# Patient Record
Sex: MALE | Race: WHITE | NOT HISPANIC OR LATINO | Employment: OTHER | URBAN - METROPOLITAN AREA
[De-identification: names, ages, dates, MRNs, and addresses within clinical notes are randomized per-mention and may not be internally consistent; named-entity substitution may affect disease eponyms.]

---

## 2017-06-15 ENCOUNTER — HISTORICAL (OUTPATIENT)
Dept: LAB | Facility: HOSPITAL | Age: 77
End: 2017-06-15

## 2017-09-26 ENCOUNTER — HISTORICAL (OUTPATIENT)
Dept: LAB | Facility: HOSPITAL | Age: 77
End: 2017-09-26

## 2017-12-19 ENCOUNTER — HISTORICAL (OUTPATIENT)
Dept: LAB | Facility: HOSPITAL | Age: 77
End: 2017-12-19

## 2018-03-19 ENCOUNTER — HISTORICAL (OUTPATIENT)
Dept: LAB | Facility: HOSPITAL | Age: 78
End: 2018-03-19

## 2018-03-19 LAB
ALBUMIN SERPL-MCNC: 4 GM/DL (ref 3.4–5)
ALBUMIN/GLOB SERPL: 1.1 RATIO (ref 1.1–2)
ALP SERPL-CCNC: 46 UNIT/L (ref 46–116)
ALT SERPL-CCNC: 24 UNIT/L (ref 12–78)
AST SERPL-CCNC: 14 UNIT/L (ref 15–37)
BILIRUB SERPL-MCNC: 0.6 MG/DL (ref 0.2–1)
BILIRUB SERPL-MCNC: NEGATIVE MG/DL
BILIRUBIN DIRECT+TOT PNL SERPL-MCNC: 0.21 MG/DL (ref 0–0.2)
BILIRUBIN DIRECT+TOT PNL SERPL-MCNC: 0.39 MG/DL (ref 0–0.8)
BLOOD URINE, POC: NEGATIVE
BUN SERPL-MCNC: 15.6 MG/DL (ref 7–18)
CALCIUM SERPL-MCNC: 9.3 MG/DL (ref 8.5–10.1)
CHLORIDE SERPL-SCNC: 105 MMOL/L (ref 98–107)
CLARITY, POC UA: CLEAR
CO2 SERPL-SCNC: 25.5 MMOL/L (ref 21–32)
COLOR, POC UA: NORMAL
CREAT SERPL-MCNC: 0.93 MG/DL (ref 0.6–1.3)
EST. AVERAGE GLUCOSE BLD GHB EST-MCNC: 140 MG/DL
GLOBULIN SER-MCNC: 3.6 GM/DL (ref 2.4–3.5)
GLUCOSE SERPL-MCNC: 139 MG/DL (ref 74–106)
GLUCOSE UR QL STRIP: NEGATIVE
HBA1C MFR BLD: 6.5 % (ref 4.5–6.2)
KETONES UR QL STRIP: NEGATIVE
LEUKOCYTE EST, POC UA: NEGATIVE
NITRITE, POC UA: NEGATIVE
PH, POC UA: 5.5
POTASSIUM SERPL-SCNC: 4.4 MMOL/L (ref 3.5–5.1)
PROT SERPL-MCNC: 7.6 GM/DL (ref 6.4–8.2)
PROTEIN, POC: NEGATIVE
SODIUM SERPL-SCNC: 142 MMOL/L (ref 136–145)
SPECIFIC GRAVITY, POC UA: 1.02
UROBILINOGEN, POC UA: NORMAL

## 2018-08-15 ENCOUNTER — HISTORICAL (OUTPATIENT)
Dept: RADIOLOGY | Facility: HOSPITAL | Age: 78
End: 2018-08-15

## 2018-10-10 ENCOUNTER — HISTORICAL (OUTPATIENT)
Dept: LAB | Facility: HOSPITAL | Age: 78
End: 2018-10-10

## 2018-12-11 ENCOUNTER — HISTORICAL (OUTPATIENT)
Dept: LAB | Facility: HOSPITAL | Age: 78
End: 2018-12-11

## 2019-01-23 ENCOUNTER — HISTORICAL (OUTPATIENT)
Dept: LAB | Facility: HOSPITAL | Age: 79
End: 2019-01-23

## 2019-04-22 ENCOUNTER — HISTORICAL (OUTPATIENT)
Dept: LAB | Facility: HOSPITAL | Age: 79
End: 2019-04-22

## 2019-10-30 ENCOUNTER — HISTORICAL (OUTPATIENT)
Dept: LAB | Facility: HOSPITAL | Age: 79
End: 2019-10-30

## 2019-11-07 ENCOUNTER — HISTORICAL (OUTPATIENT)
Dept: RADIOLOGY | Facility: HOSPITAL | Age: 79
End: 2019-11-07

## 2020-01-08 ENCOUNTER — HISTORICAL (OUTPATIENT)
Dept: LAB | Facility: HOSPITAL | Age: 80
End: 2020-01-08

## 2020-01-10 LAB — FINAL CULTURE: NORMAL

## 2020-02-17 ENCOUNTER — HISTORICAL (OUTPATIENT)
Dept: LAB | Facility: HOSPITAL | Age: 80
End: 2020-02-17

## 2020-09-14 ENCOUNTER — HISTORICAL (OUTPATIENT)
Dept: LAB | Facility: HOSPITAL | Age: 80
End: 2020-09-14

## 2021-03-23 ENCOUNTER — HISTORICAL (OUTPATIENT)
Dept: LAB | Facility: HOSPITAL | Age: 81
End: 2021-03-23

## 2021-08-31 ENCOUNTER — HISTORICAL (OUTPATIENT)
Dept: ADMINISTRATIVE | Facility: HOSPITAL | Age: 81
End: 2021-08-31

## 2021-09-07 ENCOUNTER — HISTORICAL (OUTPATIENT)
Dept: ADMINISTRATIVE | Facility: HOSPITAL | Age: 81
End: 2021-09-07

## 2021-11-29 ENCOUNTER — HISTORICAL (OUTPATIENT)
Dept: LAB | Facility: HOSPITAL | Age: 81
End: 2021-11-29

## 2021-12-01 ENCOUNTER — HISTORICAL (OUTPATIENT)
Dept: RADIOLOGY | Facility: HOSPITAL | Age: 81
End: 2021-12-01

## 2022-04-07 ENCOUNTER — HISTORICAL (OUTPATIENT)
Dept: ADMINISTRATIVE | Facility: HOSPITAL | Age: 82
End: 2022-04-07
Payer: MEDICARE

## 2022-04-24 VITALS
OXYGEN SATURATION: 97 % | SYSTOLIC BLOOD PRESSURE: 138 MMHG | HEIGHT: 70 IN | WEIGHT: 231.5 LBS | BODY MASS INDEX: 33.14 KG/M2 | DIASTOLIC BLOOD PRESSURE: 82 MMHG

## 2022-04-30 NOTE — OP NOTE
Patient:   Juanjose Chawla            MRN: [id number]            FIN: [id number]               Age:   80 years     Sex:  Male     :  1940   Associated Diagnoses:   None   Author:   Luis Miguel Moran MD      Preoperative Diagnosis:  Cataract Right  eye    Postoperative Diagnosis:  Cataract Right  eye    Procedure:  Phacoemulsification with intraocular lens implantation Right  eye    Surgeon:  Luis Miguel Moran MD    Assistant:  NASH Cavazos     Anestheisa:  MAC    Complications:  None    The patient was brought to the [facility] laser.  A time out was performed.  The capsulotomy, lens fragmentation and limbal relaxing incisions were completed.  Then the patient was brought into the operating suite, where the patient was correctly identified as was the operative eye via timeout.  The patient was prepped and draped in a sterile ophthalmic fashion.  A lid speculum was placed in the operative eye and the microscope was brought into place.  A 1.0 mm paracentesis was then made at (_12_) o'clock.  The anterior chamber was filled with Endocoat.  A (temporal ) clear corneal incision was made with a 2.4 mm keratome blade.  A 6 mm corneal marking ring was used to nicole the cornea centered over the visual axis.  A 5.00 mm continuous curvilinear capsulorhexis was fashioned using a cystotome and microcapsular forceps.  Hydrodissection and hydrodelineation was performed with upreserved 1% Xylocaine.  The nucleus was then phacoemulsified with the Abbott phacoemulsification hand-piece with a total of (_21_) EFX.  The cortex was then removed with the I/A hand-piece.  The lens model (_ZCB00_) with a power of (_22.0_) was placed in the capsular bag.  The Helon was then removed from the eye with the I/A hand piece.  The anterior chamber was inflated and the wounds were hydrated with BSS.  The wounds were checked with Weck-Veena sponges and found to be watertight.  The lid speculum was removed and topical antibiotics were placed on  the operative eye.  The patient was brought to PACU in good condition.

## 2022-04-30 NOTE — OP NOTE
Patient:   Juanjose Chawla            MRN: [id number]            FIN: [id number]               Age:   80 years     Sex:  Male     :  1940   Associated Diagnoses:   None   Author:   Luis Miguel Moran MD      Preoperative Diagnosis:  Cataract  Left eye    Postoperative Diagnosis:  Cataract  Left eye    Procedure:  Phacoemulsification with intraocular lens implantation  Left eye    Surgeon:  Luis Miguel Moran MD    Assistant:  NASH Cavazos     Anestheisa:  MAC    Complications:  None    The patient was brought to the hospitals laser.  A time out was performed.  The capsulotomy, lens fragmentation and limbal relaxing incisions were completed.  Then the patient was brought into the operating suite, where the patient was correctly identified as was the operative eye via timeout.  The patient was prepped and draped in a sterile ophthalmic fashion.  A lid speculum was placed in the operative eye and the microscope was brought into place.  A 1.0 mm paracentesis was then made at (_6_) o'clock.  The anterior chamber was filled with Endocoat.  A (temporal ) clear corneal incision was made with a 2.4 mm keratome blade.  A 6 mm corneal marking ring was used to nicole the cornea centered over the visual axis.  A 5.00 mm continuous curvilinear capsulorhexis was fashioned using a cystotome and microcapsular forceps.  Hydrodissection and hydrodelineation was performed with upreserved 1% Xylocaine.  The nucleus was then phacoemulsified with the Abbott phacoemulsification hand-piece with a total of (_19_) EFX.  The cortex was then removed with the I/A hand-piece.  The lens model (_ZCB00_) with a power of (_24.5_) was placed in the capsular bag.  The Helon was then removed from the eye with the I/A hand piece.  The anterior chamber was inflated and the wounds were hydrated with BSS.  The wounds were checked with Weck-Veena sponges and found to be watertight.  The lid speculum was removed and topical antibiotics were placed on the  operative eye.  The patient was brought to PACU in good condition.

## 2022-06-13 ENCOUNTER — LAB VISIT (OUTPATIENT)
Dept: LAB | Facility: HOSPITAL | Age: 82
End: 2022-06-13
Attending: FAMILY MEDICINE
Payer: MEDICARE

## 2022-06-13 DIAGNOSIS — E11.59 TYPE 2 DIABETES MELLITUS WITH VASCULAR DISEASE: Primary | ICD-10-CM

## 2022-06-13 DIAGNOSIS — E66.9 OBESITY, UNSPECIFIED CLASSIFICATION, UNSPECIFIED OBESITY TYPE, UNSPECIFIED WHETHER SERIOUS COMORBIDITY PRESENT: ICD-10-CM

## 2022-06-13 DIAGNOSIS — E55.9 VITAMIN D DEFICIENCY: ICD-10-CM

## 2022-06-13 DIAGNOSIS — D64.9 CHRONIC ANEMIA: ICD-10-CM

## 2022-06-13 DIAGNOSIS — I10 ESSENTIAL HYPERTENSION, MALIGNANT: ICD-10-CM

## 2022-06-13 LAB
ALBUMIN SERPL-MCNC: 3.7 GM/DL (ref 3.4–4.8)
ALBUMIN/GLOB SERPL: 1.1 RATIO (ref 1.1–2)
ALP SERPL-CCNC: 51 UNIT/L (ref 40–150)
ALT SERPL-CCNC: 15 UNIT/L (ref 0–55)
AST SERPL-CCNC: 18 UNIT/L (ref 5–34)
BASOPHILS # BLD AUTO: 0.03 X10(3)/MCL (ref 0–0.2)
BASOPHILS NFR BLD AUTO: 0.4 %
BILIRUBIN DIRECT+TOT PNL SERPL-MCNC: 0.7 MG/DL
BUN SERPL-MCNC: 15.8 MG/DL (ref 8.4–25.7)
CALCIUM SERPL-MCNC: 9.6 MG/DL (ref 8.8–10)
CHLORIDE SERPL-SCNC: 106 MMOL/L (ref 98–107)
CHOLEST SERPL-MCNC: 104 MG/DL
CHOLEST/HDLC SERPL: 2 {RATIO} (ref 0–5)
CO2 SERPL-SCNC: 25 MMOL/L (ref 23–31)
CREAT SERPL-MCNC: 0.81 MG/DL (ref 0.73–1.18)
CREAT UR-MCNC: 196 MG/DL (ref 63–166)
DEPRECATED CALCIDIOL+CALCIFEROL SERPL-MC: 52.8 NG/ML (ref 30–80)
EOSINOPHIL # BLD AUTO: 0.11 X10(3)/MCL (ref 0–0.9)
EOSINOPHIL NFR BLD AUTO: 1.6 %
ERYTHROCYTE [DISTWIDTH] IN BLOOD BY AUTOMATED COUNT: 12.5 % (ref 11.5–17)
ERYTHROCYTE [SEDIMENTATION RATE] IN BLOOD: 21 MM/HR (ref 0–15)
EST. AVERAGE GLUCOSE BLD GHB EST-MCNC: 180 MG/DL
FERRITIN SERPL-MCNC: 100.3 NG/ML (ref 21.81–274.66)
GLOBULIN SER-MCNC: 3.5 GM/DL (ref 2.4–3.5)
GLUCOSE SERPL-MCNC: 164 MG/DL (ref 82–115)
HBA1C MFR BLD: 7.9 %
HCT VFR BLD AUTO: 37.5 % (ref 42–52)
HDLC SERPL-MCNC: 45 MG/DL (ref 35–60)
HGB BLD-MCNC: 11.8 GM/DL (ref 14–18)
IMM GRANULOCYTES # BLD AUTO: 0.02 X10(3)/MCL (ref 0–0.02)
IMM GRANULOCYTES NFR BLD AUTO: 0.3 % (ref 0–0.43)
IRON SATN MFR SERPL: 28 % (ref 20–50)
IRON SERPL-MCNC: 73 UG/DL (ref 65–175)
LDLC SERPL CALC-MCNC: 40 MG/DL (ref 50–140)
LYMPHOCYTES # BLD AUTO: 2.23 X10(3)/MCL (ref 0.6–4.6)
LYMPHOCYTES NFR BLD AUTO: 32.4 %
MCH RBC QN AUTO: 29.6 PG (ref 27–31)
MCHC RBC AUTO-ENTMCNC: 31.5 MG/DL (ref 33–36)
MCV RBC AUTO: 94 FL (ref 80–94)
MICROALBUMIN UR-MCNC: 12.9 UG/ML
MICROALBUMIN/CREAT RATIO PNL UR: 6.6 MG/GM CR (ref 0–30)
MONOCYTES # BLD AUTO: 0.68 X10(3)/MCL (ref 0.1–1.3)
MONOCYTES NFR BLD AUTO: 9.9 %
NEUTROPHILS # BLD AUTO: 3.8 X10(3)/MCL (ref 2.1–9.2)
NEUTROPHILS NFR BLD AUTO: 55.4 %
PLATELET # BLD AUTO: 157 X10(3)/MCL (ref 130–400)
PMV BLD AUTO: 9.8 FL (ref 9.4–12.4)
POTASSIUM SERPL-SCNC: 4.8 MMOL/L (ref 3.5–5.1)
PROT SERPL-MCNC: 7.2 GM/DL (ref 5.8–7.6)
RBC # BLD AUTO: 3.99 X10(6)/MCL (ref 4.7–6.1)
SODIUM SERPL-SCNC: 140 MMOL/L (ref 136–145)
TIBC SERPL-MCNC: 188 UG/DL (ref 69–240)
TIBC SERPL-MCNC: 261 UG/DL (ref 250–450)
TRANSFERRIN SERPL-MCNC: 230 MG/DL
TRIGL SERPL-MCNC: 97 MG/DL (ref 34–140)
TSH SERPL-ACNC: 1.4 UIU/ML (ref 0.35–4.94)
VLDLC SERPL CALC-MCNC: 19 MG/DL
WBC # SPEC AUTO: 6.9 X10(3)/MCL (ref 4.5–11.5)

## 2022-06-13 PROCEDURE — 80053 COMPREHEN METABOLIC PANEL: CPT

## 2022-06-13 PROCEDURE — 82043 UR ALBUMIN QUANTITATIVE: CPT

## 2022-06-13 PROCEDURE — 83036 HEMOGLOBIN GLYCOSYLATED A1C: CPT

## 2022-06-13 PROCEDURE — 82306 VITAMIN D 25 HYDROXY: CPT

## 2022-06-13 PROCEDURE — 85651 RBC SED RATE NONAUTOMATED: CPT

## 2022-06-13 PROCEDURE — 83540 ASSAY OF IRON: CPT

## 2022-06-13 PROCEDURE — 84443 ASSAY THYROID STIM HORMONE: CPT

## 2022-06-13 PROCEDURE — 82728 ASSAY OF FERRITIN: CPT

## 2022-06-13 PROCEDURE — 85025 COMPLETE CBC W/AUTO DIFF WBC: CPT

## 2022-06-13 PROCEDURE — 36415 COLL VENOUS BLD VENIPUNCTURE: CPT

## 2022-06-13 PROCEDURE — 80061 LIPID PANEL: CPT

## 2022-06-30 RX ORDER — ROSUVASTATIN CALCIUM 20 MG/1
20 TABLET, COATED ORAL DAILY
COMMUNITY
Start: 2022-05-24

## 2022-06-30 RX ORDER — METFORMIN HYDROCHLORIDE 1000 MG/1
1000 TABLET ORAL 2 TIMES DAILY
COMMUNITY
Start: 2022-05-24

## 2022-06-30 RX ORDER — DOXAZOSIN 8 MG/1
8 TABLET ORAL NIGHTLY
COMMUNITY
Start: 2022-05-24

## 2022-06-30 RX ORDER — AMLODIPINE BESYLATE 5 MG/1
5 TABLET ORAL DAILY
COMMUNITY
Start: 2022-05-24

## 2022-06-30 RX ORDER — SITAGLIPTIN 100 MG/1
100 TABLET, FILM COATED ORAL DAILY
COMMUNITY
Start: 2022-05-24

## 2022-06-30 RX ORDER — LISINOPRIL 2.5 MG/1
2.5 TABLET ORAL DAILY
COMMUNITY
Start: 2022-05-24

## 2022-06-30 RX ORDER — FAMOTIDINE 20 MG/1
20 TABLET, FILM COATED ORAL DAILY
COMMUNITY
Start: 2022-05-24

## 2022-06-30 RX ORDER — ACYCLOVIR 400 MG/1
400 TABLET ORAL
COMMUNITY
Start: 2022-05-10

## 2022-09-15 ENCOUNTER — HISTORICAL (OUTPATIENT)
Dept: ADMINISTRATIVE | Facility: HOSPITAL | Age: 82
End: 2022-09-15
Payer: MEDICARE

## 2022-11-02 ENCOUNTER — LAB VISIT (OUTPATIENT)
Dept: LAB | Facility: HOSPITAL | Age: 82
End: 2022-11-02
Attending: FAMILY MEDICINE
Payer: MEDICARE

## 2022-11-02 DIAGNOSIS — I10 HYPERTENSION, UNSPECIFIED TYPE: Primary | ICD-10-CM

## 2022-11-02 DIAGNOSIS — E78.5 HYPERLIPIDEMIA, UNSPECIFIED HYPERLIPIDEMIA TYPE: ICD-10-CM

## 2022-11-02 DIAGNOSIS — E66.9 OBESITY, UNSPECIFIED CLASSIFICATION, UNSPECIFIED OBESITY TYPE, UNSPECIFIED WHETHER SERIOUS COMORBIDITY PRESENT: ICD-10-CM

## 2022-11-02 DIAGNOSIS — E11.69 DIABETES MELLITUS ASSOCIATED WITH HORMONAL ETIOLOGY: ICD-10-CM

## 2022-11-02 LAB
ANION GAP SERPL CALC-SCNC: 11 MEQ/L
BASOPHILS # BLD AUTO: 0.02 X10(3)/MCL (ref 0–0.2)
BASOPHILS NFR BLD AUTO: 0.3 %
BUN SERPL-MCNC: 15.2 MG/DL (ref 8.4–25.7)
CALCIUM SERPL-MCNC: 9 MG/DL (ref 8.8–10)
CHLORIDE SERPL-SCNC: 106 MMOL/L (ref 98–107)
CO2 SERPL-SCNC: 25 MMOL/L (ref 23–31)
CREAT SERPL-MCNC: 1.02 MG/DL (ref 0.73–1.18)
CREAT/UREA NIT SERPL: 15
EOSINOPHIL # BLD AUTO: 0.35 X10(3)/MCL (ref 0–0.9)
EOSINOPHIL NFR BLD AUTO: 4.4 %
ERYTHROCYTE [DISTWIDTH] IN BLOOD BY AUTOMATED COUNT: 12.8 % (ref 11.5–17)
EST. AVERAGE GLUCOSE BLD GHB EST-MCNC: 154.2 MG/DL
GFR SERPLBLD CREATININE-BSD FMLA CKD-EPI: >60 MLS/MIN/1.73/M2
GLUCOSE SERPL-MCNC: 161 MG/DL (ref 82–115)
HBA1C MFR BLD: 7 %
HCT VFR BLD AUTO: 40.7 % (ref 42–52)
HGB BLD-MCNC: 12.5 GM/DL (ref 14–18)
IMM GRANULOCYTES # BLD AUTO: 0.02 X10(3)/MCL (ref 0–0.04)
IMM GRANULOCYTES NFR BLD AUTO: 0.3 %
LYMPHOCYTES # BLD AUTO: 2.75 X10(3)/MCL (ref 0.6–4.6)
LYMPHOCYTES NFR BLD AUTO: 34.7 %
MCH RBC QN AUTO: 29.1 PG (ref 27–31)
MCHC RBC AUTO-ENTMCNC: 30.7 MG/DL (ref 33–36)
MCV RBC AUTO: 94.9 FL (ref 80–94)
MONOCYTES # BLD AUTO: 0.72 X10(3)/MCL (ref 0.1–1.3)
MONOCYTES NFR BLD AUTO: 9.1 %
NEUTROPHILS # BLD AUTO: 4.1 X10(3)/MCL (ref 2.1–9.2)
NEUTROPHILS NFR BLD AUTO: 51.2 %
PLATELET # BLD AUTO: 166 X10(3)/MCL (ref 130–400)
PMV BLD AUTO: 9.5 FL (ref 7.4–10.4)
POTASSIUM SERPL-SCNC: 4.2 MMOL/L (ref 3.5–5.1)
RBC # BLD AUTO: 4.29 X10(6)/MCL (ref 4.7–6.1)
SODIUM SERPL-SCNC: 142 MMOL/L (ref 136–145)
WBC # SPEC AUTO: 7.9 X10(3)/MCL (ref 4.5–11.5)

## 2022-11-02 PROCEDURE — 80048 BASIC METABOLIC PNL TOTAL CA: CPT

## 2022-11-02 PROCEDURE — 85025 COMPLETE CBC W/AUTO DIFF WBC: CPT

## 2022-11-02 PROCEDURE — 83036 HEMOGLOBIN GLYCOSYLATED A1C: CPT

## 2022-11-02 PROCEDURE — 36415 COLL VENOUS BLD VENIPUNCTURE: CPT

## 2023-06-14 ENCOUNTER — LAB VISIT (OUTPATIENT)
Dept: LAB | Facility: HOSPITAL | Age: 83
End: 2023-06-14
Attending: FAMILY MEDICINE
Payer: MEDICARE

## 2023-06-14 DIAGNOSIS — I15.2 OBESITY, DIABETES, AND HYPERTENSION SYNDROME: Primary | ICD-10-CM

## 2023-06-14 DIAGNOSIS — I10 ESSENTIAL HYPERTENSION, MALIGNANT: ICD-10-CM

## 2023-06-14 DIAGNOSIS — D64.9 ANEMIA, UNSPECIFIED TYPE: ICD-10-CM

## 2023-06-14 DIAGNOSIS — E11.59 OBESITY, DIABETES, AND HYPERTENSION SYNDROME: Primary | ICD-10-CM

## 2023-06-14 DIAGNOSIS — E66.9 OBESITY, DIABETES, AND HYPERTENSION SYNDROME: Primary | ICD-10-CM

## 2023-06-14 DIAGNOSIS — E55.9 AVITAMINOSIS D: ICD-10-CM

## 2023-06-14 DIAGNOSIS — E11.69 OBESITY, DIABETES, AND HYPERTENSION SYNDROME: Primary | ICD-10-CM

## 2023-06-14 LAB
ALBUMIN SERPL-MCNC: 3.7 G/DL (ref 3.4–4.8)
ALBUMIN/GLOB SERPL: 1 RATIO (ref 1.1–2)
ALP SERPL-CCNC: 60 UNIT/L (ref 40–150)
ALT SERPL-CCNC: 9 UNIT/L (ref 0–55)
AST SERPL-CCNC: 14 UNIT/L (ref 5–34)
BILIRUBIN DIRECT+TOT PNL SERPL-MCNC: 0.7 MG/DL
BUN SERPL-MCNC: 15.5 MG/DL (ref 8.4–25.7)
CALCIUM SERPL-MCNC: 9.3 MG/DL (ref 8.8–10)
CHLORIDE SERPL-SCNC: 107 MMOL/L (ref 98–107)
CHOLEST SERPL-MCNC: 112 MG/DL
CHOLEST/HDLC SERPL: 2 {RATIO} (ref 0–5)
CO2 SERPL-SCNC: 26 MMOL/L (ref 23–31)
CREAT SERPL-MCNC: 0.87 MG/DL (ref 0.73–1.18)
DEPRECATED CALCIDIOL+CALCIFEROL SERPL-MC: 57.3 NG/ML (ref 30–80)
ERYTHROCYTE [DISTWIDTH] IN BLOOD BY AUTOMATED COUNT: 13.1 % (ref 11.5–17)
EST. AVERAGE GLUCOSE BLD GHB EST-MCNC: 171.4 MG/DL
GFR SERPLBLD CREATININE-BSD FMLA CKD-EPI: >60 MLS/MIN/1.73/M2
GLOBULIN SER-MCNC: 3.6 GM/DL (ref 2.4–3.5)
GLUCOSE SERPL-MCNC: 144 MG/DL (ref 82–115)
HBA1C MFR BLD: 7.6 %
HCT VFR BLD AUTO: 40.6 % (ref 42–52)
HDLC SERPL-MCNC: 52 MG/DL (ref 35–60)
HGB BLD-MCNC: 12.6 G/DL (ref 14–18)
LDLC SERPL CALC-MCNC: 45 MG/DL (ref 50–140)
MCH RBC QN AUTO: 29.6 PG (ref 27–31)
MCHC RBC AUTO-ENTMCNC: 31 G/DL (ref 33–36)
MCV RBC AUTO: 95.5 FL (ref 80–94)
PLATELET # BLD AUTO: 145 X10(3)/MCL (ref 130–400)
PMV BLD AUTO: 9.6 FL (ref 7.4–10.4)
POTASSIUM SERPL-SCNC: 4.4 MMOL/L (ref 3.5–5.1)
PROT SERPL-MCNC: 7.3 GM/DL (ref 5.8–7.6)
RBC # BLD AUTO: 4.25 X10(6)/MCL (ref 4.7–6.1)
SODIUM SERPL-SCNC: 141 MMOL/L (ref 136–145)
TRIGL SERPL-MCNC: 76 MG/DL (ref 34–140)
TSH SERPL-ACNC: 1.19 UIU/ML (ref 0.35–4.94)
VLDLC SERPL CALC-MCNC: 15 MG/DL
WBC # SPEC AUTO: 6.45 X10(3)/MCL (ref 4.5–11.5)

## 2023-06-14 PROCEDURE — 83036 HEMOGLOBIN GLYCOSYLATED A1C: CPT

## 2023-06-14 PROCEDURE — 82306 VITAMIN D 25 HYDROXY: CPT

## 2023-06-14 PROCEDURE — 85027 COMPLETE CBC AUTOMATED: CPT

## 2023-06-14 PROCEDURE — 80061 LIPID PANEL: CPT

## 2023-06-14 PROCEDURE — 84443 ASSAY THYROID STIM HORMONE: CPT

## 2023-06-14 PROCEDURE — 36415 COLL VENOUS BLD VENIPUNCTURE: CPT

## 2023-06-14 PROCEDURE — 80053 COMPREHEN METABOLIC PANEL: CPT

## 2024-01-29 ENCOUNTER — LAB VISIT (OUTPATIENT)
Dept: LAB | Facility: HOSPITAL | Age: 84
End: 2024-01-29
Attending: FAMILY MEDICINE
Payer: MEDICARE

## 2024-01-29 DIAGNOSIS — E11.69 OBESITY, DIABETES, AND HYPERTENSION SYNDROME: ICD-10-CM

## 2024-01-29 DIAGNOSIS — E66.9 OBESITY, DIABETES, AND HYPERTENSION SYNDROME: ICD-10-CM

## 2024-01-29 DIAGNOSIS — I25.10 CORONARY ATHEROSCLEROSIS OF NATIVE CORONARY ARTERY: ICD-10-CM

## 2024-01-29 DIAGNOSIS — E78.5 HYPERLIPIDEMIA, UNSPECIFIED HYPERLIPIDEMIA TYPE: ICD-10-CM

## 2024-01-29 DIAGNOSIS — I15.2 OBESITY, DIABETES, AND HYPERTENSION SYNDROME: ICD-10-CM

## 2024-01-29 DIAGNOSIS — I10 ESSENTIAL HYPERTENSION, MALIGNANT: Primary | ICD-10-CM

## 2024-01-29 DIAGNOSIS — E11.59 OBESITY, DIABETES, AND HYPERTENSION SYNDROME: ICD-10-CM

## 2024-01-29 LAB
ANION GAP SERPL CALC-SCNC: 8 MEQ/L
BUN SERPL-MCNC: 14 MG/DL (ref 8.4–25.7)
CALCIUM SERPL-MCNC: 9 MG/DL (ref 8.8–10)
CHLORIDE SERPL-SCNC: 105 MMOL/L (ref 98–107)
CO2 SERPL-SCNC: 28 MMOL/L (ref 23–31)
CREAT SERPL-MCNC: 0.9 MG/DL (ref 0.73–1.18)
CREAT/UREA NIT SERPL: 16
ERYTHROCYTE [DISTWIDTH] IN BLOOD BY AUTOMATED COUNT: 13 % (ref 11.5–17)
EST. AVERAGE GLUCOSE BLD GHB EST-MCNC: 165.7 MG/DL
GFR SERPLBLD CREATININE-BSD FMLA CKD-EPI: >60 MLS/MIN/1.73/M2
GLUCOSE SERPL-MCNC: 155 MG/DL (ref 82–115)
HBA1C MFR BLD: 7.4 %
HCT VFR BLD AUTO: 41.3 % (ref 42–52)
HGB BLD-MCNC: 12.9 G/DL (ref 14–18)
MCH RBC QN AUTO: 29.7 PG (ref 27–31)
MCHC RBC AUTO-ENTMCNC: 31.2 G/DL (ref 33–36)
MCV RBC AUTO: 95.2 FL (ref 80–94)
PLATELET # BLD AUTO: 104 X10(3)/MCL (ref 130–400)
PMV BLD AUTO: 10.3 FL (ref 7.4–10.4)
POTASSIUM SERPL-SCNC: 4.2 MMOL/L (ref 3.5–5.1)
RBC # BLD AUTO: 4.34 X10(6)/MCL (ref 4.7–6.1)
SODIUM SERPL-SCNC: 141 MMOL/L (ref 136–145)
WBC # SPEC AUTO: 5.67 X10(3)/MCL (ref 4.5–11.5)

## 2024-01-29 PROCEDURE — 83036 HEMOGLOBIN GLYCOSYLATED A1C: CPT

## 2024-01-29 PROCEDURE — 85027 COMPLETE CBC AUTOMATED: CPT

## 2024-01-29 PROCEDURE — 36415 COLL VENOUS BLD VENIPUNCTURE: CPT

## 2024-01-29 PROCEDURE — 80048 BASIC METABOLIC PNL TOTAL CA: CPT

## 2024-08-14 ENCOUNTER — LAB VISIT (OUTPATIENT)
Dept: LAB | Facility: HOSPITAL | Age: 84
End: 2024-08-14
Attending: FAMILY MEDICINE
Payer: MEDICARE

## 2024-08-14 DIAGNOSIS — I25.10 CORONARY ATHEROSCLEROSIS OF NATIVE CORONARY ARTERY: ICD-10-CM

## 2024-08-14 DIAGNOSIS — I50.42 CHRONIC COMBINED SYSTOLIC AND DIASTOLIC HEART FAILURE: ICD-10-CM

## 2024-08-14 DIAGNOSIS — I15.2 OBESITY, DIABETES, AND HYPERTENSION SYNDROME: Primary | ICD-10-CM

## 2024-08-14 DIAGNOSIS — Z79.899 ENCOUNTER FOR LONG-TERM (CURRENT) USE OF OTHER MEDICATIONS: ICD-10-CM

## 2024-08-14 DIAGNOSIS — E78.5 HYPERLIPIDEMIA, UNSPECIFIED HYPERLIPIDEMIA TYPE: ICD-10-CM

## 2024-08-14 DIAGNOSIS — D69.6 THROMBOCYTOPENIA, UNSPECIFIED: ICD-10-CM

## 2024-08-14 DIAGNOSIS — E66.9 OBESITY, DIABETES, AND HYPERTENSION SYNDROME: Primary | ICD-10-CM

## 2024-08-14 DIAGNOSIS — E11.69 OBESITY, DIABETES, AND HYPERTENSION SYNDROME: Primary | ICD-10-CM

## 2024-08-14 DIAGNOSIS — E11.59 OBESITY, DIABETES, AND HYPERTENSION SYNDROME: Primary | ICD-10-CM

## 2024-08-14 DIAGNOSIS — I10 ESSENTIAL HYPERTENSION, MALIGNANT: ICD-10-CM

## 2024-08-14 LAB
ALBUMIN SERPL-MCNC: 3.7 G/DL (ref 3.4–4.8)
ALBUMIN/GLOB SERPL: 1 RATIO (ref 1.1–2)
ALP SERPL-CCNC: 65 UNIT/L (ref 40–150)
ALT SERPL-CCNC: 10 UNIT/L (ref 0–55)
ANION GAP SERPL CALC-SCNC: 4 MEQ/L
AST SERPL-CCNC: 13 UNIT/L (ref 5–34)
BILIRUB SERPL-MCNC: 0.7 MG/DL
BUN SERPL-MCNC: 13.6 MG/DL (ref 8.4–25.7)
CALCIUM SERPL-MCNC: 9.3 MG/DL (ref 8.8–10)
CHLORIDE SERPL-SCNC: 109 MMOL/L (ref 98–107)
CHOLEST SERPL-MCNC: 119 MG/DL
CHOLEST/HDLC SERPL: 3 {RATIO} (ref 0–5)
CO2 SERPL-SCNC: 28 MMOL/L (ref 23–31)
CREAT SERPL-MCNC: 1 MG/DL (ref 0.73–1.18)
CREAT/UREA NIT SERPL: 14
ERYTHROCYTE [DISTWIDTH] IN BLOOD BY AUTOMATED COUNT: 13.3 % (ref 11.5–17)
EST. AVERAGE GLUCOSE BLD GHB EST-MCNC: 131.2 MG/DL
FT4I SERPL CALC-MCNC: 2.28 (ref 2.6–3.6)
GFR SERPLBLD CREATININE-BSD FMLA CKD-EPI: >60 ML/MIN/1.73/M2
GLOBULIN SER-MCNC: 3.6 GM/DL (ref 2.4–3.5)
GLUCOSE SERPL-MCNC: 129 MG/DL (ref 82–115)
HBA1C MFR BLD: 6.2 %
HCT VFR BLD AUTO: 41 % (ref 42–52)
HDLC SERPL-MCNC: 45 MG/DL (ref 35–60)
HGB BLD-MCNC: 13 G/DL (ref 14–18)
LDLC SERPL CALC-MCNC: 55 MG/DL (ref 50–140)
MCH RBC QN AUTO: 30.7 PG (ref 27–31)
MCHC RBC AUTO-ENTMCNC: 31.7 G/DL (ref 33–36)
MCV RBC AUTO: 96.9 FL (ref 80–94)
PLATELET # BLD AUTO: 115 X10(3)/MCL (ref 130–400)
PMV BLD AUTO: 9.9 FL (ref 7.4–10.4)
POTASSIUM SERPL-SCNC: 4.5 MMOL/L (ref 3.5–5.1)
PROT SERPL-MCNC: 7.3 GM/DL (ref 5.8–7.6)
RBC # BLD AUTO: 4.23 X10(6)/MCL (ref 4.7–6.1)
SODIUM SERPL-SCNC: 141 MMOL/L (ref 136–145)
T3RU NFR SERPL: 35.57 % (ref 31–39)
T4 SERPL-MCNC: 6.41 UG/DL (ref 4.87–11.72)
TRIGL SERPL-MCNC: 94 MG/DL (ref 34–140)
TSH SERPL-ACNC: 0.97 UIU/ML (ref 0.35–4.94)
VLDLC SERPL CALC-MCNC: 19 MG/DL
WBC # BLD AUTO: 6.63 X10(3)/MCL (ref 4.5–11.5)

## 2024-08-14 PROCEDURE — 80053 COMPREHEN METABOLIC PANEL: CPT

## 2024-08-14 PROCEDURE — 36415 COLL VENOUS BLD VENIPUNCTURE: CPT

## 2024-08-14 PROCEDURE — 85027 COMPLETE CBC AUTOMATED: CPT

## 2024-08-14 PROCEDURE — 84436 ASSAY OF TOTAL THYROXINE: CPT

## 2024-08-14 PROCEDURE — 84443 ASSAY THYROID STIM HORMONE: CPT

## 2024-08-14 PROCEDURE — 83036 HEMOGLOBIN GLYCOSYLATED A1C: CPT

## 2024-08-14 PROCEDURE — 80061 LIPID PANEL: CPT

## 2025-02-27 ENCOUNTER — HOSPITAL ENCOUNTER (OUTPATIENT)
Dept: RADIOLOGY | Facility: HOSPITAL | Age: 85
Discharge: HOME | End: 2025-02-27
Attending: FAMILY MEDICINE
Payer: MEDICARE

## 2025-02-27 ENCOUNTER — HOSPITAL ENCOUNTER (OUTPATIENT)
Facility: HOSPITAL | Age: 85
Discharge: HOME | End: 2025-02-28
Attending: STUDENT IN AN ORGANIZED HEALTH CARE EDUCATION/TRAINING PROGRAM | Admitting: INTERNAL MEDICINE
Payer: MEDICARE

## 2025-02-27 DIAGNOSIS — J18.9 PNEUMONIA OF RIGHT LOWER LOBE DUE TO INFECTIOUS ORGANISM: ICD-10-CM

## 2025-02-27 DIAGNOSIS — R06.02 SHORTNESS OF BREATH: ICD-10-CM

## 2025-02-27 DIAGNOSIS — R07.9 CHEST PAIN: ICD-10-CM

## 2025-02-27 DIAGNOSIS — J90 PLEURAL EFFUSION: ICD-10-CM

## 2025-02-27 DIAGNOSIS — R91.8 HILAR ENLARGEMENT: ICD-10-CM

## 2025-02-27 DIAGNOSIS — R06.00 DYSPNEA, UNSPECIFIED TYPE: ICD-10-CM

## 2025-02-27 DIAGNOSIS — J44.1 COPD EXACERBATION: ICD-10-CM

## 2025-02-27 DIAGNOSIS — R09.02 HYPOXIA: Primary | ICD-10-CM

## 2025-02-27 DIAGNOSIS — R93.89 ABNORMAL CHEST X-RAY: ICD-10-CM

## 2025-02-27 LAB
ALBUMIN SERPL-MCNC: 3.5 G/DL (ref 3.4–4.8)
ALBUMIN/GLOB SERPL: 0.7 RATIO (ref 1.1–2)
ALP SERPL-CCNC: 81 UNIT/L (ref 40–150)
ALT SERPL-CCNC: 7 UNIT/L (ref 0–55)
ANION GAP SERPL CALC-SCNC: 9 MEQ/L
AST SERPL-CCNC: 21 UNIT/L (ref 5–34)
B PERT.PT PRMT NPH QL NAA+NON-PROBE: NOT DETECTED
BASOPHILS # BLD AUTO: 0.04 X10(3)/MCL
BASOPHILS NFR BLD AUTO: 0.5 %
BILIRUB SERPL-MCNC: 0.6 MG/DL
BNP BLD-MCNC: 45.2 PG/ML
BUN SERPL-MCNC: 11.5 MG/DL (ref 8.4–25.7)
C PNEUM DNA NPH QL NAA+NON-PROBE: NOT DETECTED
CALCIUM SERPL-MCNC: 9.7 MG/DL (ref 8.8–10)
CHLORIDE SERPL-SCNC: 106 MMOL/L (ref 98–107)
CO2 SERPL-SCNC: 24 MMOL/L (ref 23–31)
CREAT SERPL-MCNC: 0.9 MG/DL (ref 0.5–1.4)
CREAT SERPL-MCNC: 0.93 MG/DL (ref 0.72–1.25)
CREAT/UREA NIT SERPL: 12
EOSINOPHIL # BLD AUTO: 0.13 X10(3)/MCL (ref 0–0.9)
EOSINOPHIL NFR BLD AUTO: 1.5 %
ERYTHROCYTE [DISTWIDTH] IN BLOOD BY AUTOMATED COUNT: 13.1 % (ref 11.5–17)
FLUAV AG UPPER RESP QL IA.RAPID: NOT DETECTED
FLUBV AG UPPER RESP QL IA.RAPID: NOT DETECTED
GFR SERPLBLD CREATININE-BSD FMLA CKD-EPI: >60 ML/MIN/1.73/M2
GLOBULIN SER-MCNC: 4.8 GM/DL (ref 2.4–3.5)
GLUCOSE SERPL-MCNC: 109 MG/DL (ref 82–115)
HADV DNA NPH QL NAA+NON-PROBE: NOT DETECTED
HCOV 229E RNA NPH QL NAA+NON-PROBE: NOT DETECTED
HCOV HKU1 RNA NPH QL NAA+NON-PROBE: NOT DETECTED
HCOV NL63 RNA NPH QL NAA+NON-PROBE: NOT DETECTED
HCOV OC43 RNA NPH QL NAA+NON-PROBE: NOT DETECTED
HCT VFR BLD AUTO: 40.6 % (ref 42–52)
HGB BLD-MCNC: 13.4 G/DL (ref 14–18)
HMPV RNA NPH QL NAA+NON-PROBE: NOT DETECTED
HPIV1 RNA NPH QL NAA+NON-PROBE: NOT DETECTED
HPIV2 RNA NPH QL NAA+NON-PROBE: NOT DETECTED
HPIV3 RNA NPH QL NAA+NON-PROBE: NOT DETECTED
HPIV4 RNA NPH QL NAA+NON-PROBE: NOT DETECTED
IMM GRANULOCYTES # BLD AUTO: 0.03 X10(3)/MCL (ref 0–0.04)
IMM GRANULOCYTES NFR BLD AUTO: 0.3 %
LACTATE SERPL-SCNC: 1 MMOL/L (ref 0.5–2.2)
LYMPHOCYTES # BLD AUTO: 1.99 X10(3)/MCL (ref 0.6–4.6)
LYMPHOCYTES NFR BLD AUTO: 22.6 %
M PNEUMO DNA NPH QL NAA+NON-PROBE: NOT DETECTED
MCH RBC QN AUTO: 30.5 PG (ref 27–31)
MCHC RBC AUTO-ENTMCNC: 33 G/DL (ref 33–36)
MCV RBC AUTO: 92.5 FL (ref 80–94)
MONOCYTES # BLD AUTO: 0.96 X10(3)/MCL (ref 0.1–1.3)
MONOCYTES NFR BLD AUTO: 10.9 %
NEUTROPHILS # BLD AUTO: 5.66 X10(3)/MCL (ref 2.1–9.2)
NEUTROPHILS NFR BLD AUTO: 64.2 %
NRBC BLD AUTO-RTO: 0 %
OHS QRS DURATION: 122 MS
OHS QTC CALCULATION: 486 MS
PLATELET # BLD AUTO: 148 X10(3)/MCL (ref 130–400)
PMV BLD AUTO: 11 FL (ref 7.4–10.4)
POTASSIUM SERPL-SCNC: 4.7 MMOL/L (ref 3.5–5.1)
PROT SERPL-MCNC: 8.3 GM/DL (ref 5.8–7.6)
RBC # BLD AUTO: 4.39 X10(6)/MCL (ref 4.7–6.1)
RSV RNA NPH QL NAA+NON-PROBE: NOT DETECTED
RV+EV RNA NPH QL NAA+NON-PROBE: NOT DETECTED
SAMPLE: NORMAL
SARS-COV-2 RNA RESP QL NAA+PROBE: NOT DETECTED
SODIUM SERPL-SCNC: 139 MMOL/L (ref 136–145)
TROPONIN I SERPL-MCNC: 0.01 NG/ML (ref 0–0.04)
WBC # BLD AUTO: 8.81 X10(3)/MCL (ref 4.5–11.5)

## 2025-02-27 PROCEDURE — 96375 TX/PRO/DX INJ NEW DRUG ADDON: CPT

## 2025-02-27 PROCEDURE — 99900035 HC TECH TIME PER 15 MIN (STAT)

## 2025-02-27 PROCEDURE — 25000242 PHARM REV CODE 250 ALT 637 W/ HCPCS: Performed by: STUDENT IN AN ORGANIZED HEALTH CARE EDUCATION/TRAINING PROGRAM

## 2025-02-27 PROCEDURE — 93010 ELECTROCARDIOGRAM REPORT: CPT | Mod: ,,, | Performed by: INTERNAL MEDICINE

## 2025-02-27 PROCEDURE — 87798 DETECT AGENT NOS DNA AMP: CPT | Performed by: STUDENT IN AN ORGANIZED HEALTH CARE EDUCATION/TRAINING PROGRAM

## 2025-02-27 PROCEDURE — 27000221 HC OXYGEN, UP TO 24 HOURS

## 2025-02-27 PROCEDURE — 83880 ASSAY OF NATRIURETIC PEPTIDE: CPT | Performed by: PHYSICIAN ASSISTANT

## 2025-02-27 PROCEDURE — 85025 COMPLETE CBC W/AUTO DIFF WBC: CPT | Performed by: PHYSICIAN ASSISTANT

## 2025-02-27 PROCEDURE — 0240U COVID/FLU A&B PCR: CPT | Performed by: STUDENT IN AN ORGANIZED HEALTH CARE EDUCATION/TRAINING PROGRAM

## 2025-02-27 PROCEDURE — 96365 THER/PROPH/DIAG IV INF INIT: CPT

## 2025-02-27 PROCEDURE — 99900031 HC PATIENT EDUCATION (STAT)

## 2025-02-27 PROCEDURE — 25000003 PHARM REV CODE 250: Performed by: STUDENT IN AN ORGANIZED HEALTH CARE EDUCATION/TRAINING PROGRAM

## 2025-02-27 PROCEDURE — G0378 HOSPITAL OBSERVATION PER HR: HCPCS

## 2025-02-27 PROCEDURE — 93005 ELECTROCARDIOGRAM TRACING: CPT

## 2025-02-27 PROCEDURE — 71260 CT THORAX DX C+: CPT | Mod: TC

## 2025-02-27 PROCEDURE — 94760 N-INVAS EAR/PLS OXIMETRY 1: CPT

## 2025-02-27 PROCEDURE — 63600175 PHARM REV CODE 636 W HCPCS: Performed by: STUDENT IN AN ORGANIZED HEALTH CARE EDUCATION/TRAINING PROGRAM

## 2025-02-27 PROCEDURE — 25500020 PHARM REV CODE 255: Performed by: FAMILY MEDICINE

## 2025-02-27 PROCEDURE — 94644 CONT INHLJ TX 1ST HOUR: CPT

## 2025-02-27 PROCEDURE — 87040 BLOOD CULTURE FOR BACTERIA: CPT | Performed by: STUDENT IN AN ORGANIZED HEALTH CARE EDUCATION/TRAINING PROGRAM

## 2025-02-27 PROCEDURE — 83605 ASSAY OF LACTIC ACID: CPT | Performed by: STUDENT IN AN ORGANIZED HEALTH CARE EDUCATION/TRAINING PROGRAM

## 2025-02-27 PROCEDURE — 99285 EMERGENCY DEPT VISIT HI MDM: CPT | Mod: 25

## 2025-02-27 PROCEDURE — 80053 COMPREHEN METABOLIC PANEL: CPT | Performed by: PHYSICIAN ASSISTANT

## 2025-02-27 PROCEDURE — 84484 ASSAY OF TROPONIN QUANT: CPT | Performed by: PHYSICIAN ASSISTANT

## 2025-02-27 RX ORDER — FUROSEMIDE 10 MG/ML
40 INJECTION INTRAMUSCULAR; INTRAVENOUS
Status: COMPLETED | OUTPATIENT
Start: 2025-02-27 | End: 2025-02-27

## 2025-02-27 RX ORDER — METHYLPREDNISOLONE SOD SUCC 125 MG
125 VIAL (EA) INJECTION
Status: COMPLETED | OUTPATIENT
Start: 2025-02-27 | End: 2025-02-27

## 2025-02-27 RX ORDER — CEFTRIAXONE 2 G/1
2 INJECTION, POWDER, FOR SOLUTION INTRAMUSCULAR; INTRAVENOUS
Status: COMPLETED | OUTPATIENT
Start: 2025-02-27 | End: 2025-02-27

## 2025-02-27 RX ORDER — IPRATROPIUM BROMIDE AND ALBUTEROL SULFATE 2.5; .5 MG/3ML; MG/3ML
9 SOLUTION RESPIRATORY (INHALATION)
Status: COMPLETED | OUTPATIENT
Start: 2025-02-27 | End: 2025-02-27

## 2025-02-27 RX ADMIN — AZITHROMYCIN DIHYDRATE 500 MG: 500 INJECTION, POWDER, LYOPHILIZED, FOR SOLUTION INTRAVENOUS at 11:02

## 2025-02-27 RX ADMIN — FUROSEMIDE 40 MG: 10 INJECTION, SOLUTION INTRAMUSCULAR; INTRAVENOUS at 07:02

## 2025-02-27 RX ADMIN — METHYLPREDNISOLONE SODIUM SUCCINATE 125 MG: 125 INJECTION, POWDER, FOR SOLUTION INTRAMUSCULAR; INTRAVENOUS at 11:02

## 2025-02-27 RX ADMIN — CEFTRIAXONE SODIUM 2 G: 2 INJECTION, POWDER, FOR SOLUTION INTRAMUSCULAR; INTRAVENOUS at 10:02

## 2025-02-27 RX ADMIN — IPRATROPIUM BROMIDE AND ALBUTEROL SULFATE 9 ML: .5; 3 SOLUTION RESPIRATORY (INHALATION) at 07:02

## 2025-02-27 RX ADMIN — IOHEXOL 100 ML: 350 INJECTION, SOLUTION INTRAVENOUS at 09:02

## 2025-02-27 NOTE — Clinical Note
Diagnosis: COPD exacerbation [020307]   Future Attending Provider: JOYCE NANCE [[id number]]   Admit to which facility:: [facility] [[id number]]

## 2025-02-27 NOTE — FIRST PROVIDER EVALUATION
Medical screening examination initiated.  I have conducted a focused provider triage encounter, findings are as follows:    Brief history of present illness:  84-year-old male presents to ED for evaluation of shortness of breath.  Patient reports he had x-ray and CT done outpatient.  Shows a moderate-to-large pleural effusion.  Sent to ED for possible admission.    There were no vitals filed for this visit.    Pertinent physical exam:  Patient is awake and alert and oriented.  Ambulatory to triage.  In no acute distress.      Brief workup plan:  labs, EKG    Preliminary workup initiated; this workup will be continued and followed by the physician or advanced practice provider that is assigned to the patient when roomed.

## 2025-02-28 VITALS
WEIGHT: 214.31 LBS | RESPIRATION RATE: 18 BRPM | BODY MASS INDEX: 30.68 KG/M2 | DIASTOLIC BLOOD PRESSURE: 83 MMHG | SYSTOLIC BLOOD PRESSURE: 146 MMHG | HEART RATE: 91 BPM | HEIGHT: 70 IN | OXYGEN SATURATION: 91 % | TEMPERATURE: 98 F

## 2025-02-28 PROBLEM — J90 PLEURAL EFFUSION: Status: RESOLVED | Noted: 2025-02-28 | Resolved: 2025-02-28

## 2025-02-28 PROBLEM — J90 PLEURAL EFFUSION: Status: ACTIVE | Noted: 2025-02-28

## 2025-02-28 LAB
ALBUMIN SERPL-MCNC: 3.7 G/DL (ref 3.4–4.8)
ALBUMIN/GLOB SERPL: 0.9 RATIO (ref 1.1–2)
ALP SERPL-CCNC: 76 UNIT/L (ref 40–150)
ALT SERPL-CCNC: 5 UNIT/L (ref 0–55)
ANION GAP SERPL CALC-SCNC: 11 MEQ/L
AST SERPL-CCNC: 11 UNIT/L (ref 5–34)
BASOPHILS # BLD AUTO: 0.03 X10(3)/MCL
BASOPHILS NFR BLD AUTO: 0.4 %
BASOPHILS NFR FLD MANUAL: 1 %
BILIRUB SERPL-MCNC: 0.5 MG/DL
BUN SERPL-MCNC: 13.2 MG/DL (ref 8.4–25.7)
CALCIUM SERPL-MCNC: 9.7 MG/DL (ref 8.8–10)
CHLORIDE SERPL-SCNC: 104 MMOL/L (ref 98–107)
CLARITY BODY FLUID (OLG): NORMAL
CO2 SERPL-SCNC: 25 MMOL/L (ref 23–31)
COLOR BODY FLUID (OLG): NORMAL
CREAT SERPL-MCNC: 1.03 MG/DL (ref 0.72–1.25)
CREAT/UREA NIT SERPL: 13
EOSINOPHIL # BLD AUTO: 0.02 X10(3)/MCL (ref 0–0.9)
EOSINOPHIL NFR BLD AUTO: 0.3 %
ERYTHROCYTE [DISTWIDTH] IN BLOOD BY AUTOMATED COUNT: 13.2 % (ref 11.5–17)
GFR SERPLBLD CREATININE-BSD FMLA CKD-EPI: >60 ML/MIN/1.73/M2
GLOBULIN SER-MCNC: 4 GM/DL (ref 2.4–3.5)
GLUCOSE FLD-MCNC: 89 MG/DL
GLUCOSE SERPL-MCNC: 160 MG/DL (ref 82–115)
HCT VFR BLD AUTO: 39.7 % (ref 42–52)
HGB BLD-MCNC: 13.1 G/DL (ref 14–18)
IMM GRANULOCYTES # BLD AUTO: 0.03 X10(3)/MCL (ref 0–0.04)
IMM GRANULOCYTES NFR BLD AUTO: 0.4 %
LDH FLD-CCNC: 1115 U/L
LDH SERPL-CCNC: 173 U/L (ref 125–220)
LYMPHOCYTES # BLD AUTO: 1.05 X10(3)/MCL (ref 0.6–4.6)
LYMPHOCYTES NFR BLD AUTO: 13.5 %
LYMPHOCYTES NFR FLD MANUAL: 13 %
MACROPHAGES MAN COUNT BF (OLG): 42
MAGNESIUM SERPL-MCNC: 2 MG/DL (ref 1.6–2.6)
MCH RBC QN AUTO: 30.5 PG (ref 27–31)
MCHC RBC AUTO-ENTMCNC: 33 G/DL (ref 33–36)
MCV RBC AUTO: 92.3 FL (ref 80–94)
MESOTHELIAL CELLS MAN COUNT BF (OLG): 10
MONOCYTE MAN % BF (OLG): 2 %
MONOCYTES # BLD AUTO: 0.17 X10(3)/MCL (ref 0.1–1.3)
MONOCYTES NFR BLD AUTO: 2.2 %
MRSA PCR SCRN (OHS): NOT DETECTED
NEUTROPHILS # BLD AUTO: 6.48 X10(3)/MCL (ref 2.1–9.2)
NEUTROPHILS MAN % BF (OLG): 11 %
NEUTROPHILS NFR BLD AUTO: 83.2 %
NRBC BLD AUTO-RTO: 0 %
OTHER CELLS FLD MANUAL: 73 %
PLATELET # BLD AUTO: 140 X10(3)/MCL (ref 130–400)
PLATELETS.RETICULATED NFR BLD AUTO: 2.5 % (ref 0.9–11.2)
PMV BLD AUTO: 9.9 FL (ref 7.4–10.4)
POTASSIUM SERPL-SCNC: 4 MMOL/L (ref 3.5–5.1)
PROT FLD-MCNC: 6.9 GM/DL
PROT SERPL-MCNC: 7.7 GM/DL (ref 5.8–7.6)
RBC # BLD AUTO: 4.3 X10(6)/MCL (ref 4.7–6.1)
SODIUM SERPL-SCNC: 140 MMOL/L (ref 136–145)
WBC # BLD AUTO: 7.78 X10(3)/MCL (ref 4.5–11.5)
WBC # FLD AUTO: 1803 /UL

## 2025-02-28 PROCEDURE — 83615 LACTATE (LD) (LDH) ENZYME: CPT | Performed by: INTERNAL MEDICINE

## 2025-02-28 PROCEDURE — 85025 COMPLETE CBC W/AUTO DIFF WBC: CPT

## 2025-02-28 PROCEDURE — 25000003 PHARM REV CODE 250: Performed by: INTERNAL MEDICINE

## 2025-02-28 PROCEDURE — 84311 SPECTROPHOTOMETRY: CPT | Performed by: INTERNAL MEDICINE

## 2025-02-28 PROCEDURE — 87070 CULTURE OTHR SPECIMN AEROBIC: CPT

## 2025-02-28 PROCEDURE — 36415 COLL VENOUS BLD VENIPUNCTURE: CPT | Mod: 59

## 2025-02-28 PROCEDURE — 87641 MR-STAPH DNA AMP PROBE: CPT

## 2025-02-28 PROCEDURE — 87116 MYCOBACTERIA CULTURE: CPT | Performed by: INTERNAL MEDICINE

## 2025-02-28 PROCEDURE — G0378 HOSPITAL OBSERVATION PER HR: HCPCS

## 2025-02-28 PROCEDURE — 80053 COMPREHEN METABOLIC PANEL: CPT

## 2025-02-28 PROCEDURE — 87102 FUNGUS ISOLATION CULTURE: CPT | Performed by: INTERNAL MEDICINE

## 2025-02-28 PROCEDURE — 82945 GLUCOSE OTHER FLUID: CPT | Performed by: INTERNAL MEDICINE

## 2025-02-28 PROCEDURE — 84157 ASSAY OF PROTEIN OTHER: CPT | Performed by: INTERNAL MEDICINE

## 2025-02-28 PROCEDURE — 36415 COLL VENOUS BLD VENIPUNCTURE: CPT

## 2025-02-28 PROCEDURE — 89051 BODY FLUID CELL COUNT: CPT | Performed by: INTERNAL MEDICINE

## 2025-02-28 PROCEDURE — 87070 CULTURE OTHR SPECIMN AEROBIC: CPT | Performed by: INTERNAL MEDICINE

## 2025-02-28 PROCEDURE — 63600175 PHARM REV CODE 636 W HCPCS: Performed by: INTERNAL MEDICINE

## 2025-02-28 PROCEDURE — 86480 TB TEST CELL IMMUN MEASURE: CPT

## 2025-02-28 PROCEDURE — 83735 ASSAY OF MAGNESIUM: CPT

## 2025-02-28 RX ORDER — CEFTRIAXONE 2 G/1
2 INJECTION, POWDER, FOR SOLUTION INTRAMUSCULAR; INTRAVENOUS
Status: DISCONTINUED | OUTPATIENT
Start: 2025-02-28 | End: 2025-02-28

## 2025-02-28 RX ORDER — AZITHROMYCIN 250 MG/1
TABLET, FILM COATED ORAL
Qty: 6 TABLET | Refills: 0 | Status: SHIPPED | OUTPATIENT
Start: 2025-02-28 | End: 2025-03-05

## 2025-02-28 RX ORDER — IBUPROFEN 200 MG
16 TABLET ORAL
Status: DISCONTINUED | OUTPATIENT
Start: 2025-02-28 | End: 2025-02-28 | Stop reason: HOSPADM

## 2025-02-28 RX ORDER — FLUTICASONE FUROATE AND VILANTEROL 200; 25 UG/1; UG/1
1 POWDER RESPIRATORY (INHALATION) DAILY
Status: DISCONTINUED | OUTPATIENT
Start: 2025-02-28 | End: 2025-02-28 | Stop reason: HOSPADM

## 2025-02-28 RX ORDER — GUAIFENESIN 100 MG/5ML
200 SOLUTION ORAL EVERY 4 HOURS PRN
Status: DISCONTINUED | OUTPATIENT
Start: 2025-02-28 | End: 2025-02-28 | Stop reason: HOSPADM

## 2025-02-28 RX ORDER — ATORVASTATIN CALCIUM 40 MG/1
40 TABLET, FILM COATED ORAL DAILY
Status: DISCONTINUED | OUTPATIENT
Start: 2025-02-28 | End: 2025-02-28 | Stop reason: HOSPADM

## 2025-02-28 RX ORDER — IPRATROPIUM BROMIDE AND ALBUTEROL SULFATE 2.5; .5 MG/3ML; MG/3ML
3 SOLUTION RESPIRATORY (INHALATION) EVERY 8 HOURS
Status: DISCONTINUED | OUTPATIENT
Start: 2025-02-28 | End: 2025-02-28 | Stop reason: HOSPADM

## 2025-02-28 RX ORDER — ALUMINUM HYDROXIDE, MAGNESIUM HYDROXIDE, AND SIMETHICONE 1200; 120; 1200 MG/30ML; MG/30ML; MG/30ML
30 SUSPENSION ORAL 4 TIMES DAILY PRN
Status: DISCONTINUED | OUTPATIENT
Start: 2025-02-28 | End: 2025-02-28 | Stop reason: HOSPADM

## 2025-02-28 RX ORDER — ALBUTEROL SULFATE 0.63 MG/3ML
0.63 SOLUTION RESPIRATORY (INHALATION) EVERY 6 HOURS PRN
COMMUNITY

## 2025-02-28 RX ORDER — BISACODYL 10 MG/1
10 SUPPOSITORY RECTAL DAILY PRN
Status: DISCONTINUED | OUTPATIENT
Start: 2025-02-28 | End: 2025-02-28 | Stop reason: HOSPADM

## 2025-02-28 RX ORDER — ASPIRIN 81 MG/1
81 TABLET ORAL DAILY
Status: DISCONTINUED | OUTPATIENT
Start: 2025-02-28 | End: 2025-02-28 | Stop reason: HOSPADM

## 2025-02-28 RX ORDER — IBUPROFEN 200 MG
24 TABLET ORAL
Status: DISCONTINUED | OUTPATIENT
Start: 2025-02-28 | End: 2025-02-28 | Stop reason: HOSPADM

## 2025-02-28 RX ORDER — GLUCAGON 1 MG
1 KIT INJECTION
Status: DISCONTINUED | OUTPATIENT
Start: 2025-02-28 | End: 2025-02-28 | Stop reason: HOSPADM

## 2025-02-28 RX ORDER — CEFUROXIME AXETIL 250 MG/1
250 TABLET ORAL 2 TIMES DAILY
Qty: 10 TABLET | Refills: 0 | Status: SHIPPED | OUTPATIENT
Start: 2025-02-28 | End: 2025-03-05

## 2025-02-28 RX ORDER — AMLODIPINE BESYLATE 5 MG/1
5 TABLET ORAL DAILY
Status: DISCONTINUED | OUTPATIENT
Start: 2025-03-01 | End: 2025-02-28 | Stop reason: HOSPADM

## 2025-02-28 RX ORDER — ERGOCALCIFEROL 1.25 MG/1
50000 CAPSULE ORAL
Status: DISCONTINUED | OUTPATIENT
Start: 2025-03-06 | End: 2025-02-28 | Stop reason: HOSPADM

## 2025-02-28 RX ORDER — TALC
6 POWDER (GRAM) TOPICAL NIGHTLY PRN
Status: DISCONTINUED | OUTPATIENT
Start: 2025-02-28 | End: 2025-02-28 | Stop reason: HOSPADM

## 2025-02-28 RX ORDER — BENZONATATE 100 MG/1
100 CAPSULE ORAL 3 TIMES DAILY PRN
COMMUNITY

## 2025-02-28 RX ORDER — TAMSULOSIN HYDROCHLORIDE 0.4 MG/1
0.4 CAPSULE ORAL DAILY
Status: DISCONTINUED | OUTPATIENT
Start: 2025-02-28 | End: 2025-02-28 | Stop reason: HOSPADM

## 2025-02-28 RX ORDER — BENZONATATE 100 MG/1
100 CAPSULE ORAL 3 TIMES DAILY PRN
Status: DISCONTINUED | OUTPATIENT
Start: 2025-02-28 | End: 2025-02-28 | Stop reason: HOSPADM

## 2025-02-28 RX ORDER — GUAIFENESIN 600 MG/1
600 TABLET, EXTENDED RELEASE ORAL 2 TIMES DAILY
Qty: 14 TABLET | Refills: 0 | Status: SHIPPED | OUTPATIENT
Start: 2025-02-28 | End: 2025-03-07

## 2025-02-28 RX ORDER — CEFTRIAXONE 1 G/1
1 INJECTION, POWDER, FOR SOLUTION INTRAMUSCULAR; INTRAVENOUS
Status: DISCONTINUED | OUTPATIENT
Start: 2025-02-28 | End: 2025-02-28 | Stop reason: HOSPADM

## 2025-02-28 RX ORDER — LISINOPRIL 2.5 MG/1
2.5 TABLET ORAL DAILY
Status: DISCONTINUED | OUTPATIENT
Start: 2025-02-28 | End: 2025-02-28 | Stop reason: HOSPADM

## 2025-02-28 RX ORDER — ALBUTEROL SULFATE 90 UG/1
2 INHALANT RESPIRATORY (INHALATION) EVERY 4 HOURS PRN
Status: DISCONTINUED | OUTPATIENT
Start: 2025-02-28 | End: 2025-02-28 | Stop reason: HOSPADM

## 2025-02-28 RX ORDER — GABAPENTIN 300 MG/1
300 CAPSULE ORAL 3 TIMES DAILY
Status: DISCONTINUED | OUTPATIENT
Start: 2025-02-28 | End: 2025-02-28 | Stop reason: HOSPADM

## 2025-02-28 RX ORDER — GUAIFENESIN 600 MG/1
600 TABLET, EXTENDED RELEASE ORAL 2 TIMES DAILY
Status: DISCONTINUED | OUTPATIENT
Start: 2025-02-28 | End: 2025-02-28 | Stop reason: HOSPADM

## 2025-02-28 RX ORDER — SODIUM CHLORIDE 0.9 % (FLUSH) 0.9 %
10 SYRINGE (ML) INJECTION
Status: DISCONTINUED | OUTPATIENT
Start: 2025-02-28 | End: 2025-02-28 | Stop reason: HOSPADM

## 2025-02-28 RX ORDER — FAMOTIDINE 20 MG/1
20 TABLET, FILM COATED ORAL DAILY
Status: DISCONTINUED | OUTPATIENT
Start: 2025-02-28 | End: 2025-02-28 | Stop reason: HOSPADM

## 2025-02-28 RX ORDER — GABAPENTIN 300 MG/1
300 CAPSULE ORAL 3 TIMES DAILY
COMMUNITY

## 2025-02-28 RX ORDER — LEVOFLOXACIN 750 MG/1
750 TABLET ORAL DAILY
Status: ON HOLD | COMMUNITY
End: 2025-02-28 | Stop reason: HOSPADM

## 2025-02-28 RX ORDER — INSULIN ASPART 100 [IU]/ML
0-10 INJECTION, SOLUTION INTRAVENOUS; SUBCUTANEOUS
Status: DISCONTINUED | OUTPATIENT
Start: 2025-02-28 | End: 2025-02-28 | Stop reason: HOSPADM

## 2025-02-28 RX ORDER — ERGOCALCIFEROL 1.25 MG/1
50000 CAPSULE ORAL
COMMUNITY

## 2025-02-28 RX ORDER — PREDNISONE 50 MG/1
50 TABLET ORAL DAILY
Status: DISCONTINUED | OUTPATIENT
Start: 2025-02-28 | End: 2025-02-28 | Stop reason: HOSPADM

## 2025-02-28 RX ORDER — ACETAMINOPHEN 500 MG
1000 TABLET ORAL EVERY 6 HOURS PRN
Status: DISCONTINUED | OUTPATIENT
Start: 2025-02-28 | End: 2025-02-28 | Stop reason: HOSPADM

## 2025-02-28 RX ORDER — PREDNISONE 50 MG/1
50 TABLET ORAL DAILY
Qty: 5 TABLET | Refills: 0 | Status: SHIPPED | OUTPATIENT
Start: 2025-03-01 | End: 2025-03-06

## 2025-02-28 RX ORDER — ASPIRIN 81 MG/1
81 TABLET ORAL DAILY
COMMUNITY

## 2025-02-28 RX ORDER — GLIMEPIRIDE 1 MG/1
1 TABLET ORAL
COMMUNITY

## 2025-02-28 RX ORDER — POLYETHYLENE GLYCOL 3350 17 G/17G
17 POWDER, FOR SOLUTION ORAL 2 TIMES DAILY PRN
Status: DISCONTINUED | OUTPATIENT
Start: 2025-02-28 | End: 2025-02-28 | Stop reason: HOSPADM

## 2025-02-28 RX ORDER — IPRATROPIUM BROMIDE AND ALBUTEROL SULFATE 2.5; .5 MG/3ML; MG/3ML
3 SOLUTION RESPIRATORY (INHALATION) EVERY 4 HOURS PRN
Status: DISCONTINUED | OUTPATIENT
Start: 2025-02-28 | End: 2025-02-28

## 2025-02-28 RX ORDER — TAMSULOSIN HYDROCHLORIDE 0.4 MG/1
0.4 CAPSULE ORAL DAILY
COMMUNITY

## 2025-02-28 RX ADMIN — GABAPENTIN 300 MG: 300 CAPSULE ORAL at 12:02

## 2025-02-28 RX ADMIN — ATORVASTATIN CALCIUM 40 MG: 40 TABLET, FILM COATED ORAL at 08:02

## 2025-02-28 RX ADMIN — GABAPENTIN 300 MG: 300 CAPSULE ORAL at 08:02

## 2025-02-28 RX ADMIN — FAMOTIDINE 20 MG: 20 TABLET, FILM COATED ORAL at 08:02

## 2025-02-28 RX ADMIN — ASPIRIN 81 MG: 81 TABLET, COATED ORAL at 08:02

## 2025-02-28 RX ADMIN — TAMSULOSIN HYDROCHLORIDE 0.4 MG: 0.4 CAPSULE ORAL at 08:02

## 2025-02-28 RX ADMIN — GUAIFENESIN 600 MG: 600 TABLET ORAL at 12:02

## 2025-02-28 RX ADMIN — LISINOPRIL 2.5 MG: 2.5 TABLET ORAL at 08:02

## 2025-02-28 RX ADMIN — PREDNISONE 50 MG: 50 TABLET ORAL at 12:02

## 2025-02-28 NOTE — H&P
"[facility] Medicine History & Physical Examination       Patient Name: Juanjose Chawla  MRN: [id number]  Patient Class: OP- Observation   Admission Date: 2/27/2025   Admitting Physician:  Service   Length of Stay: 0  Attending Physician: Stas Moore MD  Primary Care Provider: Sadiq Hartmann MD  Face-to-Face encounter date: 02/28/2025  Code Status: FULL   Chief Complaint: Shortness of Breath (PCP had patient do outpatient xray and CT done,  sent here due to "fluid around the lungs" x 3 weeks. C/o SOB. )        HISTORY OF PRESENT ILLNESS:     83 yo  male with h/o t2Dm, HTN, HLD,remote history of smoking was seen by his pcp due to c/o progressively worsening SOB a/w productive cough for past few weeks. Work up done as outpatient showed right lower lobe atelectasis, pleural effusion, pneumonia lymphadenopathy of the right hilum for which he was sent to ER for further work up and eval. At baseline he is independent with all ADLS and IADLs.     Upon presentation, he was tachypnic, has mild elevated BP and was mildly hypoxic requiring nc O2. CT chest showed findings consistent with COPD and emphysema with right lower lobe area of atelectasis versus developing consolidation ,moderate to large right-sided pleural effusion, reactive bilateral hilar lymphadenopathy. BNP was wnl. Cultures were obtained, empiric abx were added and he was admitted to  for further management.    When seen at bedside, he was alert, comfortably resting on room air, and reports feeling better.         PAST MEDICAL HISTORY:   No past medical history on file.    PAST SURGICAL HISTORY:   No past surgical history on file.    ALLERGIES:   Patient has no known allergies.    FAMILY HISTORY:   Reviewed and negative    SOCIAL HISTORY:     Social History     Tobacco Use    Smoking status: Not on file    Smokeless tobacco: Not on file   Substance Use Topics    Alcohol use: Not on file        HOME " MEDICATIONS:     Prior to Admission medications    Medication Sig Start Date End Date Taking? Authorizing Provider   albuterol (ACCUNEB) 0.63 mg/3 mL Nebu Take 0.63 mg by nebulization every 6 (six) hours as needed (wheezing and shortness of breath). Rescue   Yes Provider, Historical   amLODIPine (NORVASC) 5 MG tablet Take 5 mg by mouth once daily. 5/24/22  Yes Provider, Historical   aspirin (ECOTRIN) 81 MG EC tablet Take 81 mg by mouth once daily.   Yes Provider, Historical   benzonatate (TESSALON) 100 MG capsule Take 100 mg by mouth 3 (three) times daily as needed for Cough.   Yes Provider, Historical   ergocalciferol (VITAMIN D2) 50,000 unit Cap Take 50,000 Units by mouth every 7 days.   Yes Provider, Historical   famotidine (PEPCID) 20 MG tablet Take 20 mg by mouth once daily. 5/24/22  Yes Provider, Historical   gabapentin (NEURONTIN) 300 MG capsule Take 300 mg by mouth 3 (three) times daily.   Yes Provider, Historical   glimepiride (AMARYL) 1 MG tablet Take 1 mg by mouth before breakfast.   Yes Provider, Historical   levoFLOXacin (LEVAQUIN) 750 MG tablet Take 750 mg by mouth once daily. Take for 7 days starting 2/26/25   Yes Provider, Historical   lisinopriL (PRINIVIL,ZESTRIL) 2.5 MG tablet Take 2.5 mg by mouth once daily. 5/24/22  Yes Provider, Historical   rosuvastatin (CRESTOR) 20 MG tablet Take 20 mg by mouth once daily. 5/24/22  Yes Provider, Historical   tamsulosin (FLOMAX) 0.4 mg Cap Take 0.4 mg by mouth once daily.   Yes Provider, Historical   acyclovir (ZOVIRAX) 400 MG tablet Take 400 mg by mouth. 5/10/22   Provider, Historical   doxazosin (CARDURA) 8 MG Tab Take 8 mg by mouth nightly. 5/24/22   Provider, Historical   JANUVIA 100 mg Tab Take 100 mg by mouth once daily. 5/24/22   Provider, Historical   metFORMIN (GLUCOPHAGE) 1000 MG tablet Take 1,000 mg by mouth 2 (two) times daily. 5/24/22   Provider, Historical       REVIEW OF SYSTEMS:   Except as documented, all other systems reviewed and negative      PHYSICAL EXAM:     VITAL SIGNS: 24 HRS MIN & MAX LAST   Temp  Min: 97.5 °F (36.4 °C)  Max: 97.9 °F (36.6 °C) 97.5 °F (36.4 °C)   BP  Min: 103/67  Max: 167/106 (!) 151/83   Pulse  Min: 70  Max: 100  92   Resp  Min: 15  Max: 27 20   SpO2  Min: 91 %  Max: 98 % (!) 93 %     General appearance: Well-developed, well-nourished male in no apparent distress.  HENT: Atraumatic head. Moist mucous membranes of oral cavity.  Eyes: Normal extraocular movements.   Neck: Supple.   Lungs: Clear to auscultation bilaterally. No wheezing present.   Heart: Regular rate and rhythm. S1 and S2 present with no murmurs/gallop/rub. No pedal edema. No JVD present.   Abdomen: Soft, non-distended, non-tender. No rebound tenderness/guarding. Bowel sounds are normal.   Extremities: No cyanosis, clubbing, or edema.  Skin: No Rash.   Neuro: Motor and sensory exams grossly intact. Good tone. Muscle strength 5/5 in all 4 extremities  Psych/mental status: Appropriate mood and affect. Responds appropriately to questions.     LABS AND IMAGING:     Recent Labs   Lab 02/27/25  1624 02/28/25 0315   WBC 8.81 7.78   RBC 4.39* 4.30*   HGB 13.4* 13.1*   HCT 40.6* 39.7*   MCV 92.5 92.3   MCH 30.5 30.5   MCHC 33.0 33.0   RDW 13.1 13.2    140   MPV 11.0* 9.9       Recent Labs   Lab 02/27/25  1624 02/28/25  0315    140   K 4.7 4.0    104   CO2 24 25   BUN 11.5 13.2   CREATININE 0.93 1.03   CALCIUM 9.7 9.7   MG  --  2.00   ALBUMIN 3.5 3.7   ALKPHOS 81 76   ALT 7 5   AST 21 11   BILITOT 0.6 0.5       Microbiology Results (last 7 days)       Procedure Component Value Units Date/Time    Respiratory Culture [[id number]]     Order Status: Sent Specimen: Sputum     Blood culture #2 **CANNOT BE ORDERED STAT** [[id number]] Collected: 02/27/25 1922    Order Status: Resulted Specimen: Blood Updated: 02/27/25 1941    Blood culture #1 **CANNOT BE ORDERED STAT** [[id number]] Collected: 02/27/25 1929    Order Status: Resulted Specimen: Blood Updated:  02/27/25 1941             CT Chest With Contrast  Narrative: EXAMINATION:  CT CHEST WITH CONTRAST    CLINICAL HISTORY:  abnormal chest xray, Right hilar enlargement; Abnormal findings on diagnostic imaging of other specified body structures    TECHNIQUE:  Low dose axial images, sagittal and coronal reformations were obtained from the thoracic inlet to the lung bases. Contrast was  administered.  Automatic exposure control is utilized to reduce patient radiation exposure.    COMPARISON:  None.    FINDINGS:  There are changes seen consistent with emphysema and COPD in the lungs bilaterally.  There is some interstitial fibrosis seen in the upper and lower lobes with some blebs seen in the left lower lobe.  There is a moderate to large right-sided pleural effusion.  There is atelectasis versus developing infiltrate in the right lower lobe.  Some right hilar lymphadenopathy is seen which is likely reactive.  Representative lymph node is measured on image 56 series 3 in the right hilum.  It measures 2.1 x 2.5 cm.  Other smaller lymph nodes are seen in the right hilum.  Some lymph nodes also seen in the left hilum which are mostly subcentimeter in size.  There is adenopathy seen in the subcarinal region as well as the mediastinum.  Largest lymph node is in the precarinal region and it measures 2 cm x 2.2 cm.    Thoracic aorta is normal in caliber.  The heart is normal in size.  Upper abdomen appears unremarkable.  Impression: Findings seen consistent with COPD and emphysema with right lower lobe area of atelectasis versus developing consolidation.  There is a reactive lymphadenopathy right hilum as well as the mediastinum and smaller lymph nodes are seen in the left hilum.  Short-term follow-up is recommended.    Electronically signed by: Isaiah Burno  Date:    02/27/2025  Time:    12:30      ASSESSMENT & PLAN:       Possible right sided CAP  Moderate right pleural effusion  Chronic progressively worsening SOB- Underlying  un quantified COPD  B/l hilar LAD- ? Reactive  SOB due to above  Hypoxia at presentation - improving  Past history of smoking    Hx: T2DM, HTN, HLD, BPH    Plan:  - Will keep empiric cap antibiotics, rocephin and Azithromycin. Resp pcr, mrsa pcr were negative. Obtain sputum cultures  - keep neb treatments. Add few days of Po prednisone. Switch to IV if SOB worsens.   - Pulm consulted for possible thoracentesis and more input. Needs outpatient PFTs  -Encourage ambulation, IS use. Antitussives as needed  -Will review home meds. Check A1c for am. Keep correction. Hold po diab meds    lovenox          __________________________________________________________________________  INPATIENT LIST OF MEDICATIONS   Current Medications[1]      Scheduled Meds:   albuterol-ipratropium  3 mL Nebulization Q8H    [START ON 3/1/2025] amLODIPine  5 mg Oral Daily    aspirin  81 mg Oral Daily    atorvastatin  40 mg Oral Daily    azithromycin  500 mg Intravenous Q24H    cefTRIAXone (Rocephin) IV (PEDS and ADULTS)  2 g Intravenous Q24H    [START ON 3/6/2025] ergocalciferol  50,000 Units Oral Q7 Days    famotidine  20 mg Oral Daily    gabapentin  300 mg Oral TID    lisinopriL  2.5 mg Oral Daily    tamsulosin  0.4 mg Oral Daily     Continuous Infusions:  PRN Meds:.  Current Facility-Administered Medications:     acetaminophen, 1,000 mg, Oral, Q6H PRN    aluminum-magnesium hydroxide-simethicone, 30 mL, Oral, QID PRN    benzonatate, 100 mg, Oral, TID PRN    bisacodyL, 10 mg, Rectal, Daily PRN    dextrose 50%, 12.5 g, Intravenous, PRN    dextrose 50%, 25 g, Intravenous, PRN    glucagon (human recombinant), 1 mg, Intramuscular, PRN    glucose, 16 g, Oral, PRN    glucose, 24 g, Oral, PRN    melatonin, 6 mg, Oral, Nightly PRN    polyethylene glycol, 17 g, Oral, BID PRN    sodium chloride 0.9%, 10 mL, Intravenous, PRN      Stas Moore MD   02/28/2025     Screening for Social Drivers for health:  Patient screened for food insecurity, housing  instability, transportation needs, utility difficulties, and interpersonal safety (select all that apply as identified as concern)  []Housing or Food  []Transportation Needs  []Utility Difficulties  []Interpersonal safety  [x]None         [1]   Current Facility-Administered Medications:     acetaminophen tablet 1,000 mg, 1,000 mg, Oral, Q6H PRN, Mari Chavis FNP    albuterol-ipratropium 2.5 mg-0.5 mg/3 mL nebulizer solution 3 mL, 3 mL, Nebulization, Q8H, Stas Moore MD    aluminum-magnesium hydroxide-simethicone 200-200-20 mg/5 mL suspension 30 mL, 30 mL, Oral, QID PRN, Mari Chavis FNP    [START ON 3/1/2025] amLODIPine tablet 5 mg, 5 mg, Oral, Daily, Stas Moore MD    aspirin EC tablet 81 mg, 81 mg, Oral, Daily, Stas Moore MD    atorvastatin tablet 40 mg, 40 mg, Oral, Daily, Stas Moore MD    azithromycin (ZITHROMAX) 500 mg in 0.9% NaCl 250 mL IVPB (admixture device), 500 mg, Intravenous, Q24H, Mari Chavis FNP    benzonatate capsule 100 mg, 100 mg, Oral, TID PRN, Stas Moore MD    bisacodyL suppository 10 mg, 10 mg, Rectal, Daily PRN, Mari Chavis FNP    cefTRIAXone injection 2 g, 2 g, Intravenous, Q24H, Mari Chavis FNP    dextrose 50% injection 12.5 g, 12.5 g, Intravenous, PRN, Mari Chavis FNP    dextrose 50% injection 25 g, 25 g, Intravenous, PRN, Mari Chavis FNP    [START ON 3/6/2025] ergocalciferol capsule 50,000 Units, 50,000 Units, Oral, Q7 Days, Stas Moore MD    famotidine tablet 20 mg, 20 mg, Oral, Daily, Stas Moore MD    gabapentin capsule 300 mg, 300 mg, Oral, TID, Stas Moore MD    glucagon (human recombinant) injection 1 mg, 1 mg, Intramuscular, PRN, Mari Chavis, HEMA    glucose chewable tablet 16 g, 16 g, Oral, PRN, Mari Chavis FNP    glucose chewable tablet 24 g, 24 g, Oral, PRN, Mari Chavis, HEMA    lisinopriL tablet 2.5 mg, 2.5 mg, Oral, Daily, Stas Moore MD    melatonin tablet 6  mg, 6 mg, Oral, Nightly PRN, Mari Chavis FNP    polyethylene glycol packet 17 g, 17 g, Oral, BID PRN, Mari Chavis FNP    sodium chloride 0.9% flush 10 mL, 10 mL, Intravenous, PRN, Mari Chavis FNP    tamsulosin 24 hr capsule 0.4 mg, 0.4 mg, Oral, Daily, Stas Moore MD

## 2025-02-28 NOTE — PLAN OF CARE
02/28/25 1454   Discharge Assessment   Assessment Type Discharge Planning Assessment   Confirmed/corrected address, phone number and insurance Yes   Confirmed Demographics Correct on Facesheet   Source of Information patient;family   Communicated MITCH with patient/caregiver Yes   Reason For Admission Shortness of breath   People in Home spouse   Do you expect to return to your current living situation? Yes   Do you have help at home or someone to help you manage your care at home? Yes   Who are your caregiver(s) and their phone number(s)? Ashlie Denton   Prior to hospitilization cognitive status: Unable to Assess   Current cognitive status: Alert/Oriented   Dressing/Bathing Difficulty no   Home Accessibility wheelchair accessible   Home Layout Able to live on 1st floor   Patient currently being followed by outpatient case management? No   Do you currently have service(s) that help you manage your care at home? No   Do you have prescription coverage? Yes   Coverage Medicare   Who is going to help you get home at discharge? Daughter   How do you get to doctors appointments? car, drives self;family or friend will provide   Are you on dialysis? No   Do you take coumadin? No   Discharge Plan A Home with family   Discharge Plan B Home with family   DME Needed Upon Discharge  none   Discharge Plan discussed with: Patient   Transition of Care Barriers None   OTHER   Name(s) of People in Home Ashlie Chawla     Dc assessment completed with patient and daughter at bedside. Both denies need for home health upon dc. Patient is independent at home with ADLs. Daughter lives across the street and able to assit with care at home.     PCP Sadiq Hartmann MD  [facility] [place].

## 2025-02-28 NOTE — DISCHARGE SUMMARY
[facility] - [facility] Hospital Medicine  Discharge Summary      Patient Name: Juanjose Chawla  MRN: [id number]  [facility]: [id number]  Patient Class: OP- Observation  Admission Date: 2/27/2025  Hospital Length of Stay: 0 days  Discharge Date and Time:  02/28/2025 3:04 PM  Attending Physician: Luis Antonio Guaman MD   Discharging Provider: Stas Moore MD  Primary Care Provider: Sadiq Hartmann MD    Primary Care Team: Networked reference to record PCT         85 yo  male with h/o t2Dm, HTN, HLD,remote history of smoking was seen by his pcp due to c/o progressively worsening SOB a/w productive cough for past few weeks. Work up done as outpatient showed right lower lobe atelectasis, pleural effusion, pneumonia lymphadenopathy of the right hilum for which he was sent to ER for further work up and eval. At baseline he is independent with all ADLS and IADLs.      Upon presentation, he was tachypnic, has mild elevated BP and was mildly hypoxic requiring nc O2. CT chest showed findings consistent with COPD and emphysema with right lower lobe area of atelectasis versus developing consolidation ,moderate to large right-sided pleural effusion, reactive bilateral hilar lymphadenopathy. BNP was wnl. Cultures were obtained, empiric abx were added and he was admitted to  for further management.    He was weaned off to room air.  Empiric community-acquired pneumonia treatment, scheduled nebulizer treatments were continued while inpatient.  Evaluated him and he underwent diagnostic thoracentesis.  1300 cc kenny pleural fluid was drained and samples were sent for analysis.  CT findings suggesting COPD with chronic interstitial lung disease as per Pulmonary.  Malignancy could not be excluded and he may need EBUS as outpatient.  Pulmonary has cleared for discharge and recommended outpatient follow up.  He remained stable from hemodynamic standpoint and we will be discharged to home  today.  Recommended follow up with PCP on Pulmonary.  Necessary prescriptions will be provided.    Possible right sided CAP  Moderate right pleural effusion s/p diagnostic thoracentesis 02/28/2025  Chronic progressively worsening SOB- Underlying un quantified COPD, ILD  B/l hilar LAD- unclear etiology  Hypoxia at presentation - improving  Past history of smoking     Hx: T2DM, CAD, HTN, HLD, BPH      Goals of Care Treatment Preferences:  Code Status: Full Code      Vitals:    02/28/25 1130   BP: (!) 146/83   Pulse: 91   Resp: 18   Temp: 98 °F (36.7 °C)       General: Comfortable, not in distress  Respiratory: Clear to auscultation bilaterally, nonlabored breathing  Cardiovascular: RRR, S1, S2  Abdominal: Soft, nontender, nondistended  Neurological: AOx4, no focal deficits  Psychiatric: Cooperative             Consults:   Consults (From admission, onward)          Status Ordering Provider     Inpatient consult to Pulmonology  Once        Provider:  Serafin Jones MD    Completed COCO SCHILLING            No notes have been filed under this hospital service.  Service: Hospital Medicine    Final Active Diagnoses:      Problems Resolved During this Admission:    Diagnosis Date Noted Date Resolved POA    PRINCIPAL PROBLEM:  Pleural effusion [J90] 02/28/2025 02/28/2025 Yes       Discharged Condition: good    Disposition: Home or Self Care    Follow Up:   Follow-up Information       Sadiq Hartmann MD Follow up.    Specialty: Family Medicine  Why: Someone from [address] [place] [address] will contact the patient with an appointment date and time.  Contact information:  [address] [place] [address] [id number] [phone]                           Patient Instructions:   No discharge procedures on file.    Significant Diagnostic Studies: Labs: CMP   Recent Labs   Lab 02/27/25  1624 02/28/25  0315    140   K 4.7 4.0    104   CO2 24 25   BUN 11.5 13.2   CREATININE 0.93 1.03   CALCIUM 9.7 9.7   ALBUMIN 3.5 3.7   BILITOT 0.6  0.5   ALKPHOS 81 76   AST 21 11   ALT 7 5       Pending Diagnostic Studies:       Procedure Component Value Units Date/Time    Adenosine Deaminase, Pleural Fluid Pleural Fluid, Right [[id number]] Collected: 02/28/25 1328    Order Status: Sent Lab Status: In process Updated: 02/28/25 1335    Specimen: Body Fluid from Pleural Fluid, Right     Cytology, Fluid/Wash/Brush [[id number]] Collected: 02/28/25 1328    Order Status: Sent Lab Status: In process Updated: 02/28/25 1335    Specimen: Body Fluid from Pleural Fluid, Right     Differential, Body Fluid [[id number]] Collected: 02/28/25 1328    Order Status: Sent Lab Status: In process Updated: 02/28/25 1420    Specimen: Body Fluid from Pleural, Right     Glucose, Body Fluid [[id number]] Collected: 02/28/25 1328    Order Status: Sent Lab Status: In process Updated: 02/28/25 1336    Specimen: Body Fluid from Pleural Fluid     LD, Body Fluid [[id number]] Collected: 02/28/25 1328    Order Status: Sent Lab Status: In process Updated: 02/28/25 1336    Specimen: Body Fluid from Pleural Fluid     Protein, Body Fluid [[id number]] Collected: 02/28/25 1328    Order Status: Sent Lab Status: In process Updated: 02/28/25 1336    Specimen: Body Fluid from Pleural Fluid     Quantiferon Gold TB [[id number]] Collected: 02/28/25 1347    Order Status: Sent Lab Status: In process Updated: 02/28/25 1356    Specimen: Blood            Medications:  Reconciled Home Medications:      Medication List        START taking these medications      azithromycin 250 MG tablet  Commonly known as: Z-SHARLA  Take 2 tablets by mouth on day 1; Take 1 tablet by mouth on days 2-5     cefUROXime 250 MG tablet  Commonly known as: CEFTIN  Take 1 tablet (250 mg total) by mouth 2 (two) times daily. for 5 days     guaiFENesin 600 mg 12 hr tablet  Commonly known as: MUCINEX  Take 1 tablet (600 mg total) by mouth 2 (two) times daily. for 7 days     predniSONE 50 MG Tab  Commonly known as: DELTASONE  Take 1 tablet (50 mg  total) by mouth once daily. for 5 days  Start taking on: March 1, 2025     umeclidinium 62.5 mcg/actuation inhalation capsule  Commonly known as: INCRUSE ELLIPTA  Inhale 62.5 mcg into the lungs once daily. Controller            CONTINUE taking these medications      albuterol 0.63 mg/3 mL Nebu  Commonly known as: ACCUNEB  Take 0.63 mg by nebulization every 6 (six) hours as needed (wheezing and shortness of breath). Rescue     amLODIPine 5 MG tablet  Commonly known as: NORVASC  Take 5 mg by mouth once daily.     aspirin 81 MG EC tablet  Commonly known as: ECOTRIN  Take 81 mg by mouth once daily.     benzonatate 100 MG capsule  Commonly known as: TESSALON  Take 100 mg by mouth 3 (three) times daily as needed for Cough.     famotidine 20 MG tablet  Commonly known as: PEPCID  Take 20 mg by mouth once daily.     gabapentin 300 MG capsule  Commonly known as: NEURONTIN  Take 300 mg by mouth 3 (three) times daily.     glimepiride 1 MG tablet  Commonly known as: AMARYL  Take 1 mg by mouth before breakfast.     JANUVIA 100 mg Tab  Generic drug: SITagliptin phosphate  Take 100 mg by mouth once daily.     lisinopriL 2.5 MG tablet  Commonly known as: PRINIVIL,ZESTRIL  Take 2.5 mg by mouth once daily.     metFORMIN 1000 MG tablet  Commonly known as: GLUCOPHAGE  Take 1,000 mg by mouth 2 (two) times daily.     rosuvastatin 20 MG tablet  Commonly known as: CRESTOR  Take 20 mg by mouth once daily.     tamsulosin 0.4 mg Cap  Commonly known as: FLOMAX  Take 0.4 mg by mouth once daily.     VITAMIN D2 50,000 unit Cap  Generic drug: ergocalciferol  Take 50,000 Units by mouth every 7 days.            STOP taking these medications      acyclovir 400 MG tablet  Commonly known as: ZOVIRAX     doxazosin 8 MG Tab  Commonly known as: CARDURA     levoFLOXacin 750 MG tablet  Commonly known as: LEVAQUIN              Indwelling Lines/Drains at time of discharge:   Lines/Drains/Airways       None                   Time spent on the discharge of  patient: 35 minutes         Stas Moore MD  Department of Hospital Medicine  [facility] - [facility]

## 2025-02-28 NOTE — PROCEDURES
[facility] - [facility]    Right Ultrasound guided Thoracentesis Procedure Note         Date of Procedure: 2/28/2025    Procedure:  Right Ultrasound-guided diagnostic and therapeutic thoracentesis    Performed by: Shira Faustin MD  Supervised by: Jerrod Harman MD    Pre-Procedure Diagnosis:  Right pleural effusion    Post-Procedure Diagnosis:  Same    Anesthesia:  5 cc of 1% local lidocaine injected subcutaneously      Indication: The patient is a 84 y.o. male with right pleural effusion.    Consent: The risks, benefits, potential complications, treatment options and expected outcomes were discussed with the patient and/or family.  The possibilities of reaction to medication, pulmonary aspiration, perforation of an organ , bleeding, failure to diagnose a condition and creating a complication requiring transfusion or operation were discussed.  Signed/verbal consent was granted.      Description of the Findings of the Procedure:  A Time Out was held and the above information confirmed.     With patient in sitting position, ultrasound guidance was used to identify a pocket of fluid felt amenable to aspiration.  The right posterior mid axillary area was sterilized with chlorhexidine and draped in sterile fashion.  5 cc of local lidocaine was injected subcutaneously.  A small incision was made with scalpel.  A 20 gauge thoracentesis needle was advanced with aspiration via 10 cc syringe.  When pleural fluid was obtained, a 8 English drainage catheter was advanced over the needle into the pleural space and the needle was removed.  The catheter was then connected to suction for fluid removal via vacutainer bottle.    Total of 1300 cc of fluid was removed.     Appearance of fluid was kenny colored.    The patient recovered from the procedure and anesthesia in satisfactory condition.      Complications:  None    Estimated Blood Loss (EBL): Minimal    Specimens: sent to lab     Shira Faustin  MD  Pulmonary Critical Care Medicine  [facility] [address]

## 2025-02-28 NOTE — PROGRESS NOTES
Inpatient Nutrition Evaluation    Admit Date: 2/27/2025   Total duration of encounter: 1 day   Patient Age: 84 y.o.    Nutrition Recommendation/Prescription     Continue diet (diabetic diet) as ordered and tolerated.     Nutrition Assessment     Chart Review    Reason Seen: continuous nutrition monitoring    Malnutrition Screening Tool Results              Diagnosis:  Possible right sided CAP  Moderate right pleural effusion  Chronic progressively worsening SOB- Underlying un quantified COPD  B/l hilar LAD- ? Reactive  SOB due to above  Hypoxia at presentation - improving  Past history of smoking    Relevant Medical History: T2DM, HTN, HLD, BPH     Scheduled Medications:  albuterol-ipratropium, 3 mL, Q8H  [START ON 3/1/2025] amLODIPine, 5 mg, Daily  aspirin, 81 mg, Daily  atorvastatin, 40 mg, Daily  azithromycin, 500 mg, Q24H  cefTRIAXone (Rocephin) IV (PEDS and ADULTS), 1 g, Q24H  [START ON 3/6/2025] ergocalciferol, 50,000 Units, Q7 Days  famotidine, 20 mg, Daily  gabapentin, 300 mg, TID  guaiFENesin, 600 mg, BID  lisinopriL, 2.5 mg, Daily  predniSONE, 50 mg, Daily  tamsulosin, 0.4 mg, Daily    Continuous Infusions:   PRN Medications:   Current Facility-Administered Medications:     acetaminophen, 1,000 mg, Oral, Q6H PRN    aluminum-magnesium hydroxide-simethicone, 30 mL, Oral, QID PRN    benzonatate, 100 mg, Oral, TID PRN    bisacodyL, 10 mg, Rectal, Daily PRN    dextrose 50%, 12.5 g, Intravenous, PRN    dextrose 50%, 12.5 g, Intravenous, PRN    dextrose 50%, 25 g, Intravenous, PRN    dextrose 50%, 25 g, Intravenous, PRN    glucagon (human recombinant), 1 mg, Intramuscular, PRN    glucagon (human recombinant), 1 mg, Intramuscular, PRN    glucose, 16 g, Oral, PRN    glucose, 16 g, Oral, PRN    glucose, 24 g, Oral, PRN    glucose, 24 g, Oral, PRN    guaiFENesin 100 mg/5 ml, 200 mg, Oral, Q4H PRN    insulin aspart U-100, 0-10 Units, Subcutaneous, QID (AC + HS) PRN    melatonin, 6 mg, Oral, Nightly PRN    polyethylene  "glycol, 17 g, Oral, BID PRN    sodium chloride 0.9%, 10 mL, Intravenous, PRN    Recent Labs   Lab 25  1624 25  0315    140   K 4.7 4.0   CALCIUM 9.7 9.7   MG  --  2.00    104   CO2 24 25   BUN 11.5 13.2   CREATININE 0.93 1.03   EGFRNORACEVR >60 >60   GLUCOSE 109 160*   BILITOT 0.6 0.5   ALKPHOS 81 76   ALT 7 5   AST 21 11   ALBUMIN 3.5 3.7   WBC 8.81 7.78   HGB 13.4* 13.1*   HCT 40.6* 39.7*     Nutrition Orders:  Diet Adult Regular Diabetic; 2000 Calories (up to 75 gm per meal); Standard Tray      Appetite/Oral Intake: NPO/NPO  Factors Affecting Nutritional Intake: NPO  Food/Jehovah's witness/Cultural Preferences: none reported  Food Allergies: none reported  Last Bowel Movement: 25  Wound(s):      Comments    25 Reports 2 days since last meal. Typically follows a regular diet, eats small portions at home. Denies any current GI complaints or unintentional wt loss. Dry UBW ~210lbs. Politely declined oral supplement once diet advanced.     Anthropometrics    Height: 5' 10" (177.8 cm),    Last Weight: 97.2 kg (214 lb 4.6 oz) (25 235), Weight Method: Bed Scale  BMI (Calculated): 30.7  BMI Classification: obese grade I (BMI 30-34.9)        Ideal Body Weight (IBW), Male: 166 lb     % Ideal Body Weight, Male (lb): 129.09 %                 Usual Body Weight (UBW), k.45 kg  % Usual Body Weight: 102.05     Usual Weight Provided By: patient    Wt Readings from Last 5 Encounters:   25 97.2 kg (214 lb 4.6 oz)   21 105 kg (231 lb 7.7 oz)     Weight Change(s) Since Admission:   Wt Readings from Last 1 Encounters:   25 97.2 kg (214 lb 4.6 oz)   25 1503 95.3 kg (210 lb)   Admit Weight: 95.3 kg (210 lb) (25 1503), Weight Method: Bed Scale    Patient Education     Not applicable.    Nutrition Goals & Monitoring     Dietitian will monitor: energy intake    Nutrition Risk/Follow-Up: low (follow-up in 5-7 days)  Patients assigned 'low nutrition risk' status do not " qualify for a full nutritional assessment but will be monitored and re-evaluated in a 5-7 day time period. Please consult if re-evaluation needed sooner.

## 2025-02-28 NOTE — CONSULTS
[facility] - [facility] Medical Telemetry  Pulmonology  Consult Note    Patient Name: Juanjose Chawla  MRN: [id number]  Admission Date: 2/27/2025  Hospital Length of Stay: 0 days  Code Status: Full Code  Attending Physician: Luis Antonio Guaman MD  Primary Care Provider: Sadiq Hartmann MD   Principal Problem: <principal problem not specified>      Subjective:     Reason for Consult:    HPI    Juanjose Chawla is an 85 y/o  male with PMH of T2DM, HTN, HLD, CAD (s/p stenting in 2017) and remote history of smoking who presented tot he [facility] ED on 02/28/25 with c/o worsening SOB onset ~3-4x weeks. Pt reports his SOB is worse with exertion and has been intermittent since onset. Pt reports an associated productive cough with clear sputum. He reports having an outpatient X-ray and CT scan done today, for a concern of lung cancer. He was diagnosed with right lower lobe atelectasis, pleural effusion, pneumonia lymphadenopathy of the right hilum. He reports he was prescribed an inhaler and Levaquin, and told to come here for pulmonology evaluation. He denies being on O2 at home. He denies a history of asthma or COPD. He reports he was a former tobacco user in his 20s. At baseline, he is independent with ADLs, is involved in extensive physical activity while gardening and taking care of his many acres of land. 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 Also endorsed remote smoking history for 7-8 years, quit in 1971.    Upon presentation, he was tachypnic, has mild elevated BP and was mildly hypoxic requiring nc O2. CT chest showed findings consistent with COPD and emphysema with right lower lobe area of atelectasis versus developing consolidation, moderate to large right-sided pleural effusion, reactive bilateral hilar lymphadenopathy. BNP was wnl. Cultures were obtained, empiric abx were added and he was admitted to   for further management. Pulmonology was consulted for evaluation of possible thoracentesis.    Today (02/28/25)  Patient was seen this a.m - was sitting up in chair, comfortably breathing on room air. He reiterated details regarding his symptoms as in HPI above. He noticed a worsening in his SOB 4-5 weeks ago, along with productive cough with clear-white mucus and some chest congestion. His wife developed similar symptoms before him and recovered within a week. Prior to this, he would occasionally feel SOB which he would mention to his cardiologist, but he has not followed with pulmonology. He did confirm improvement in symptoms since starting the Levaquin and nebs 2 days ago. Further, he mentioned an associated decrease in appetite due to illness related stress and measured 10 lbs weight loss over 5 weeks. Denied any fevers, chills, palpitations, chest pain/pressure, night sweats, or any other notable symptoms. He did report brief, migrating pains in his chest, over the past week - R lower side first and today, in the L middle region. Denied needing home oxygen. Patient mentioned that he is bradycardic at baseline - has always found HR's in the 50's.    No past medical history on file.      No past surgical history on file.      No family history on file.      Social History[1]      Review of patient's allergies indicates:  No Known Allergies      Current Outpatient Medications   Medication Instructions    acyclovir (ZOVIRAX) 400 mg, Oral    albuterol (ACCUNEB) 0.63 mg, Every 6 hours PRN    amLODIPine (NORVASC) 5 mg, Daily    aspirin (ECOTRIN) 81 mg, Daily    benzonatate (TESSALON) 100 mg, 3 times daily PRN    doxazosin (CARDURA) 8 mg, Oral, Nightly    ergocalciferol (VITAMIN D2) 50,000 Units, Every 7 days    famotidine (PEPCID) 20 mg, Daily    gabapentin (NEURONTIN) 300 mg, 3 times daily    glimepiride (AMARYL) 1 mg, Before breakfast    JANUVIA 100 mg, Oral, Daily    levoFLOXacin (LEVAQUIN) 750 mg, Daily     lisinopriL (PRINIVIL,ZESTRIL) 2.5 mg, Daily    metFORMIN (GLUCOPHAGE) 1,000 mg, Oral, 2 times daily    rosuvastatin (CRESTOR) 20 mg, Daily    tamsulosin (FLOMAX) 0.4 mg, Daily    umeclidinium (INCRUSE ELLIPTA) 62.5 mcg, Inhalation, Daily, Controller         Scheduled Medications:    albuterol-ipratropium  3 mL Nebulization Q8H    [START ON 3/1/2025] amLODIPine  5 mg Oral Daily    aspirin  81 mg Oral Daily    atorvastatin  40 mg Oral Daily    azithromycin  500 mg Intravenous Q24H    cefTRIAXone (Rocephin) IV (PEDS and ADULTS)  1 g Intravenous Q24H    [START ON 3/6/2025] ergocalciferol  50,000 Units Oral Q7 Days    famotidine  20 mg Oral Daily    fluticasone furoate-vilanteroL  1 puff Inhalation Daily    gabapentin  300 mg Oral TID    guaiFENesin  600 mg Oral BID    lisinopriL  2.5 mg Oral Daily    predniSONE  50 mg Oral Daily    tamsulosin  0.4 mg Oral Daily         PRN Medications:     Current Facility-Administered Medications:     acetaminophen, 1,000 mg, Oral, Q6H PRN    albuterol, 2 puff, Inhalation, Q4H PRN **AND** MDI Q4H PRN, , , Q4H PRN    aluminum-magnesium hydroxide-simethicone, 30 mL, Oral, QID PRN    benzonatate, 100 mg, Oral, TID PRN    bisacodyL, 10 mg, Rectal, Daily PRN    dextrose 50%, 12.5 g, Intravenous, PRN    dextrose 50%, 12.5 g, Intravenous, PRN    dextrose 50%, 25 g, Intravenous, PRN    dextrose 50%, 25 g, Intravenous, PRN    glucagon (human recombinant), 1 mg, Intramuscular, PRN    glucagon (human recombinant), 1 mg, Intramuscular, PRN    glucose, 16 g, Oral, PRN    glucose, 16 g, Oral, PRN    glucose, 24 g, Oral, PRN    glucose, 24 g, Oral, PRN    guaiFENesin 100 mg/5 ml, 200 mg, Oral, Q4H PRN    insulin aspart U-100, 0-10 Units, Subcutaneous, QID (AC + HS) PRN    melatonin, 6 mg, Oral, Nightly PRN    polyethylene glycol, 17 g, Oral, BID PRN    sodium chloride 0.9%, 10 mL, Intravenous, PRN      Infusions:            ROS  Conducted and relevant items mentioned in HPI      Objective:     Vital  "Signs (Most Recent):  Temp: 98 °F (36.7 °C) (02/28/25 1130)  Pulse: 91 (02/28/25 1130)  Resp: 18 (02/28/25 1130)  BP: (!) 146/83 (02/28/25 1130)  SpO2: (!) 91 % (02/28/25 1130) Vital Signs (24h Range):  Temp:  [97.5 °F (36.4 °C)-98 °F (36.7 °C)] 98 °F (36.7 °C)  Pulse:  [] 91  Resp:  [15-27] 18  SpO2:  [91 %-98 %] 91 %  BP: (103-167)/() 146/83     Body mass index is 30.75 kg/m².      Fluid Balance:     Intake/Output Summary (Last 24 hours) at 2/28/2025 1403  Last data filed at 2/27/2025 2014  Gross per 24 hour   Intake --   Output 150 ml   Net -150 ml           Physical Examination:  General: Nontoxic-appearing, well nourished, in no acute distress. Breathing comfortably on room air.  Eye: PERRL, EOMI  HENT: Normocephalic, atraumatic, moist mucous membranes  Neck: Supple, nontender, no JVD  Respiratory: Clear to auscultation bilaterally, although relatively decreased breath sounds in R middle lobe.   No wheezes, rales, or rhonchi. Normal work of breathing  Cardiovascular: Regular rate and rhythm, no murmur, rubs, or gallops. No peripheral edema. No JVD  Gastrointestinal: Soft, nontender, nondistended  Musculoskeletal: Moves all extremities purposefully. No gross deformities. No calf tenderness  Integumentary: Warm, dry, intact. No rashes  Neurologic: Alert and oriented x3. Normal speech. No gross deficits.  Psychiatric: Appropriate mood and affect        Laboratory Studies:       No results for input(s): "PH", "PCO2", "PO2", "HCO3", "POCSATURATED", "BE" in the last 24 hours.      Recent Labs   Lab 02/28/25  0315   WBC 7.78   RBC 4.30*   HGB 13.1*   HCT 39.7*      MCV 92.3   MCH 30.5   MCHC 33.0         Recent Labs   Lab 02/28/25  0315   GLUCOSE 160*      K 4.0      CO2 25   BUN 13.2   CREATININE 1.03   CALCIUM 9.7   MG 2.00         Microbiology Data:   Microbiology Results (last 7 days)       Procedure Component Value Units Date/Time    Fungal Culture [[id number]] Collected: 02/28/25 " 1328    Order Status: Sent Specimen: Body Fluid from Pleural Fluid, Right Updated: 02/28/25 1336    Mycobacteria and Nocardia Culture [[id number]] Collected: 02/28/25 1328    Order Status: Sent Specimen: Body Fluid from Pleural Fluid, Right Updated: 02/28/25 1336    Body Fluid Culture [[id number]] Collected: 02/28/25 1328    Order Status: Sent Specimen: Body Fluid from Pleural Fluid, Right Updated: 02/28/25 1335    Respiratory Culture [[id number]] Collected: 02/28/25 0839    Order Status: Sent Specimen: Sputum, Expectorated Updated: 02/28/25 0845    Blood culture #2 **CANNOT BE ORDERED STAT** [[id number]] Collected: 02/27/25 1922    Order Status: Resulted Specimen: Blood Updated: 02/27/25 1941    Blood culture #1 **CANNOT BE ORDERED STAT** [[id number]] Collected: 02/27/25 1929    Order Status: Resulted Specimen: Blood Updated: 02/27/25 1941              Imaging reviewed:  CT Chest With Contrast  Narrative: EXAMINATION:  CT CHEST WITH CONTRAST    CLINICAL HISTORY:  abnormal chest xray, Right hilar enlargement; Abnormal findings on diagnostic imaging of other specified body structures    TECHNIQUE:  Low dose axial images, sagittal and coronal reformations were obtained from the thoracic inlet to the lung bases. Contrast was  administered.  Automatic exposure control is utilized to reduce patient radiation exposure.    COMPARISON:  None.    FINDINGS:  There are changes seen consistent with emphysema and COPD in the lungs bilaterally.  There is some interstitial fibrosis seen in the upper and lower lobes with some blebs seen in the left lower lobe.  There is a moderate to large right-sided pleural effusion.  There is atelectasis versus developing infiltrate in the right lower lobe.  Some right hilar lymphadenopathy is seen which is likely reactive.  Representative lymph node is measured on image 56 series 3 in the right hilum.  It measures 2.1 x 2.5 cm.  Other smaller lymph nodes are seen in the right hilum.  Some lymph  "nodes also seen in the left hilum which are mostly subcentimeter in size.  There is adenopathy seen in the subcarinal region as well as the mediastinum.  Largest lymph node is in the precarinal region and it measures 2 cm x 2.2 cm.    Thoracic aorta is normal in caliber.  The heart is normal in size.  Upper abdomen appears unremarkable.  Impression: Findings seen consistent with COPD and emphysema with right lower lobe area of atelectasis versus developing consolidation.  There is a reactive lymphadenopathy right hilum as well as the mediastinum and smaller lymph nodes are seen in the left hilum.  Short-term follow-up is recommended.    Electronically signed by: Isaiah Bruno  Date:    02/27/2025  Time:    12:30        All imaging from the past 24 hours personally reviewed.        2D ECHO Results    No results found in the last 24 hours.       Pulmonary Functions Testing Results:    No results found for: "FEV1", "FVC", "NBT7AMR", "TLC", "DLCO"        Assessment/Plan:     Assessment  Right sided CAP  Moderate R pleural effusion  Chronic, progressively worsening SOB - underlying COPD and ILD changes on CT  B/L lymphadenopathy   Hypoxia upon presentation - improving  Remote hx of smoking  Remote hx of sandblasting  Hx of CAD s/p stenting in 2017  Hx of T2DM, HTN, HLD      Plan  COVID, MRSA PCR, respiratory panel negative  Serum electrolytes, lactic acid, LDH normal  Blood and sputum cultures pending  Per patient, his current R sided pneumonia was treated by an antibiotic(?) outpatient, changed to Levaquin of which he had only taken 2 /5 doses prior to hospital presentation.  CT chest 02/28/25 - Findings consistent with COPD/emphysema with RLL atelectasis vs developing consolidation. Some interstitial fibrosis seen in the upper and lower lobes with some blebs seen in the left lower lobe. There is a moderate to large right-sided pleural effusion. Lymphadenopathy evident in R hilar region (largest 2.5 cm x 2.1 cm), " subcarinal as well as smaller lymph nodes seen in L hilum.  Thoracentesis performed - 1300 cc of kenny colored fluid obtained.  Pending labs - pleural fluid culture, cell count, cytology, LD, glucose, protein, fungal culture, mycobacteria and nocardia culture.  If these are unremarkable, plan for EBUS of enlarged lymph nodes.  At this time, our list of differentials is broad, including a parapneumonic effusion, TB, silicosis/ILD and also malignancy.  Agree with rocephin and azithromycin while inpatient.  Continue neb treatments and PO prednisolone taper.  Encouraged ambulation and IS use are appreciated.  Patient okay to be discharged from pulmonary standpoint. Consider switching home antibiotics to Ceftin 250 PO BID and Z-John.      Thank you for your consult. Pulmonology staff will follow with the patient regarding results of his pleural fluid analysis and the steps forward.    Case seen with Dr Harman. Physician attestation to follow.     Shira Faustin MD  Internal Medicine - PGY-1  02/28/25              [1]   Social History  Socioeconomic History    Marital status:

## 2025-02-28 NOTE — ED PROVIDER NOTES
"Encounter Date: 2/27/2025    SCRIBE #1 NOTE: I, Deep Geronimo, am scribing for, and in the presence of,  Chacorta Argueta MD. I have scribed the following portions of the note - Other sections scribed: HPI,ROS,PE.       History     Chief Complaint   Patient presents with    Shortness of Breath     PCP had patient do outpatient xray and CT done,  sent here due to "fluid around the lungs" x 3 weeks. C/o SOB.      Patient is a 85 y/o male with PMHx of DM, HTN, and HLD presents to ED c/o SOB onset ~3-4x weeks. Pt reports his SOB is worse with exertion and has been intermittent since onset. Pt reports an associated productive cough with clear sputum. He reports having an outpatient X-ray and CT scan done today, for a concern of lung cancer. He was diagnosed with right lower lobe atelectasis, pleural effusion, pneumonia lymphadenopathy of the right hilum. He reports he was prescribed an inhaler and Levaquin, and told to come here for pulmonology evaluation. He denies being on O2 at home. He denies a history of asthma or COPD. He reports he was a former tobacco user in his 20s.       Per chart review, outpatient CT chest w/ contrast impression:       Findings seen consistent with COPD and emphysema with right lower lobe area of atelectasis versus developing consolidation.  There is a reactive lymphadenopathy right hilum as well as the mediastinum and smaller lymph nodes are seen in the left hilum.       The history is provided by the patient.     Review of patient's allergies indicates:  No Known Allergies  No past medical history on file.  No past surgical history on file.  No family history on file.  Social History[1]  Review of Systems   Constitutional:  Negative for fever.   HENT:  Negative for sore throat.    Eyes:  Negative for visual disturbance.   Respiratory:  Positive for cough (productive with clear sputum) and shortness of breath.    Cardiovascular:  Negative for chest pain.   Gastrointestinal:  Negative for " abdominal pain.   Genitourinary:  Negative for dysuria.   Musculoskeletal:  Negative for joint swelling.   Skin:  Negative for rash.   Neurological:  Negative for weakness.   Psychiatric/Behavioral:  Negative for confusion.    All other systems reviewed and are negative.      Physical Exam     Initial Vitals [02/27/25 1503]   BP Pulse Resp Temp SpO2   (!) 146/91 100 (!) 21 97.9 °F (36.6 °C) (!) 91 %      MAP       --         Physical Exam    Nursing note and vitals reviewed.  Constitutional: He appears well-developed and well-nourished. He is not diaphoretic. No distress.   HENT:   Head: Normocephalic and atraumatic.   Eyes: Conjunctivae and EOM are normal. Pupils are equal, round, and reactive to light.   Neck:   Normal range of motion.  Cardiovascular:  Regular rhythm, normal heart sounds and intact distal pulses.           No murmur heard.  Tachycardic   Pulmonary/Chest: He is in respiratory distress. He has no wheezes. He has rales (crackles to the right base).   Abdominal: Abdomen is soft. He exhibits no distension. There is no abdominal tenderness.   Musculoskeletal:         General: No tenderness or edema. Normal range of motion.      Cervical back: Normal range of motion.     Neurological: He is alert and oriented to person, place, and time. No cranial nerve deficit.   Skin: Skin is warm and dry. Capillary refill takes less than 2 seconds. No rash noted. No erythema.   Psychiatric: He has a normal mood and affect.         ED Course   Procedures  Labs Reviewed   COMPREHENSIVE METABOLIC PANEL - Abnormal       Result Value    Sodium 139      Potassium 4.7      Chloride 106      CO2 24      Glucose 109      Blood Urea Nitrogen 11.5      Creatinine 0.93      Calcium 9.7      Protein Total 8.3 (*)     Albumin 3.5      Globulin 4.8 (*)     Albumin/Globulin Ratio 0.7 (*)     Bilirubin Total 0.6      ALP 81      ALT 7      AST 21      eGFR >60      Anion Gap 9.0      BUN/Creatinine Ratio 12     CBC WITH DIFFERENTIAL  - Abnormal    WBC 8.81      RBC 4.39 (*)     Hgb 13.4 (*)     Hct 40.6 (*)     MCV 92.5      MCH 30.5      MCHC 33.0      RDW 13.1      Platelet 148      MPV 11.0 (*)     Neut % 64.2      Lymph % 22.6      Mono % 10.9      Eos % 1.5      Basophil % 0.5      Imm Grans % 0.3      Neut # 5.66      Lymph # 1.99      Mono # 0.96      Eos # 0.13      Baso # 0.04      Imm Gran # 0.03      NRBC% 0.0     B-TYPE NATRIURETIC PEPTIDE - Normal    Natriuretic Peptide 45.2     TROPONIN I - Normal    Troponin-I 0.010     COVID/FLU A&B PCR - Normal    Influenza A PCR Not Detected      Influenza B PCR Not Detected      SARS-CoV-2 PCR Not Detected      Narrative:     The [employer] Xpress SARS-CoV-2/FLU/RSV plus is a rapid, multiplexed real-time PCR test intended for the simultaneous qualitative detection and differentiation of SARS-CoV-2, Influenza A, Influenza B, and respiratory syncytial virus (RSV) viral RNA in either nasopharyngeal swab or nasal swab specimens.         RESPIRATORY PANEL - Normal    Adenovirus Not Detected      Coronavirus 229E Not Detected      Coronavirus HKU1 Not Detected      Coronavirus NL63 Not Detected      Coronavirus OC43 PCR, Common Cold Virus Not Detected      Human Metapneumovirus Not Detected      Parainfluenza Virus 1 Not Detected      Parainfluenza Virus 2 Not Detected      Parainfluenza Virus 3 Not Detected      Parainfluenza Virus 4 Not Detected      Bordetella pertussis (ptxP) Not Detected      Chlamydia pneumoniae Not Detected      Mycoplasma pneumoniae Not Detected      Human Rhinovirus/Enterovirus Not Detected      Bordetella parapertussis (ZX7584) Not Detected      Narrative:     The [employer] Respiratory Panel 2.1 (RP2.1) is a PCR-based multiplexed nucleic acid test intended for use with the [employer]Fire® 2.0 for simultaneous qualitative detection and identification of multiple respiratory viral and bacterial nucleic acids in nasopharyngeal swabs (NPS) obtained from individuals suspected of respiratory  tract infections.   LACTIC ACID, PLASMA - Normal    Lactic Acid Level 1.0     BLOOD CULTURE OLG   BLOOD CULTURE OLG   CBC W/ AUTO DIFFERENTIAL    Narrative:     The following orders were created for panel order CBC auto differential.  Procedure                               Abnormality         Status                     ---------                               -----------         ------                     CBC with Differential[[id number]]       Abnormal            Final result                 Please view results for these tests on the individual orders.     EKG Readings: (Independently Interpreted)   Initial Reading: No STEMI. Rhythm: Normal Sinus Rhythm. Heart Rate: 90. Ectopy: PVCs. Conduction: Normal. ST Segments: Normal ST Segments. T Waves: Normal.   Taken at 15:04     ECG Results              EKG 12-lead (Final result)        Collection Time Result Time QRS Duration OHS QTC Calculation    02/27/25 15:04:13 02/27/25 20:00:45 122 486                     Final result by Interface, [facility] (02/27/25 20:00:50)                   Narrative:    Test Reason : R06.02,    Vent. Rate :  90 BPM     Atrial Rate :  90 BPM     P-R Int : 142 ms          QRS Dur : 122 ms      QT Int : 398 ms       P-R-T Axes :  35 -16   1 degrees    QTcB Int : 486 ms    Sinus rhythm with Premature supraventricular complexes  Nonspecific intraventricular conduction delay  Borderline Abnormal ECG    Confirmed by Mathew Yarbrough ([id number]) on 2/27/2025 8:00:42 PM    Referred By:            Confirmed By: Mathew Yarbrough                                  Imaging Results    None          Medications   azithromycin (ZITHROMAX) 500 mg in 0.9% NaCl 250 mL IVPB (admixture device) (500 mg Intravenous New Bag 2/27/25 2300)   methylPREDNISolone sodium succinate injection 125 mg (has no administration in time range)   albuterol-ipratropium 2.5 mg-0.5 mg/3 mL nebulizer solution 9 mL (9 mLs Nebulization Given 2/27/25 1927)   furosemide injection 40 mg  (40 mg Intravenous Given 2/27/25 1917)   cefTRIAXone injection 2 g (2 g Intravenous Given 2/27/25 7714)     Medical Decision Making  Judging by the patient's chief complaint and pertinent history, the patient has the following possible differential diagnoses, including but not limited to the following.  Some of these are deemed to be lower likelihood and some more likely based on my physical exam and history combined with possible lab work and/or imaging studies.   Please see the pertinent studies, and refer to the HPI.  Some of these diagnoses will take further evaluation to fully rule out, perhaps as an outpatient and the patient was encouraged to follow up when discharged for more comprehensive evaluation.    ACS, pneumonia, COVID/Flu, congestive heart failure, asthma, COPD, pleural effusion, pulmonary edema, acute bronchitis, PE, pneumothorax, hemothorax, aortic dissection, electrolyte abnormalities, anemia, anxiety       Patient is a 84-year-old male presents to emergency department complaining of shortness of breath.  See HPI.  See physical exam.  Had outpatient labs and imaging with concern for possible pleural effusion, pneumonia, recommended to come to the emergency department for pulmonology evaluation.  On arrival patient hypoxic.  Occasional wheeze.  Hypoxic, suspect likely history of undiagnosed COPD.  Given DuoNebs, steroids.  Blood cultures obtained currently on treatment with Levaquin.  Afebrile, no leukocytosis.  Covered with IV antibiotics however can likely placed back on Levaquin.  Will consult pulmonology.  Will observe overnight, likely wean oxygen.  Likely COPD exacerbation.  All results discussed with the patient and family.  Answered all questions at this time.  Verbalized understanding and agreed to plan.    Problems Addressed:  COPD exacerbation: acute illness or injury that poses a threat to life or bodily functions  Dyspnea, unspecified type: acute illness or injury that poses a threat  to life or bodily functions  Hypoxia: acute illness or injury that poses a threat to life or bodily functions  Pleural effusion: acute illness or injury that poses a threat to life or bodily functions  Pneumonia of right lower lobe due to infectious organism: acute illness or injury that poses a threat to life or bodily functions  Shortness of breath: acute illness or injury that poses a threat to life or bodily functions    Amount and/or Complexity of Data Reviewed  External Data Reviewed: radiology and notes.     Details: Per chart review, outpatient CT chest w/ contrast impression:       Findings seen consistent with COPD and emphysema with right lower lobe area of atelectasis versus developing consolidation.  There is a reactive lymphadenopathy right hilum as well as the mediastinum and smaller lymph nodes are seen in the left hilum.  Labs: ordered.  Radiology: ordered and independent interpretation performed.  ECG/medicine tests: ordered and independent interpretation performed.     Details: Initial Reading: No STEMI. Rhythm: Normal Sinus Rhythm. Heart Rate: 90. Ectopy: PVCs. Conduction: Normal. ST Segments: Normal ST Segments. T Waves: Normal.   Taken at 15:04     Risk  Prescription drug management.  Parenteral controlled substances.  Decision regarding hospitalization.            Scribe Attestation:   Scribe #1: I performed the above scribed service and the documentation accurately describes the services I performed. I attest to the accuracy of the note.    Attending Attestation:           Physician Attestation for Scribe:  Physician Attestation Statement for Scribe #1: I, Chacorta Argueta MD, reviewed documentation, as scribed by Deep Geronimo in my presence, and it is both accurate and complete.                                    Clinical Impression:  Final diagnoses:  [R06.02] Shortness of breath  [R09.02] Hypoxia (Primary)  [J44.1] COPD exacerbation  [R06.00] Dyspnea, unspecified type  [J90] Pleural  effusion  [J18.9] Pneumonia of right lower lobe due to infectious organism          ED Disposition Condition    Observation                     [1]         Chacorta Argueta MD  02/27/25 7940

## 2025-03-02 LAB
BACTERIA SPEC CULT: ABNORMAL
GRAM STN SPEC: ABNORMAL

## 2025-03-04 LAB
BACTERIA BLD CULT: NORMAL
BACTERIA BLD CULT: NORMAL
GAMMA INTERFERON BACKGROUND BLD IA-ACNC: 0 IU/ML
M TB IFN-G BLD-IMP: NEGATIVE
M TB IFN-G CD4+ BCKGRND COR BLD-ACNC: 0.01 IU/ML
M TB IFN-G CD4+CD8+ BCKGRND COR BLD-ACNC: 0 IU/ML
MITOGEN IGNF BCKGRD COR BLD-ACNC: 10 IU/ML

## 2025-03-05 ENCOUNTER — PATIENT MESSAGE (OUTPATIENT)
Dept: ADMINISTRATIVE | Facility: CLINIC | Age: 85
End: 2025-03-05
Payer: MEDICARE

## 2025-03-05 ENCOUNTER — PATIENT OUTREACH (OUTPATIENT)
Dept: ADMINISTRATIVE | Facility: CLINIC | Age: 85
End: 2025-03-05
Payer: MEDICARE

## 2025-03-05 LAB
ADENOSINE DEAMINASE PLR-CCNC: 47 U/L
BACTERIA FLD CULT: NORMAL

## 2025-03-05 NOTE — PROGRESS NOTES
C3 nurse attempted to contact Juanjose Chawla  for a TCC post hospital discharge follow up call. No answer. No voicemail available. Called contact number for patient's wife, Ashlie. No answer. Left voicemail with callback information. The patient does not have a scheduled HOSFU appointment noted. Unable to message PCP staff.    Message sent via patient's portal regarding follow up call.

## 2025-03-05 NOTE — PROGRESS NOTES
C3 nurse spoke with Juanjose Chawla  for a TCC post hospital discharge follow up call. The patient has a scheduled [facility] appointment with Sadiq Hartmann MD  today, 03/05/2025 @ 1 pm.     Patient taking;  Xigduo XR 5mg-1000 mg (2 tablets) daily

## 2025-03-13 ENCOUNTER — HOSPITAL ENCOUNTER (OUTPATIENT)
Dept: RADIOLOGY | Facility: HOSPITAL | Age: 85
Discharge: HOME | End: 2025-03-13
Attending: INTERNAL MEDICINE
Payer: MEDICARE

## 2025-03-13 DIAGNOSIS — J91.0 MALIGNANT PLEURAL EFFUSION: ICD-10-CM

## 2025-03-13 PROCEDURE — 74177 CT ABD & PELVIS W/CONTRAST: CPT | Mod: TC

## 2025-03-13 PROCEDURE — 25500020 PHARM REV CODE 255: Performed by: INTERNAL MEDICINE

## 2025-03-13 RX ORDER — DIATRIZOATE MEGLUMINE AND DIATRIZOATE SODIUM 660; 100 MG/ML; MG/ML
30 SOLUTION ORAL; RECTAL
Status: DISCONTINUED | OUTPATIENT
Start: 2025-03-13 | End: 2025-03-14 | Stop reason: HOSPADM

## 2025-03-13 RX ADMIN — IOHEXOL 100 ML: 350 INJECTION, SOLUTION INTRAVENOUS at 11:03

## 2025-03-19 ENCOUNTER — HOSPITAL ENCOUNTER (OUTPATIENT)
Dept: RADIOLOGY | Facility: HOSPITAL | Age: 85
Discharge: HOME | End: 2025-03-19
Attending: INTERNAL MEDICINE
Payer: MEDICARE

## 2025-03-19 DIAGNOSIS — J90 PLEURAL EFFUSION: ICD-10-CM

## 2025-03-19 PROCEDURE — 32555 ASPIRATE PLEURA W/ IMAGING: CPT

## 2025-03-19 PROCEDURE — 71045 X-RAY EXAM CHEST 1 VIEW: CPT | Mod: TC

## 2025-03-19 NOTE — PROCEDURES
[facility]   Thoracentesis  Procedure Note    SUMMARY     Date of Procedure:  3/19/2025    Procedure: right thoracentesis with US guidance    Performed by: Daysi Harman MD, [facility]        Pre-Operative Diagnosis: malignant right pleural effusion with recurrence    Post-Operative Diagnosis: malignant right pleural effusion with recurrence    Anesthesia: Local, 1% lidocaine without epinephrine      Description of the Findings of the Procedure:   Proper informed consent was obtained, including a detailed discussion of the potential risks and benefits of the procedure.  Patient was placed in a sitting position, and ultrasound used to localize a right pleural effusion.  Skin was then marked, and was then prepped using chlorhexidine which was allowed to dry.  Sterile drape was then applied.  Xylocaine 1% without epinephrine, 3ml, was used to anesthetize skin.  A 22 gauge needle was then inserted over rib, into pleural space with return of kenny fluid.  A thoracentesis needle/catheter was then inserted over rib, into pleural space, and catheter was fed without resistance. Vacutainer bottles were then connected to catheter, removing a total of 650 cc kenny, noncloudy fluid.  No complications.  Post procedure chest x-ray reveals complete resolution of fluid, no pneumothorax.      Complications: none    Estimated Blood Loss (EBL): 0ml           Condition: stable    Disposition: home      Daysi Harman MD, [facility]  Pulmonary/Critical Care

## 2025-03-20 ENCOUNTER — HOSPITAL ENCOUNTER (OUTPATIENT)
Dept: RADIOLOGY | Facility: HOSPITAL | Age: 85
Discharge: HOME | End: 2025-03-20
Attending: INTERNAL MEDICINE
Payer: MEDICARE

## 2025-03-20 DIAGNOSIS — J91.0 MALIGNANT PLEURAL EFFUSION: ICD-10-CM

## 2025-03-20 PROCEDURE — A9552 F18 FDG: HCPCS | Performed by: INTERNAL MEDICINE

## 2025-03-20 PROCEDURE — 78815 PET IMAGE W/CT SKULL-THIGH: CPT | Mod: TC

## 2025-03-20 RX ORDER — FLUDEOXYGLUCOSE F18 500 MCI/ML
10 INJECTION INTRAVENOUS
Status: COMPLETED | OUTPATIENT
Start: 2025-03-20 | End: 2025-03-20

## 2025-03-20 RX ADMIN — FLUDEOXYGLUCOSE F-18 10.3 MILLICURIE: 500 INJECTION INTRAVENOUS at 11:03

## 2025-03-21 LAB — POCT GLUCOSE: 122 MG/DL (ref 70–110)

## 2025-03-24 ENCOUNTER — TELEPHONE (OUTPATIENT)
Dept: PULMONOLOGY | Facility: CLINIC | Age: 85
End: 2025-03-24
Payer: MEDICARE

## 2025-03-24 ENCOUNTER — TELEPHONE (OUTPATIENT)
Dept: HEMATOLOGY/ONCOLOGY | Facility: CLINIC | Age: 85
End: 2025-03-24
Payer: MEDICARE

## 2025-03-24 PROBLEM — C77.1 SECONDARY MALIGNANCY OF MEDIASTINAL LYMPH NODES: Status: ACTIVE | Noted: 2025-03-24

## 2025-03-24 PROBLEM — C79.51 METASTASIS TO BONE: Status: ACTIVE | Noted: 2025-03-24

## 2025-03-24 PROBLEM — J91.0 MALIGNANT PLEURAL EFFUSION: Status: ACTIVE | Noted: 2025-03-24

## 2025-03-24 NOTE — TELEPHONE ENCOUNTER
PET/CT was performed 03/24/2025. There are extensive bony hypermetabolic uptake with associated lytic attenuations left ilium, L5 vertebral body, C2 vertebral body, as well as hypermetabolic mediastinal nodes and bilateral hilar hypermetabolic lymph nodes.  There is a small amount of right pleural fluid noted with hypermetabolic consolidation in the right posterior basilar lower lobe.    I discussed the above findings in detail with patient by phone today.  Will likely need to proceed to bronch with EBUS for tissue.  Further to follow.

## 2025-03-24 NOTE — PROGRESS NOTES
Subjective:        PATIENT: Juanjose Chawla  MRN: [id number]  DATE: 3/25/2025    PCP: Sadiq Hartmann MD   Referring Provider : Jerrod Harman Jr., MD, [address]  [address]  [place] [address]     Chief Complaint: Establish Care          03/25/2025  This is an 84-year-old male who was seen evaluated by Pulmonary.  He was admitted to the hospital on February 28, 2025 with progressive shortness of breath he eventually underwent a diagnostic thoracentesis  He underwent CT scan chest abdomen pelvis.  He eventually underwent a diagnostic thoracentesis with social consistent with adenocarcinoma questionable primary.  He underwent a PET scan it was referred to us for further evaluation.  I spoke to his pulmonologist.  They are planning an endoscopic bronchoscopy and ultrasound and biopsy after his PET scan        This patient denies any headache or visual change.  He was longstanding visual changes.    He has  had cataract surgery.    He hearing loss.    He ambulates with a rolling walker.    Up until several months ago he was cutting all of his grass and working out in the field.    He was lost some weight.  He had he has dry mouth,   dyspnea on exertion, mild lower extremity edema.  Mild PND no orthopnea.    No current chest pain, he does have some pleuritic chest pain from time to time.    He was occasional bone pain ,,no neuropathy.    He ambulates with a rolling walker.    He was had constipation   he has had urinary frequency is yes drink plenty of water because of dry mouth.      Denies any blood in his stool.    He denies any history of hepatitis yellow jaundice.  He does not recall an episode where he had some heated lung therapy as a child back in the 40s      Past Medical History:   Diagnosis Date    Anxiety     Diabetes mellitus     HLD (hyperlipidemia)     Hypertension     Major depressive disorder, single episode, unspecified     Pneumonia, unspecified organism       .  Past Surgical  History:   Procedure Laterality Date    Cardiac stent      HERNIA REPAIR N/A       .No family history on file.   Social History[1]   .Review of patient's allergies indicates:  No Known Allergies   Current Medications[2]   Review of Systems     Pertinent positives above in interim history  Objective:     Vitals:    03/25/25 1401   BP: (!) 154/91   Pulse: 96   Resp: 20   Temp: 98 °F (36.7 °C)         Physical Exam  Vitals reviewed.   Constitutional:       Appearance: Normal appearance.   HENT:      Head: Normocephalic and atraumatic.      Right Ear: Tympanic membrane normal.      Left Ear: Tympanic membrane normal.      Mouth/Throat:      Mouth: Mucous membranes are moist.      Pharynx: Oropharynx is clear.   Eyes:      Extraocular Movements: Extraocular movements intact.      Conjunctiva/sclera: Conjunctivae normal.      Pupils: Pupils are equal, round, and reactive to light.   Cardiovascular:      Rate and Rhythm: Normal rate and regular rhythm.      Pulses: Normal pulses.      Heart sounds: Normal heart sounds.   Pulmonary:      Effort: Tachypnea present.      Breath sounds: Decreased air movement present. Examination of the right-middle field reveals decreased breath sounds. Examination of the right-lower field reveals decreased breath sounds. Decreased breath sounds present.   Abdominal:      General: Abdomen is flat. Bowel sounds are normal. There is no distension.      Palpations: Abdomen is soft.   Musculoskeletal:         General: Normal range of motion.      Cervical back: Normal range of motion and neck supple.   Lymphadenopathy:      Cervical: No cervical adenopathy.      Lower Body: No right inguinal adenopathy. No left inguinal adenopathy.   Skin:     General: Skin is warm and dry.   Neurological:      General: No focal deficit present.      Mental Status: He is alert and oriented to person, place, and time. Mental status is at baseline.      GCS: GCS eye subscore is 4. GCS verbal subscore is 5. GCS  motor subscore is 6.   Psychiatric:         Attention and Perception: Attention normal.         Mood and Affect: Mood is anxious.         Speech: Speech is rapid and pressured.         Behavior: Behavior normal.         Thought Content: Thought content normal.         Judgment: Judgment normal.         ECOG SCORE            .Lab Review:  Previous labs and images reviewed in epic      Images    February 27, 2025:  CT of the chest, COPD and emphysema, right lower lobe atelectasis, lymphadenopathy in the right hilum as well as the mediastinum and smaller nodes in the left hilum.  The right hilar lymph node measures 2.5 cm other small nodes in the right hilum.  And there is a precarinal lymph node 2.2 x 2.2 cm.  And adenopathy seen in the subcarinal space as well as the mediastinum.    03/13/2025: CT abdomen pelvis, right-sided pleural effusion interstitial fibrosis lungs bilaterally prostatic hypertrophy    03/20/2025: PET-CT: Hypermetabolic consolidation right lung base with mediastinal and bilateral adenopathy and innumerable scattered hypermetabolic bone lesions    pathology  The right pleural effusion showed malignant cells consistent with a poorly differential adenocarcinoma:  Negative for TTF 1, WT1, GATA3.  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  This is more consistent with a lung primary in the clinical picture.      Goals: Palliation   Noncurable  Control pain, decrease shortness of breath, explained he was not a surgical candidate.  Explained  utility of radiation   Explained risk of bone metastases   Express progressive shortness of breath --need for drainage of his pleural effusion  and Risk of death from progressive disease  Options: Chemotherapy, immunotherapy, directed therapy based on NGS testing oral medications   Hospice and supportive care      Recommendations  NGS testing in the blood  Agree with secondary biopsy we will discuss with Pulmonary either EBUS or bone biopsy   MRI with ativan  Drain Fluid if able  Agree with -- EBUS and biopsy---discussed again with Dr. Harman today  Stop cholesterol /medication  Start b12/folate  F/u ---   2 week  Labs today   Call primary care to help with BuSpar and see if an antianxiety medications can be altered or change                Follow up in about 2 weeks (around 4/8/2025) for use new patient spot, With ME.   Orders Placed This Encounter    MRI Brain W WO Contrast    CBC Auto Differential    Comprehensive Metabolic Panel    TSH    [facility] - Solid Tumor - CtDNA    CBC with Differential    LORazepam (ATIVAN) 1 MG tablet          Niko Johnson MD    Patient called today on 03/26/2025.  He was noted to have hypercalcemia in his labs yesterday.    He was instructed to go back to the emergency room to have his calcium and PTH checked.  He was explained that if his hypercalcemia is real he would need admission and start therapy for the above.    He understood the above.  He reports that he was going to go the hospital           [1]   Social History  Socioeconomic History    Marital status:    Tobacco Use    Smoking status: Never    Smokeless tobacco: Never   Substance and Sexual Activity    Alcohol use: Not Currently    Drug use: Never   [2]   Current Outpatient Medications:     albuterol (ACCUNEB) 0.63 mg/3 mL Nebu, Take 0.63 mg by nebulization every 6 (six) hours as needed (wheezing and shortness of breath). Rescue, Disp: , Rfl:     amLODIPine (NORVASC) 5 MG tablet, Take 5 mg by mouth once daily.,  Disp: , Rfl:     aspirin (ECOTRIN) 81 MG EC tablet, Take 81 mg by mouth once daily., Disp: , Rfl:     benzonatate (TESSALON) 100 MG capsule, Take 100 mg by mouth 3 (three) times daily as needed for Cough., Disp: , Rfl:     ergocalciferol (VITAMIN D2) 50,000 unit Cap, Take 50,000 Units by mouth every 7 days., Disp: , Rfl:     famotidine (PEPCID) 20 MG tablet, Take 20 mg by mouth once daily., Disp: , Rfl:     gabapentin (NEURONTIN) 300 MG capsule, Take 300 mg by mouth 3 (three) times daily., Disp: , Rfl:     glimepiride (AMARYL) 1 MG tablet, Take 1 mg by mouth before breakfast., Disp: , Rfl:     JANUVIA 100 mg Tab, Take 100 mg by mouth once daily., Disp: , Rfl:     lisinopriL (PRINIVIL,ZESTRIL) 2.5 MG tablet, Take 2.5 mg by mouth once daily., Disp: , Rfl:     metFORMIN (GLUCOPHAGE) 1000 MG tablet, Take 1,000 mg by mouth 2 (two) times daily., Disp: , Rfl:     rosuvastatin (CRESTOR) 20 MG tablet, Take 20 mg by mouth once daily., Disp: , Rfl:     tamsulosin (FLOMAX) 0.4 mg Cap, Take 0.4 mg by mouth once daily., Disp: , Rfl:     umeclidinium (INCRUSE ELLIPTA) 62.5 mcg/actuation inhalation capsule, Inhale 62.5 mcg into the lungs once daily. Controller, Disp: 30 each, Rfl: 0    LORazepam (ATIVAN) 1 MG tablet, Take  60 min before MRI, May repeat x 1 if needed, Disp: 2 tablet, Rfl: 0

## 2025-03-24 NOTE — TELEPHONE ENCOUNTER
Spoke with patient regarding new patient appointment on 3/25/25 with Dr. Johnson. Patient denied concerns or questions at this time. Confirmed appointment date, time and location with patient.

## 2025-03-25 ENCOUNTER — CLINICAL SUPPORT (OUTPATIENT)
Dept: HEMATOLOGY/ONCOLOGY | Facility: CLINIC | Age: 85
End: 2025-03-25
Payer: MEDICARE

## 2025-03-25 ENCOUNTER — OFFICE VISIT (OUTPATIENT)
Dept: HEMATOLOGY/ONCOLOGY | Facility: CLINIC | Age: 85
End: 2025-03-25
Payer: MEDICARE

## 2025-03-25 VITALS
RESPIRATION RATE: 20 BRPM | BODY MASS INDEX: 28.1 KG/M2 | TEMPERATURE: 98 F | HEART RATE: 96 BPM | OXYGEN SATURATION: 95 % | WEIGHT: 196.31 LBS | DIASTOLIC BLOOD PRESSURE: 91 MMHG | HEIGHT: 70 IN | SYSTOLIC BLOOD PRESSURE: 154 MMHG

## 2025-03-25 DIAGNOSIS — C34.90 MALIGNANT NEOPLASM OF UNSPECIFIED PART OF UNSPECIFIED BRONCHUS OR LUNG: ICD-10-CM

## 2025-03-25 DIAGNOSIS — C79.51 METASTASIS TO BONE: ICD-10-CM

## 2025-03-25 DIAGNOSIS — J91.0 MALIGNANT PLEURAL EFFUSION: Primary | ICD-10-CM

## 2025-03-25 DIAGNOSIS — R53.83 OTHER FATIGUE: ICD-10-CM

## 2025-03-25 DIAGNOSIS — C77.1 SECONDARY MALIGNANCY OF MEDIASTINAL LYMPH NODES: ICD-10-CM

## 2025-03-25 DIAGNOSIS — F40.240 CLAUSTROPHOBIA: ICD-10-CM

## 2025-03-25 DIAGNOSIS — C34.90 MALIGNANT NEOPLASM OF UNSPECIFIED PART OF UNSPECIFIED BRONCHUS OR LUNG: Primary | ICD-10-CM

## 2025-03-25 LAB
ALBUMIN SERPL-MCNC: 3.5 G/DL (ref 3.4–4.8)
ALBUMIN/GLOB SERPL: 0.7 RATIO (ref 1.1–2)
ALP SERPL-CCNC: 88 UNIT/L (ref 40–150)
ALT SERPL-CCNC: 7 UNIT/L (ref 0–55)
ANION GAP SERPL CALC-SCNC: 12 MEQ/L
AST SERPL-CCNC: 14 UNIT/L (ref 11–45)
BASOPHILS # BLD AUTO: 0.03 X10(3)/MCL
BASOPHILS NFR BLD AUTO: 0.4 %
BILIRUB SERPL-MCNC: 0.5 MG/DL
BUN SERPL-MCNC: 15.3 MG/DL (ref 8.4–25.7)
CALCIUM SERPL-MCNC: 11.4 MG/DL (ref 8.8–10)
CHLORIDE SERPL-SCNC: 107 MMOL/L (ref 98–107)
CO2 SERPL-SCNC: 25 MMOL/L (ref 23–31)
CREAT SERPL-MCNC: 0.79 MG/DL (ref 0.72–1.25)
CREAT/UREA NIT SERPL: 19
EOSINOPHIL # BLD AUTO: 0.14 X10(3)/MCL (ref 0–0.9)
EOSINOPHIL NFR BLD AUTO: 1.8 %
ERYTHROCYTE [DISTWIDTH] IN BLOOD BY AUTOMATED COUNT: 13 % (ref 11.5–17)
GFR SERPLBLD CREATININE-BSD FMLA CKD-EPI: >60 ML/MIN/1.73/M2
GLOBULIN SER-MCNC: 4.7 GM/DL (ref 2.4–3.5)
GLUCOSE SERPL-MCNC: 91 MG/DL (ref 82–115)
HCT VFR BLD AUTO: 41.9 % (ref 42–52)
HGB BLD-MCNC: 13.7 G/DL (ref 14–18)
IMM GRANULOCYTES # BLD AUTO: 0.03 X10(3)/MCL (ref 0–0.04)
IMM GRANULOCYTES NFR BLD AUTO: 0.4 %
LYMPHOCYTES # BLD AUTO: 2.11 X10(3)/MCL (ref 0.6–4.6)
LYMPHOCYTES NFR BLD AUTO: 26.6 %
MCH RBC QN AUTO: 30.4 PG (ref 27–31)
MCHC RBC AUTO-ENTMCNC: 32.7 G/DL (ref 33–36)
MCV RBC AUTO: 92.9 FL (ref 80–94)
MONOCYTES # BLD AUTO: 0.8 X10(3)/MCL (ref 0.1–1.3)
MONOCYTES NFR BLD AUTO: 10.1 %
NEUTROPHILS # BLD AUTO: 4.82 X10(3)/MCL (ref 2.1–9.2)
NEUTROPHILS NFR BLD AUTO: 60.7 %
PLATELET # BLD AUTO: 177 X10(3)/MCL (ref 130–400)
PMV BLD AUTO: 9.6 FL (ref 7.4–10.4)
POTASSIUM SERPL-SCNC: 4.2 MMOL/L (ref 3.5–5.1)
PROT SERPL-MCNC: 8.2 GM/DL (ref 5.8–7.6)
RBC # BLD AUTO: 4.51 X10(6)/MCL (ref 4.7–6.1)
SODIUM SERPL-SCNC: 144 MMOL/L (ref 136–145)
TSH SERPL-ACNC: 0.46 UIU/ML (ref 0.35–4.94)
WBC # BLD AUTO: 7.93 X10(3)/MCL (ref 4.5–11.5)

## 2025-03-25 PROCEDURE — 36415 COLL VENOUS BLD VENIPUNCTURE: CPT | Performed by: INTERNAL MEDICINE

## 2025-03-25 PROCEDURE — 99215 OFFICE O/P EST HI 40 MIN: CPT | Mod: PBBFAC | Performed by: INTERNAL MEDICINE

## 2025-03-25 PROCEDURE — 99999 PR PBB SHADOW E&M-EST. PATIENT-LVL V: CPT | Mod: PBBFAC,,, | Performed by: INTERNAL MEDICINE

## 2025-03-25 PROCEDURE — 80053 COMPREHEN METABOLIC PANEL: CPT | Performed by: INTERNAL MEDICINE

## 2025-03-25 PROCEDURE — 84443 ASSAY THYROID STIM HORMONE: CPT | Performed by: INTERNAL MEDICINE

## 2025-03-25 PROCEDURE — 85025 COMPLETE CBC W/AUTO DIFF WBC: CPT | Performed by: INTERNAL MEDICINE

## 2025-03-25 RX ORDER — LORAZEPAM 1 MG/1
TABLET ORAL
Qty: 2 TABLET | Refills: 0 | Status: SHIPPED | OUTPATIENT
Start: 2025-03-25

## 2025-03-25 NOTE — NURSING
Oncology Navigation   Intake  Date of Diagnosis: 02/28/25  Cancer Type: Thoracic     Treatment  Current Status: Staging work-up    Surgical Oncologist: Dr. Jerrod Harman  Type of Surgery: Bronch with EBUS    Medical Oncologist: Dr. Niko Johnson  Consult Date: 03/25/25       Procedures: Biopsy; MRI (EBUS for tissue for NGS)             Support Systems: Spouse/significant other; Children  Barriers of Care: Comorbidities     Acuity      Follow Up  No follow-ups on file.     Met with patient today following new patient appointment with Dr. Johnson. He is accompanied by his wife and two daughters. Introduced self and role of navigation. Patient states that he understands and agrees with plan. Assessed for barriers to care. Patient denies anxiety and depression today. He denies transportation concerns. He uses a walker and denies need for any other medical equipment. He has no further questions or concerns today. He is aware of how to reach navigation should he need to.

## 2025-03-26 ENCOUNTER — RESULTS FOLLOW-UP (OUTPATIENT)
Dept: HEMATOLOGY/ONCOLOGY | Facility: CLINIC | Age: 85
End: 2025-03-26

## 2025-03-26 ENCOUNTER — HOSPITAL ENCOUNTER (INPATIENT)
Facility: HOSPITAL | Age: 85
LOS: 2 days | Discharge: HOME | DRG: 167 | End: 2025-03-29
Attending: EMERGENCY MEDICINE | Admitting: HOSPITALIST
Payer: MEDICARE

## 2025-03-26 ENCOUNTER — TELEPHONE (OUTPATIENT)
Dept: HEMATOLOGY/ONCOLOGY | Facility: CLINIC | Age: 85
End: 2025-03-26
Payer: MEDICARE

## 2025-03-26 DIAGNOSIS — C77.1 SECONDARY MALIGNANCY OF MEDIASTINAL LYMPH NODES: ICD-10-CM

## 2025-03-26 DIAGNOSIS — E83.52 SERUM CALCIUM ELEVATED: Primary | ICD-10-CM

## 2025-03-26 DIAGNOSIS — R07.9 CHEST PAIN: ICD-10-CM

## 2025-03-26 DIAGNOSIS — R45.89 ANXIETY ABOUT TREATMENT: ICD-10-CM

## 2025-03-26 DIAGNOSIS — E87.8 ELECTROLYTE ABNORMALITY: ICD-10-CM

## 2025-03-26 DIAGNOSIS — E83.52 HYPERCALCEMIA: ICD-10-CM

## 2025-03-26 DIAGNOSIS — C34.90 MALIGNANT NEOPLASM OF UNSPECIFIED PART OF UNSPECIFIED BRONCHUS OR LUNG: Primary | ICD-10-CM

## 2025-03-26 LAB
ALBUMIN SERPL-MCNC: 3.4 G/DL (ref 3.4–4.8)
ALBUMIN/GLOB SERPL: 0.8 RATIO (ref 1.1–2)
ALP SERPL-CCNC: 90 UNIT/L (ref 40–150)
ALT SERPL-CCNC: 5 UNIT/L (ref 0–55)
ANION GAP SERPL CALC-SCNC: 11 MEQ/L
AST SERPL-CCNC: 13 UNIT/L (ref 11–45)
BACTERIA #/AREA URNS AUTO: ABNORMAL /HPF
BASOPHILS # BLD AUTO: 0.03 X10(3)/MCL
BASOPHILS NFR BLD AUTO: 0.3 %
BILIRUB SERPL-MCNC: 0.5 MG/DL
BILIRUB UR QL STRIP.AUTO: NEGATIVE
BUN SERPL-MCNC: 18.7 MG/DL (ref 8.4–25.7)
CALCIUM SERPL-MCNC: 10.7 MG/DL (ref 8.8–10)
CALCIUM SERPL-MCNC: 11.2 MG/DL (ref 8.8–10)
CHLORIDE SERPL-SCNC: 109 MMOL/L (ref 98–107)
CLARITY UR: CLEAR
CO2 SERPL-SCNC: 24 MMOL/L (ref 23–31)
COLOR UR AUTO: ABNORMAL
CREAT SERPL-MCNC: 0.79 MG/DL (ref 0.72–1.25)
CREAT/UREA NIT SERPL: 24
EOSINOPHIL # BLD AUTO: 0.15 X10(3)/MCL (ref 0–0.9)
EOSINOPHIL NFR BLD AUTO: 1.7 %
ERYTHROCYTE [DISTWIDTH] IN BLOOD BY AUTOMATED COUNT: 13.2 % (ref 11.5–17)
GFR SERPLBLD CREATININE-BSD FMLA CKD-EPI: >60 ML/MIN/1.73/M2
GLOBULIN SER-MCNC: 4.1 GM/DL (ref 2.4–3.5)
GLUCOSE SERPL-MCNC: 83 MG/DL (ref 82–115)
GLUCOSE UR QL STRIP: ABNORMAL
HCT VFR BLD AUTO: 40.4 % (ref 42–52)
HGB BLD-MCNC: 12.8 G/DL (ref 14–18)
HGB UR QL STRIP: ABNORMAL
IMM GRANULOCYTES # BLD AUTO: 0.04 X10(3)/MCL (ref 0–0.04)
IMM GRANULOCYTES NFR BLD AUTO: 0.5 %
INR PPP: 1
KETONES UR QL STRIP: NEGATIVE
LEUKOCYTE ESTERASE UR QL STRIP: NEGATIVE
LYMPHOCYTES # BLD AUTO: 1.94 X10(3)/MCL (ref 0.6–4.6)
LYMPHOCYTES NFR BLD AUTO: 22.5 %
MAGNESIUM SERPL-MCNC: 2.3 MG/DL (ref 1.6–2.6)
MCH RBC QN AUTO: 29.8 PG (ref 27–31)
MCHC RBC AUTO-ENTMCNC: 31.7 G/DL (ref 33–36)
MCV RBC AUTO: 94 FL (ref 80–94)
MONOCYTES # BLD AUTO: 0.83 X10(3)/MCL (ref 0.1–1.3)
MONOCYTES NFR BLD AUTO: 9.6 %
NEUTROPHILS # BLD AUTO: 5.62 X10(3)/MCL (ref 2.1–9.2)
NEUTROPHILS NFR BLD AUTO: 65.4 %
NITRITE UR QL STRIP: NEGATIVE
NRBC BLD AUTO-RTO: 0 %
OHS QRS DURATION: 126 MS
OHS QTC CALCULATION: 474 MS
PH UR STRIP: 5 [PH]
PHOSPHATE SERPL-MCNC: 2.7 MG/DL (ref 2.3–4.7)
PLATELET # BLD AUTO: 165 X10(3)/MCL (ref 130–400)
PMV BLD AUTO: 9.6 FL (ref 7.4–10.4)
POCT GLUCOSE: 142 MG/DL (ref 70–110)
POTASSIUM SERPL-SCNC: 3.9 MMOL/L (ref 3.5–5.1)
PROT SERPL-MCNC: 7.5 GM/DL (ref 5.8–7.6)
PROT UR QL STRIP: NEGATIVE
PROTHROMBIN TIME: 13.1 SECONDS (ref 12.5–14.5)
RBC # BLD AUTO: 4.3 X10(6)/MCL (ref 4.7–6.1)
RBC #/AREA URNS AUTO: ABNORMAL /HPF
SODIUM SERPL-SCNC: 144 MMOL/L (ref 136–145)
SP GR UR STRIP.AUTO: 1.03 (ref 1–1.03)
SQUAMOUS #/AREA URNS LPF: ABNORMAL /HPF
TROPONIN I SERPL-MCNC: 0.02 NG/ML (ref 0–0.04)
TSH SERPL-ACNC: 0.48 UIU/ML (ref 0.35–4.94)
UROBILINOGEN UR STRIP-ACNC: NORMAL
WBC # BLD AUTO: 8.61 X10(3)/MCL (ref 4.5–11.5)
WBC #/AREA URNS AUTO: ABNORMAL /HPF

## 2025-03-26 PROCEDURE — G0378 HOSPITAL OBSERVATION PER HR: HCPCS

## 2025-03-26 PROCEDURE — 99214 OFFICE O/P EST MOD 30 MIN: CPT | Mod: ,,, | Performed by: INTERNAL MEDICINE

## 2025-03-26 PROCEDURE — 80053 COMPREHEN METABOLIC PANEL: CPT | Performed by: PHYSICIAN ASSISTANT

## 2025-03-26 PROCEDURE — 83735 ASSAY OF MAGNESIUM: CPT | Performed by: PHYSICIAN ASSISTANT

## 2025-03-26 PROCEDURE — 99285 EMERGENCY DEPT VISIT HI MDM: CPT | Mod: 25

## 2025-03-26 PROCEDURE — 85610 PROTHROMBIN TIME: CPT | Performed by: PHYSICIAN ASSISTANT

## 2025-03-26 PROCEDURE — 85025 COMPLETE CBC W/AUTO DIFF WBC: CPT | Performed by: PHYSICIAN ASSISTANT

## 2025-03-26 PROCEDURE — 25000003 PHARM REV CODE 250: Performed by: INTERNAL MEDICINE

## 2025-03-26 PROCEDURE — 96360 HYDRATION IV INFUSION INIT: CPT

## 2025-03-26 PROCEDURE — 27000221 HC OXYGEN, UP TO 24 HOURS

## 2025-03-26 PROCEDURE — 36415 COLL VENOUS BLD VENIPUNCTURE: CPT | Performed by: STUDENT IN AN ORGANIZED HEALTH CARE EDUCATION/TRAINING PROGRAM

## 2025-03-26 PROCEDURE — 93010 ELECTROCARDIOGRAM REPORT: CPT | Mod: ,,, | Performed by: INTERNAL MEDICINE

## 2025-03-26 PROCEDURE — 84484 ASSAY OF TROPONIN QUANT: CPT

## 2025-03-26 PROCEDURE — 25000003 PHARM REV CODE 250: Performed by: EMERGENCY MEDICINE

## 2025-03-26 PROCEDURE — 84484 ASSAY OF TROPONIN QUANT: CPT | Performed by: PHYSICIAN ASSISTANT

## 2025-03-26 PROCEDURE — 84443 ASSAY THYROID STIM HORMONE: CPT | Performed by: PHYSICIAN ASSISTANT

## 2025-03-26 PROCEDURE — 84100 ASSAY OF PHOSPHORUS: CPT | Performed by: STUDENT IN AN ORGANIZED HEALTH CARE EDUCATION/TRAINING PROGRAM

## 2025-03-26 PROCEDURE — 93005 ELECTROCARDIOGRAM TRACING: CPT

## 2025-03-26 PROCEDURE — 81001 URINALYSIS AUTO W/SCOPE: CPT | Performed by: PHYSICIAN ASSISTANT

## 2025-03-26 PROCEDURE — 82310 ASSAY OF CALCIUM: CPT | Performed by: STUDENT IN AN ORGANIZED HEALTH CARE EDUCATION/TRAINING PROGRAM

## 2025-03-26 PROCEDURE — 25000003 PHARM REV CODE 250: Performed by: STUDENT IN AN ORGANIZED HEALTH CARE EDUCATION/TRAINING PROGRAM

## 2025-03-26 RX ORDER — SODIUM CHLORIDE 0.9 % (FLUSH) 0.9 %
10 SYRINGE (ML) INJECTION
Status: DISCONTINUED | OUTPATIENT
Start: 2025-03-26 | End: 2025-03-29 | Stop reason: HOSPADM

## 2025-03-26 RX ORDER — GLUCAGON 1 MG
1 KIT INJECTION
Status: DISCONTINUED | OUTPATIENT
Start: 2025-03-26 | End: 2025-03-29 | Stop reason: HOSPADM

## 2025-03-26 RX ORDER — AMOXICILLIN 250 MG
1 CAPSULE ORAL 2 TIMES DAILY PRN
Status: DISCONTINUED | OUTPATIENT
Start: 2025-03-26 | End: 2025-03-29 | Stop reason: HOSPADM

## 2025-03-26 RX ORDER — IBUPROFEN 200 MG
16 TABLET ORAL
Status: DISCONTINUED | OUTPATIENT
Start: 2025-03-26 | End: 2025-03-29 | Stop reason: HOSPADM

## 2025-03-26 RX ORDER — INSULIN ASPART 100 [IU]/ML
0-10 INJECTION, SOLUTION INTRAVENOUS; SUBCUTANEOUS
Status: DISCONTINUED | OUTPATIENT
Start: 2025-03-26 | End: 2025-03-29 | Stop reason: HOSPADM

## 2025-03-26 RX ORDER — SODIUM CHLORIDE 9 MG/ML
1000 INJECTION, SOLUTION INTRAVENOUS
Status: COMPLETED | OUTPATIENT
Start: 2025-03-26 | End: 2025-03-26

## 2025-03-26 RX ORDER — ATORVASTATIN CALCIUM 40 MG/1
40 TABLET, FILM COATED ORAL DAILY
Status: DISCONTINUED | OUTPATIENT
Start: 2025-03-26 | End: 2025-03-28

## 2025-03-26 RX ORDER — TALC
6 POWDER (GRAM) TOPICAL NIGHTLY PRN
Status: DISCONTINUED | OUTPATIENT
Start: 2025-03-26 | End: 2025-03-29 | Stop reason: HOSPADM

## 2025-03-26 RX ORDER — LISINOPRIL 2.5 MG/1
2.5 TABLET ORAL DAILY
Status: DISCONTINUED | OUTPATIENT
Start: 2025-03-27 | End: 2025-03-29 | Stop reason: HOSPADM

## 2025-03-26 RX ORDER — FOLIC ACID 1 MG/1
1 TABLET ORAL DAILY
Status: DISCONTINUED | OUTPATIENT
Start: 2025-03-27 | End: 2025-03-29 | Stop reason: HOSPADM

## 2025-03-26 RX ORDER — ACETAMINOPHEN 500 MG
1000 TABLET ORAL EVERY 6 HOURS PRN
Status: DISCONTINUED | OUTPATIENT
Start: 2025-03-26 | End: 2025-03-29 | Stop reason: HOSPADM

## 2025-03-26 RX ORDER — ALUMINUM HYDROXIDE, MAGNESIUM HYDROXIDE, AND SIMETHICONE 1200; 120; 1200 MG/30ML; MG/30ML; MG/30ML
30 SUSPENSION ORAL 4 TIMES DAILY PRN
Status: DISCONTINUED | OUTPATIENT
Start: 2025-03-26 | End: 2025-03-29 | Stop reason: HOSPADM

## 2025-03-26 RX ORDER — TAMSULOSIN HYDROCHLORIDE 0.4 MG/1
0.4 CAPSULE ORAL DAILY
Status: DISCONTINUED | OUTPATIENT
Start: 2025-03-27 | End: 2025-03-28

## 2025-03-26 RX ORDER — GUAIFENESIN 100 MG/5ML
400 LIQUID ORAL EVERY 8 HOURS
Status: DISCONTINUED | OUTPATIENT
Start: 2025-03-26 | End: 2025-03-29 | Stop reason: HOSPADM

## 2025-03-26 RX ORDER — ENOXAPARIN SODIUM 100 MG/ML
40 INJECTION SUBCUTANEOUS EVERY 24 HOURS
Status: DISCONTINUED | OUTPATIENT
Start: 2025-03-27 | End: 2025-03-29 | Stop reason: HOSPADM

## 2025-03-26 RX ORDER — PROCHLORPERAZINE EDISYLATE 5 MG/ML
5 INJECTION INTRAMUSCULAR; INTRAVENOUS EVERY 6 HOURS PRN
Status: DISCONTINUED | OUTPATIENT
Start: 2025-03-26 | End: 2025-03-29 | Stop reason: HOSPADM

## 2025-03-26 RX ORDER — IBUPROFEN 200 MG
24 TABLET ORAL
Status: DISCONTINUED | OUTPATIENT
Start: 2025-03-26 | End: 2025-03-29 | Stop reason: HOSPADM

## 2025-03-26 RX ORDER — LORAZEPAM 1 MG/1
1 TABLET ORAL SEE ADMIN INSTRUCTIONS
Status: DISCONTINUED | OUTPATIENT
Start: 2025-03-26 | End: 2025-03-29 | Stop reason: HOSPADM

## 2025-03-26 RX ORDER — BISACODYL 10 MG/1
10 SUPPOSITORY RECTAL DAILY PRN
Status: DISCONTINUED | OUTPATIENT
Start: 2025-03-26 | End: 2025-03-29 | Stop reason: HOSPADM

## 2025-03-26 RX ORDER — BUSPIRONE HYDROCHLORIDE 5 MG/1
5 TABLET ORAL 2 TIMES DAILY
Status: DISCONTINUED | OUTPATIENT
Start: 2025-03-26 | End: 2025-03-29 | Stop reason: HOSPADM

## 2025-03-26 RX ORDER — BUSPIRONE HYDROCHLORIDE 5 MG/1
5 TABLET ORAL 2 TIMES DAILY
COMMUNITY

## 2025-03-26 RX ORDER — NALOXONE HCL 0.4 MG/ML
0.02 VIAL (ML) INJECTION
Status: DISCONTINUED | OUTPATIENT
Start: 2025-03-26 | End: 2025-03-29 | Stop reason: HOSPADM

## 2025-03-26 RX ORDER — ONDANSETRON HYDROCHLORIDE 2 MG/ML
4 INJECTION, SOLUTION INTRAVENOUS EVERY 4 HOURS PRN
Status: DISCONTINUED | OUTPATIENT
Start: 2025-03-26 | End: 2025-03-29 | Stop reason: HOSPADM

## 2025-03-26 RX ORDER — VIT C/E/ZN/COPPR/LUTEIN/ZEAXAN 250MG-90MG
1000 CAPSULE ORAL DAILY
Status: DISCONTINUED | OUTPATIENT
Start: 2025-03-27 | End: 2025-03-29 | Stop reason: HOSPADM

## 2025-03-26 RX ORDER — GABAPENTIN 300 MG/1
300 CAPSULE ORAL 3 TIMES DAILY
Status: DISCONTINUED | OUTPATIENT
Start: 2025-03-26 | End: 2025-03-29 | Stop reason: HOSPADM

## 2025-03-26 RX ADMIN — SODIUM CHLORIDE 1000 ML: 9 INJECTION, SOLUTION INTRAVENOUS at 01:03

## 2025-03-26 RX ADMIN — LORAZEPAM 1 MG: 1 TABLET ORAL at 09:03

## 2025-03-26 RX ADMIN — GABAPENTIN 300 MG: 300 CAPSULE ORAL at 09:03

## 2025-03-26 RX ADMIN — GUAIFENESIN 400 MG: 200 SOLUTION ORAL at 09:03

## 2025-03-26 RX ADMIN — SODIUM CHLORIDE 1000 ML: 9 INJECTION, SOLUTION INTRAVENOUS at 03:03

## 2025-03-26 RX ADMIN — SODIUM CHLORIDE 1000 ML: 9 INJECTION, SOLUTION INTRAVENOUS at 12:03

## 2025-03-26 RX ADMIN — BUSPIRONE HYDROCHLORIDE 5 MG: 5 TABLET ORAL at 09:03

## 2025-03-26 NOTE — TELEPHONE ENCOUNTER
----- Message from Niko Johnson MD sent at 3/26/2025  7:39 AM CDT -----  Patient called--this am   Need him to go to hospital in  and have calcium and PTH    Check in ER or here in ER--If repeat is high we may have to have him be admitted  He may go to [facility]    This can be lab error or he may need treatment  Stop Tums  ----- Message -----  From: Lab, Background User  Sent: 3/25/2025   3:22 PM CDT  To: Niko Johnson MD

## 2025-03-26 NOTE — ED PROVIDER NOTES
Encounter Date: 3/26/2025    SCRIBE #1 NOTE: I, Lindsay Mallory, am scribing for, and in the presence of,  Edu Yeh MD. I have scribed the entire note.     History     Chief Complaint   Patient presents with    Abnormal Lab     Patient sent by magdy GONZALEZ for high calcium, had blood work done yesterday. Hx stage 4 lung, spine, and pelvis cancer. Patient denies any symptoms at this time.      84 year old male with a hx of stage 4 lung cancer with mets, DM, and HTN presents to the ED for abnormal labs. Pt was sent here by Dr. Johnson for hypercalcemia. Pt had blood work drawn yesterday and was told to come to the [facility] to have his labs repeated. Pt was told if his calcium was still elevated, he might need to be admitted. Pt denies any symptoms at this time. Pt reports he currently feels great.     The history is provided by the patient. No  was used.     Review of patient's allergies indicates:  No Known Allergies  Past Medical History:   Diagnosis Date    Anxiety     Diabetes mellitus     HLD (hyperlipidemia)     Hypertension     Major depressive disorder, single episode, unspecified     Pneumonia, unspecified organism      Past Surgical History:   Procedure Laterality Date    Cardiac stent      HERNIA REPAIR N/A      No family history on file.  Social History[1]  Review of Systems   Constitutional:  Negative for chills and fever.   HENT:  Negative for congestion and ear pain.    Eyes:  Negative for discharge.   Respiratory:  Negative for cough, shortness of breath and wheezing.    Cardiovascular:  Negative for chest pain and leg swelling.   Gastrointestinal:  Negative for abdominal pain, constipation, diarrhea, nausea and vomiting.   Genitourinary:  Negative for dysuria, flank pain and frequency.   Musculoskeletal:  Negative for back pain and joint swelling.   Skin:  Negative for rash.   Neurological:  Negative for dizziness, weakness and headaches.   Psychiatric/Behavioral:   Negative for agitation, confusion and hallucinations.        Physical Exam     Initial Vitals [03/26/25 1014]   BP Pulse Resp Temp SpO2   121/63 101 20 98 °F (36.7 °C) (!) 92 %      MAP       --         Physical Exam    Constitutional: He appears well-developed and well-nourished. No distress.   HENT:   Head: Normocephalic and atraumatic.   Eyes: Conjunctivae and EOM are normal. Pupils are equal, round, and reactive to light. Right eye exhibits no discharge. Left eye exhibits no discharge. No scleral icterus.   Neck: No tracheal deviation present.   Cardiovascular:  Normal rate, regular rhythm, normal heart sounds and intact distal pulses.           No murmur heard.  Pulmonary/Chest: Breath sounds normal. No stridor. No respiratory distress. He has no wheezes. He has no rales.   Abdominal: Abdomen is soft. He exhibits no distension. There is no abdominal tenderness. There is no rebound and no guarding.   Musculoskeletal:         General: No tenderness or edema. Normal range of motion.     Neurological: He is alert and oriented to person, place, and time. He has normal strength and normal reflexes. No cranial nerve deficit.   Skin: Skin is warm and dry. No rash noted. No erythema. No pallor.   Psychiatric: He has a normal mood and affect. His behavior is normal. Judgment and thought content normal.       ED Course   Procedures  Labs Reviewed   CBC W/ AUTO DIFFERENTIAL    Narrative:     The following orders were created for panel order CBC auto differential.  Procedure                               Abnormality         Status                     ---------                               -----------         ------                     CBC with Differential[[id number]]                           In process                   Please view results for these tests on the individual orders.   COMPREHENSIVE METABOLIC PANEL   MAGNESIUM   TROPONIN I   TSH   PROTIME-INR   URINALYSIS, REFLEX TO URINE CULTURE   CBC WITH DIFFERENTIAL      EKG Readings: (Independently Interpreted)   Initial Reading: No STEMI. Rhythm: Normal Sinus Rhythm. Heart Rate: 97. Ectopy: PVCs. Conduction: Normal. ST Segments: Normal ST Segments. T Waves: Normal. Axis: Left Axis Deviation. Clinical Impression: Left Ventricular Hypertrophy (LVH)   Done on 3/26/25 at 1014.      ms       Imaging Results              X-Ray Chest AP Portable (In process)                      Medications - No data to display  Medical Decision Making          Scribe Attestation:   Scribe #1: I performed the above scribed service and the documentation accurately describes the services I performed. I attest to the accuracy of the note.    Attending Attestation:           Physician Attestation for Scribe:  Physician Attestation Statement for Scribe #1: I, Edu Yeh MD, reviewed documentation, as scribed by Lindsay Mallory in my presence, and it is both accurate and complete.                                  Clinical Impression:  Final diagnoses:  [E87.8] Electrolyte abnormality                     [1]  Social History  Tobacco Use    Smoking status: Never    Smokeless tobacco: Never   Substance Use Topics    Alcohol use: Not Currently    Drug use: Never

## 2025-03-26 NOTE — PLAN OF CARE
03/26/25 1619   Discharge Assessment   Assessment Type Discharge Planning Assessment   Confirmed/corrected address, phone number and insurance Yes   Confirmed Demographics Correct on Facesheet   Source of Information patient;family   Communicated [facility] with patient/caregiver Date not available/Unable to determine   Reason For Admission Hypercalcemia   People in Home spouse   Do you expect to return to your current living situation? Yes   Do you have help at home or someone to help you manage your care at home? Yes   Who are your caregiver(s) and their phone number(s)? Ashlie   Prior to hospitilization cognitive status: Unable to Assess   Current cognitive status: Alert/Oriented   Walking or Climbing Stairs Difficulty yes   Walking or Climbing Stairs ambulation difficulty, requires equipment   Mobility Management RW   Dressing/Bathing Difficulty yes   Dressing/Bathing bathing difficulty, requires equipment   Dressing/Bathing Management Built in shower chair   Home Accessibility wheelchair accessible   Home Layout Able to live on 1st floor   Equipment Currently Used at Home walker, rolling   Patient currently being followed by outpatient case management? No   Do you currently have service(s) that help you manage your care at home? No   Do you have prescription coverage? Yes   Coverage Medicare   Who is going to help you get home at discharge? Family   How do you get to doctors appointments? car, drives self;family or friend will provide   Are you on dialysis? No   Do you take coumadin? No   Discharge Plan A Home with family   Discharge Plan B Home with family   DME Needed Upon Discharge  none   Discharge Plan discussed with: Patient;Spouse/sig other   Transition of Care Barriers None   OTHER   Name(s) of People in Home Ashlie Chawla

## 2025-03-26 NOTE — FIRST PROVIDER EVALUATION
"Medical screening examination initiated.  I have conducted a focused provider triage encounter, findings are as follows:    Brief history of present illness:  84yoWM w/hx of hypercalcemia sent here for further evaluation. Saw Dr. Lovett and Dr. Ham did lab work that said he needs to be evaluated. No other information. Patient told he "has cancer" and it's "stage 4" "something in lung, spine, and pelvis".     There were no vitals filed for this visit.    Pertinent physical exam:  NAD. Ambulates with walker.     Brief workup plan:  labs and imaging.     Preliminary workup initiated; this workup will be continued and followed by the physician or advanced practice provider that is assigned to the patient when roomed.  "

## 2025-03-26 NOTE — CONSULTS
Subjective:        PATIENT: Juanjose Chawla  MRN: [id number]  DATE: 3/26/2025    PCP: Sadiq Hartmann MD   Referring Provider : Sadiq Hartmann MD  [address] [place] [address]  [place] [address]     Chief Complaint: Abnormal Lab (Patient sent by magdy GONZALEZ for high calcium, had blood work done yesterday. Hx stage 4 lung, spine, and pelvis cancer. Patient denies any symptoms at this time. )          03/26/2025  This is an 84-year-old male who was seen evaluated by Pulmonary.  He was admitted to the hospital on February 28, 2025 with progressive shortness of breath he eventually underwent a diagnostic thoracentesis  He underwent CT scan chest abdomen pelvis.  He eventually underwent a diagnostic thoracentesis with social consistent with adenocarcinoma questionable primary.  He underwent a PET scan it was referred to us for further evaluation.  I spoke to his pulmonologist.  They are planning an endoscopic bronchoscopy and ultrasound and biopsy after his PET scan        This patient denies any headache or visual change.  He was longstanding visual changes.    He has  had cataract surgery.    He hearing loss.    He ambulates with a rolling walker.    Up until several months ago he was cutting all of his grass and working out in the field.    He was lost some weight.  He had he has dry mouth,   dyspnea on exertion, mild lower extremity edema.  Mild PND no orthopnea.    No current chest pain, he does have some pleuritic chest pain from time to time.    He was occasional bone pain ,,no neuropathy.    He ambulates with a rolling walker.    He was had constipation   he has had urinary frequency is yes drink plenty of water because of dry mouth.      Denies any blood in his stool.    He denies any history of hepatitis yellow jaundice.  He does not recall an episode where he had some heated lung therapy as a child back in the 40s    Patient called today on 03/26/2025.  He was noted to have hypercalcemia in his labs  yesterday.    He was instructed to go back to the emergency room to have his calcium and PTH checked.  He was explained that if his hypercalcemia is real he would need admission and start therapy for the above.    He understood the above.  He reports that he was going to go the hospital    He was repeat calcium was elevated.  He was seen evaluated in the emergency room.  He was started on IV fluids and admitted to the hospital.    I called and spoke to his pulmonologist Dr. Lela Messer.    They are going to squeeze him in for his bronchoscopy and potential fluid drain on Friday morning.    He was should have daily renal function test.    He was states his pleuritic pain is little better today.  He was still cough and shortness of breath.  He was still has anxiety.        Past Medical History:   Diagnosis Date    Anxiety     Diabetes mellitus     HLD (hyperlipidemia)     Hypertension     Major depressive disorder, single episode, unspecified     Pneumonia, unspecified organism       .  Past Surgical History:   Procedure Laterality Date    Cardiac stent      HERNIA REPAIR N/A       .No family history on file.   Social History[1]   .Review of patient's allergies indicates:  No Known Allergies   Current Medications[2]   Review of Systems     Pertinent positives above in interim history  Objective:     Vitals:    03/26/25 1444   BP: (!) 140/72   Pulse: 66   Resp: 18   Temp: 98.1 °F (36.7 °C)         Physical Exam  Vitals reviewed.   Constitutional:       Appearance: Normal appearance.   HENT:      Head: Normocephalic and atraumatic.      Right Ear: Tympanic membrane normal.      Left Ear: Tympanic membrane normal.      Mouth/Throat:      Mouth: Mucous membranes are moist.      Pharynx: Oropharynx is clear.   Eyes:      Extraocular Movements: Extraocular movements intact.      Conjunctiva/sclera: Conjunctivae normal.      Pupils: Pupils are equal, round, and reactive to light.   Cardiovascular:      Rate and Rhythm: Normal  rate and regular rhythm.      Pulses: Normal pulses.      Heart sounds: Normal heart sounds.   Pulmonary:      Effort: Tachypnea present.      Breath sounds: Decreased air movement present. Examination of the right-middle field reveals decreased breath sounds. Examination of the right-lower field reveals decreased breath sounds. Decreased breath sounds present.   Abdominal:      General: Abdomen is flat. Bowel sounds are normal. There is no distension.      Palpations: Abdomen is soft.   Musculoskeletal:         General: Normal range of motion.      Cervical back: Normal range of motion and neck supple.   Lymphadenopathy:      Cervical: No cervical adenopathy.      Lower Body: No right inguinal adenopathy. No left inguinal adenopathy.   Skin:     General: Skin is warm and dry.   Neurological:      General: No focal deficit present.      Mental Status: He is alert and oriented to person, place, and time. Mental status is at baseline.      GCS: GCS eye subscore is 4. GCS verbal subscore is 5. GCS motor subscore is 6.   Psychiatric:         Attention and Perception: Attention normal.         Mood and Affect: Mood is anxious.         Speech: Speech is rapid and pressured.         Behavior: Behavior normal.         Thought Content: Thought content normal.         Judgment: Judgment normal.         ECOG SCORE            .Lab Review:  Previous labs and images reviewed in epic      Images    February 27, 2025:  CT of the chest, COPD and emphysema, right lower lobe atelectasis, lymphadenopathy in the right hilum as well as the mediastinum and smaller nodes in the left hilum.  The right hilar lymph node measures 2.5 cm other small nodes in the right hilum.  And there is a precarinal lymph node 2.2 x 2.2 cm.  And adenopathy seen in the subcarinal space as well as the mediastinum.    03/13/2025: CT abdomen pelvis, right-sided pleural effusion interstitial fibrosis lungs bilaterally prostatic hypertrophy    03/20/2025: PET-CT:  Hypermetabolic consolidation right lung base with mediastinal and bilateral adenopathy and innumerable scattered hypermetabolic bone lesions    pathology  The right pleural effusion showed malignant cells consistent with a poorly differential adenocarcinoma:  Negative for TTF 1, WT1, GATA3.  Suggest an origin of the GI tract clinical pathology is recommended  Assessment/Plan:   Malignant pleural effusion   Pathological mediastinal and hilar adenopathy   Most likely right sided adenocarcinoma of the lung stage IV  Abnormal PET consistent with bone metastases  Fatigue   Weight loss   Anxiety/claustrophobia  Hypercalcemia        This is an 84-year-old male with a clinical appearance of stage IV non-small-cell lung carcinoma adenocarcinoma with malignant pleural effusion, bone Mets hilar and mediastinal adenopathy.  This is more consistent with a lung primary in the clinical picture.      Goals: Palliation   Noncurable  Control pain, decrease shortness of breath, explained he was not a surgical candidate.  Explained utility of radiation   Explained risk of bone metastases   Express progressive shortness of breath --need for drainage of his pleural effusion  and Risk of death from progressive disease  Options: Chemotherapy, immunotherapy, directed therapy based on NGS testing oral medications   Hospice and supportive care      Recommendations  NGS testing in the blood sent yesteredya  Agree with secondary biopsy             D/w dr. Harman they will do Friday AM here--consult placed  MRI with ativan or Ct head if not tolerated--complete staging  Drain Fluid if able  Stop cholesterol /medication  Add guaifenesin liquid   Stop calcium supplements and Tums  Diabetic diet, defer diabetic meds to PCP  Continue  b12/folate  primary care to help with BuSpar and see if an antianxiety medications can be altered or change     IVF --if his calcium in not improving add Miacalcin   If calcium > 12 and still elevated despite IVF and  miacalcim              Niko Johnson MD             [1]   Social History  Socioeconomic History    Marital status:    Tobacco Use    Smoking status: Never    Smokeless tobacco: Never   Substance and Sexual Activity    Alcohol use: Not Currently    Drug use: Never   [2]   Current Facility-Administered Medications:     0.9% NaCl infusion, 1,000 mL, Intravenous, ED 1 Time, Maddie Davis MD    acetaminophen tablet 1,000 mg, 1,000 mg, Oral, Q6H PRN, Vira Mccurdy, FNP    aluminum-magnesium hydroxide-simethicone 200-200-20 mg/5 mL suspension 30 mL, 30 mL, Oral, QID PRN, Vira Mccurdy, FNP    atorvastatin tablet 40 mg, 40 mg, Oral, Daily, Maddie Davis MD    bisacodyL suppository 10 mg, 10 mg, Rectal, Daily PRN, Vira Mccurdy, FNP    dextrose 50% injection 12.5 g, 12.5 g, Intravenous, PRN, Vira Mccurdy L, FNP    dextrose 50% injection 25 g, 25 g, Intravenous, PRN, Vira Mccurdy L, FNP    [START ON 3/27/2025] enoxaparin injection 40 mg, 40 mg, Subcutaneous, Daily, Vira Mccurdy, FNP    gabapentin capsule 300 mg, 300 mg, Oral, TID, Maddie Davis MD    glucagon (human recombinant) injection 1 mg, 1 mg, Intramuscular, PRN, Vira Mccurdy L, FNP    glucose chewable tablet 16 g, 16 g, Oral, PRN, Vira Mccurdy, FNP    glucose chewable tablet 24 g, 24 g, Oral, PRN, Vira Mccurdy L, FNP    insulin aspart U-100 injection 0-10 Units, 0-10 Units, Subcutaneous, QID (AC + HS) PRN, Vira Mccurdy L, FNP    [START ON 3/27/2025] lisinopriL tablet 2.5 mg, 2.5 mg, Oral, Daily, Maddie Davis MD    melatonin tablet 6 mg, 6 mg, Oral, Nightly PRN, Vira Mccurdy L, FNP    naloxone 0.4 mg/mL injection 0.02 mg, 0.02 mg, Intravenous, PRN, Vira Mccurdy, FNP    ondansetron injection 4 mg, 4 mg, Intravenous, Q4H PRN, Vira Mccurdy FNP    prochlorperazine injection Soln 5 mg, 5 mg, Intravenous, Q6H PRN, Vira Mccurdy, HEMA    senna-docusate 8.6-50 mg per tablet 1 tablet, 1 tablet, Oral, BID PRN, Calli  HEMA Faustin    [START ON 3/27/2025] SITagliptin phosphate tablet 100 mg, 100 mg, Oral, Daily, Maddie Davis MD    sodium chloride 0.9% flush 10 mL, 10 mL, Intravenous, PRN, Vira Mccurdy FNP    [START ON 3/27/2025] tamsulosin 24 hr capsule 0.4 mg, 0.4 mg, Oral, Daily, Maddie Davis MD    [START ON 3/27/2025] tiotropium bromide 2.5 mcg/actuation inhaler 2 puff, 2 puff, Inhalation, Daily, Maddie Davis MD

## 2025-03-26 NOTE — H&P
[facility] Medicine History & Physical Examination       Patient Name: Juanjose Chawla  MRN: [id number]  Patient Class: OP- Observation   Admission Date: 3/26/2025   Admitting Physician: NORMA Service   Length of Stay: 0  Attending Physician: Maddie Davis MD  Primary Care Provider: Sadiq Hartmann MD  Face-to-Face encounter date: 03/26/2025  Code Status:  Full code  Chief Complaint: Abnormal Lab (Patient sent by magdy GONZALEZ for high calcium, had blood work done yesterday. Hx stage 4 lung, spine, and pelvis cancer. Patient denies any symptoms at this time. )      Screening for Social Drivers for health:  Patient screened for food insecurity, housing instability, transportation needs, utility difficulties, and interpersonal safety (select all that apply as identified as concern)  []Housing or Food  []Transportation Needs  []Utility Difficulties  []Interpersonal safety  [x]None      Patient information was obtained from patient, patient's family, past medical records and ER records.  ED records were reviewed in detail and documented below    HISTORY OF PRESENT ILLNESS:   Juanjose Chawla is a 84 y.o. male who  has a past medical history of Anxiety, Diabetes mellitus, HLD (hyperlipidemia), Hypertension, Major depressive disorder, single episode, unspecified, and Pneumonia, unspecified organism.. The patient presented to [facility] on 3/26/2025 with a primary complaint of abnormal labs was brought from Oncology Clinic to our hospital.  He is brought in for hypercalcemia. Patient has a history of malignant pleural effusion which was found on recent hospitalization for shortness of breath on 2/28.  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  Since the patient needs to be admitted for hypercalcemia treatment, oncology has spoken with pulmonology  to schedule biopsy to confirm diagnosis.    CBC WNL.  CMP WNL except for calcium of 11.2 on arrival.  Continue fluids and repeat H&H at 6:00 p.m. we will consult Oncology.  PAST MEDICAL HISTORY:     Past Medical History:   Diagnosis Date    Anxiety     Diabetes mellitus     HLD (hyperlipidemia)     Hypertension     Major depressive disorder, single episode, unspecified     Pneumonia, unspecified organism        PAST SURGICAL HISTORY:     Past Surgical History:   Procedure Laterality Date    Cardiac stent      HERNIA REPAIR N/A        ALLERGIES:   Patient has no known allergies.    FAMILY HISTORY:   Reviewed and negative    SOCIAL HISTORY:     Social History     Tobacco Use    Smoking status: Never    Smokeless tobacco: Never   Substance Use Topics    Alcohol use: Not Currently        HOME MEDICATIONS:     Prior to Admission medications    Medication Sig Start Date End Date Taking? Authorizing Provider   albuterol (ACCUNEB) 0.63 mg/3 mL Nebu Take 0.63 mg by nebulization every 6 (six) hours as needed (wheezing and shortness of breath). Rescue    Provider, Historical   amLODIPine (NORVASC) 5 MG tablet Take 5 mg by mouth once daily. 5/24/22   Provider, Historical   aspirin (ECOTRIN) 81 MG EC tablet Take 81 mg by mouth once daily.    Provider, Historical   benzonatate (TESSALON) 100 MG capsule Take 100 mg by mouth 3 (three) times daily as needed for Cough.    Provider, Historical   ergocalciferol (VITAMIN D2) 50,000 unit Cap Take 50,000 Units by mouth every 7 days.    Provider, Historical   famotidine (PEPCID) 20 MG tablet Take 20 mg by mouth once daily. 5/24/22   Provider, Historical   gabapentin (NEURONTIN) 300 MG capsule Take 300 mg by mouth 3 (three) times daily.    Provider, Historical   glimepiride (AMARYL) 1 MG tablet Take 1 mg by mouth before breakfast.    Provider, Historical   JANUVIA 100 mg Tab Take 100 mg by mouth once daily. 5/24/22   Provider, Historical   lisinopriL (PRINIVIL,ZESTRIL) 2.5 MG tablet Take  2.5 mg by mouth once daily. 5/24/22   Provider, Historical   LORazepam (ATIVAN) 1 MG tablet Take  60 min before MRI, May repeat x 1 if needed 3/25/25   Niko Johnson MD   metFORMIN (GLUCOPHAGE) 1000 MG tablet Take 1,000 mg by mouth 2 (two) times daily. 5/24/22   Provider, Historical   rosuvastatin (CRESTOR) 20 MG tablet Take 20 mg by mouth once daily. 5/24/22   Provider, Historical   tamsulosin (FLOMAX) 0.4 mg Cap Take 0.4 mg by mouth once daily.    Provider, Historical   umeclidinium (INCRUSE ELLIPTA) 62.5 mcg/actuation inhalation capsule Inhale 62.5 mcg into the lungs once daily. Controller 2/28/25   Stas Moore MD       REVIEW OF SYSTEMS:   Except as documented, all other systems reviewed and negative     PHYSICAL EXAM:     VITAL SIGNS: 24 HRS MIN & MAX LAST   Temp  Min: 98 °F (36.7 °C)  Max: 98.1 °F (36.7 °C) 98.1 °F (36.7 °C)   BP  Min: 117/87  Max: 140/72 (!) 140/72   Pulse  Min: 66  Max: 101  66   Resp  Min: 13  Max: 22 18   SpO2  Min: 91 %  Max: 98 % (!) 91 %     General appearance: Well-developed, well-nourished male in no apparent distress.  HENT: Atraumatic head. Moist mucous membranes of oral cavity.  Eyes: Normal extraocular movements.   Neck: Supple.   Lungs: Clear to auscultation bilaterally. No wheezing present.   Heart: Regular rate and rhythm. S1 and S2 present with no murmurs/gallop/rub. No pedal edema. No JVD present.   Abdomen: Soft, non-distended, non-tender. No rebound tenderness/guarding. Bowel sounds are normal.   Extremities: No cyanosis, clubbing, or edema.  Skin: No Rash.   Neuro: Motor and sensory exams grossly intact. Good tone. Muscle strength 5/5 in all 4 extremities  Psych/mental status: Appropriate mood and affect. Responds appropriately to questions.     LABS AND IMAGING:     Recent Labs   Lab 03/25/25  1513 03/26/25  1126   WBC 7.93 8.61   RBC 4.51* 4.30*   HGB 13.7* 12.8*   HCT 41.9* 40.4*   MCV 92.9 94.0   MCH 30.4 29.8   MCHC 32.7* 31.7*   RDW 13.0 13.2     165   MPV 9.6 9.6       Recent Labs   Lab 03/25/25  1513 03/26/25  1038    144   K 4.2 3.9    109*   CO2 25 24   BUN 15.3 18.7   CREATININE 0.79 0.79   CALCIUM 11.4* 11.2*   MG  --  2.30   ALBUMIN 3.5 3.4   ALKPHOS 88 90   ALT 7 5   AST 14 13   BILITOT 0.5 0.5       Microbiology Results (last 7 days)       ** No results found for the last 168 hours. **             X-Ray Chest AP Portable  Narrative: EXAMINATION:  XR CHEST AP PORTABLE    CLINICAL HISTORY:  Other disorders of electrolyte and fluid balance, not elsewhere classified    TECHNIQUE:  Single frontal view of the chest was performed.    COMPARISON:  March 19, 2025    FINDINGS:  Examination reveals cardiomediastinal silhouette to be unchanged as compared with the previous exam.    Persistent blunting of the right costophrenic angle indicating the presence of a right-sided pleural effusion.    Persistent increase interstitial markings throughout both lung fields more so at the right perihilar region and right base and left infrahilar region and left base these, however, are similar to previous exam of March 19, 2025.    No new focal consolidative changes  Impression: No significant interval change as compared with the previous exam    Electronically signed by: Hai Wan  Date:    03/26/2025  Time:    12:12      ASSESSMENT & PLAN:   Hypercalcemia, expected from cancer history  Malignant pleural effusion   Pathological mediastinal and hilar adenopathy   Most likely right sided adenocarcinoma of the lung stage IV  Abnormal PET consistent with bone metastases  Fatigue   Weight loss   Anxiety/claustrophobia    -continue fluids for hypercalcemia  -oncology consulted  -repeat labs at 1800   -continue home medications  -continue to monitor    VTE Prophylaxis:  SCDs    Patient condition:  Stable    __________________________________________________________________________  INPATIENT LIST OF MEDICATIONS     Scheduled Meds:   [START ON 3/27/2025]  enoxparin  40 mg Subcutaneous Daily     Continuous Infusions:  PRN Meds:.  Current Facility-Administered Medications:     acetaminophen, 1,000 mg, Oral, Q6H PRN    aluminum-magnesium hydroxide-simethicone, 30 mL, Oral, QID PRN    bisacodyL, 10 mg, Rectal, Daily PRN    dextrose 50%, 12.5 g, Intravenous, PRN    dextrose 50%, 25 g, Intravenous, PRN    glucagon (human recombinant), 1 mg, Intramuscular, PRN    glucose, 16 g, Oral, PRN    glucose, 24 g, Oral, PRN    insulin aspart U-100, 0-10 Units, Subcutaneous, QID (AC + HS) PRN    melatonin, 6 mg, Oral, Nightly PRN    naloxone, 0.02 mg, Intravenous, PRN    ondansetron, 4 mg, Intravenous, Q4H PRN    prochlorperazine, 5 mg, Intravenous, Q6H PRN    senna-docusate 8.6-50 mg, 1 tablet, Oral, BID PRN    sodium chloride 0.9%, 10 mL, Intravenous, PRN        Maddie Davis MD   03/26/2025

## 2025-03-27 ENCOUNTER — ANESTHESIA EVENT (OUTPATIENT)
Dept: ENDOSCOPY | Facility: HOSPITAL | Age: 85
End: 2025-03-27
Payer: MEDICARE

## 2025-03-27 PROBLEM — E44.0 MODERATE MALNUTRITION: Status: ACTIVE | Noted: 2025-03-27

## 2025-03-27 LAB
ALBUMIN SERPL-MCNC: 3.1 G/DL (ref 3.4–4.8)
ALBUMIN/GLOB SERPL: 0.8 RATIO (ref 1.1–2)
ALP SERPL-CCNC: 83 UNIT/L (ref 40–150)
ALT SERPL-CCNC: 5 UNIT/L (ref 0–55)
ANION GAP SERPL CALC-SCNC: 7 MEQ/L
AST SERPL-CCNC: 11 UNIT/L (ref 11–45)
BASOPHILS # BLD AUTO: 0.03 X10(3)/MCL
BASOPHILS NFR BLD AUTO: 0.5 %
BILIRUB SERPL-MCNC: 0.6 MG/DL
BUN SERPL-MCNC: 13.6 MG/DL (ref 8.4–25.7)
CALCIUM SERPL-MCNC: 10.1 MG/DL (ref 8.8–10)
CHLORIDE SERPL-SCNC: 107 MMOL/L (ref 98–107)
CO2 SERPL-SCNC: 30 MMOL/L (ref 23–31)
CREAT SERPL-MCNC: 0.73 MG/DL (ref 0.72–1.25)
CREAT/UREA NIT SERPL: 19
EOSINOPHIL # BLD AUTO: 0.15 X10(3)/MCL (ref 0–0.9)
EOSINOPHIL NFR BLD AUTO: 2.5 %
ERYTHROCYTE [DISTWIDTH] IN BLOOD BY AUTOMATED COUNT: 13.2 % (ref 11.5–17)
GFR SERPLBLD CREATININE-BSD FMLA CKD-EPI: >60 ML/MIN/1.73/M2
GLOBULIN SER-MCNC: 3.7 GM/DL (ref 2.4–3.5)
GLUCOSE SERPL-MCNC: 130 MG/DL (ref 82–115)
HCT VFR BLD AUTO: 37.6 % (ref 42–52)
HGB BLD-MCNC: 12.3 G/DL (ref 14–18)
IMM GRANULOCYTES # BLD AUTO: 0.04 X10(3)/MCL (ref 0–0.04)
IMM GRANULOCYTES NFR BLD AUTO: 0.7 %
LYMPHOCYTES # BLD AUTO: 1.51 X10(3)/MCL (ref 0.6–4.6)
LYMPHOCYTES NFR BLD AUTO: 24.7 %
MAGNESIUM SERPL-MCNC: 2.1 MG/DL (ref 1.6–2.6)
MCH RBC QN AUTO: 30.2 PG (ref 27–31)
MCHC RBC AUTO-ENTMCNC: 32.7 G/DL (ref 33–36)
MCV RBC AUTO: 92.4 FL (ref 80–94)
MONOCYTES # BLD AUTO: 0.6 X10(3)/MCL (ref 0.1–1.3)
MONOCYTES NFR BLD AUTO: 9.8 %
NEUTROPHILS # BLD AUTO: 3.78 X10(3)/MCL (ref 2.1–9.2)
NEUTROPHILS NFR BLD AUTO: 61.8 %
NRBC BLD AUTO-RTO: 0 %
PLATELET # BLD AUTO: 152 X10(3)/MCL (ref 130–400)
PMV BLD AUTO: 10 FL (ref 7.4–10.4)
POCT GLUCOSE: 107 MG/DL (ref 70–110)
POCT GLUCOSE: 120 MG/DL (ref 70–110)
POCT GLUCOSE: 144 MG/DL (ref 70–110)
POTASSIUM SERPL-SCNC: 3.6 MMOL/L (ref 3.5–5.1)
PROT SERPL-MCNC: 6.8 GM/DL (ref 5.8–7.6)
RBC # BLD AUTO: 4.07 X10(6)/MCL (ref 4.7–6.1)
SODIUM SERPL-SCNC: 144 MMOL/L (ref 136–145)
WBC # BLD AUTO: 6.11 X10(3)/MCL (ref 4.5–11.5)

## 2025-03-27 PROCEDURE — 25000242 PHARM REV CODE 250 ALT 637 W/ HCPCS: Performed by: STUDENT IN AN ORGANIZED HEALTH CARE EDUCATION/TRAINING PROGRAM

## 2025-03-27 PROCEDURE — 21400001 HC TELEMETRY ROOM

## 2025-03-27 PROCEDURE — 63600175 PHARM REV CODE 636 W HCPCS

## 2025-03-27 PROCEDURE — 25000003 PHARM REV CODE 250: Performed by: NURSE PRACTITIONER

## 2025-03-27 PROCEDURE — 83735 ASSAY OF MAGNESIUM: CPT | Performed by: STUDENT IN AN ORGANIZED HEALTH CARE EDUCATION/TRAINING PROGRAM

## 2025-03-27 PROCEDURE — 25500020 PHARM REV CODE 255: Performed by: INTERNAL MEDICINE

## 2025-03-27 PROCEDURE — 25000003 PHARM REV CODE 250: Performed by: INTERNAL MEDICINE

## 2025-03-27 PROCEDURE — A9577 INJ MULTIHANCE: HCPCS | Performed by: INTERNAL MEDICINE

## 2025-03-27 PROCEDURE — 25000003 PHARM REV CODE 250: Performed by: STUDENT IN AN ORGANIZED HEALTH CARE EDUCATION/TRAINING PROGRAM

## 2025-03-27 PROCEDURE — 99232 SBSQ HOSP IP/OBS MODERATE 35: CPT | Mod: ,,, | Performed by: INTERNAL MEDICINE

## 2025-03-27 PROCEDURE — 80053 COMPREHEN METABOLIC PANEL: CPT | Performed by: STUDENT IN AN ORGANIZED HEALTH CARE EDUCATION/TRAINING PROGRAM

## 2025-03-27 PROCEDURE — 85025 COMPLETE CBC W/AUTO DIFF WBC: CPT | Performed by: STUDENT IN AN ORGANIZED HEALTH CARE EDUCATION/TRAINING PROGRAM

## 2025-03-27 PROCEDURE — 63600175 PHARM REV CODE 636 W HCPCS: Performed by: NURSE PRACTITIONER

## 2025-03-27 PROCEDURE — 36415 COLL VENOUS BLD VENIPUNCTURE: CPT | Performed by: STUDENT IN AN ORGANIZED HEALTH CARE EDUCATION/TRAINING PROGRAM

## 2025-03-27 RX ORDER — SODIUM CHLORIDE 9 MG/ML
INJECTION, SOLUTION INTRAVENOUS ONCE
Status: COMPLETED | OUTPATIENT
Start: 2025-03-27 | End: 2025-03-27

## 2025-03-27 RX ORDER — LORAZEPAM 2 MG/ML
1 INJECTION INTRAMUSCULAR ONCE
Status: COMPLETED | OUTPATIENT
Start: 2025-03-27 | End: 2025-03-27

## 2025-03-27 RX ADMIN — GABAPENTIN 300 MG: 300 CAPSULE ORAL at 09:03

## 2025-03-27 RX ADMIN — SITAGLIPTIN 100 MG: 100 TABLET, FILM COATED ORAL at 09:03

## 2025-03-27 RX ADMIN — TIOTROPIUM BROMIDE INHALATION SPRAY 2 PUFF: 3.12 SPRAY, METERED RESPIRATORY (INHALATION) at 10:03

## 2025-03-27 RX ADMIN — GABAPENTIN 300 MG: 300 CAPSULE ORAL at 07:03

## 2025-03-27 RX ADMIN — BUSPIRONE HYDROCHLORIDE 5 MG: 5 TABLET ORAL at 07:03

## 2025-03-27 RX ADMIN — GUAIFENESIN 400 MG: 200 SOLUTION ORAL at 03:03

## 2025-03-27 RX ADMIN — FOLIC ACID 1 MG: 1 TABLET ORAL at 09:03

## 2025-03-27 RX ADMIN — ATORVASTATIN CALCIUM 40 MG: 40 TABLET, FILM COATED ORAL at 09:03

## 2025-03-27 RX ADMIN — GABAPENTIN 300 MG: 300 CAPSULE ORAL at 03:03

## 2025-03-27 RX ADMIN — LORAZEPAM 1 MG: 2 INJECTION INTRAMUSCULAR; INTRAVENOUS at 02:03

## 2025-03-27 RX ADMIN — SENNOSIDES AND DOCUSATE SODIUM 1 TABLET: 50; 8.6 TABLET ORAL at 10:03

## 2025-03-27 RX ADMIN — GUAIFENESIN 400 MG: 200 SOLUTION ORAL at 07:03

## 2025-03-27 RX ADMIN — GADOBENATE DIMEGLUMINE 18 ML: 529 INJECTION, SOLUTION INTRAVENOUS at 03:03

## 2025-03-27 RX ADMIN — BUSPIRONE HYDROCHLORIDE 5 MG: 5 TABLET ORAL at 09:03

## 2025-03-27 RX ADMIN — LISINOPRIL 2.5 MG: 2.5 TABLET ORAL at 09:03

## 2025-03-27 RX ADMIN — CYANOCOBALAMIN TAB 500 MCG 1000 MCG: 500 TAB at 09:03

## 2025-03-27 RX ADMIN — SODIUM CHLORIDE: 9 INJECTION, SOLUTION INTRAVENOUS at 11:03

## 2025-03-27 RX ADMIN — ENOXAPARIN SODIUM 40 MG: 40 INJECTION SUBCUTANEOUS at 05:03

## 2025-03-27 NOTE — NURSING
Obtained up to date medication including bottles from pt & his daughter. Updated medication reconciliation from these. Returned pt's medication to pt's daughter to return to pt's home.

## 2025-03-27 NOTE — PROGRESS NOTES
Subjective:        PATIENT: Juanjose Chawla  MRN: [id number]  DATE: 3/27/2025    PCP: Sadiq Hartmann MD   Referring Provider : Sadiq Hartmann MD  [address] [place], [place] [address]  [place] [id number]     Chief Complaint: Abnormal Lab (Patient sent by magdy GONZALEZ for high calcium, had blood work done yesterday. Hx stage 4 lung, spine, and pelvis cancer. Patient denies any symptoms at this time. )        This is an 84-year-old male who was seen evaluated by Pulmonary.  He was admitted to the hospital on February 28, 2025 with progressive shortness of breath he eventually underwent a diagnostic thoracentesis  He underwent CT scan chest abdomen pelvis.  He eventually underwent a diagnostic thoracentesis with social consistent with adenocarcinoma questionable primary.  He underwent a PET scan it was referred to us for further evaluation.  I spoke to his pulmonologist.  They are planning an endoscopic bronchoscopy and ultrasound and biopsy after his PET scan        This patient denies any headache or visual change.  He was longstanding visual changes.    He has  had cataract surgery.    He hearing loss.    He ambulates with a rolling walker.    Up until several months ago he was cutting all of his grass and working out in the field.    He was lost some weight.  He had he has dry mouth,   dyspnea on exertion, mild lower extremity edema.  Mild PND no orthopnea.    No current chest pain, he does have some pleuritic chest pain from time to time.    He was occasional bone pain ,,no neuropathy.    He ambulates with a rolling walker.    He was had constipation   he has had urinary frequency is yes drink plenty of water because of dry mouth.      Denies any blood in his stool.    He denies any history of hepatitis yellow jaundice.  He does not recall an episode where he had some heated lung therapy as a child back in the 40s    Patient called today on 03/26/2025.  He was noted to have hypercalcemia in his labs yesterday.    He was  instructed to go back to the emergency room to have his calcium and PTH checked.  He was explained that if his hypercalcemia is real he would need admission and start therapy for the above.    He understood the above.  He reports that he was going to go the hospital    He was repeat calcium was elevated.  He was seen evaluated in the emergency room.  He was started on IV fluids and admitted to the hospital.    I called and spoke to his pulmonologist Dr. Lela Messer.    They are going to squeeze him in for his bronchoscopy and potential fluid drain on Friday morning.    He was should have daily renal function test.    He was states his pleuritic pain is little better today.  He was still cough and shortness of breath.  He was still has anxiety.        Today 03/27/2025: Patient was calcium continues declined.    We will keep him NPO after midnight, we will restart IV fluids at midnight and run through his procedure.    Complains of constipation.  Took a stool softener hopefully that will help.  Still with cough mucoid sputum.  No pain.  Reviewed MRI of the brain and showed bone Mets.  No intracranial metastases        Past Medical History:   Diagnosis Date    Anxiety     Diabetes mellitus     HLD (hyperlipidemia)     Hypertension     Major depressive disorder, single episode, unspecified     Pneumonia, unspecified organism       .  Past Surgical History:   Procedure Laterality Date    Cardiac stent      HERNIA REPAIR N/A       .No family history on file.   Social History[1]   .Review of patient's allergies indicates:  No Known Allergies   Current Medications[2]   Review of Systems     Pertinent positives above in interim history  Objective:     Vitals:    03/27/25 1111   BP: 127/72   Pulse: 79   Resp:    Temp: 97.6 °F (36.4 °C)         Physical Exam  Vitals reviewed.   Constitutional:       Appearance: Normal appearance.   HENT:      Head: Normocephalic and atraumatic.      Right Ear: Tympanic membrane normal.       Left Ear: Tympanic membrane normal.      Mouth/Throat:      Mouth: Mucous membranes are moist.      Pharynx: Oropharynx is clear.   Eyes:      Extraocular Movements: Extraocular movements intact.      Conjunctiva/sclera: Conjunctivae normal.      Pupils: Pupils are equal, round, and reactive to light.   Cardiovascular:      Rate and Rhythm: Normal rate and regular rhythm.      Pulses: Normal pulses.      Heart sounds: Normal heart sounds.   Pulmonary:      Effort: Tachypnea present.      Breath sounds: Decreased air movement present. Examination of the right-middle field reveals decreased breath sounds. Examination of the right-lower field reveals decreased breath sounds. Decreased breath sounds present.   Abdominal:      General: Abdomen is flat. Bowel sounds are normal. There is no distension.      Palpations: Abdomen is soft.   Musculoskeletal:         General: Normal range of motion.      Cervical back: Normal range of motion and neck supple.   Lymphadenopathy:      Cervical: No cervical adenopathy.      Lower Body: No right inguinal adenopathy. No left inguinal adenopathy.   Skin:     General: Skin is warm and dry.   Neurological:      General: No focal deficit present.      Mental Status: He is alert and oriented to person, place, and time. Mental status is at baseline.      GCS: GCS eye subscore is 4. GCS verbal subscore is 5. GCS motor subscore is 6.   Psychiatric:         Attention and Perception: Attention normal.         Mood and Affect: Mood is anxious.         Speech: Speech is rapid and pressured.         Behavior: Behavior normal.         Thought Content: Thought content normal.         Judgment: Judgment normal.         ECOG SCORE            .Lab Review:  Previous labs and images reviewed in epic      Images    February 27, 2025:  CT of the chest, COPD and emphysema, right lower lobe atelectasis, lymphadenopathy in the right hilum as well as the mediastinum and smaller nodes in the left hilum.  The  right hilar lymph node measures 2.5 cm other small nodes in the right hilum.  And there is a precarinal lymph node 2.2 x 2.2 cm.  And adenopathy seen in the subcarinal space as well as the mediastinum.    03/13/2025: CT abdomen pelvis, right-sided pleural effusion interstitial fibrosis lungs bilaterally prostatic hypertrophy    03/20/2025: PET-CT: Hypermetabolic consolidation right lung base with mediastinal and bilateral adenopathy and innumerable scattered hypermetabolic bone lesions    03/26/2025: MRI of the brain no intracranial metastases, osseous lytic lesion        pathology  The right pleural effusion showed malignant cells consistent with a poorly differential adenocarcinoma:  Negative for TTF 1, WT1, GATA3.  Suggest an origin of the GI tract clinical pathology is recommended  Assessment/Plan:   Malignant pleural effusion   Pathological mediastinal and hilar adenopathy   Most likely right sided adenocarcinoma of the lung stage IV  Abnormal PET consistent with bone metastases  Fatigue   Weight loss   Anxiety/claustrophobia  Hypercalcemia        This is an 84-year-old male with a clinical appearance of stage IV non-small-cell lung carcinoma adenocarcinoma with malignant pleural effusion, bone Mets hilar and mediastinal adenopathy.  This is more consistent with a lung primary in the clinical picture.      Goals: Palliation   Noncurable  Control pain, decrease shortness of breath, explained he was not a surgical candidate.  Explained utility of radiation   Explained risk of bone metastases   Express progressive shortness of breath --need for drainage of his pleural effusion  and Risk of death from progressive disease  Options: Chemotherapy, immunotherapy, directed therapy based on NGS testing oral medications   Hospice and supportive care      Recommendations  NGS testing in the blood sent   Agree with secondary biopsy             D/w dr. Harman they will do Friday AM here--consult placed  MRI with ativan or Ct  head if not tolerated--complete staging--no intracranial metastases  Drain Fluid if able  Stop cholesterol /medication  Continue guaifenesin liquid   Stop calcium supplements and Tums  Diabetic diet, defer diabetic meds to PCP  Continue  b12/folate  Remains on BuSpar consider alternative or increasing    IVF --if his calcium in not improving add Miacalcin   If calcium > 12 and still elevated despite IVF and miacalcim add zometa        Resume IVF tonight x 1 bag with npo   Will d/w pulmonary after bronch and will discuss impatient vs outpatient chemo   And if biopsy successful    Biopsy for tempus only if viable cancer tissue      Niko Johnson MD             [1]   Social History  Socioeconomic History    Marital status:    Tobacco Use    Smoking status: Never    Smokeless tobacco: Never   Substance and Sexual Activity    Alcohol use: Not Currently    Drug use: Never     Social Drivers of Health     Financial Resource Strain: Low Risk  (3/27/2025)    Overall Financial Resource Strain (CARDIA)     Difficulty of Paying Living Expenses: Not hard at all   Food Insecurity: No Food Insecurity (3/27/2025)    Hunger Vital Sign     Worried About Running Out of Food in the Last Year: Never true     Ran Out of Food in the Last Year: Never true   Transportation Needs: No Transportation Needs (3/27/2025)    PRAPARE - Transportation     Lack of Transportation (Medical): No     Lack of Transportation (Non-Medical): No   Stress: No Stress Concern Present (3/27/2025)    [employer] - Occupational Stress Questionnaire     Feeling of Stress : Not at all   Housing Stability: Low Risk  (3/27/2025)    Housing Stability Vital Sign     Unable to Pay for Housing in the Last Year: No     Homeless in the Last Year: No   [2]   Current Facility-Administered Medications:     acetaminophen tablet 1,000 mg, 1,000 mg, Oral, Q6H PRN, Vira Mccurdy, FNP    aluminum-magnesium hydroxide-simethicone 200-200-20  mg/5 mL suspension 30 mL, 30 mL, Oral, QID PRN, Vira Mccurdy, FNP    atorvastatin tablet 40 mg, 40 mg, Oral, Daily, Maddie Davis MD, 40 mg at 03/27/25 0916    bisacodyL suppository 10 mg, 10 mg, Rectal, Daily PRN, Vira Mccurdy, ALICEP    busPIRone tablet 5 mg, 5 mg, Oral, BID, Maddie Davis MD, 5 mg at 03/27/25 0916    cyanocobalamin tablet 1,000 mcg, 1,000 mcg, Oral, Daily, Niko Johnson MD, 1,000 mcg at 03/27/25 0916    dextrose 50% injection 12.5 g, 12.5 g, Intravenous, PRN, Vira Mccurdy, FNP    dextrose 50% injection 25 g, 25 g, Intravenous, PRN, Vira Mccurdy, FNP    enoxaparin injection 40 mg, 40 mg, Subcutaneous, Daily, Vira Mccurdy, HEMA    folic acid tablet 1 mg, 1 mg, Oral, Daily, Niko Johnson MD, 1 mg at 03/27/25 0916    gabapentin capsule 300 mg, 300 mg, Oral, TID, Maddie Davis MD, 300 mg at 03/27/25 1545    glucagon (human recombinant) injection 1 mg, 1 mg, Intramuscular, PRN, Vira Mccurdy, HEMA    glucose chewable tablet 16 g, 16 g, Oral, PRN, Vira Mccurdy, HEMA    glucose chewable tablet 24 g, 24 g, Oral, PRN, Vira Mccurdy, ALICEP    guaiFENesin 100 mg/5 ml syrup 400 mg, 400 mg, Oral, Q8H, Niko Johnson MD, 400 mg at 03/27/25 1545    insulin aspart U-100 injection 0-10 Units, 0-10 Units, Subcutaneous, QID (AC + HS) PRN, Vira Mccurdy, FNP    lisinopriL tablet 2.5 mg, 2.5 mg, Oral, Daily, Maddie Davis MD, 2.5 mg at 03/27/25 0916    LORazepam tablet 1 mg, 1 mg, Oral, See admin instructions, Niko Johnson MD, 1 mg at 03/26/25 2115    melatonin tablet 6 mg, 6 mg, Oral, Nightly PRN, Vira Mccurdy, HEMA    naloxone 0.4 mg/mL injection 0.02 mg, 0.02 mg, Intravenous, PRN, Vira Mccurdy, HEMA    ondansetron injection 4 mg, 4 mg, Intravenous, Q4H PRN, Vira Mccurdy, HEMA    prochlorperazine injection Soln 5 mg, 5 mg, Intravenous, Q6H PRN, Vira Mccurdy, HEMA    senna-docusate 8.6-50 mg per tablet 1 tablet, 1 tablet, Oral, BID PRN, Vira Mccurdy,  ALICEP, 1 tablet at 03/27/25 1014    SITagliptin phosphate tablet 100 mg, 100 mg, Oral, Daily, Maddie Davis MD, 100 mg at 03/27/25 0916    sodium chloride 0.9% flush 10 mL, 10 mL, Intravenous, PRN, Vira Mccurdy, ALICEP    tamsulosin 24 hr capsule 0.4 mg, 0.4 mg, Oral, Daily, Maddie Davis MD    tiotropium bromide 2.5 mcg/actuation inhaler 2 puff, 2 puff, Inhalation, Daily, Maddie Davis MD, 2 puff at 03/27/25 1014

## 2025-03-27 NOTE — CONSULTS
[facility] - [facility]  Pulmonary Critical Care Note    Patient Name: Juanjose Chawla  MRN: [id number]  Admission Date: 3/26/2025  Hospital Length of Stay: 0 days  Code Status: Full Code  Attending Provider: Romaine Cervantes MD  Primary Care Provider: Sadiq Hartmann MD     Subjective:     HPI:   This is an 84-year-old male with a history of diabetes, hypertension, hyperlipidemia, and anxiety who was evaluated in February 2025 for pleural effusion. Dr Harman performed a right-sided thoracentesis on 03/19/2025, pathology revealed poorly differentiated adenocarcinoma, strong nuclear positivity for CDX2 is suggestive of an origin from the GI tract although can also be found in certain pulmonary adenocarcinomas.  He was sent to the emergency department from the oncology clinic on 03/26/2025 for hypercalcemia.  He has also been scheduled for EBUS tomorrow.     Hospital Course/Significant events:  As above    24 Hour Interval History:  Doing well on room air. Had MRI brain done, results pending. He is having some anxiety and mild dyspnea.     Past Medical History:   Diagnosis Date    Anxiety     Diabetes mellitus     HLD (hyperlipidemia)     Hypertension     Major depressive disorder, single episode, unspecified     Pneumonia, unspecified organism        Past Surgical History:   Procedure Laterality Date    Cardiac stent      HERNIA REPAIR N/A        Social History[1]        Current Outpatient Medications   Medication Instructions    albuterol (ACCUNEB) 0.63 mg, Every 6 hours PRN    amLODIPine (NORVASC) 5 mg, Daily    aspirin (ECOTRIN) 81 mg, Daily    benzonatate (TESSALON) 100 mg, 3 times daily PRN    busPIRone (BUSPAR) 5 mg, Oral, 2 times daily PRN    ergocalciferol (VITAMIN D2) 50,000 Units, Every 7 days    famotidine (PEPCID) 20 mg, Daily    gabapentin (NEURONTIN) 300 mg, 3 times daily    glimepiride (AMARYL) 1 mg, Before breakfast    JANUVIA 100 mg, Daily    lisinopriL (PRINIVIL,ZESTRIL) 2.5  mg, Daily    LORazepam (ATIVAN) 1 MG tablet Take  60 min before MRI, May repeat x 1 if needed    metFORMIN (GLUCOPHAGE) 1,000 mg, 2 times daily    rosuvastatin (CRESTOR) 20 mg, Daily    tamsulosin (FLOMAX) 0.4 mg, Daily    umeclidinium (INCRUSE ELLIPTA) 62.5 mcg, Inhalation, Daily, Controller       Review of patient's allergies indicates:  No Known Allergies     Current Inpatient Medications   atorvastatin  40 mg Oral Daily    busPIRone  5 mg Oral BID    cyanocobalamin  1,000 mcg Oral Daily    enoxparin  40 mg Subcutaneous Daily    folic acid  1 mg Oral Daily    gabapentin  300 mg Oral TID    guaiFENesin 100 mg/5 ml  400 mg Oral Q8H    lisinopriL  2.5 mg Oral Daily    SITagliptin phosphate  100 mg Oral Daily    tamsulosin  0.4 mg Oral Daily    tiotropium bromide  2 puff Inhalation Daily         Review of Systems   Respiratory:  Positive for shortness of breath.    Psychiatric/Behavioral:  The patient is nervous/anxious.           Objective:       Intake/Output Summary (Last 24 hours) at 3/27/2025 0753  Last data filed at 3/27/2025 0428  Gross per 24 hour   Intake 1000 ml   Output 550 ml   Net 450 ml         Vital Signs (Most Recent):  Temp: 98 °F (36.7 °C) (03/27/25 0729)  Pulse: (!) 57 (03/27/25 0729)  Resp: 18 (03/26/25 1444)  BP: 131/66 (03/27/25 0729)  SpO2: (!) 93 % (03/27/25 0729)  Body mass index is 27.98 kg/m².  Weight: 88.5 kg (195 lb) Vital Signs (24h Range):  Temp:  [97.7 °F (36.5 °C)-98.1 °F (36.7 °C)] 98 °F (36.7 °C)  Pulse:  [] 57  Resp:  [13-22] 18  SpO2:  [90 %-98 %] 93 %  BP: (117-179)/(63-87) 131/66     Physical Exam  Vitals reviewed.   Constitutional:       Appearance: Normal appearance.   HENT:      Head: Normocephalic and atraumatic.   Cardiovascular:      Rate and Rhythm: Normal rate.      Heart sounds: Normal heart sounds.   Pulmonary:      Effort: Pulmonary effort is normal.      Breath sounds: Normal breath sounds.   Abdominal:      Palpations: Abdomen is soft.   Musculoskeletal:       "Cervical back: Neck supple.   Neurological:      General: No focal deficit present.      Mental Status: He is alert and oriented to person, place, and time.           Lines/Drains/Airways       Peripheral Intravenous Line  Duration                  Peripheral IV - Single Lumen 03/26/25 1043 20 G Posterior;Right Forearm <1 day                    Significant Labs:    Lab Results   Component Value Date    WBC 8.61 03/26/2025    HGB 12.8 (L) 03/26/2025    HCT 40.4 (L) 03/26/2025    MCV 94.0 03/26/2025     03/26/2025           BMP  Lab Results   Component Value Date     03/26/2025    K 3.9 03/26/2025    CO2 24 03/26/2025    BUN 18.7 03/26/2025    CREATININE 0.79 03/26/2025    CALCIUM 10.7 (H) 03/26/2025    AGAP 11.0 03/26/2025    EGFRNONAA >60 06/13/2022         ABG  No results for input(s): "PH", "PO2", "PCO2", "HCO3", "POCBASEDEF" in the last 168 hours.      Significant Imaging:  I have reviewed the pertinent imaging within the past 24 hours.        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, path with adenocarcinoma  Lymphadenopathy, awaiting EBUS for addition path to determine primary  Hypercalcemia      Plan  Plan for EBUS tomorrow  MRI brain results pending  NPO after midnight       HEMA Samaniego  Pulmonary Critical Care Medicine  [facility] - [facility]  DOS: 03/27/2025          [1]   Social History  Socioeconomic History    Marital status:    Tobacco Use    Smoking status: Never    Smokeless tobacco: Never   Substance and Sexual Activity    Alcohol use: Not Currently    Drug use: Never     "

## 2025-03-27 NOTE — PROGRESS NOTES
Inpatient Nutrition Assessment    Admit Date: 3/26/2025   Total duration of encounter: 1 day   Patient Age: 84 y.o.    Nutrition Recommendation/Prescription     Continue diabetic 2000 kcal diet as tolerated  Boost Glucose Control BID (190 kcal and 16 gm protein per serving)  Bowel regimen PRN  Monitor PO intake, labs and weight    Communication of Recommendations: reviewed with patient and reviewed with family    Nutrition Assessment     Malnutrition Assessment/Nutrition-Focused Physical Exam    Malnutrition Context: chronic illness (03/27/25 1500)  Malnutrition Level: moderate (03/27/25 1500)  Energy Intake (Malnutrition): other (see comments) (Does not meet criteria) (03/27/25 1500)  Weight Loss (Malnutrition): greater than 5% in 1 month (03/27/25 1500)  Subcutaneous Fat (Malnutrition): mild depletion (03/27/25 1500)           Muscle Mass (Malnutrition): mild depletion (03/27/25 1500)  [place] (Muscle Loss): mild depletion                       Fluid Accumulation (Malnutrition): other (see comments) (Unable to assess) (03/27/25 1500)        A minimum of two characteristics is recommended for diagnosis of either severe or non-severe malnutrition.    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  Abnormal PET consistent with bone metastases  Fatigue   Weight loss   Anxiety/claustrophobia    Relevant Medical History: anxiety, DM, HLD, HTN, depression    Scheduled Medications:  atorvastatin, 40 mg, Daily  busPIRone, 5 mg, BID  cyanocobalamin, 1,000 mcg, Daily  enoxparin, 40 mg, Daily  folic acid, 1 mg, Daily  gabapentin, 300 mg, TID  guaiFENesin  100 mg/5 ml, 400 mg, Q8H  lisinopriL, 2.5 mg, Daily  SITagliptin phosphate, 100 mg, Daily  tamsulosin, 0.4 mg, Daily  tiotropium bromide, 2 puff, Daily    Continuous Infusions:   PRN Medications:  acetaminophen, 1,000 mg, Q6H PRN  aluminum-magnesium hydroxide-simethicone, 30 mL, QID PRN  bisacodyL, 10 mg, Daily PRN  dextrose 50%, 12.5 g, PRN  dextrose 50%, 25 g, PRN  glucagon (human recombinant), 1 mg, PRN  glucose, 16 g, PRN  glucose, 24 g, PRN  insulin aspart U-100, 0-10 Units, QID (AC + HS) PRN  LORazepam, 1 mg, See admin instructions  melatonin, 6 mg, Nightly PRN  naloxone, 0.02 mg, PRN  ondansetron, 4 mg, Q4H PRN  prochlorperazine, 5 mg, Q6H PRN  senna-docusate 8.6-50 mg, 1 tablet, BID PRN  sodium chloride 0.9%, 10 mL, PRN    Calorie Containing IV Medications: no significant kcals from medications at this time    Recent Labs   Lab 03/25/25  1513 03/26/25  1038 03/26/25  1126 03/26/25  1907 03/27/25  1034    144  --   --  144   K 4.2 3.9  --   --  3.6   CALCIUM 11.4* 11.2*  --  10.7* 10.1*   PHOS  --   --   --  2.7  --    MG  --  2.30  --   --  2.10    109*  --   --  107   CO2 25 24  --   --  30   BUN 15.3 18.7  --   --  13.6   CREATININE 0.79 0.79  --   --  0.73   EGFRNORACEVR >60 >60  --   --  >60   GLUCOSE 91 83  --   --  130*   BILITOT 0.5 0.5  --   --  0.6   ALKPHOS 88 90  --   --  83   ALT 7 5  --   --  5   AST 14 13  --   --  11   ALBUMIN 3.5 3.4  --   --  3.1*   WBC 7.93  --  8.61  --  6.11   HGB 13.7*  --  12.8*  --  12.3*   HCT 41.9*  --  40.4*  --  37.6*     Nutrition Orders:  Diet Consistent Carbohydrate 2000 Calories (up to 75 gm per meal); Standard Tray  Diet NPO  Dietary nutrition supplements BID; Boost Glucose Control - Any flavor    Appetite/Oral Intake: good/% of meals  Factors Affecting Nutritional Intake: constipation  Social Needs Impacting Access to Food: none identified  Food/Buddhism/Cultural Preferences: none reported  Food Allergies: no known food allergies  Last  "Bowel Movement: 25  Wound(s):  none documented    Comments    3/27/25: Pt and family reports good appetite/PO intake since admit, however decreased PO intake PTA for >1 month 2/2 decreased appetite and difficulty swallowing r/t sinuses and phlegm. Agreed to ONS BID to ensure adequate nutrition intake. Denies n/v; requesting stool softener. PRN meds noted. Reports recent unintentional weight loss. Per EMR weight history, 7% weight loss in 1 month (significant). Pt with mild muscle and fat depletion noted per NFPE.    Anthropometrics    Height: 5' 10" (177.8 cm),    Last Weight: 88.5 kg (195 lb) (25 1444), Weight Method: Standard Scale  BMI (Calculated): 28  BMI Classification: overweight (BMI 25-29.9)        Ideal Body Weight (IBW), Male: 166 lb     % Ideal Body Weight, Male (lb): 117.47 %                 Usual Body Weight (UBW), k.4 kg  % Usual Body Weight: 92.91     Usual Weight Provided By: EMR weight history    Wt Readings from Last 5 Encounters:   25 88.5 kg (195 lb)   25 89 kg (196 lb 4.8 oz)   25 97.2 kg (214 lb 4.6 oz)   21 105 kg (231 lb 7.7 oz)     Weight Change(s) Since Admission:   Wt Readings from Last 1 Encounters:   25 1444 88.5 kg (195 lb)   25 1014 88.5 kg (195 lb)   Admit Weight: 88.5 kg (195 lb) (25 1014), Weight Method: Standard Scale    Estimated Needs    Weight Used For Calorie Calculations: 88.5 kg (195 lb 1.7 oz)  Energy Calorie Requirements (kcal): 1885 kcal (1.2 SF)  Energy Need Method: Branford- Jeor  Weight Used For Protein Calculations: 88.5 kg (195 lb 1.7 oz)  Protein Requirements: 89-98 gm (1.0-1.1 gm/kg)  Fluid Requirements (mL): 2212 mL (25 mL/kg)  CHO Requirement: 212-235 gm/d (45-50% est kcal)     Enteral Nutrition     Patient not receiving enteral nutrition at this time.    Parenteral Nutrition     Patient not receiving parenteral nutrition support at this time.    Evaluation of Received Nutrient Intake    Calories: " meeting estimated needs  Protein: meeting estimated needs    Patient Education     Not applicable.    Nutrition Diagnosis     PES: Moderate chronic disease or condition related malnutrition Related to chronic illness As Evidenced by:  - weight loss: > 5% in 1 month - muscle mass depletion: 1 area of mild muscle loss (Temporalis) - loss of subcutaneous fat: 1 area of mild fat loss (Buccal) new    Nutrition Interventions     Intervention(s): general/healthful diet, commercial beverage, prescription medication, and collaboration with other providers    Goal: Consume % of oral supplements by follow-up. (new)  Goal: Consume % of meals/snacks by follow-up. (new)    Nutrition Goals & Monitoring     Dietitian will monitor: food and beverage intake, weight change, glucose/endocrine profile, and gastrointestinal profile  Discharge planning: continue diabetic diet with boost or similar oral supplements  Nutrition Risk/Follow-Up: moderate (follow-up in 3-5 days)   Please consult if re-assessment needed sooner.

## 2025-03-27 NOTE — PROGRESS NOTES
[facility] Hospital Medicine Progress Note        Chief Complaint: Inpatient Follow-up     HPI:    84 y.o. male who  has a past medical history of Anxiety, Diabetes mellitus, HLD (hyperlipidemia), Hypertension, Major depressive disorder, single episode, unspecified, and Pneumonia, unspecified organism.. The patient presented to [facility] on 3/26/2025 with a primary complaint of abnormal labs was brought from Oncology Clinic to our hospital.  He is brought in for hypercalcemia. Patient has a history of malignant pleural effusion which was found on recent hospitalization for shortness of breath on 2/28.  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  Since the patient needs to be admitted for hypercalcemia treatment, oncology has spoken with pulmonology to schedule biopsy to confirm diagnosis.     CBC WNL.  CMP WNL except for calcium of 11.2 on arrival.  Continue fluids and repeat H&H at 6:00 p.m. we will consult Oncology.    Interval Hx:  No significant changes overnight.  On room air.  No family with him this morning.  No new complaints.  Afebrile    Objective/physical exam:  General appearance: Well-developed, well-nourished male in no apparent distress.  HENT: Atraumatic head. Moist mucous membranes of oral cavity.  Eyes: Normal extraocular movements.   Neck: Supple.   Lungs: Clear to auscultation bilaterally. No wheezing present.   Heart: Regular rate and rhythm. S1 and S2 present with no murmurs/gallop/rub. No pedal edema. No JVD present.   Abdomen: Soft, non-distended, non-tender. No rebound tenderness/guarding. Bowel sounds are normal.   Extremities: No cyanosis, clubbing, or edema.  Skin: No Rash.   Neuro: Motor and sensory exams grossly intact. Good tone. Muscle strength 5/5 in all 4 extremities  Psych/mental status: Appropriate mood and  affect. Responds appropriately to questions.     VITAL SIGNS: 24 HRS MIN & MAX LAST   Temp  Min: 97.7 °F (36.5 °C)  Max: 98.1 °F (36.7 °C) 98 °F (36.7 °C)   BP  Min: 117/87  Max: 179/71 131/66   Pulse  Min: 57  Max: 101  (!) 57   Resp  Min: 13  Max: 22 18   SpO2  Min: 90 %  Max: 98 % (!) 93 %       Recent Labs   Lab 03/25/25  1513 03/26/25  1126   WBC 7.93 8.61   RBC 4.51* 4.30*   HGB 13.7* 12.8*   HCT 41.9* 40.4*   MCV 92.9 94.0   MCH 30.4 29.8   MCHC 32.7* 31.7*   RDW 13.0 13.2    165   MPV 9.6 9.6       Recent Labs   Lab 03/25/25  1513 03/26/25  1038 03/26/25  1907    144  --    K 4.2 3.9  --     109*  --    CO2 25 24  --    BUN 15.3 18.7  --    CREATININE 0.79 0.79  --    CALCIUM 11.4* 11.2* 10.7*   MG  --  2.30  --    ALBUMIN 3.5 3.4  --    ALKPHOS 88 90  --    ALT 7 5  --    AST 14 13  --    BILITOT 0.5 0.5  --           Microbiology Results (last 7 days)       ** No results found for the last 168 hours. **             Radiology:  X-Ray Chest AP Portable  Narrative: EXAMINATION:  XR CHEST AP PORTABLE    CLINICAL HISTORY:  Other disorders of electrolyte and fluid balance, not elsewhere classified    TECHNIQUE:  Single frontal view of the chest was performed.    COMPARISON:  March 19, 2025    FINDINGS:  Examination reveals cardiomediastinal silhouette to be unchanged as compared with the previous exam.    Persistent blunting of the right costophrenic angle indicating the presence of a right-sided pleural effusion.    Persistent increase interstitial markings throughout both lung fields more so at the right perihilar region and right base and left infrahilar region and left base these, however, are similar to previous exam of March 19, 2025.    No new focal consolidative changes  Impression: No significant interval change as compared with the previous exam    Electronically signed by: Hai Wan  Date:    03/26/2025  Time:    12:12        Medications:  Scheduled Meds:   atorvastatin  40  mg Oral Daily    busPIRone  5 mg Oral BID    cyanocobalamin  1,000 mcg Oral Daily    enoxparin  40 mg Subcutaneous Daily    folic acid  1 mg Oral Daily    gabapentin  300 mg Oral TID    guaiFENesin 100 mg/5 ml  400 mg Oral Q8H    lisinopriL  2.5 mg Oral Daily    SITagliptin phosphate  100 mg Oral Daily    tamsulosin  0.4 mg Oral Daily    tiotropium bromide  2 puff Inhalation Daily     Continuous Infusions:  PRN Meds:.  Current Facility-Administered Medications:     acetaminophen, 1,000 mg, Oral, Q6H PRN    aluminum-magnesium hydroxide-simethicone, 30 mL, Oral, QID PRN    bisacodyL, 10 mg, Rectal, Daily PRN    dextrose 50%, 12.5 g, Intravenous, PRN    dextrose 50%, 25 g, Intravenous, PRN    glucagon (human recombinant), 1 mg, Intramuscular, PRN    glucose, 16 g, Oral, PRN    glucose, 24 g, Oral, PRN    insulin aspart U-100, 0-10 Units, Subcutaneous, QID (AC + HS) PRN    LORazepam, 1 mg, Oral, See admin instructions    melatonin, 6 mg, Oral, Nightly PRN    naloxone, 0.02 mg, Intravenous, PRN    ondansetron, 4 mg, Intravenous, Q4H PRN    prochlorperazine, 5 mg, Intravenous, Q6H PRN    senna-docusate 8.6-50 mg, 1 tablet, Oral, BID PRN    sodium chloride 0.9%, 10 mL, Intravenous, PRN    Nutrition:  Nutrition consulted. Most recent weight and BMI monitored-     Measurements:  Wt Readings from Last 1 Encounters:   03/26/25 88.5 kg (195 lb)   Body mass index is 27.98 kg/m².    Patient has been screened and assessed by RD.    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     From respiratory standpoint he appears to be stable.    Overall the patient has a very poor long-term prognosis.    Oncology working on further staging of his malignancy.  MRI of the brain is pending this morning but completed.  Follow up BMP.  Calcium was trending down.      Romaine Cervantes MD   03/27/2025     All diagnosis and differential diagnosis have been reviewed; assessment and plan has been documented; I have personally reviewed the labs and test results that are presently available; I have reviewed the patients medication list; I have reviewed the consulting providers response and recommendations. I have reviewed or attempted to review medical records based upon their availability    All of the patient's questions have been  addressed and answered. Patient's is agreeable to the above stated plan. I will continue to monitor closely and make adjustments to medical management as needed.  _____________________________________________________________________

## 2025-03-28 ENCOUNTER — ANESTHESIA (OUTPATIENT)
Dept: ENDOSCOPY | Facility: HOSPITAL | Age: 85
End: 2025-03-28
Payer: MEDICARE

## 2025-03-28 LAB
ANION GAP SERPL CALC-SCNC: 7 MEQ/L
BASOPHILS # BLD AUTO: 0.04 X10(3)/MCL
BASOPHILS NFR BLD AUTO: 0.7 %
BUN SERPL-MCNC: 14.6 MG/DL (ref 8.4–25.7)
CALCIUM SERPL-MCNC: 10 MG/DL (ref 8.8–10)
CHLORIDE SERPL-SCNC: 111 MMOL/L (ref 98–107)
CO2 SERPL-SCNC: 24 MMOL/L (ref 23–31)
CREAT SERPL-MCNC: 0.7 MG/DL (ref 0.72–1.25)
CREAT/UREA NIT SERPL: 21
EOSINOPHIL # BLD AUTO: 0.17 X10(3)/MCL (ref 0–0.9)
EOSINOPHIL NFR BLD AUTO: 3 %
ERYTHROCYTE [DISTWIDTH] IN BLOOD BY AUTOMATED COUNT: 13.2 % (ref 11.5–17)
GFR SERPLBLD CREATININE-BSD FMLA CKD-EPI: >60 ML/MIN/1.73/M2
GLUCOSE SERPL-MCNC: 104 MG/DL (ref 82–115)
HCT VFR BLD AUTO: 36 % (ref 42–52)
HGB BLD-MCNC: 11.2 G/DL (ref 14–18)
IMM GRANULOCYTES # BLD AUTO: 0.05 X10(3)/MCL (ref 0–0.04)
IMM GRANULOCYTES NFR BLD AUTO: 0.9 %
LYMPHOCYTES # BLD AUTO: 1.58 X10(3)/MCL (ref 0.6–4.6)
LYMPHOCYTES NFR BLD AUTO: 27.6 %
MCH RBC QN AUTO: 29.6 PG (ref 27–31)
MCHC RBC AUTO-ENTMCNC: 31.1 G/DL (ref 33–36)
MCV RBC AUTO: 95 FL (ref 80–94)
MONOCYTES # BLD AUTO: 0.76 X10(3)/MCL (ref 0.1–1.3)
MONOCYTES NFR BLD AUTO: 13.3 %
NEUTROPHILS # BLD AUTO: 3.12 X10(3)/MCL (ref 2.1–9.2)
NEUTROPHILS NFR BLD AUTO: 54.5 %
NRBC BLD AUTO-RTO: 0 %
PLATELET # BLD AUTO: 141 X10(3)/MCL (ref 130–400)
PMV BLD AUTO: 10.5 FL (ref 7.4–10.4)
POCT GLUCOSE: 113 MG/DL (ref 70–110)
POCT GLUCOSE: 117 MG/DL (ref 70–110)
POCT GLUCOSE: 140 MG/DL (ref 70–110)
POCT GLUCOSE: 224 MG/DL (ref 70–110)
POCT GLUCOSE: 238 MG/DL (ref 70–110)
POTASSIUM SERPL-SCNC: 3.5 MMOL/L (ref 3.5–5.1)
RBC # BLD AUTO: 3.79 X10(6)/MCL (ref 4.7–6.1)
SODIUM SERPL-SCNC: 142 MMOL/L (ref 136–145)
WBC # BLD AUTO: 5.72 X10(3)/MCL (ref 4.5–11.5)

## 2025-03-28 PROCEDURE — 31652 BRONCH EBUS SAMPLNG 1/2 NODE: CPT | Performed by: INTERNAL MEDICINE

## 2025-03-28 PROCEDURE — 27201423 OPTIME MED/SURG SUP & DEVICES STERILE SUPPLY: Performed by: INTERNAL MEDICINE

## 2025-03-28 PROCEDURE — 21400001 HC TELEMETRY ROOM

## 2025-03-28 PROCEDURE — 31652 BRONCH EBUS SAMPLNG 1/2 NODE: CPT

## 2025-03-28 PROCEDURE — 07B74ZX EXCISION OF THORAX LYMPHATIC, PERCUTANEOUS ENDOSCOPIC APPROACH, DIAGNOSTIC: ICD-10-PCS | Performed by: INTERNAL MEDICINE

## 2025-03-28 PROCEDURE — C1726 CATH, BAL DIL, NON-VASCULAR: HCPCS | Performed by: INTERNAL MEDICINE

## 2025-03-28 PROCEDURE — 88341 IMHCHEM/IMCYTCHM EA ADD ANTB: CPT

## 2025-03-28 PROCEDURE — 88342 IMHCHEM/IMCYTCHM 1ST ANTB: CPT

## 2025-03-28 PROCEDURE — 88172 CYTP DX EVAL FNA 1ST EA SITE: CPT

## 2025-03-28 PROCEDURE — 25000003 PHARM REV CODE 250: Performed by: ANESTHESIOLOGY

## 2025-03-28 PROCEDURE — 85025 COMPLETE CBC W/AUTO DIFF WBC: CPT | Performed by: HOSPITALIST

## 2025-03-28 PROCEDURE — 37000009 HC ANESTHESIA EA ADD 15 MINS: Performed by: INTERNAL MEDICINE

## 2025-03-28 PROCEDURE — 63600175 PHARM REV CODE 636 W HCPCS: Performed by: NURSE PRACTITIONER

## 2025-03-28 PROCEDURE — 36415 COLL VENOUS BLD VENIPUNCTURE: CPT | Performed by: HOSPITALIST

## 2025-03-28 PROCEDURE — 63600175 PHARM REV CODE 636 W HCPCS: Mod: JZ,TB | Performed by: ANESTHESIOLOGY

## 2025-03-28 PROCEDURE — 25000003 PHARM REV CODE 250: Performed by: INTERNAL MEDICINE

## 2025-03-28 PROCEDURE — 88173 CYTOPATH EVAL FNA REPORT: CPT

## 2025-03-28 PROCEDURE — 88305 TISSUE EXAM BY PATHOLOGIST: CPT | Performed by: INTERNAL MEDICINE

## 2025-03-28 PROCEDURE — 37000008 HC ANESTHESIA 1ST 15 MINUTES: Performed by: INTERNAL MEDICINE

## 2025-03-28 PROCEDURE — 99232 SBSQ HOSP IP/OBS MODERATE 35: CPT | Mod: ,,, | Performed by: INTERNAL MEDICINE

## 2025-03-28 PROCEDURE — 25000003 PHARM REV CODE 250: Performed by: NURSE PRACTITIONER

## 2025-03-28 PROCEDURE — 25000003 PHARM REV CODE 250: Performed by: STUDENT IN AN ORGANIZED HEALTH CARE EDUCATION/TRAINING PROGRAM

## 2025-03-28 PROCEDURE — 80048 BASIC METABOLIC PNL TOTAL CA: CPT | Performed by: HOSPITALIST

## 2025-03-28 PROCEDURE — 99900035 HC TECH TIME PER 15 MIN (STAT)

## 2025-03-28 RX ORDER — FENTANYL CITRATE 50 UG/ML
25 INJECTION, SOLUTION INTRAMUSCULAR; INTRAVENOUS EVERY 5 MIN PRN
Refills: 0 | Status: CANCELLED | OUTPATIENT
Start: 2025-03-28

## 2025-03-28 RX ORDER — GLUCAGON 1 MG
1 KIT INJECTION
Status: CANCELLED | OUTPATIENT
Start: 2025-03-28

## 2025-03-28 RX ORDER — GLYCOPYRROLATE 0.2 MG/ML
INJECTION INTRAMUSCULAR; INTRAVENOUS
Status: DISCONTINUED | OUTPATIENT
Start: 2025-03-28 | End: 2025-03-28

## 2025-03-28 RX ORDER — LIDOCAINE HYDROCHLORIDE 20 MG/ML
INJECTION INTRAVENOUS
Status: DISCONTINUED | OUTPATIENT
Start: 2025-03-28 | End: 2025-03-28

## 2025-03-28 RX ORDER — PROPOFOL 10 MG/ML
VIAL (ML) INTRAVENOUS
Status: DISCONTINUED | OUTPATIENT
Start: 2025-03-28 | End: 2025-03-28

## 2025-03-28 RX ORDER — ONDANSETRON HYDROCHLORIDE 2 MG/ML
INJECTION, SOLUTION INTRAVENOUS
Status: DISCONTINUED | OUTPATIENT
Start: 2025-03-28 | End: 2025-03-28

## 2025-03-28 RX ORDER — FENTANYL CITRATE 50 UG/ML
INJECTION, SOLUTION INTRAMUSCULAR; INTRAVENOUS
Status: DISCONTINUED | OUTPATIENT
Start: 2025-03-28 | End: 2025-03-28

## 2025-03-28 RX ORDER — LIDOCAINE HYDROCHLORIDE 40 MG/ML
4 SOLUTION TOPICAL
Status: DISCONTINUED | OUTPATIENT
Start: 2025-03-28 | End: 2025-03-29 | Stop reason: HOSPADM

## 2025-03-28 RX ORDER — DEXAMETHASONE SODIUM PHOSPHATE 4 MG/ML
INJECTION, SOLUTION INTRA-ARTICULAR; INTRALESIONAL; INTRAMUSCULAR; INTRAVENOUS; SOFT TISSUE
Status: DISCONTINUED | OUTPATIENT
Start: 2025-03-28 | End: 2025-03-28

## 2025-03-28 RX ADMIN — SITAGLIPTIN 100 MG: 100 TABLET, FILM COATED ORAL at 10:03

## 2025-03-28 RX ADMIN — DEXAMETHASONE SODIUM PHOSPHATE 4 MG: 4 INJECTION, SOLUTION INTRA-ARTICULAR; INTRALESIONAL; INTRAMUSCULAR; INTRAVENOUS; SOFT TISSUE at 08:03

## 2025-03-28 RX ADMIN — ONDANSETRON 4 MG: 2 INJECTION INTRAMUSCULAR; INTRAVENOUS at 08:03

## 2025-03-28 RX ADMIN — FOLIC ACID 1 MG: 1 TABLET ORAL at 10:03

## 2025-03-28 RX ADMIN — GABAPENTIN 300 MG: 300 CAPSULE ORAL at 09:03

## 2025-03-28 RX ADMIN — Medication 6 MG: at 09:03

## 2025-03-28 RX ADMIN — LISINOPRIL 2.5 MG: 2.5 TABLET ORAL at 10:03

## 2025-03-28 RX ADMIN — GUAIFENESIN 400 MG: 200 SOLUTION ORAL at 09:03

## 2025-03-28 RX ADMIN — GUAIFENESIN 400 MG: 200 SOLUTION ORAL at 03:03

## 2025-03-28 RX ADMIN — SODIUM CHLORIDE: 9 INJECTION, SOLUTION INTRAVENOUS at 08:03

## 2025-03-28 RX ADMIN — LIDOCAINE HYDROCHLORIDE 4 ML: 40 SOLUTION TOPICAL at 08:03

## 2025-03-28 RX ADMIN — FENTANYL CITRATE 50 MCG: 50 INJECTION, SOLUTION INTRAMUSCULAR; INTRAVENOUS at 08:03

## 2025-03-28 RX ADMIN — CYANOCOBALAMIN TAB 500 MCG 1000 MCG: 500 TAB at 10:03

## 2025-03-28 RX ADMIN — BUSPIRONE HYDROCHLORIDE 5 MG: 5 TABLET ORAL at 09:03

## 2025-03-28 RX ADMIN — LIDOCAINE HYDROCHLORIDE 80 MG: 20 INJECTION INTRAVENOUS at 08:03

## 2025-03-28 RX ADMIN — GLYCOPYRROLATE 0.2 MG: 0.2 INJECTION INTRAMUSCULAR; INTRAVENOUS at 08:03

## 2025-03-28 RX ADMIN — GABAPENTIN 300 MG: 300 CAPSULE ORAL at 03:03

## 2025-03-28 RX ADMIN — INSULIN ASPART 4 UNITS: 100 INJECTION, SOLUTION INTRAVENOUS; SUBCUTANEOUS at 04:03

## 2025-03-28 RX ADMIN — PROPOFOL 125 MCG/KG/MIN: 10 INJECTION, EMULSION INTRAVENOUS at 08:03

## 2025-03-28 RX ADMIN — ENOXAPARIN SODIUM 40 MG: 40 INJECTION SUBCUTANEOUS at 04:03

## 2025-03-28 RX ADMIN — PROPOFOL 100 MG: 10 INJECTION, EMULSION INTRAVENOUS at 08:03

## 2025-03-28 RX ADMIN — GUAIFENESIN 400 MG: 200 SOLUTION ORAL at 06:03

## 2025-03-28 RX ADMIN — BUSPIRONE HYDROCHLORIDE 5 MG: 5 TABLET ORAL at 10:03

## 2025-03-28 RX ADMIN — TIOTROPIUM BROMIDE INHALATION SPRAY 2 PUFF: 3.12 SPRAY, METERED RESPIRATORY (INHALATION) at 06:03

## 2025-03-28 RX ADMIN — GABAPENTIN 300 MG: 300 CAPSULE ORAL at 10:03

## 2025-03-28 NOTE — ANESTHESIA PREPROCEDURE EVALUATION
03/28/2025  Juanjose Chawla is a 84 y.o., male who  has a past medical history of Anxiety, Diabetes mellitus, HLD (hyperlipidemia), Hypertension, Major depressive disorder, single episode, unspecified, and Pneumonia, unspecified organism.. The patient presented to [facility] on 3/26/2025 with a primary complaint of abnormal labs was brought from Oncology Clinic to our hospital.  He is brought in for hypercalcemia. Patient has a history of malignant pleural effusion which was found on recent hospitalization for shortness of breath on 2/28.  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  Since the patient needs to be admitted for hypercalcemia treatment, oncology has spoken with pulmonology to schedule biopsy to confirm diagnosis.     Recent Labs   Lab 03/25/25  1513 03/26/25  1126   WBC 7.93 8.61   RBC 4.51* 4.30*   HGB 13.7* 12.8*   HCT 41.9* 40.4*   MCV 92.9 94.0   MCH 30.4 29.8   MCHC 32.7* 31.7*   RDW 13.0 13.2    165   MPV 9.6 9.6              Recent Labs   Lab 03/25/25  1513 03/26/25  1038    144   K 4.2 3.9    109*   CO2 25 24   BUN 15.3 18.7   CREATININE 0.79 0.79   CALCIUM 11.4* 11.2*   MG  --  2.30   ALBUMIN 3.5 3.4   ALKPHOS 88 90   ALT 7 5   AST 14 13   BILITOT 0.5 0.5     CXR  FINDINGS:  Examination reveals cardiomediastinal silhouette to be unchanged as compared with the previous exam.     Persistent blunting of the right costophrenic angle indicating the presence of a right-sided pleural effusion.     Persistent increase interstitial markings throughout both lung fields more so at the right perihilar region and right base and left infrahilar region and left base these, however, are similar to previous exam of March 19, 2025.     No new  focal consolidative changes    Pre-op Assessment    I have reviewed the Patient Summary Reports.    I have reviewed the NPO Status.   I have reviewed the Medications.     Review of Systems  Anesthesia Hx:  No problems with previous Anesthesia                Social:  Non-Smoker       Hematology/Oncology:                      Current/Recent Cancer. (Lung)                Cardiovascular:     Hypertension, well controlled   Denies MI.    CABG/stent (Stent x 1 - well since)           ECG has been reviewed.  Patient not on beta blockers                          Pulmonary:  Pneumonia       Denies Sleep Apnea.                Renal/:  Renal/ Normal                 Hepatic/GI:     GERD (OTC meds prn), well controlled                Neurological:  Neurology Normal                                      Endocrine:  Diabetes, well controlled, type 2           Psych:  Psychiatric History  depression                   Anesthesia Plan  Type of Anesthesia, risks & benefits discussed:    Anesthesia Type: Gen ETT  Intra-op Monitoring Plan: Standard ASA Monitors  Post Op Pain Control Plan: multimodal analgesia and IV/PO Opioids PRN  Induction:  IV  Airway Plan: Direct, Post-Induction  ASA Score: 3  Day of Surgery Review of History & Physical: H&P Update referred to the surgeon/provider.    Ready For Surgery From Anesthesia Perspective.     .

## 2025-03-28 NOTE — TRANSFER OF CARE
"Anesthesia Transfer of Care Note    Patient: Juanjose Chawla    Procedure(s) Performed: Procedure(s) (LRB):  ENDOBRONCHIAL ULTRASOUND (EBUS) (N/A)    Patient location: Telemetry/Step Down Unit    Anesthesia Type: general    Transport from OR: Transported from OR on 2-3 L/min O2 by NC with adequate spontaneous ventilation    Post pain: adequate analgesia    Post assessment: no apparent anesthetic complications and tolerated procedure well    Post vital signs: stable    Level of consciousness: awake, alert and oriented    Nausea/Vomiting: no nausea/vomiting    Complications: none    Transfer of care protocol was followed      Last vitals: Visit Vitals  /80   Pulse 78   Temp 36 °C (96.8 °F)   Resp 16   Ht 5' 10" (1.778 m)   Wt 88.5 kg (195 lb)   SpO2 (!) 93%   BMI 27.98 kg/m²     "

## 2025-03-28 NOTE — ANESTHESIA POSTPROCEDURE EVALUATION
Anesthesia Post Evaluation    Patient: Juanjose Chawla    Procedure(s) Performed: Procedure(s) (LRB):  ENDOBRONCHIAL ULTRASOUND (EBUS) (N/A)    Final Anesthesia Type: general      Patient location during evaluation: PACU  Patient participation: Yes- Able to Participate  Level of consciousness: awake and alert  Post-procedure vital signs: reviewed and stable  Pain management: adequate  Airway patency: patent    PONV status at discharge: No PONV  Anesthetic complications: no      Cardiovascular status: hemodynamically stable  Respiratory status: spontaneous ventilation and room air  Hydration status: euvolemic  Follow-up not needed.              Vitals Value Taken Time   /80 03/28/25 09:29   Temp 36 °C (96.8 °F) 03/28/25 09:29   Pulse 78 03/28/25 09:29   Resp 16 03/28/25 09:29   SpO2 93 % 03/28/25 09:29         No case tracking events are documented in the log.      Pain/Vashti Score: No data recorded

## 2025-03-28 NOTE — PROGRESS NOTES
Subjective:        PATIENT: Juanjose Chawla  MRN: [id number]  DATE: 3/28/2025    PCP: Sadiq Hartmann MD   Referring Provider : Sadiq Hartmann MD  [address] [place], [place] [address]  [place] [address]     Chief Complaint: Abnormal Lab (Patient sent by magdy GONZALEZ for high calcium, had blood work done yesterday. Hx stage 4 lung, spine, and pelvis cancer. Patient denies any symptoms at this time. )    This is an 84-year-old male who was seen evaluated by Pulmonary.  He was admitted to the hospital on February 28, 2025 with progressive shortness of breath he eventually underwent a diagnostic thoracentesis  He underwent CT scan chest abdomen pelvis.  He eventually underwent a diagnostic thoracentesis with social consistent with adenocarcinoma questionable primary.  He underwent a PET scan it was referred to us for further evaluation.  I spoke to his pulmonologist.  They are planning an endoscopic bronchoscopy and ultrasound and biopsy after his PET scan        This patient denies any headache or visual change.  He was longstanding visual changes.    He has  had cataract surgery.    He hearing loss.    He ambulates with a rolling walker.    Up until several months ago he was cutting all of his grass and working out in the field.    He was lost some weight.  He had he has dry mouth,   dyspnea on exertion, mild lower extremity edema.  Mild PND no orthopnea.    No current chest pain, he does have some pleuritic chest pain from time to time.    He was occasional bone pain ,,no neuropathy.    He ambulates with a rolling walker.    He was had constipation   he has had urinary frequency is yes drink plenty of water because of dry mouth.      Denies any blood in his stool.    He denies any history of hepatitis yellow jaundice.  He does not recall an episode where he had some heated lung therapy as a child back in the 40s    Patient called today on 03/26/2025.  He was noted to have hypercalcemia in his labs yesterday.    He was  instructed to go back to the emergency room to have his calcium and PTH checked.  He was explained that if his hypercalcemia is real he would need admission and start therapy for the above.    He understood the above.  He reports that he was going to go the hospital    He was repeat calcium was elevated.  He was seen evaluated in the emergency room.  He was started on IV fluids and admitted to the hospital.    I called and spoke to his pulmonologist Dr. Lela Messer.    They are going to squeeze him in for his bronchoscopy and potential fluid drain on Friday morning.    He was should have daily renal function test.    He was states his pleuritic pain is little better today.  He was still cough and shortness of breath.  He was still has anxiety.        03/27/2025: Patient was calcium continues declined.    We will keep him NPO after midnight, we will restart IV fluids at midnight and run through his procedure.    Complains of constipation.  Took a stool softener hopefully that will help.  Still with cough mucoid sputum.  No pain.  Reviewed MRI of the brain and showed bone Mets.  No intracranial metastases    3/28/ 2025  Patient was status post repeat bronchoscopy  He was bronchoscopy procedure went well.  They were able to get a good sample of the Station 7 R lymph node.    I do not think he needs a 2nd or 3rd biopsy at this point.    He was clinically doing well.  His calcium is well-controlled with IV hydration.    He was starting to eat.    I discussed with the hospital service that he be looks stable in the morning they can plan discharge.  However he was worsening shortness of breath and we should keep him make sure he does not have a recurrent pleural effusion.  And plan inpatient chemotherapy on Monday.      Past Medical History:   Diagnosis Date    Anxiety     Diabetes mellitus     HLD (hyperlipidemia)     Hypertension     Major depressive disorder, single episode, unspecified     Pneumonia, unspecified  organism       .  Past Surgical History:   Procedure Laterality Date    Cardiac stent      HERNIA REPAIR N/A       .No family history on file.   Social History[1]   .Review of patient's allergies indicates:  No Known Allergies   Current Medications[2]   Review of Systems     Pertinent positives above in interim history  Objective:     Vitals:    03/28/25 1230   BP: 139/64   Pulse: (!) 55   Resp:    Temp:          Physical Exam  Vitals reviewed.   Constitutional:       Appearance: Normal appearance.   HENT:      Head: Normocephalic and atraumatic.      Right Ear: Tympanic membrane normal.      Left Ear: Tympanic membrane normal.      Mouth/Throat:      Mouth: Mucous membranes are moist.      Pharynx: Oropharynx is clear.   Eyes:      Extraocular Movements: Extraocular movements intact.      Conjunctiva/sclera: Conjunctivae normal.      Pupils: Pupils are equal, round, and reactive to light.   Cardiovascular:      Rate and Rhythm: Normal rate and regular rhythm.      Pulses: Normal pulses.      Heart sounds: Normal heart sounds.   Pulmonary:      Breath sounds: Decreased air movement present. Examination of the right-middle field reveals decreased breath sounds. Examination of the right-lower field reveals decreased breath sounds. Decreased breath sounds present.   Abdominal:      General: Abdomen is flat. Bowel sounds are normal. There is no distension.      Palpations: Abdomen is soft.   Musculoskeletal:         General: Normal range of motion.      Cervical back: Normal range of motion and neck supple.   Lymphadenopathy:      Cervical: No cervical adenopathy.      Lower Body: No right inguinal adenopathy. No left inguinal adenopathy.   Skin:     General: Skin is warm and dry.   Neurological:      General: No focal deficit present.      Mental Status: He is alert and oriented to person, place, and time. Mental status is at baseline.      GCS: GCS eye subscore is 4. GCS verbal subscore is 5. GCS motor subscore is 6.    Psychiatric:         Attention and Perception: Attention normal.         Behavior: Behavior normal.         Thought Content: Thought content normal.         Judgment: Judgment normal.         ECOG SCORE            .Lab Review:  Previous labs and images reviewed in epic      Images    February 27, 2025:  CT of the chest, COPD and emphysema, right lower lobe atelectasis, lymphadenopathy in the right hilum as well as the mediastinum and smaller nodes in the left hilum.  The right hilar lymph node measures 2.5 cm other small nodes in the right hilum.  And there is a precarinal lymph node 2.2 x 2.2 cm.  And adenopathy seen in the subcarinal space as well as the mediastinum.    03/13/2025: CT abdomen pelvis, right-sided pleural effusion interstitial fibrosis lungs bilaterally prostatic hypertrophy    03/20/2025: PET-CT: Hypermetabolic consolidation right lung base with mediastinal and bilateral adenopathy and innumerable scattered hypermetabolic bone lesions    03/26/2025: MRI of the brain no intracranial metastases, osseous lytic lesion        pathology  The right pleural effusion showed malignant cells consistent with a poorly differential adenocarcinoma:  Negative for TTF 1, WT1, GATA3.  Suggest an origin of the GI tract clinical pathology is recommended  Assessment/Plan:   Malignant pleural effusion   Pathological mediastinal and hilar adenopathy   Most likely right sided adenocarcinoma of the lung stage IV  Abnormal PET consistent with bone metastases  Fatigue   Weight loss   Anxiety/claustrophobia  Hypercalcemia        This is an 84-year-old male with a clinical appearance of stage IV non-small-cell lung carcinoma adenocarcinoma with malignant pleural effusion, bone Mets hilar and mediastinal adenopathy.  This is more consistent with a lung primary in the clinical picture.      Goals: Palliation   Noncurable  Control pain, decrease shortness of breath, explained he was not a surgical candidate.  Explained utility  of radiation   Explained risk of bone metastases   Express progressive shortness of breath --need for drainage of his pleural effusion  and Risk of death from progressive disease  Options: Chemotherapy, immunotherapy, directed therapy based on NGS testing oral medications   Hospice and supportive care      Recommendations  NGS testing in the blood sent   Agree with secondary biopsy             Status post  MRI with ativan or Ct head if not tolerated--complete staging--no intracranial metastases  Drain Fluid if able  Stop cholesterol /medication  Continue guaifenesin liquid   Stop calcium supplements and Tums  Diabetic diet, defer diabetic meds to PCP  Continue  b12/folate  Remains on BuSpar consider alternative or increasing      If stable in a.m. discharged home with plan follow-up   If progressively short of breath repeat x-ray to make sure he does not need a drain of his pleural effusion, and then plan on keeping till Tuesday to plan chemotherapy on the oncology floor  Monday or tuesday  Carbo/alimta or carb/taxol    Call Oncology on call if questions or if worse  Dr. Macdonald on Call this weekend            Niko Johnson MD             [1]   Social History  Socioeconomic History    Marital status:    Tobacco Use    Smoking status: Never    Smokeless tobacco: Never   Substance and Sexual Activity    Alcohol use: Not Currently    Drug use: Never     Social Drivers of Health     Financial Resource Strain: Low Risk  (3/28/2025)    Overall Financial Resource Strain (CARDIA)     Difficulty of Paying Living Expenses: Not hard at all   Food Insecurity: No Food Insecurity (3/28/2025)    Hunger Vital Sign     Worried About Running Out of Food in the Last Year: Never true     Ran Out of Food in the Last Year: Never true   Transportation Needs: No Transportation Needs (3/27/2025)    PRAPARE - Transportation     Lack of Transportation (Medical): No     Lack of Transportation (Non-Medical): No   Stress: No Stress  Concern Present (3/28/2025)    [employer] [place] [employer] - Occupational Stress Questionnaire     Feeling of Stress : Not at all   Housing Stability: Low Risk  (3/28/2025)    Housing Stability Vital Sign     Unable to Pay for Housing in the Last Year: No     Homeless in the Last Year: No   [2]   Current Facility-Administered Medications:     acetaminophen tablet 1,000 mg, 1,000 mg, Oral, Q6H PRN, Vira Mccurdy, FNP    aluminum-magnesium hydroxide-simethicone 200-200-20 mg/5 mL suspension 30 mL, 30 mL, Oral, QID PRN, Vira Mccurdy, FNP    bisacodyL suppository 10 mg, 10 mg, Rectal, Daily PRN, Vira Mccurdy FNP    busPIRone tablet 5 mg, 5 mg, Oral, BID, Maddie Davis MD, 5 mg at 03/28/25 1035    cyanocobalamin tablet 1,000 mcg, 1,000 mcg, Oral, Daily, Niko Johnson MD, 1,000 mcg at 03/28/25 1035    dextrose 50% injection 12.5 g, 12.5 g, Intravenous, PRN, Vira Mccurdy, FNP    dextrose 50% injection 25 g, 25 g, Intravenous, PRN, Vira Mccurdy, FNP    enoxaparin injection 40 mg, 40 mg, Subcutaneous, Daily, Vira Mccurdy FNP, 40 mg at 03/27/25 1727    folic acid tablet 1 mg, 1 mg, Oral, Daily, Niko Johnson MD, 1 mg at 03/28/25 1034    gabapentin capsule 300 mg, 300 mg, Oral, TID, Maddie Davis MD, 300 mg at 03/28/25 1035    glucagon (human recombinant) injection 1 mg, 1 mg, Intramuscular, PRN, Vira Mccurdy, ALICEP    glucose chewable tablet 16 g, 16 g, Oral, PRN, Vira Mccurdy, ALICEP    glucose chewable tablet 24 g, 24 g, Oral, PRN, Vira Mccurdy, FNP    guaiFENesin 100 mg/5 ml syrup 400 mg, 400 mg, Oral, Q8H, Niko Johnson MD, 400 mg at 03/28/25 0612    insulin aspart U-100 injection 0-10 Units, 0-10 Units, Subcutaneous, QID (AC + HS) PRN, Vira Mccurdy, FNP    LIDOcaine HCL 4% external solution 4 mL, 4 mL, Topical (Top), PRN, Jerrod Harman Jr., MD, FCCP, 4 mL at 03/28/25 0815    lisinopriL tablet 2.5 mg, 2.5 mg, Oral, Daily, Maddie Davis MD, 2.5 mg at  03/28/25 1035    LORazepam tablet 1 mg, 1 mg, Oral, See admin instructions, Niko Johnson MD, 1 mg at 03/26/25 2115    melatonin tablet 6 mg, 6 mg, Oral, Nightly PRN, Vira Mccurdy FNP    naloxone 0.4 mg/mL injection 0.02 mg, 0.02 mg, Intravenous, PRN, Vira Mccurdy FNP    ondansetron injection 4 mg, 4 mg, Intravenous, Q4H PRN, Vira Mccurdy FNP    prochlorperazine injection Soln 5 mg, 5 mg, Intravenous, Q6H PRN, Vira Mccurdy FNP    senna-docusate 8.6-50 mg per tablet 1 tablet, 1 tablet, Oral, BID PRN, Vira Mccurdy FNP, 1 tablet at 03/27/25 1014    SITagliptin phosphate tablet 100 mg, 100 mg, Oral, Daily, Maddie Davis MD, 100 mg at 03/28/25 1035    sodium chloride 0.9% flush 10 mL, 10 mL, Intravenous, PRN, Vira Mccurdy FNP    tiotropium bromide 2.5 mcg/actuation inhaler 2 puff, 2 puff, Inhalation, Daily, Maddie Davis MD, 2 puff at 03/28/25 0612

## 2025-03-28 NOTE — PROGRESS NOTES
[facility] Hospital Medicine Progress Note        Chief Complaint: Inpatient Follow-up     HPI:   84 y.o. male who  has a past medical history of Anxiety, Diabetes mellitus, HLD (hyperlipidemia), Hypertension, Major depressive disorder, single episode, unspecified, and Pneumonia, unspecified organism.. The patient presented to [facility] on 3/26/2025 with a primary complaint of abnormal labs was brought from Oncology Clinic to our hospital.  He is brought in for hypercalcemia. Patient has a history of malignant pleural effusion which was found on recent hospitalization for shortness of breath on 2/28.  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  Since the patient needs to be admitted for hypercalcemia treatment, oncology has spoken with pulmonology to schedule biopsy to confirm diagnosis.     CBC WNL.  CMP WNL except for calcium of 11.2 on arrival.  Continue fluids and repeat H&H at 6:00 p.m. we will consult Oncology.     Interval Hx:  Doing okay this morning.  Resting on the couch.  No current complaints.  NPO for bronchoscopy this morning.    Objective/physical exam:  General appearance: Well-developed, well-nourished male in no apparent distress.  HENT: Atraumatic head. Moist mucous membranes of oral cavity.  Eyes: Normal extraocular movements.   Neck: Supple.   Lungs: Clear to auscultation bilaterally. No wheezing present.   Heart: Regular rate and rhythm. S1 and S2 present with no murmurs/gallop/rub. No pedal edema. No JVD present.   Abdomen: Soft, non-distended, non-tender. No rebound tenderness/guarding. Bowel sounds are normal.   Extremities: No cyanosis, clubbing, or edema.  Skin: No Rash.   Neuro: Motor and sensory exams grossly intact. Good tone. Muscle strength 5/5 in all 4 extremities  Psych/mental status: Appropriate mood and affect.  Responds appropriately to questions.     VITAL SIGNS: 24 HRS MIN & MAX LAST   Temp  Min: 97.6 °F (36.4 °C)  Max: 98.3 °F (36.8 °C) 98.2 °F (36.8 °C)   BP  Min: 123/67  Max: 142/78 (!) 142/78   Pulse  Min: 57  Max: 79  71   Resp  Min: 20  Max: 20 20   SpO2  Min: 91 %  Max: 94 % (!) 93 %       Recent Labs   Lab 03/26/25  1126 03/27/25  1034 03/28/25  0607   WBC 8.61 6.11 5.72   RBC 4.30* 4.07* 3.79*   HGB 12.8* 12.3* 11.2*   HCT 40.4* 37.6* 36.0*   MCV 94.0 92.4 95.0*   MCH 29.8 30.2 29.6   MCHC 31.7* 32.7* 31.1*   RDW 13.2 13.2 13.2    152 141   MPV 9.6 10.0 10.5*       Recent Labs   Lab 03/25/25  1513 03/26/25  1038 03/26/25  1907 03/27/25  1034 03/28/25  0607    144  --  144 142   K 4.2 3.9  --  3.6 3.5    109*  --  107 111*   CO2 25 24  --  30 24   BUN 15.3 18.7  --  13.6 14.6   CREATININE 0.79 0.79  --  0.73 0.70*   CALCIUM 11.4* 11.2* 10.7* 10.1* 10.0   MG  --  2.30  --  2.10  --    ALBUMIN 3.5 3.4  --  3.1*  --    ALKPHOS 88 90  --  83  --    ALT 7 5  --  5  --    AST 14 13  --  11  --    BILITOT 0.5 0.5  --  0.6  --           Microbiology Results (last 7 days)       ** No results found for the last 168 hours. **             Radiology:  MRI Brain W WO Contrast  Narrative: EXAMINATION:  MRI BRAIN W WO CONTRAST    CLINICAL HISTORY:  Malignant neoplasm of unspecified part of unspecified bronchus or lung Non-small cell lung cancer (NSCLC), staging;    TECHNIQUE:  Multiplanar, multisequence MR images of the brain were obtained with and without administration of intravenous contrast.    COMPARISON:  None    FINDINGS:  There is no restricted hemorrhage or edema.  Mild scattered T2/FLAIR hyperintense in subcortical white matter likely represent chronic microvascular ischemic changes.  There is no abnormal parenchymal or leptomeningeal enhancement.    There is no mass effect or midline shift.  The basal cisterns are patent.  There is mild diffuse parenchymal loss.  There is no hydrocephalus or  abnormal extra-axial fluid collection.  The major intracranial voids are patent.  Few scattered enhancing lesions in the calvarium likely represent metastases.  Impression: 1. No evidence of intracranial metastatic disease.  2. Few scattered osseous metastases in the calvarium.    Electronically signed by: Herlinda Snow  Date:    03/27/2025  Time:    09:02        Medications:  Scheduled Meds:   atorvastatin  40 mg Oral Daily    busPIRone  5 mg Oral BID    cyanocobalamin  1,000 mcg Oral Daily    enoxparin  40 mg Subcutaneous Daily    folic acid  1 mg Oral Daily    gabapentin  300 mg Oral TID    guaiFENesin 100 mg/5 ml  400 mg Oral Q8H    lisinopriL  2.5 mg Oral Daily    SITagliptin phosphate  100 mg Oral Daily    tamsulosin  0.4 mg Oral Daily    tiotropium bromide  2 puff Inhalation Daily     Continuous Infusions:  PRN Meds:.  Current Facility-Administered Medications:     acetaminophen, 1,000 mg, Oral, Q6H PRN    aluminum-magnesium hydroxide-simethicone, 30 mL, Oral, QID PRN    bisacodyL, 10 mg, Rectal, Daily PRN    dextrose 50%, 12.5 g, Intravenous, PRN    dextrose 50%, 25 g, Intravenous, PRN    glucagon (human recombinant), 1 mg, Intramuscular, PRN    glucose, 16 g, Oral, PRN    glucose, 24 g, Oral, PRN    insulin aspart U-100, 0-10 Units, Subcutaneous, QID (AC + HS) PRN    LORazepam, 1 mg, Oral, See admin instructions    melatonin, 6 mg, Oral, Nightly PRN    naloxone, 0.02 mg, Intravenous, PRN    ondansetron, 4 mg, Intravenous, Q4H PRN    prochlorperazine, 5 mg, Intravenous, Q6H PRN    senna-docusate 8.6-50 mg, 1 tablet, Oral, BID PRN    sodium chloride 0.9%, 10 mL, Intravenous, PRN    Nutrition:  Nutrition consulted. Most recent weight and BMI monitored-     Measurements:  Wt Readings from Last 1 Encounters:   03/26/25 88.5 kg (195 lb)   Body mass index is 27.98 kg/m².    Patient has been screened and assessed by RD.    Malnutrition Type:  Context: chronic illness  Level: moderate    Malnutrition  Characteristic Summary:  Weight Loss (Malnutrition): greater than 5% in 1 month  Energy Intake (Malnutrition): other (see comments) (Does not meet criteria)  Subcutaneous Fat (Malnutrition): mild depletion  Muscle Mass (Malnutrition): mild depletion  Fluid Accumulation (Malnutrition): other (see comments) (Unable to assess)    Interventions/Recommendations (treatment strategy):           Assessment/Plan:   Hypercalcemia of malignancy  Malignant pleural effusion   Right sided adenocarcinoma of the lung stage IV with bone mets  Anxiety     NPO for bronchoscopy and thoracentesis today.  Calcium has stabilized.  10.0 today.  Appreciate Oncology recommendations.  MRI of the brain performed yesterday was negative for any signs of intracranial disease but did have scattered osseous metastasis in his skull.  Oncology considering possible initiation of chemotherapy either outpatient versus inpatient pending pathology results.     Afternoon update:  Patient went for bronch this morning.  I discussed the case with Oncology.  Will monitor overnight and if stable can potentially go home tomorrow and will follow up in the clinic next week once pathology is back for directed chemotherapy.  If he is not stable for discharge, oncology will consider initiating chemo Monday or Tuesday.     Romaine Cervantes MD   03/28/2025     All diagnosis and differential diagnosis have been reviewed; assessment and plan has been documented; I have personally reviewed the labs and test results that are presently available; I have reviewed the patients medication list; I have reviewed the consulting providers response and recommendations. I have reviewed or attempted to review medical records based upon their availability    All of the patient's questions have been  addressed and answered. Patient's is agreeable to the above stated plan. I will continue to monitor closely and make adjustments to medical management as  needed.  _____________________________________________________________________

## 2025-03-28 NOTE — PROGRESS NOTES
[facility]  Fiberoptic Bronchoscopy with EBUS  Operative Note    SUMMARY     Date of Procedure: 3/28/2025    Procedure:  Fiberoptic bronchoscopy with EBUS guided biopsies 4R and 7R hypermetabolic adenopathy    Performed by: Daysi Harman MD, [facility]    Pre-Procedure Diagnosis:  Malignant right pleural effusion with hypermetabolic lytic bone lesions and mediastinal adenopathy    Post-Procedure Diagnosis: Malignant right pleural effusion with hypermetabolic lytic bone lesions and mediastinal adenopathy    Anesthesia:  LMA and anesthesia as per anesthesiology service        Description of the Findings of the Procedure:   Proper informed consent was obtained and signed by patient.  Anesthesiology service placed LMA and performed anesthesia during procedure.  EBUS bronchoscope was passed per LMA.  Vocal cords appeared normal and freely movable both pre and post bronchoscopy.  Trachea normal ferdinand sharp.  All lung airways appear patent without extrinsic compression or abnormal mucosa.  No endobronchial lesions encountered.  EBUS imaging used to inspect mediastinal structures.  4R/paratracheal node imaged and multiple transtracheal needle biopsies were taken with cores obtained.  Rapid read consistent with epithelial cells and no evidence of atypia or malignancy.  Scope was then advanced to the subcarinal position,, imaging an enlarged right subcarinal 7R node.  Multiple transbronchial biopsies using ultrasound guidance were obtained of this lymph node, rapid read returning consistent with malignancy.  Tissue was sent for final pathology.  Will obtain NGS panel and PDL1.  He tolerated procedure well without hypotension, desaturation, or arrhythmias.        Complications:  None    Estimated Blood Loss (EBL):  Less than 5 cc    OUTCOME: Patient tolerated treatment/procedure well without complication and is now ready for discharge.         Condition:  Good        Disposition: hospital floor bed        Daysi Harman  MD, FCCP  Pulmonary/critical care

## 2025-03-29 VITALS
RESPIRATION RATE: 16 BRPM | HEART RATE: 69 BPM | TEMPERATURE: 98 F | OXYGEN SATURATION: 93 % | HEIGHT: 70 IN | BODY MASS INDEX: 27.92 KG/M2 | WEIGHT: 195 LBS | DIASTOLIC BLOOD PRESSURE: 65 MMHG | SYSTOLIC BLOOD PRESSURE: 125 MMHG

## 2025-03-29 LAB
POCT GLUCOSE: 142 MG/DL (ref 70–110)
POCT GLUCOSE: 146 MG/DL (ref 70–110)

## 2025-03-29 PROCEDURE — 25000003 PHARM REV CODE 250: Performed by: INTERNAL MEDICINE

## 2025-03-29 PROCEDURE — 25000003 PHARM REV CODE 250: Performed by: NURSE PRACTITIONER

## 2025-03-29 PROCEDURE — 27000221 HC OXYGEN, UP TO 24 HOURS

## 2025-03-29 PROCEDURE — 25000003 PHARM REV CODE 250: Performed by: HOSPITALIST

## 2025-03-29 PROCEDURE — 94760 N-INVAS EAR/PLS OXIMETRY 1: CPT

## 2025-03-29 PROCEDURE — 25000003 PHARM REV CODE 250: Performed by: STUDENT IN AN ORGANIZED HEALTH CARE EDUCATION/TRAINING PROGRAM

## 2025-03-29 RX ORDER — CODEINE PHOSPHATE AND GUAIFENESIN 10; 100 MG/5ML; MG/5ML
5 SOLUTION ORAL EVERY 4 HOURS PRN
Qty: 100 ML | Refills: 0 | Status: SHIPPED | OUTPATIENT
Start: 2025-03-29

## 2025-03-29 RX ORDER — CODEINE PHOSPHATE AND GUAIFENESIN 10; 100 MG/5ML; MG/5ML
5 SOLUTION ORAL EVERY 4 HOURS PRN
Status: DISCONTINUED | OUTPATIENT
Start: 2025-03-29 | End: 2025-03-29 | Stop reason: HOSPADM

## 2025-03-29 RX ADMIN — GABAPENTIN 300 MG: 300 CAPSULE ORAL at 09:03

## 2025-03-29 RX ADMIN — GUAIFENESIN 400 MG: 200 SOLUTION ORAL at 05:03

## 2025-03-29 RX ADMIN — LISINOPRIL 2.5 MG: 2.5 TABLET ORAL at 09:03

## 2025-03-29 RX ADMIN — GUAIFENESIN AND CODEINE PHOSPHATE 5 ML: 100; 10 SOLUTION ORAL at 10:03

## 2025-03-29 RX ADMIN — TIOTROPIUM BROMIDE INHALATION SPRAY 2 PUFF: 3.12 SPRAY, METERED RESPIRATORY (INHALATION) at 09:03

## 2025-03-29 RX ADMIN — BUSPIRONE HYDROCHLORIDE 5 MG: 5 TABLET ORAL at 09:03

## 2025-03-29 RX ADMIN — FOLIC ACID 1 MG: 1 TABLET ORAL at 09:03

## 2025-03-29 RX ADMIN — GABAPENTIN 300 MG: 300 CAPSULE ORAL at 02:03

## 2025-03-29 RX ADMIN — SITAGLIPTIN 100 MG: 100 TABLET, FILM COATED ORAL at 09:03

## 2025-03-29 RX ADMIN — GUAIFENESIN 400 MG: 200 SOLUTION ORAL at 02:03

## 2025-03-29 RX ADMIN — SENNOSIDES AND DOCUSATE SODIUM 1 TABLET: 50; 8.6 TABLET ORAL at 02:03

## 2025-03-29 RX ADMIN — CYANOCOBALAMIN TAB 500 MCG 1000 MCG: 500 TAB at 09:03

## 2025-03-29 NOTE — NURSING
IV was D/C. Education and handouts were provided. Pt questions were answered. No concerns noted. Going home via personal vehicle.

## 2025-03-29 NOTE — CONSULTS
[facility] - [address] [facility]  Pulmonary Critical Care Note    Patient Name: Juanjose Chawla  MRN: [id number]  Admission Date: 3/26/2025  Hospital Length of Stay: 2 days  Code Status: Full Code  Attending Provider: Romaine Cervantes MD  Primary Care Provider: Sadiq Hartmann MD     Subjective:     HPI:   This is an 84-year-old male with a history of diabetes, hypertension, hyperlipidemia, and anxiety who was evaluated in February 2025 for pleural effusion. Dr Harman performed a right-sided thoracentesis on 03/19/2025, pathology revealed poorly differentiated adenocarcinoma, strong nuclear positivity for CDX2 is suggestive of an origin from the GI tract although can also be found in certain pulmonary adenocarcinomas.  He was sent to the emergency department from the oncology clinic on 03/26/2025 for hypercalcemia.  He has also been scheduled for EBUS.     Hospital Course/Significant events:  3/28/2025: fiberoptic bronchoscopy with EBUS guided biopsies 4R and 7R hypermetabolic adenopathy    24 Hour Interval History:  S/P EBUS 3/28 and tolerated without event. Sitting up on bathroom toilet. Rested well overnight. No acute events overnight. No c/o other than mild throat hoarseness/scratchiness. No cough or sputum production reported. Vitals stable with no recorded fever. Placed on 2L/NC overnight, currently on RA with Spo1 >92%. No new labs today. 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     Past Medical History:   Diagnosis Date    Anxiety     Diabetes mellitus     HLD (hyperlipidemia)     Hypertension     Major depressive disorder, single episode, unspecified     Pneumonia, unspecified organism      Past Surgical History:   Procedure Laterality Date    Cardiac stent      HERNIA REPAIR N/A      Social History[1]    Current Outpatient Medications   Medication Instructions    albuterol (ACCUNEB) 0.63 mg,  Every 6 hours PRN    amLODIPine (NORVASC) 5 mg, Daily    aspirin (ECOTRIN) 81 mg, Daily    benzonatate (TESSALON) 100 mg, 3 times daily PRN    busPIRone (BUSPAR) 5 mg, 2 times daily    ergocalciferol (VITAMIN D2) 50,000 Units, Every 7 days    famotidine (PEPCID) 20 mg, Daily    gabapentin (NEURONTIN) 300 mg, 3 times daily    glimepiride (AMARYL) 1 mg, Before breakfast    lisinopriL (PRINIVIL,ZESTRIL) 2.5 mg, Daily    LORazepam (ATIVAN) 1 MG tablet Take  60 min before MRI, May repeat x 1 if needed    metFORMIN (GLUCOPHAGE) 2,000 mg, Daily    tamsulosin (FLOMAX) 0.4 mg, Daily    umeclidinium (INCRUSE ELLIPTA) 62.5 mcg, Inhalation, Daily, Controller     Review of patient's allergies indicates:  No Known Allergies     Current Inpatient Medications   busPIRone  5 mg Oral BID    cyanocobalamin  1,000 mcg Oral Daily    enoxparin  40 mg Subcutaneous Daily    folic acid  1 mg Oral Daily    gabapentin  300 mg Oral TID    guaiFENesin 100 mg/5 ml  400 mg Oral Q8H    lisinopriL  2.5 mg Oral Daily    SITagliptin phosphate  100 mg Oral Daily    tiotropium bromide  2 puff Inhalation Daily     Review of Systems   Respiratory:  Positive for shortness of breath.    Psychiatric/Behavioral:  The patient is nervous/anxious.       Objective:       Intake/Output Summary (Last 24 hours) at 3/29/2025 0812  Last data filed at 3/28/2025 0904  Gross per 24 hour   Intake 400 ml   Output --   Net 400 ml     Vital Signs (Most Recent):  Temp: 98.1 °F (36.7 °C) (03/29/25 0549)  Pulse: (!) 55 (03/29/25 0549)  Resp: 16 (03/29/25 0020)  BP: (!) 125/57 (03/29/25 0549)  SpO2: (!) 94 % (03/29/25 0549)  Body mass index is 27.98 kg/m².  Weight: 88.5 kg (195 lb) Vital Signs (24h Range):  Temp:  [96.8 °F (36 °C)-98.3 °F (36.8 °C)] 98.1 °F (36.7 °C)  Pulse:  [] 55  Resp:  [16-18] 16  SpO2:  [89 %-96 %] 94 %  BP: (106-171)/(53-82) 125/57     Physical Exam  Vitals reviewed.   Constitutional:       Appearance: Normal appearance.   HENT:      Head:  Normocephalic and atraumatic.   Cardiovascular:      Rate and Rhythm: Normal rate.      Heart sounds: Normal heart sounds.   Pulmonary:      Effort: Pulmonary effort is normal.      Breath sounds: Normal breath sounds.   Abdominal:      Palpations: Abdomen is soft.   Musculoskeletal:      Cervical back: Neck supple.   Neurological:      General: No focal deficit present.      Mental Status: He is alert and oriented to person, place, and time.       Lines/Drains/Airways       Peripheral Intravenous Line  Duration                  Peripheral IV - Single Lumen 03/26/25 1043 20 G Posterior;Right Forearm 2 days                  Significant Labs:    Lab Results   Component Value Date    WBC 5.72 03/28/2025    HGB 11.2 (L) 03/28/2025    HCT 36.0 (L) 03/28/2025    MCV 95.0 (H) 03/28/2025     03/28/2025     BMP  Lab Results   Component Value Date     03/28/2025    K 3.5 03/28/2025    CO2 24 03/28/2025    BUN 14.6 03/28/2025    CREATININE 0.70 (L) 03/28/2025    CALCIUM 10.0 03/28/2025    AGAP 7.0 03/28/2025    EGFRNONAA >60 06/13/2022     Significant Imaging:  I have reviewed the pertinent imaging within the past 24 hours.    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, path with adenocarcinoma  Lymphadenopathy, s/p EBUS 3/28 for addition path to determine primary  Hypercalcemia    Plan  S/P EBUS; follow up pathology results  Stable from pulmonary standpoint to discharge home  Was placed on NC O2 overnight, no on room air with Spo2 >92%  Follow up with oncology outpatient     MURPHY Pollock  Pulmonary Critical Care Medicine  [facility] [address] [facility]  DOS: 03/29/2025          [1]   Social History  Socioeconomic History    Marital status:    Tobacco Use    Smoking status: Never    Smokeless tobacco: Never   Substance and Sexual Activity    Alcohol use: Not Currently    Drug use: Never     Social Drivers of Health     Financial Resource  Strain: Low Risk  (3/28/2025)    Overall Financial Resource Strain (CARDIA)     Difficulty of Paying Living Expenses: Not hard at all   Food Insecurity: No Food Insecurity (3/28/2025)    Hunger Vital Sign     Worried About Running Out of Food in the Last Year: Never true     Ran Out of Food in the Last Year: Never true   Transportation Needs: No Transportation Needs (3/27/2025)    PRAPARE - Transportation     Lack of Transportation (Medical): No     Lack of Transportation (Non-Medical): No   Stress: No Stress Concern Present (3/28/2025)    [employer] [place] [employer] - Occupational Stress Questionnaire     Feeling of Stress : Not at all   Housing Stability: Low Risk  (3/28/2025)    Housing Stability Vital Sign     Unable to Pay for Housing in the Last Year: No     Homeless in the Last Year: No

## 2025-03-29 NOTE — PLAN OF CARE
Problem: Adult Inpatient Plan of Care  Goal: Plan of Care Review  3/29/2025 1412 by Eli Floyd RN  Outcome: Met  3/29/2025 1147 by Eli Floyd RN  Outcome: Progressing  Goal: Patient-Specific Goal (Individualized)  3/29/2025 1412 by Eli Floyd RN  Outcome: Met  3/29/2025 1147 by Eli Floyd RN  Outcome: Progressing  Goal: Absence of Hospital-Acquired Illness or Injury  3/29/2025 1412 by Eli Floyd RN  Outcome: Met  3/29/2025 1147 by Eli Floyd RN  Outcome: Progressing  Goal: Optimal Comfort and Wellbeing  3/29/2025 1412 by Eli Floyd RN  Outcome: Met  3/29/2025 1147 by Eli Floyd RN  Outcome: Progressing  Goal: Readiness for Transition of Care  3/29/2025 1412 by Eli Floyd RN  Outcome: Met  3/29/2025 1147 by Eli Floyd RN  Outcome: Progressing     Problem: Fall Injury Risk  Goal: Absence of Fall and Fall-Related Injury  3/29/2025 1412 by Eli Floyd RN  Outcome: Met  3/29/2025 1147 by Eli Floyd RN  Outcome: Progressing

## 2025-03-29 NOTE — DISCHARGE SUMMARY
[facility]  Hospital Medicine Discharge Summary    Admit Date: 3/26/2025  Discharge Date and Time: 3/29/68190:53 PM  Admitting Physician: Hospitalist team   Discharging Physician: Romaine Cervantes MD.  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 male who  has a past medical history of Anxiety, Diabetes mellitus, HLD (hyperlipidemia), Hypertension, Major depressive disorder, single episode, unspecified, and Pneumonia, unspecified organism.. The patient presented to [facility] on 3/26/2025 with a primary complaint of abnormal labs was brought from Oncology Clinic to our hospital.  He is brought in for hypercalcemia. Patient has a history of malignant pleural effusion which was found on recent hospitalization for shortness of breath on 2/28.  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  Since the patient needs to be admitted for hypercalcemia treatment, oncology has spoken with pulmonology to schedule biopsy to confirm diagnosis.     CBC WNL.  CMP WNL except for calcium of 11.2 on arrival.  Continue fluids and repeat H&H at 6:00 p.m. we will consult Oncology.  He was taken for bronchoscopy with biopsy on 3/28 and returned to the floor in stable condition.       Patient doing well this morning.  He is on room air sitting on a couch.  He is significantly hoarse after his procedure yesterday and has a mildly productive cough.  Discussed that get him some cough medicine.  I discussed with him my conversation with Oncology yesterday.  If he is doing okay and stable could potentially go home later today or tomorrow and follow up in Oncology Clinic for pathology  "results and initiation of chemotherapy.  If he has started to decline chemotherapy can be started in the hospital setting but this would not be ideal. Patient reports that he is feeling well.  He is ambulating on room air.  OK to DC home.  Will send a prescription for some cough syrup.  Oncology will call the patient once biopsy results are back to plan for chemo.        Vitals:  Blood pressure 125/65, pulse 69, temperature 97.5 °F (36.4 °C), temperature source Oral, resp. rate 16, height 5' 10" (1.778 m), weight 88.5 kg (195 lb), SpO2 (!) 93%..    Physical Exam:  Awake, Alert, Oriented x 3, No new focal Neurologic deficit, Normal Affect  NC/AT, PERRLA, EOMI  Supple neck, no JVD, No cervical lymphadenopathy  Symmetrical chest, Good air entry bilaterally. No rhonchi, wheezes, crackles appreciated  RRR, No gallop, rub or murmur  +ve Bowel sounds x4, Abd soft and non tender, no rebound, guarding or rigidity  No Cyanosis, cludding or edema, No new rash or bruises    Procedures Performed: No admission procedures for hospital encounter.     Significant Diagnostic Studies: See Full reports for all details  Admission on 03/26/2025   Component Date Value    Sodium 03/26/2025 144     Potassium 03/26/2025 3.9     Chloride 03/26/2025 109 (H)     CO2 03/26/2025 24     Glucose 03/26/2025 83     Blood Urea Nitrogen 03/26/2025 18.7     Creatinine 03/26/2025 0.79     Calcium 03/26/2025 11.2 (H)     Protein Total 03/26/2025 7.5     Albumin 03/26/2025 3.4     Globulin 03/26/2025 4.1 (H)     Albumin/Globulin Ratio 03/26/2025 0.8 (L)     Bilirubin Total 03/26/2025 0.5     ALP 03/26/2025 90     ALT 03/26/2025 5     AST 03/26/2025 13     eGFR 03/26/2025 >60     Anion Gap 03/26/2025 11.0     BUN/Creatinine Ratio 03/26/2025 24     Magnesium Level 03/26/2025 2.30     Troponin-I 03/26/2025 0.016     TSH 03/26/2025 0.482     PT 03/26/2025 13.1     INR 03/26/2025 1.0     QRS Duration 03/26/2025 126     [facility] QTC Calculation 03/26/2025 474     " Color, UA 03/26/2025 Light-Yellow     Appearance, UA 03/26/2025 Clear     Specific Gravity, UA 03/26/2025 1.026     pH, UA 03/26/2025 5.0     Protein, UA 03/26/2025 Negative     Glucose, UA 03/26/2025 4+ (A)     Ketones, UA 03/26/2025 Negative     Blood, UA 03/26/2025 1+ (A)     Bilirubin, UA 03/26/2025 Negative     Urobilinogen, UA 03/26/2025 Normal     Nitrites, UA 03/26/2025 Negative     Leukocyte Esterase, UA 03/26/2025 Negative     RBC, UA 03/26/2025 0-5     WBC, UA 03/26/2025 6-10 (A)     Bacteria, UA 03/26/2025 None Seen     Squamous Epithelial Cell* 03/26/2025 Trace     WBC 03/26/2025 8.61     RBC 03/26/2025 4.30 (L)     Hgb 03/26/2025 12.8 (L)     Hct 03/26/2025 40.4 (L)     MCV 03/26/2025 94.0     MCH 03/26/2025 29.8     MCHC 03/26/2025 31.7 (L)     RDW 03/26/2025 13.2     Platelet 03/26/2025 165     MPV 03/26/2025 9.6     Neut % 03/26/2025 65.4     Lymph % 03/26/2025 22.5     Mono % 03/26/2025 9.6     Eos % 03/26/2025 1.7     Basophil % 03/26/2025 0.3     Imm Grans % 03/26/2025 0.5     Neut # 03/26/2025 5.62     Lymph # 03/26/2025 1.94     Mono # 03/26/2025 0.83     Eos # 03/26/2025 0.15     Baso # 03/26/2025 0.03     Imm Gran # 03/26/2025 0.04     NRBC% 03/26/2025 0.0     Phosphorus Level 03/26/2025 2.7     Calcium 03/26/2025 10.7 (H)     POCT Glucose 03/26/2025 142 (H)     Sodium 03/27/2025 144     Potassium 03/27/2025 3.6     Chloride 03/27/2025 107     CO2 03/27/2025 30     Glucose 03/27/2025 130 (H)     Blood Urea Nitrogen 03/27/2025 13.6     Creatinine 03/27/2025 0.73     Calcium 03/27/2025 10.1 (H)     Protein Total 03/27/2025 6.8     Albumin 03/27/2025 3.1 (L)     Globulin 03/27/2025 3.7 (H)     Albumin/Globulin Ratio 03/27/2025 0.8 (L)     Bilirubin Total 03/27/2025 0.6     ALP 03/27/2025 83     ALT 03/27/2025 5     AST 03/27/2025 11     eGFR 03/27/2025 >60     Anion Gap 03/27/2025 7.0     BUN/Creatinine Ratio 03/27/2025 19     Magnesium Level 03/27/2025 2.10     WBC 03/27/2025 6.11     RBC  03/27/2025 4.07 (L)     Hgb 03/27/2025 12.3 (L)     Hct 03/27/2025 37.6 (L)     MCV 03/27/2025 92.4     MCH 03/27/2025 30.2     MCHC 03/27/2025 32.7 (L)     RDW 03/27/2025 13.2     Platelet 03/27/2025 152     MPV 03/27/2025 10.0     Neut % 03/27/2025 61.8     Lymph % 03/27/2025 24.7     Mono % 03/27/2025 9.8     Eos % 03/27/2025 2.5     Basophil % 03/27/2025 0.5     Imm Grans % 03/27/2025 0.7     Neut # 03/27/2025 3.78     Lymph # 03/27/2025 1.51     Mono # 03/27/2025 0.60     Eos # 03/27/2025 0.15     Baso # 03/27/2025 0.03     Imm Gran # 03/27/2025 0.04     NRBC% 03/27/2025 0.0     POCT Glucose 03/27/2025 144 (H)     POCT Glucose 03/27/2025 120 (H)     POCT Glucose 03/27/2025 107     Sodium 03/28/2025 142     Potassium 03/28/2025 3.5     Chloride 03/28/2025 111 (H)     CO2 03/28/2025 24     Glucose 03/28/2025 104     Blood Urea Nitrogen 03/28/2025 14.6     Creatinine 03/28/2025 0.70 (L)     BUN/Creatinine Ratio 03/28/2025 21     Calcium 03/28/2025 10.0     Anion Gap 03/28/2025 7.0     eGFR 03/28/2025 >60     WBC 03/28/2025 5.72     RBC 03/28/2025 3.79 (L)     Hgb 03/28/2025 11.2 (L)     Hct 03/28/2025 36.0 (L)     MCV 03/28/2025 95.0 (H)     MCH 03/28/2025 29.6     MCHC 03/28/2025 31.1 (L)     RDW 03/28/2025 13.2     Platelet 03/28/2025 141     MPV 03/28/2025 10.5 (H)     Neut % 03/28/2025 54.5     Lymph % 03/28/2025 27.6     Mono % 03/28/2025 13.3     Eos % 03/28/2025 3.0     Basophil % 03/28/2025 0.7     Imm Grans % 03/28/2025 0.9     Neut # 03/28/2025 3.12     Lymph # 03/28/2025 1.58     Mono # 03/28/2025 0.76     Eos # 03/28/2025 0.17     Baso # 03/28/2025 0.04     Imm Gran # 03/28/2025 0.05 (H)     NRBC% 03/28/2025 0.0     POCT Glucose 03/28/2025 113 (H)     POCT Glucose 03/28/2025 117 (H)     POCT Glucose 03/28/2025 238 (H)     POCT Glucose 03/28/2025 224 (H)     POCT Glucose 03/28/2025 140 (H)     POCT Glucose 03/29/2025 146 (H)     POCT Glucose 03/29/2025 142 (H)         Microbiology Results (last 7 days)        ** No results found for the last 168 hours. **             MRI Brain W WO Contrast  Result Date: 3/27/2025  EXAMINATION: MRI BRAIN W WO CONTRAST CLINICAL HISTORY: Malignant neoplasm of unspecified part of unspecified bronchus or lung Non-small cell lung cancer (NSCLC), staging; TECHNIQUE: Multiplanar, multisequence MR images of the brain were obtained with and without administration of intravenous contrast. COMPARISON: None FINDINGS: There is no restricted hemorrhage or edema.  Mild scattered T2/FLAIR hyperintense in subcortical white matter likely represent chronic microvascular ischemic changes.  There is no abnormal parenchymal or leptomeningeal enhancement. There is no mass effect or midline shift.  The basal cisterns are patent.  There is mild diffuse parenchymal loss.  There is no hydrocephalus or abnormal extra-axial fluid collection.  The major intracranial voids are patent.  Few scattered enhancing lesions in the calvarium likely represent metastases.     1. No evidence of intracranial metastatic disease. 2. Few scattered osseous metastases in the calvarium. Electronically signed by: Herlinda Snow Date:    03/27/2025 Time:    09:02    X-Ray Chest AP Portable  Result Date: 3/26/2025  EXAMINATION: XR CHEST AP PORTABLE CLINICAL HISTORY: Other disorders of electrolyte and fluid balance, not elsewhere classified TECHNIQUE: Single frontal view of the chest was performed. COMPARISON: March 19, 2025 FINDINGS: Examination reveals cardiomediastinal silhouette to be unchanged as compared with the previous exam. Persistent blunting of the right costophrenic angle indicating the presence of a right-sided pleural effusion. Persistent increase interstitial markings throughout both lung fields more so at the right perihilar region and right base and left infrahilar region and left base these, however, are similar to previous exam of March 19, 2025. No new focal consolidative changes     No significant interval  change as compared with the previous exam Electronically signed by: Hai Wan Date:    03/26/2025 Time:    12:12    NM PET CT FDG Skull Base to Mid Thigh  Result Date: 3/24/2025  EXAMINATION: NM PET CT FDG SKULL BASE TO MID THIGH CLINICAL HISTORY: Malignant pleural effusion; Malignant pleural effusion TECHNIQUE: Fasting blood glucose level was 122 mg/dl. 10.3 mCi of F18-FDG was given intravenously with subsequent PET imaging performed from the skull base through the upper thighs. Corresponding helical CT images were acquired for anatomic correlation and attenuation correction. Dose length product is 716 mGycm. COMPARISON: CT chest dated 02/27/2025, CT abdomen pelvis dated 03/13/2025 FINDINGS: There is no hypermetabolic cervical lymphadenopathy. There is hypermetabolic mediastinal and bilateral hilar lymphadenopathy.  A right precarinal lymph node measures 1.6 cm in short axis with a max SUV of 4.9 (series 3, image 76).  Right hilar lymph node measures 1.5 cm with a max SUV of 5.1 (series 3, image 77).  There is a small right pleural effusion, decreased in size.  Consolidative opacities in the right lung base have a max SUV of 8.9 (series 3, image 93). There is no hypermetabolic lymphadenopathy in the abdomen or pelvis.  There is physiologic uptake in the solid organs, urinary system and bowel. There are innumerable scattered hypermetabolic bone lesions.  A lesion in the left ilium has a max SUV of 12.8 (fused axial image 211).  A lesion in the L5 vertebral body has a max SUV of 12.8 (fused axial image 191).  A lesion in the C2 vertebral body has a max SUV of 9 (fused axial image 13).     1. Hypermetabolic consolidation in the right lung base with mediastinal and bilateral hilar lymphadenopathy. 2. Innumerable scattered hypermetabolic bone lesions. Electronically signed by: Herlinda Snow Date:    03/24/2025 Time:    14:44    X-Ray Chest AP Portable  Result Date: 3/20/2025  EXAMINATION: XR CHEST AP PORTABLE  CLINICAL HISTORY: post right thoracentesis; FINDINGS: Comparison is made to the frontal chest dated 02/28/2025.  There is no pneumothorax.  Blunting of the costophrenic angles is consistent with trace pleural effusions.  There are centrally predominant mixed airspace and interstitial opacities which probably reflect pulmonary edema.  The cardiac silhouette is enlarged but unchanged.     1. No pneumothorax following right thoracentesis. 2. Trace pleural effusions. 3. Central predominant parenchymal opacities probably reflect edema, less likely pneumonia. Electronically signed by: Clay Benitez MD Date:    03/20/2025 Time:    21:41    US Non Rad Thoracentesis with Imaging, Aspiration Only  Result Date: 3/19/2025  See Provider Notes for results. IMPRESSION: Please see provider notes for interpretation This procedure was auto-finalized by: Virtual Radiologist    CT Abdomen Pelvis With IV Contrast Routine Oral Contrast  Result Date: 3/17/2025  EXAMINATION: CT ABDOMEN PELVIS WITH IV CONTRAST CLINICAL HISTORY: Malignant pleural effusion; Malignant pleural effusion TECHNIQUE: Low dose axial images, sagittal and coronal reformations were obtained from the lung bases to the pubic symphysis following the IV administration of contrast. Automatic exposure control (AEC) is utilized to reduce patient radiation exposure. COMPARISON: None. FINDINGS: There is a right-sided pleural effusion.  There is interstitial fibrosis seen in the lungs bilaterally and developing consolidation in the right lobe.  Similar changes were seen on 02/27/2025 exam.  A. The liver appears normal.  No liver mass or lesion is seen.  Portal and hepatic veins appear normal. The gallbladder appears normal.  No obvious gallstones are seen.  No biliary dilatation is seen.  No pericholecystic fluid is seen. The pancreas appears normal.  No pancreatic mass or lesion is seen. The spleen shows no acute abnormality. The adrenal glands appear normal.  No adrenal  nodule is seen. The kidneys appear normal.  No hydronephrosis is seen.  No hydroureter is seen.  No nephrolithiasis is seen.  No obvious ureteral stones are seen. Urinary bladder appears grossly unremarkable.  The prostate is enlarged in size. Calcified plaque is seen in the abdominal aorta and mesenteric vessels. No colitis is seen.  No diverticulitis is seen.  No obvious colonic mass or lesion is seen. No free air is seen.  No free fluid is seen.     Right-sided pleural effusion and developing consolidation in the right lower lobe Interstitial fibrosis in the lungs bilaterally Prostatic hypertrophy Electronically signed by: Isaiah Bruno Date:    03/17/2025 Time:    10:37    X-Ray Chest AP Portable  Result Date: 2/28/2025  EXAMINATION: XR CHEST AP PORTABLE CLINICAL HISTORY: Pleural effusion.Post right thoracentesis; COMPARISON: CT yesterday FINDINGS: Frontal view of the chest was obtained. Heart is not significantly enlarged.  There is aortic atherosclerosis.  There are mild right basilar opacities.  No sizable pleural effusion.  No convincing pneumothorax.     Mild right basilar opacities.  No convincing pneumothorax. Electronically signed by: Edwin Villa Date:    02/28/2025 Time:    14:24  - pulls last radiology orders        Medication List        START taking these medications      guaiFENesin-codeine 100-10 mg/5 ml  mg/5 mL syrup  Commonly known as: TUSSI-ORGANIDIN NR  Take 5 mLs by mouth every 4 (four) hours as needed for Cough or Congestion.            CONTINUE taking these medications      albuterol 0.63 mg/3 mL Nebu  Commonly known as: ACCUNEB     amLODIPine 5 MG tablet  Commonly known as: NORVASC     aspirin 81 MG EC tablet  Commonly known as: ECOTRIN     busPIRone 5 MG Tab  Commonly known as: BUSPAR     famotidine 20 MG tablet  Commonly known as: PEPCID     gabapentin 300 MG capsule  Commonly known as: NEURONTIN     glimepiride 1 MG tablet  Commonly known as: AMARYL     lisinopriL 2.5 MG  tablet  Commonly known as: PRINIVIL,ZESTRIL     LORazepam 1 MG tablet  Commonly known as: ATIVAN  Take  60 min before MRI, May repeat x 1 if needed     metFORMIN 1000 MG tablet  Commonly known as: GLUCOPHAGE     tamsulosin 0.4 mg Cap  Commonly known as: FLOMAX     umeclidinium 62.5 mcg/actuation inhalation capsule  Commonly known as: INCRUSE ELLIPTA  Inhale 62.5 mcg into the lungs once daily. Controller            STOP taking these medications      benzonatate 100 MG capsule  Commonly known as: TESSALON     VITAMIN D2 50,000 unit Cap  Generic drug: ergocalciferol               Where to Get Your Medications        These medications were sent to [facility] [address] [address]      Phone: [phone]   guaiFENesin-codeine 100-10 mg/5 ml  mg/5 mL syrup          Explained in detail to the patient about the discharge plan, medications, and follow-up visits. Pt understands and agrees with the treatment plan  Discharged Condition: stable  Diet: regular  Disposition: home    Medications Per [place] med rec  Activities as tolerated  Follow up with your PCP in 2 wks   For further questions contact hospitalist office    Discharge time 33 minutes    For worsening symptoms, chest pain, shortness of breath, increased abdominal pain, high grade fever, stroke or stroke like symptoms, immediately go to the nearest Emergency Room or call 911 as soon as possible.        Romaine Barrios M.D, on 3/29/2025. at 1:53 PM.

## 2025-03-29 NOTE — PROGRESS NOTES
[facility]  Hospital Medicine Progress Note        Chief Complaint: Inpatient Follow-up     HPI:   84 y.o. male who  has a past medical history of Anxiety, Diabetes mellitus, HLD (hyperlipidemia), Hypertension, Major depressive disorder, single episode, unspecified, and Pneumonia, unspecified organism.. The patient presented to [facility] on 3/26/2025 with a primary complaint of abnormal labs was brought from Oncology Clinic to our hospital.  He is brought in for hypercalcemia. Patient has a history of malignant pleural effusion which was found on recent hospitalization for shortness of breath on 2/28.  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  Since the patient needs to be admitted for hypercalcemia treatment, oncology has spoken with pulmonology to schedule biopsy to confirm diagnosis.     CBC WNL.  CMP WNL except for calcium of 11.2 on arrival.  Continue fluids and repeat H&H at 6:00 p.m. we will consult Oncology.     Interval Hx:  Patient doing well this morning.  He is on room air sitting on a couch.  He is significantly hoarse after his procedure yesterday and has a mildly productive cough.  Discussed that get him some cough medicine.  I discussed with him my conversation with Oncology yesterday.  If he is doing okay and stable could potentially go home later today or tomorrow and follow up in Oncology Clinic for pathology results and initiation of chemotherapy.  If he has started to decline chemotherapy can be started in the hospital setting but this would not be ideal.     Objective/physical exam:  General appearance: Well-developed, well-nourished male in no apparent distress.  HENT: Atraumatic head. Moist mucous membranes of oral cavity.  Eyes: Normal extraocular movements.   Neck: Supple.   Lungs: Clear to auscultation bilaterally. No  wheezing present.   Heart: Regular rate and rhythm. S1 and S2 present with no murmurs/gallop/rub. No pedal edema. No JVD present.   Abdomen: Soft, non-distended, non-tender. No rebound tenderness/guarding. Bowel sounds are normal.   Extremities: No cyanosis, clubbing, or edema.  Skin: No Rash.   Neuro: Motor and sensory exams grossly intact. Good tone. Muscle strength 5/5 in all 4 extremities  Psych/mental status: Appropriate mood and affect. Responds appropriately to questions.    VITAL SIGNS: 24 HRS MIN & MAX LAST   Temp  Min: 96.8 °F (36 °C)  Max: 98.3 °F (36.8 °C) 98.1 °F (36.7 °C)   BP  Min: 106/57  Max: 171/80 (!) 125/57   Pulse  Min: 55  Max: 108  (!) 55   Resp  Min: 16  Max: 18 16   SpO2  Min: 89 %  Max: 96 % (!) 94 %       Recent Labs   Lab 03/26/25  1126 03/27/25  1034 03/28/25  0607   WBC 8.61 6.11 5.72   RBC 4.30* 4.07* 3.79*   HGB 12.8* 12.3* 11.2*   HCT 40.4* 37.6* 36.0*   MCV 94.0 92.4 95.0*   MCH 29.8 30.2 29.6   MCHC 31.7* 32.7* 31.1*   RDW 13.2 13.2 13.2    152 141   MPV 9.6 10.0 10.5*       Recent Labs   Lab 03/25/25  1513 03/26/25  1038 03/26/25  1907 03/27/25  1034 03/28/25  0607    144  --  144 142   K 4.2 3.9  --  3.6 3.5    109*  --  107 111*   CO2 25 24  --  30 24   BUN 15.3 18.7  --  13.6 14.6   CREATININE 0.79 0.79  --  0.73 0.70*   CALCIUM 11.4* 11.2* 10.7* 10.1* 10.0   MG  --  2.30  --  2.10  --    ALBUMIN 3.5 3.4  --  3.1*  --    ALKPHOS 88 90  --  83  --    ALT 7 5  --  5  --    AST 14 13  --  11  --    BILITOT 0.5 0.5  --  0.6  --           Microbiology Results (last 7 days)       ** No results found for the last 168 hours. **             Radiology:  MRI Brain W WO Contrast  Narrative: EXAMINATION:  MRI BRAIN W WO CONTRAST    CLINICAL HISTORY:  Malignant neoplasm of unspecified part of unspecified bronchus or lung Non-small cell lung cancer (NSCLC), staging;    TECHNIQUE:  Multiplanar, multisequence MR images of the brain were obtained with and without  administration of intravenous contrast.    COMPARISON:  None    FINDINGS:  There is no restricted hemorrhage or edema.  Mild scattered T2/FLAIR hyperintense in subcortical white matter likely represent chronic microvascular ischemic changes.  There is no abnormal parenchymal or leptomeningeal enhancement.    There is no mass effect or midline shift.  The basal cisterns are patent.  There is mild diffuse parenchymal loss.  There is no hydrocephalus or abnormal extra-axial fluid collection.  The major intracranial voids are patent.  Few scattered enhancing lesions in the calvarium likely represent metastases.  Impression: 1. No evidence of intracranial metastatic disease.  2. Few scattered osseous metastases in the calvarium.    Electronically signed by: Herlinda Snow  Date:    03/27/2025  Time:    09:02        Medications:  Scheduled Meds:   busPIRone  5 mg Oral BID    cyanocobalamin  1,000 mcg Oral Daily    enoxparin  40 mg Subcutaneous Daily    folic acid  1 mg Oral Daily    gabapentin  300 mg Oral TID    guaiFENesin 100 mg/5 ml  400 mg Oral Q8H    lisinopriL  2.5 mg Oral Daily    SITagliptin phosphate  100 mg Oral Daily    tiotropium bromide  2 puff Inhalation Daily     Continuous Infusions:  PRN Meds:.  Current Facility-Administered Medications:     acetaminophen, 1,000 mg, Oral, Q6H PRN    aluminum-magnesium hydroxide-simethicone, 30 mL, Oral, QID PRN    bisacodyL, 10 mg, Rectal, Daily PRN    dextrose 50%, 12.5 g, Intravenous, PRN    dextrose 50%, 25 g, Intravenous, PRN    glucagon (human recombinant), 1 mg, Intramuscular, PRN    glucose, 16 g, Oral, PRN    glucose, 24 g, Oral, PRN    insulin aspart U-100, 0-10 Units, Subcutaneous, QID (AC + HS) PRN    LIDOcaine HCL 4%, 4 mL, Topical (Top), PRN    LORazepam, 1 mg, Oral, See admin instructions    melatonin, 6 mg, Oral, Nightly PRN    naloxone, 0.02 mg, Intravenous, PRN    ondansetron, 4 mg, Intravenous, Q4H PRN    prochlorperazine, 5 mg, Intravenous, Q6H PRN     senna-docusate 8.6-50 mg, 1 tablet, Oral, BID PRN    sodium chloride 0.9%, 10 mL, Intravenous, PRN    Nutrition:  Nutrition consulted. Most recent weight and BMI monitored-     Measurements:  Wt Readings from Last 1 Encounters:   03/26/25 88.5 kg (195 lb)   Body mass index is 27.98 kg/m².    Patient has been screened and assessed by RD.    Malnutrition Type:  Context: chronic illness  Level: moderate    Malnutrition Characteristic Summary:  Weight Loss (Malnutrition): greater than 5% in 1 month  Energy Intake (Malnutrition): other (see comments) (Does not meet criteria)  Subcutaneous Fat (Malnutrition): mild depletion  Muscle Mass (Malnutrition): mild depletion  Fluid Accumulation (Malnutrition): other (see comments) (Unable to assess)    Interventions/Recommendations (treatment strategy):           Assessment/Plan:   Hypercalcemia of malignancy, resolved  Malignant pleural effusion   Right sided adenocarcinoma of the lung stage IV with bone mets  Anxiety     Hypercalcemia stable/resolved.    Bronchoscopy yesterday.  Pathology sent off.  This will take several days to returned.    Per my conversation with Oncology yesterday okay to discharge the patient once he is feeling well.  They will call him with the pathology results and initiate chemotherapy in a directed approach.    If the patient decompensates over the weekend could consider emergent broad chemotherapy but this would not be ideal.  This morning patient is doing well but is a bit nervous about going home just yet.  Told him that we take the day today and can see how he is feeling.  Medically he appears to be stable        Romaine Cervantes MD   03/29/2025     All diagnosis and differential diagnosis have been reviewed; assessment and plan has been documented; I have personally reviewed the labs and test results that are presently available; I have reviewed the patients medication list; I have reviewed the consulting providers response and recommendations. I  have reviewed or attempted to review medical records based upon their availability    All of the patient's questions have been  addressed and answered. Patient's is agreeable to the above stated plan. I will continue to monitor closely and make adjustments to medical management as needed.  _____________________________________________________________________

## 2025-03-31 ENCOUNTER — TELEPHONE (OUTPATIENT)
Dept: HEMATOLOGY/ONCOLOGY | Facility: CLINIC | Age: 85
End: 2025-03-31
Payer: MEDICARE

## 2025-03-31 NOTE — TELEPHONE ENCOUNTER
Received call from patient. He states that he is constipated. He is having bowel movements but they are small and painful per patient. He feels that he is hydrating well. He has not taken any stool softeners/laxatives. He is asking if he can take magnesium citrate. Ok to advise him to try miralax or po stool softeners first?

## 2025-04-01 ENCOUNTER — RESULTS FOLLOW-UP (OUTPATIENT)
Dept: HEMATOLOGY/ONCOLOGY | Facility: CLINIC | Age: 85
End: 2025-04-01

## 2025-04-02 DIAGNOSIS — C77.1 SECONDARY MALIGNANCY OF MEDIASTINAL LYMPH NODES: Primary | ICD-10-CM

## 2025-04-03 ENCOUNTER — HOSPITAL ENCOUNTER (INPATIENT)
Facility: HOSPITAL | Age: 85
LOS: 6 days | Discharge: HOME HEALTH | DRG: 180 | End: 2025-04-09
Attending: STUDENT IN AN ORGANIZED HEALTH CARE EDUCATION/TRAINING PROGRAM | Admitting: STUDENT IN AN ORGANIZED HEALTH CARE EDUCATION/TRAINING PROGRAM
Payer: MEDICARE

## 2025-04-03 ENCOUNTER — TELEPHONE (OUTPATIENT)
Dept: HEMATOLOGY/ONCOLOGY | Facility: CLINIC | Age: 85
End: 2025-04-03
Payer: MEDICARE

## 2025-04-03 DIAGNOSIS — C79.51 METASTASIS TO BONE: ICD-10-CM

## 2025-04-03 DIAGNOSIS — C77.1 SECONDARY MALIGNANCY OF MEDIASTINAL LYMPH NODES: ICD-10-CM

## 2025-04-03 DIAGNOSIS — I25.10 CAD (CORONARY ARTERY DISEASE): ICD-10-CM

## 2025-04-03 DIAGNOSIS — R07.9 CHEST PAIN: ICD-10-CM

## 2025-04-03 DIAGNOSIS — J90 RECURRENT PLEURAL EFFUSION ON RIGHT: Primary | ICD-10-CM

## 2025-04-03 DIAGNOSIS — J18.9 PNEUMONIA OF RIGHT LOWER LOBE DUE TO INFECTIOUS ORGANISM: ICD-10-CM

## 2025-04-03 DIAGNOSIS — C80.1 ADENOCARCINOMA OF UNKNOWN PRIMARY: ICD-10-CM

## 2025-04-03 DIAGNOSIS — R06.02 SOB (SHORTNESS OF BREATH): ICD-10-CM

## 2025-04-03 DIAGNOSIS — J90 PLEURAL EFFUSION: ICD-10-CM

## 2025-04-03 DIAGNOSIS — E83.52 HYPERCALCEMIA: ICD-10-CM

## 2025-04-03 LAB
ALBUMIN SERPL-MCNC: 3 G/DL (ref 3.4–4.8)
ALBUMIN/GLOB SERPL: 0.7 RATIO (ref 1.1–2)
ALP SERPL-CCNC: 80 UNIT/L (ref 40–150)
ALT SERPL-CCNC: 7 UNIT/L (ref 0–55)
ANION GAP SERPL CALC-SCNC: 10 MEQ/L
AST SERPL-CCNC: 18 UNIT/L (ref 11–45)
BASOPHILS # BLD AUTO: 0.05 X10(3)/MCL
BASOPHILS NFR BLD AUTO: 0.5 %
BILIRUB SERPL-MCNC: 0.4 MG/DL
BNP BLD-MCNC: 39.6 PG/ML
BUN SERPL-MCNC: 12.6 MG/DL (ref 8.4–25.7)
CALCIUM SERPL-MCNC: 11.1 MG/DL (ref 8.8–10)
CHLORIDE SERPL-SCNC: 108 MMOL/L (ref 98–107)
CO2 SERPL-SCNC: 22 MMOL/L (ref 23–31)
CREAT SERPL-MCNC: 0.74 MG/DL (ref 0.72–1.25)
CREAT/UREA NIT SERPL: 17
EOSINOPHIL # BLD AUTO: 0.18 X10(3)/MCL (ref 0–0.9)
EOSINOPHIL NFR BLD AUTO: 2 %
ERYTHROCYTE [DISTWIDTH] IN BLOOD BY AUTOMATED COUNT: 13.5 % (ref 11.5–17)
FLUAV AG UPPER RESP QL IA.RAPID: NOT DETECTED
FLUBV AG UPPER RESP QL IA.RAPID: NOT DETECTED
GFR SERPLBLD CREATININE-BSD FMLA CKD-EPI: >60 ML/MIN/1.73/M2
GLOBULIN SER-MCNC: 4.2 GM/DL (ref 2.4–3.5)
GLUCOSE SERPL-MCNC: 142 MG/DL (ref 82–115)
HCT VFR BLD AUTO: 38.3 % (ref 42–52)
HGB BLD-MCNC: 12.6 G/DL (ref 14–18)
IMM GRANULOCYTES # BLD AUTO: 0.07 X10(3)/MCL (ref 0–0.04)
IMM GRANULOCYTES NFR BLD AUTO: 0.8 %
LYMPHOCYTES # BLD AUTO: 1.6 X10(3)/MCL (ref 0.6–4.6)
LYMPHOCYTES NFR BLD AUTO: 17.5 %
MCH RBC QN AUTO: 30.3 PG (ref 27–31)
MCHC RBC AUTO-ENTMCNC: 32.9 G/DL (ref 33–36)
MCV RBC AUTO: 92.1 FL (ref 80–94)
MONOCYTES # BLD AUTO: 0.95 X10(3)/MCL (ref 0.1–1.3)
MONOCYTES NFR BLD AUTO: 10.4 %
NEUTROPHILS # BLD AUTO: 6.27 X10(3)/MCL (ref 2.1–9.2)
NEUTROPHILS NFR BLD AUTO: 68.8 %
NRBC BLD AUTO-RTO: 0 %
PLATELET # BLD AUTO: 143 X10(3)/MCL (ref 130–400)
PMV BLD AUTO: 10.2 FL (ref 7.4–10.4)
POTASSIUM SERPL-SCNC: 4.4 MMOL/L (ref 3.5–5.1)
PROT SERPL-MCNC: 7.2 GM/DL (ref 5.8–7.6)
RBC # BLD AUTO: 4.16 X10(6)/MCL (ref 4.7–6.1)
RSV A 5' UTR RNA NPH QL NAA+PROBE: NOT DETECTED
SARS-COV-2 RNA RESP QL NAA+PROBE: NOT DETECTED
SODIUM SERPL-SCNC: 140 MMOL/L (ref 136–145)
TROPONIN I SERPL-MCNC: 0.04 NG/ML (ref 0–0.04)
WBC # BLD AUTO: 9.12 X10(3)/MCL (ref 4.5–11.5)

## 2025-04-03 PROCEDURE — 94640 AIRWAY INHALATION TREATMENT: CPT

## 2025-04-03 PROCEDURE — 25500020 PHARM REV CODE 255: Performed by: STUDENT IN AN ORGANIZED HEALTH CARE EDUCATION/TRAINING PROGRAM

## 2025-04-03 PROCEDURE — 87641 MR-STAPH DNA AMP PROBE: CPT | Performed by: NURSE PRACTITIONER

## 2025-04-03 PROCEDURE — 96374 THER/PROPH/DIAG INJ IV PUSH: CPT

## 2025-04-03 PROCEDURE — 94760 N-INVAS EAR/PLS OXIMETRY 1: CPT

## 2025-04-03 PROCEDURE — 87040 BLOOD CULTURE FOR BACTERIA: CPT | Performed by: NURSE PRACTITIONER

## 2025-04-03 PROCEDURE — 80053 COMPREHEN METABOLIC PANEL: CPT | Performed by: PHYSICIAN ASSISTANT

## 2025-04-03 PROCEDURE — 11000001 HC ACUTE MED/SURG PRIVATE ROOM

## 2025-04-03 PROCEDURE — 99900031 HC PATIENT EDUCATION (STAT)

## 2025-04-03 PROCEDURE — 96375 TX/PRO/DX INJ NEW DRUG ADDON: CPT

## 2025-04-03 PROCEDURE — 93010 ELECTROCARDIOGRAM REPORT: CPT | Mod: ,,, | Performed by: INTERNAL MEDICINE

## 2025-04-03 PROCEDURE — 87798 DETECT AGENT NOS DNA AMP: CPT | Performed by: NURSE PRACTITIONER

## 2025-04-03 PROCEDURE — 85025 COMPLETE CBC W/AUTO DIFF WBC: CPT | Performed by: PHYSICIAN ASSISTANT

## 2025-04-03 PROCEDURE — 83880 ASSAY OF NATRIURETIC PEPTIDE: CPT | Performed by: PHYSICIAN ASSISTANT

## 2025-04-03 PROCEDURE — 99285 EMERGENCY DEPT VISIT HI MDM: CPT | Mod: 25

## 2025-04-03 PROCEDURE — 63600175 PHARM REV CODE 636 W HCPCS: Performed by: STUDENT IN AN ORGANIZED HEALTH CARE EDUCATION/TRAINING PROGRAM

## 2025-04-03 PROCEDURE — 93005 ELECTROCARDIOGRAM TRACING: CPT

## 2025-04-03 PROCEDURE — 84484 ASSAY OF TROPONIN QUANT: CPT | Performed by: PHYSICIAN ASSISTANT

## 2025-04-03 PROCEDURE — 0241U COVID/RSV/FLU A&B PCR: CPT | Performed by: NURSE PRACTITIONER

## 2025-04-03 PROCEDURE — 25000242 PHARM REV CODE 250 ALT 637 W/ HCPCS: Performed by: STUDENT IN AN ORGANIZED HEALTH CARE EDUCATION/TRAINING PROGRAM

## 2025-04-03 RX ORDER — IPRATROPIUM BROMIDE AND ALBUTEROL SULFATE 2.5; .5 MG/3ML; MG/3ML
3 SOLUTION RESPIRATORY (INHALATION)
Status: COMPLETED | OUTPATIENT
Start: 2025-04-03 | End: 2025-04-03

## 2025-04-03 RX ORDER — NALOXONE HCL 0.4 MG/ML
0.02 VIAL (ML) INJECTION
Status: DISCONTINUED | OUTPATIENT
Start: 2025-04-04 | End: 2025-04-09 | Stop reason: HOSPADM

## 2025-04-03 RX ORDER — PROCHLORPERAZINE EDISYLATE 5 MG/ML
5 INJECTION INTRAMUSCULAR; INTRAVENOUS EVERY 6 HOURS PRN
Status: DISCONTINUED | OUTPATIENT
Start: 2025-04-04 | End: 2025-04-09 | Stop reason: HOSPADM

## 2025-04-03 RX ORDER — IBUPROFEN 200 MG
24 TABLET ORAL
Status: DISCONTINUED | OUTPATIENT
Start: 2025-04-04 | End: 2025-04-09 | Stop reason: HOSPADM

## 2025-04-03 RX ORDER — GLUCAGON 1 MG
1 KIT INJECTION
Status: DISCONTINUED | OUTPATIENT
Start: 2025-04-04 | End: 2025-04-09 | Stop reason: HOSPADM

## 2025-04-03 RX ORDER — ONDANSETRON HYDROCHLORIDE 2 MG/ML
4 INJECTION, SOLUTION INTRAVENOUS
Status: COMPLETED | OUTPATIENT
Start: 2025-04-03 | End: 2025-04-03

## 2025-04-03 RX ORDER — SODIUM CHLORIDE 9 MG/ML
INJECTION, SOLUTION INTRAVENOUS CONTINUOUS
Status: ACTIVE | OUTPATIENT
Start: 2025-04-04 | End: 2025-04-04

## 2025-04-03 RX ORDER — AMOXICILLIN 250 MG
1 CAPSULE ORAL 2 TIMES DAILY PRN
Status: DISCONTINUED | OUTPATIENT
Start: 2025-04-04 | End: 2025-04-09 | Stop reason: HOSPADM

## 2025-04-03 RX ORDER — ACETAMINOPHEN 500 MG
1000 TABLET ORAL EVERY 6 HOURS PRN
Status: DISCONTINUED | OUTPATIENT
Start: 2025-04-04 | End: 2025-04-04

## 2025-04-03 RX ORDER — SODIUM CHLORIDE 0.9 % (FLUSH) 0.9 %
10 SYRINGE (ML) INJECTION
Status: DISCONTINUED | OUTPATIENT
Start: 2025-04-04 | End: 2025-04-09 | Stop reason: HOSPADM

## 2025-04-03 RX ORDER — IPRATROPIUM BROMIDE AND ALBUTEROL SULFATE 2.5; .5 MG/3ML; MG/3ML
3 SOLUTION RESPIRATORY (INHALATION) EVERY 4 HOURS PRN
Status: DISCONTINUED | OUTPATIENT
Start: 2025-04-04 | End: 2025-04-04

## 2025-04-03 RX ORDER — ONDANSETRON HYDROCHLORIDE 2 MG/ML
4 INJECTION, SOLUTION INTRAVENOUS EVERY 4 HOURS PRN
Status: DISCONTINUED | OUTPATIENT
Start: 2025-04-04 | End: 2025-04-09 | Stop reason: HOSPADM

## 2025-04-03 RX ORDER — ALUMINUM HYDROXIDE, MAGNESIUM HYDROXIDE, AND SIMETHICONE 1200; 120; 1200 MG/30ML; MG/30ML; MG/30ML
30 SUSPENSION ORAL 4 TIMES DAILY PRN
Status: DISCONTINUED | OUTPATIENT
Start: 2025-04-04 | End: 2025-04-09 | Stop reason: HOSPADM

## 2025-04-03 RX ORDER — IBUPROFEN 200 MG
16 TABLET ORAL
Status: DISCONTINUED | OUTPATIENT
Start: 2025-04-04 | End: 2025-04-09 | Stop reason: HOSPADM

## 2025-04-03 RX ORDER — BISACODYL 10 MG/1
10 SUPPOSITORY RECTAL DAILY PRN
Status: DISCONTINUED | OUTPATIENT
Start: 2025-04-04 | End: 2025-04-09 | Stop reason: HOSPADM

## 2025-04-03 RX ORDER — MORPHINE SULFATE 4 MG/ML
2 INJECTION, SOLUTION INTRAMUSCULAR; INTRAVENOUS
Status: COMPLETED | OUTPATIENT
Start: 2025-04-03 | End: 2025-04-03

## 2025-04-03 RX ORDER — INSULIN ASPART 100 [IU]/ML
0-10 INJECTION, SOLUTION INTRAVENOUS; SUBCUTANEOUS
Status: DISCONTINUED | OUTPATIENT
Start: 2025-04-04 | End: 2025-04-09 | Stop reason: HOSPADM

## 2025-04-03 RX ORDER — ENOXAPARIN SODIUM 100 MG/ML
40 INJECTION SUBCUTANEOUS EVERY 24 HOURS
Status: DISCONTINUED | OUTPATIENT
Start: 2025-04-04 | End: 2025-04-09 | Stop reason: HOSPADM

## 2025-04-03 RX ORDER — TALC
6 POWDER (GRAM) TOPICAL NIGHTLY PRN
Status: DISCONTINUED | OUTPATIENT
Start: 2025-04-04 | End: 2025-04-09 | Stop reason: HOSPADM

## 2025-04-03 RX ADMIN — IOHEXOL 70 ML: 350 INJECTION, SOLUTION INTRAVENOUS at 08:04

## 2025-04-03 RX ADMIN — ONDANSETRON 4 MG: 2 INJECTION INTRAMUSCULAR; INTRAVENOUS at 08:04

## 2025-04-03 RX ADMIN — MORPHINE SULFATE 2 MG: 4 INJECTION INTRAVENOUS at 08:04

## 2025-04-03 RX ADMIN — PIPERACILLIN SODIUM AND TAZOBACTAM SODIUM 4.5 G: 4; .5 INJECTION, POWDER, LYOPHILIZED, FOR SOLUTION INTRAVENOUS at 11:04

## 2025-04-03 RX ADMIN — IPRATROPIUM BROMIDE AND ALBUTEROL SULFATE 3 ML: .5; 3 SOLUTION RESPIRATORY (INHALATION) at 08:04

## 2025-04-03 NOTE — TELEPHONE ENCOUNTER
Received call from patient's daughter. She states that patient is very SOB even at rest. She believes that patient needs thoracentesis again. Advised that she bring patient to ED. She verbalized understanding. Dr. Johnson made aware.

## 2025-04-03 NOTE — FIRST PROVIDER EVALUATION
"Medical screening examination initiated.  I have conducted a focused provider triage encounter, findings are as follows:    Brief history of present illness:  84yoWM w/SOB for a few days and now worsening w/orthopnea. No chest pain. No cough. No fever. No Hx of .     Lymph node biopsy 3/28/25- results in the chart.     Dr. Johnson/Ak[facility] - actively being worked up for lung cancer.    Vitals:    04/03/25 1519   BP: 138/69   BP Location: Left arm   Pulse: 106   Resp: (!) 21   Temp: 98 °F (36.7 °C)   TempSrc: Oral   SpO2: (!) 93%   Weight: 88.5 kg (195 lb)   Height: 5' 10" (1.778 m)       Pertinent physical exam:  wheelchair bound.     Brief workup plan:  labs and imaging    Preliminary workup initiated; this workup will be continued and followed by the physician or advanced practice provider that is assigned to the patient when roomed.  "

## 2025-04-03 NOTE — Clinical Note
Diagnosis: SOB (shortness of breath) [057456]   Future Attending Provider: REYES, THAIRY G [[id number]]   Admit to which facility:: [facility] [[id number]]   Reason for IP Medical Treatment  (Clinical interventions that can only be accomplished in the IP setting? ) :: IV Abx   Plans for Post-Acute care--if anticipated (pick the single best option):: A. No post acute care anticipated at this time

## 2025-04-04 PROBLEM — C80.1 ADENOCARCINOMA OF UNKNOWN PRIMARY: Status: ACTIVE | Noted: 2025-03-26

## 2025-04-04 LAB
ALBUMIN SERPL-MCNC: 2.8 G/DL (ref 3.4–4.8)
ALBUMIN/GLOB SERPL: 0.7 RATIO (ref 1.1–2)
ALP SERPL-CCNC: 79 UNIT/L (ref 40–150)
ALT SERPL-CCNC: 7 UNIT/L (ref 0–55)
ANION GAP SERPL CALC-SCNC: 6 MEQ/L
APICAL FOUR CHAMBER EJECTION FRACTION: 43 %
APICAL TWO CHAMBER EJECTION FRACTION: 49 %
AST SERPL-CCNC: 17 UNIT/L (ref 11–45)
AV INDEX (PROSTH): 0.54
AV MEAN GRADIENT: 5 MMHG
AV PEAK GRADIENT: 10 MMHG
AV VALVE AREA BY VELOCITY RATIO: 1.9 CM²
AV VALVE AREA: 2.1 CM²
AV VELOCITY RATIO: 0.5
B PERT.PT PRMT NPH QL NAA+NON-PROBE: NOT DETECTED
BASOPHILS # BLD AUTO: 0.06 X10(3)/MCL
BASOPHILS NFR BLD AUTO: 0.7 %
BILIRUB SERPL-MCNC: 0.6 MG/DL
BSA FOR ECHO PROCEDURE: 2.08 M2
BUN SERPL-MCNC: 10 MG/DL (ref 8.4–25.7)
C PNEUM DNA NPH QL NAA+NON-PROBE: NOT DETECTED
CALCIUM SERPL-MCNC: 11.1 MG/DL (ref 8.8–10)
CHLORIDE SERPL-SCNC: 107 MMOL/L (ref 98–107)
CLARITY BODY FLUID (OLG): CLEAR
CO2 SERPL-SCNC: 27 MMOL/L (ref 23–31)
COLOR BODY FLUID (OLG): YELLOW
CREAT SERPL-MCNC: 0.7 MG/DL (ref 0.72–1.25)
CREAT/UREA NIT SERPL: 14
CV ECHO LV RWT: 0.55 CM
DOP CALC AO PEAK VEL: 1.6 M/S
DOP CALC AO VTI: 24.3 CM
DOP CALC LVOT AREA: 3.8 CM2
DOP CALC LVOT DIAMETER: 2.2 CM
DOP CALC LVOT PEAK VEL: 0.8 M/S
DOP CALC LVOT STROKE VOLUME: 50.2 CM3
DOP CALC MV VTI: 12.9 CM
DOP CALCLVOT PEAK VEL VTI: 13.2 CM
ECHO LV POSTERIOR WALL: 1.2 CM (ref 0.6–1.1)
EOSINOPHIL # BLD AUTO: 0.12 X10(3)/MCL (ref 0–0.9)
EOSINOPHIL NFR BLD AUTO: 1.4 %
ERYTHROCYTE [DISTWIDTH] IN BLOOD BY AUTOMATED COUNT: 13.5 % (ref 11.5–17)
FRACTIONAL SHORTENING: 13.6 % (ref 28–44)
GFR SERPLBLD CREATININE-BSD FMLA CKD-EPI: >60 ML/MIN/1.73/M2
GLOBULIN SER-MCNC: 3.9 GM/DL (ref 2.4–3.5)
GLUCOSE FLD-MCNC: 48 MG/DL
GLUCOSE SERPL-MCNC: 129 MG/DL (ref 82–115)
HADV DNA NPH QL NAA+NON-PROBE: NOT DETECTED
HCOV 229E RNA NPH QL NAA+NON-PROBE: NOT DETECTED
HCOV HKU1 RNA NPH QL NAA+NON-PROBE: NOT DETECTED
HCOV NL63 RNA NPH QL NAA+NON-PROBE: NOT DETECTED
HCOV OC43 RNA NPH QL NAA+NON-PROBE: NOT DETECTED
HCT VFR BLD AUTO: 35.7 % (ref 42–52)
HGB BLD-MCNC: 11.6 G/DL (ref 14–18)
HMPV RNA NPH QL NAA+NON-PROBE: NOT DETECTED
HPIV1 RNA NPH QL NAA+NON-PROBE: NOT DETECTED
HPIV2 RNA NPH QL NAA+NON-PROBE: NOT DETECTED
HPIV3 RNA NPH QL NAA+NON-PROBE: NOT DETECTED
HPIV4 RNA NPH QL NAA+NON-PROBE: NOT DETECTED
IMM GRANULOCYTES # BLD AUTO: 0.06 X10(3)/MCL (ref 0–0.04)
IMM GRANULOCYTES NFR BLD AUTO: 0.7 %
INTERVENTRICULAR SEPTUM: 1.6 CM (ref 0.6–1.1)
LEFT ATRIUM AREA SYSTOLIC (APICAL 4 CHAMBER): 21.6 CM2
LEFT ATRIUM SIZE: 3.9 CM
LEFT INTERNAL DIMENSION IN SYSTOLE: 3.8 CM (ref 2.1–4)
LEFT VENTRICLE DIASTOLIC VOLUME INDEX: 42.72 ML/M2
LEFT VENTRICLE DIASTOLIC VOLUME: 88 ML
LEFT VENTRICLE END DIASTOLIC VOLUME APICAL 2 CHAMBER: 35.5 ML
LEFT VENTRICLE END DIASTOLIC VOLUME APICAL 4 CHAMBER: 58.8 ML
LEFT VENTRICLE END SYSTOLIC VOLUME APICAL 4 CHAMBER: 61.1 ML
LEFT VENTRICLE MASS INDEX: 116.6 G/M2
LEFT VENTRICLE SYSTOLIC VOLUME INDEX: 30.1 ML/M2
LEFT VENTRICLE SYSTOLIC VOLUME: 62 ML
LEFT VENTRICULAR INTERNAL DIMENSION IN DIASTOLE: 4.4 CM (ref 3.5–6)
LEFT VENTRICULAR MASS: 240.3 G
LVED V (TEICH): 87.7 ML
LVES V (TEICH): 62 ML
LVOT MG: 1 MMHG
LVOT MV: 0.55 CM/S
LYMPHOCYTES # BLD AUTO: 1.9 X10(3)/MCL (ref 0.6–4.6)
LYMPHOCYTES NFR BLD AUTO: 22.6 %
LYMPHOCYTES NFR FLD MANUAL: 12 %
M PNEUMO DNA NPH QL NAA+NON-PROBE: NOT DETECTED
MACROPHAGES MAN COUNT BF (OLG): 66
MAGNESIUM SERPL-MCNC: 2 MG/DL (ref 1.6–2.6)
MCH RBC QN AUTO: 30 PG (ref 27–31)
MCHC RBC AUTO-ENTMCNC: 32.5 G/DL (ref 33–36)
MCV RBC AUTO: 92.2 FL (ref 80–94)
MESOTHELIAL CELLS MAN COUNT BF (OLG): 36
MONOCYTE MAN % BF (OLG): 70 %
MONOCYTES # BLD AUTO: 0.89 X10(3)/MCL (ref 0.1–1.3)
MONOCYTES NFR BLD AUTO: 10.6 %
MRSA PCR SCRN (OHS): NOT DETECTED
MV MEAN GRADIENT: 2 MMHG
MV PEAK GRADIENT: 5 MMHG
MV VALVE AREA BY CONTINUITY EQUATION: 3.89 CM2
NEUTROPHILS # BLD AUTO: 5.36 X10(3)/MCL (ref 2.1–9.2)
NEUTROPHILS MAN % BF (OLG): 18 %
NEUTROPHILS NFR BLD AUTO: 64 %
NRBC BLD AUTO-RTO: 0 %
OHS CV RV/LV RATIO: 0.61 CM
OHS LV EJECTION FRACTION SIMPSONS BIPLANE MOD: 44 %
OHS QRS DURATION: 122 MS
OHS QTC CALCULATION: 462 MS
PISA TR MAX VEL: 1.3 M/S
PLATELET # BLD AUTO: 154 X10(3)/MCL (ref 130–400)
PMV BLD AUTO: 10.6 FL (ref 7.4–10.4)
POCT GLUCOSE: 113 MG/DL (ref 70–110)
POCT GLUCOSE: 168 MG/DL (ref 70–110)
POTASSIUM SERPL-SCNC: 4.1 MMOL/L (ref 3.5–5.1)
PROT SERPL-MCNC: 6.7 GM/DL (ref 5.8–7.6)
PV PEAK GRADIENT: 5 MMHG
PV PEAK VELOCITY: 1.14 M/S
RA MAJOR: 5.9 CM
RA WIDTH: 4 CM
RBC # BLD AUTO: 3.87 X10(6)/MCL (ref 4.7–6.1)
RIGHT VENTRICLE DIASTOLIC BASEL DIMENSION: 2.7 CM
RIGHT VENTRICLE DIASTOLIC LENGTH: 8.7 CM
RIGHT VENTRICLE DIASTOLIC MID DIMENSION: 3.2 CM
RIGHT VENTRICULAR END-DIASTOLIC DIMENSION: 2.7 CM
RIGHT VENTRICULAR LENGTH IN DIASTOLE (APICAL 4-CHAMBER VIEW): 8.7 CM
RSV RNA NPH QL NAA+NON-PROBE: NOT DETECTED
RV MID DIAMA: 3.2 CM
RV+EV RNA NPH QL NAA+NON-PROBE: NOT DETECTED
SODIUM SERPL-SCNC: 140 MMOL/L (ref 136–145)
TDI LATERAL: 0.05 M/S
TDI SEPTAL: 0.05 M/S
TDI: 0.05 M/S
TR MAX PG: 7 MMHG
TRICUSPID ANNULAR PLANE SYSTOLIC EXCURSION: 2.26 CM
WBC # BLD AUTO: 8.39 X10(3)/MCL (ref 4.5–11.5)
WBC # FLD AUTO: 1031 /UL
Z-SCORE OF LEFT VENTRICULAR DIMENSION IN END DIASTOLE: -3.46
Z-SCORE OF LEFT VENTRICULAR DIMENSION IN END SYSTOLE: -0.02

## 2025-04-04 PROCEDURE — 80053 COMPREHEN METABOLIC PANEL: CPT | Performed by: NURSE PRACTITIONER

## 2025-04-04 PROCEDURE — 89051 BODY FLUID CELL COUNT: CPT | Performed by: STUDENT IN AN ORGANIZED HEALTH CARE EDUCATION/TRAINING PROGRAM

## 2025-04-04 PROCEDURE — 82945 GLUCOSE OTHER FLUID: CPT | Performed by: STUDENT IN AN ORGANIZED HEALTH CARE EDUCATION/TRAINING PROGRAM

## 2025-04-04 PROCEDURE — 99223 1ST HOSP IP/OBS HIGH 75: CPT | Mod: ,,, | Performed by: INTERNAL MEDICINE

## 2025-04-04 PROCEDURE — 63600175 PHARM REV CODE 636 W HCPCS

## 2025-04-04 PROCEDURE — 99900031 HC PATIENT EDUCATION (STAT)

## 2025-04-04 PROCEDURE — 25000003 PHARM REV CODE 250

## 2025-04-04 PROCEDURE — 02HV33Z INSERTION OF INFUSION DEVICE INTO SUPERIOR VENA CAVA, PERCUTANEOUS APPROACH: ICD-10-PCS | Performed by: RADIOLOGY

## 2025-04-04 PROCEDURE — 63600175 PHARM REV CODE 636 W HCPCS: Performed by: INTERNAL MEDICINE

## 2025-04-04 PROCEDURE — 11000001 HC ACUTE MED/SURG PRIVATE ROOM

## 2025-04-04 PROCEDURE — 25000242 PHARM REV CODE 250 ALT 637 W/ HCPCS: Performed by: PHYSICIAN ASSISTANT

## 2025-04-04 PROCEDURE — 99900035 HC TECH TIME PER 15 MIN (STAT)

## 2025-04-04 PROCEDURE — 94640 AIRWAY INHALATION TREATMENT: CPT

## 2025-04-04 PROCEDURE — C1751 CATH, INF, PER/CENT/MIDLINE: HCPCS

## 2025-04-04 PROCEDURE — 36569 INSJ PICC 5 YR+ W/O IMAGING: CPT

## 2025-04-04 PROCEDURE — 27000221 HC OXYGEN, UP TO 24 HOURS

## 2025-04-04 PROCEDURE — 0W993ZZ DRAINAGE OF RIGHT PLEURAL CAVITY, PERCUTANEOUS APPROACH: ICD-10-PCS | Performed by: STUDENT IN AN ORGANIZED HEALTH CARE EDUCATION/TRAINING PROGRAM

## 2025-04-04 PROCEDURE — 94760 N-INVAS EAR/PLS OXIMETRY 1: CPT

## 2025-04-04 PROCEDURE — 25000003 PHARM REV CODE 250: Performed by: INTERNAL MEDICINE

## 2025-04-04 PROCEDURE — 85025 COMPLETE CBC W/AUTO DIFF WBC: CPT | Performed by: NURSE PRACTITIONER

## 2025-04-04 PROCEDURE — 25000003 PHARM REV CODE 250: Performed by: PHYSICIAN ASSISTANT

## 2025-04-04 PROCEDURE — 83735 ASSAY OF MAGNESIUM: CPT | Performed by: NURSE PRACTITIONER

## 2025-04-04 PROCEDURE — 21400001 HC TELEMETRY ROOM

## 2025-04-04 PROCEDURE — 25000003 PHARM REV CODE 250: Performed by: NURSE PRACTITIONER

## 2025-04-04 PROCEDURE — 63600175 PHARM REV CODE 636 W HCPCS: Performed by: NURSE PRACTITIONER

## 2025-04-04 PROCEDURE — 87070 CULTURE OTHR SPECIMN AEROBIC: CPT | Performed by: NURSE PRACTITIONER

## 2025-04-04 PROCEDURE — 36415 COLL VENOUS BLD VENIPUNCTURE: CPT | Performed by: NURSE PRACTITIONER

## 2025-04-04 RX ORDER — AMLODIPINE BESYLATE 5 MG/1
5 TABLET ORAL DAILY
Status: DISCONTINUED | OUTPATIENT
Start: 2025-04-05 | End: 2025-04-05

## 2025-04-04 RX ORDER — LEVALBUTEROL INHALATION SOLUTION 0.63 MG/3ML
0.63 SOLUTION RESPIRATORY (INHALATION) EVERY 6 HOURS
Status: DISCONTINUED | OUTPATIENT
Start: 2025-04-04 | End: 2025-04-09 | Stop reason: HOSPADM

## 2025-04-04 RX ORDER — GUAIFENESIN AND DEXTROMETHORPHAN HYDROBROMIDE 10; 100 MG/5ML; MG/5ML
5 SYRUP ORAL EVERY 6 HOURS PRN
Status: DISCONTINUED | OUTPATIENT
Start: 2025-04-04 | End: 2025-04-09 | Stop reason: HOSPADM

## 2025-04-04 RX ORDER — ASPIRIN 81 MG/1
81 TABLET ORAL DAILY
Status: DISCONTINUED | OUTPATIENT
Start: 2025-04-05 | End: 2025-04-09 | Stop reason: HOSPADM

## 2025-04-04 RX ORDER — ZOLEDRONIC ACID 0.04 MG/ML
4 INJECTION, SOLUTION INTRAVENOUS
Status: COMPLETED | OUTPATIENT
Start: 2025-04-04 | End: 2025-04-04

## 2025-04-04 RX ORDER — SODIUM CHLORIDE, SODIUM LACTATE, POTASSIUM CHLORIDE, CALCIUM CHLORIDE 600; 310; 30; 20 MG/100ML; MG/100ML; MG/100ML; MG/100ML
INJECTION, SOLUTION INTRAVENOUS CONTINUOUS
Status: ACTIVE | OUTPATIENT
Start: 2025-04-04 | End: 2025-04-05

## 2025-04-04 RX ORDER — SODIUM CHLORIDE 0.9 % (FLUSH) 0.9 %
10 SYRINGE (ML) INJECTION EVERY 12 HOURS PRN
Status: DISCONTINUED | OUTPATIENT
Start: 2025-04-04 | End: 2025-04-09 | Stop reason: HOSPADM

## 2025-04-04 RX ORDER — ALPRAZOLAM 0.25 MG/1
0.25 TABLET ORAL 3 TIMES DAILY PRN
COMMUNITY

## 2025-04-04 RX ORDER — MUPIROCIN 20 MG/G
OINTMENT TOPICAL 2 TIMES DAILY
Status: COMPLETED | OUTPATIENT
Start: 2025-04-04 | End: 2025-04-09

## 2025-04-04 RX ORDER — ALPRAZOLAM 0.5 MG/1
0.5 TABLET ORAL ONCE
Status: COMPLETED | OUTPATIENT
Start: 2025-04-04 | End: 2025-04-04

## 2025-04-04 RX ORDER — POLYETHYLENE GLYCOL 3350 17 G/17G
17 POWDER, FOR SOLUTION ORAL DAILY
COMMUNITY

## 2025-04-04 RX ORDER — CYANOCOBALAMIN 1000 UG/ML
1000 INJECTION, SOLUTION INTRAMUSCULAR; SUBCUTANEOUS ONCE
Status: COMPLETED | OUTPATIENT
Start: 2025-04-04 | End: 2025-04-04

## 2025-04-04 RX ORDER — ACETAMINOPHEN 325 MG/1
650 TABLET ORAL EVERY 6 HOURS PRN
Status: DISCONTINUED | OUTPATIENT
Start: 2025-04-04 | End: 2025-04-09 | Stop reason: HOSPADM

## 2025-04-04 RX ORDER — BUSPIRONE HYDROCHLORIDE 5 MG/1
5 TABLET ORAL 2 TIMES DAILY
Status: DISCONTINUED | OUTPATIENT
Start: 2025-04-04 | End: 2025-04-05

## 2025-04-04 RX ORDER — TAMSULOSIN HYDROCHLORIDE 0.4 MG/1
0.4 CAPSULE ORAL DAILY
Status: DISCONTINUED | OUTPATIENT
Start: 2025-04-04 | End: 2025-04-09 | Stop reason: HOSPADM

## 2025-04-04 RX ORDER — FOLIC ACID 1 MG/1
1 TABLET ORAL DAILY
Status: DISCONTINUED | OUTPATIENT
Start: 2025-04-04 | End: 2025-04-04

## 2025-04-04 RX ADMIN — PIPERACILLIN SODIUM AND TAZOBACTAM SODIUM 4.5 G: 4; .5 INJECTION, POWDER, LYOPHILIZED, FOR SOLUTION INTRAVENOUS at 08:04

## 2025-04-04 RX ADMIN — PIPERACILLIN SODIUM AND TAZOBACTAM SODIUM 4.5 G: 4; .5 INJECTION, POWDER, LYOPHILIZED, FOR SOLUTION INTRAVENOUS at 05:04

## 2025-04-04 RX ADMIN — MUPIROCIN: 20 OINTMENT TOPICAL at 09:04

## 2025-04-04 RX ADMIN — SODIUM CHLORIDE: 9 INJECTION, SOLUTION INTRAVENOUS at 12:04

## 2025-04-04 RX ADMIN — VANCOMYCIN HYDROCHLORIDE 1250 MG: 1.25 INJECTION, POWDER, LYOPHILIZED, FOR SOLUTION INTRAVENOUS at 10:04

## 2025-04-04 RX ADMIN — ENOXAPARIN SODIUM 40 MG: 40 INJECTION SUBCUTANEOUS at 05:04

## 2025-04-04 RX ADMIN — LEVALBUTEROL HYDROCHLORIDE 0.63 MG: 0.63 SOLUTION RESPIRATORY (INHALATION) at 11:04

## 2025-04-04 RX ADMIN — CYANOCOBALAMIN 1000 MCG: 1000 INJECTION INTRAMUSCULAR; SUBCUTANEOUS at 09:04

## 2025-04-04 RX ADMIN — LACTULOSE 30 G: 10 SOLUTION ORAL at 09:04

## 2025-04-04 RX ADMIN — VANCOMYCIN HYDROCHLORIDE 750 MG: 750 INJECTION, POWDER, LYOPHILIZED, FOR SOLUTION INTRAVENOUS at 06:04

## 2025-04-04 RX ADMIN — SENNOSIDES AND DOCUSATE SODIUM 1 TABLET: 50; 8.6 TABLET ORAL at 09:04

## 2025-04-04 RX ADMIN — GUAIFENESIN AND DEXTROMETHORPHAN 5 ML: 100; 10 SYRUP ORAL at 09:04

## 2025-04-04 RX ADMIN — GUAIFENESIN AND DEXTROMETHORPHAN 5 ML: 100; 10 SYRUP ORAL at 11:04

## 2025-04-04 RX ADMIN — TAMSULOSIN HYDROCHLORIDE 0.4 MG: 0.4 CAPSULE ORAL at 05:04

## 2025-04-04 RX ADMIN — SODIUM CHLORIDE, POTASSIUM CHLORIDE, SODIUM LACTATE AND CALCIUM CHLORIDE: 600; 310; 30; 20 INJECTION, SOLUTION INTRAVENOUS at 09:04

## 2025-04-04 RX ADMIN — VANCOMYCIN HYDROCHLORIDE 1000 MG: 1 INJECTION, POWDER, LYOPHILIZED, FOR SOLUTION INTRAVENOUS at 05:04

## 2025-04-04 RX ADMIN — FOLIC ACID 1 MG: 1 TABLET ORAL at 09:04

## 2025-04-04 RX ADMIN — ALPRAZOLAM 0.5 MG: 0.5 TABLET ORAL at 09:04

## 2025-04-04 RX ADMIN — ZOLEDRONIC ACID 4 MG: 0.04 INJECTION, SOLUTION INTRAVENOUS at 10:04

## 2025-04-04 RX ADMIN — BUSPIRONE HYDROCHLORIDE 5 MG: 5 TABLET ORAL at 10:04

## 2025-04-04 RX ADMIN — INSULIN ASPART 1 UNITS: 100 INJECTION, SOLUTION INTRAVENOUS; SUBCUTANEOUS at 09:04

## 2025-04-04 NOTE — PROCEDURES
[facility] - Oncology Acute    Right Ultrasound guided Thoracentesis Procedure Note         Date of Procedure: 4/4/2025    Procedure:  Right Ultrasound-guided diagnostic and therapeutic thoracentesis    Performed by: Cisco Harkins MD    Pre-Procedure Diagnosis:  Right Pleural effusion    Post-Procedure Diagnosis:  Same    Anesthesia:  5 cc of 1% local lidocaine injected subcutaneously      Indication: The patient is a 84 y.o. male with right pleural effusion.    Consent: The risks, benefits, potential complications, treatment options and expected outcomes were discussed with the patient and/or family.  The possibilities of reaction to medication, perforation of an organ , bleeding, failure to diagnose a condition and creating a complication requiring transfusion or operation were discussed.  Signed/verbal consent was granted.      Description of the Findings of the Procedure:  A Time Out was held and the above information confirmed.     With patient in sitting position, ultrasound guidance was used to identify a pocket of fluid felt amenable to aspiration.  The right posterior mid axillary area was sterilized with chlorhexidine and draped in sterile fashion.  5 cc of local lidocaine was injected subcutaneously.  A small incision was made with scalpel.  A 20 gauge thoracentesis needle was advanced with aspiration via 10 cc syringe.  When pleural fluid was obtained, a 5 Greenlandic drainage catheter was advanced over the needle into the pleural space and the needle was removed.  The catheter was then connected to suction for fluid removal via vacutainer bottle.    Total of 900 cc of fluid was removed.     Appearance of fluid was yellow-orange.    The patient recovered from the procedure and anesthesia in satisfactory condition.      Complications:  None    Estimated Blood Loss (EBL): Minimal    Specimens: Pleural fluid glucose and cell count    Cisco Harkins MD  Pulmonary Critical Care Medicine  [facility]

## 2025-04-04 NOTE — PLAN OF CARE
04/04/25 1031   Discharge Assessment   Assessment Type Discharge Planning Assessment   Confirmed/corrected address, phone number and insurance Yes   Confirmed Demographics Correct on Facesheet   Source of Information patient;family   Communicated MITCH with patient/caregiver Date not available/Unable to determine   Reason For Admission SOB   People in Home spouse   Do you expect to return to your current living situation? Yes   Do you have help at home or someone to help you manage your care at home? Yes   Who are your caregiver(s) and their phone number(s)? wife Ashlie Chawla [phone]   Prior to hospitilization cognitive status: Alert/Oriented   Current cognitive status: Alert/Oriented   Walking or Climbing Stairs Difficulty yes   Walking or Climbing Stairs ambulation difficulty, requires equipment   Mobility Management rolling walker   Dressing/Bathing Difficulty yes   Dressing/Bathing bathing difficulty, requires equipment   Dressing/Bathing Management built in shower bench   Equipment Currently Used at Home walker, rolling;shower chair  (built in shower chair, raised toilet seat)   Do you currently have service(s) that help you manage your care at home? No   Do you take prescription medications? Yes   Do you have prescription coverage? Yes   Coverage [employer] supplement   Do you have any problems affording any of your prescribed medications? No   Is the patient taking medications as prescribed? yes   Who is going to help you get home at discharge? daughter   How do you get to doctors appointments? family or friend will provide   Are you on dialysis? No   Do you take coumadin? No   Discharge Plan A Home   Discharge Plan B Home   DME Needed Upon Discharge  none   Discharge Plan discussed with: Adult children;Patient   Transition of Care Barriers None   OTHER   Name(s) of People in Home marga Dailey

## 2025-04-04 NOTE — H&P
[facility] Hospital Medicine History & Physical Examination       Patient Name: Juanjose Chawla  MRN: [id number]  Patient Class: IP- Inpatient   Admission Date: 04/04/2025   Admitting Service: Hospital Medicine   Length of Stay: 1  Attending Physician: Itzel Webb MD   Primary Care Provider: Sadiq Hartmann MD  Face-to-Face encounter date: 04/04/2025  Code Status: Full code   Chief Complaint: Shortness of Breath (Pt arrives c/o SOB x several days worse with lying down. Denies CP, cough. Seeing Dr. Johnson for possible lung cancer. Daughter reports had fluid drained from chest 2 weeks ago. )      Screening for Social Drivers for health:  Patient screened for food insecurity, housing instability, transportation needs, utility difficulties, and interpersonal safety (select all that apply as identified as concern)  []Housing or Food  []Transportation Needs  []Utility Difficulties  []Interpersonal safety  [x]None    Present at Bedside:  Patient's daughter and nurse  Patient information was obtained from patient, patient's family, past medical records and ER records.      HISTORY OF PRESENT ILLNESS:   Juanjose Chawla is a 84 y.o. male with a past medical history of hypertension, hyperlipidemia, DM, anxiety, and depression who presented to [facility] on 4/3/2025 with c/o shortness of breath.  Patient reported worsening shortness of breath for the past several days worse with lying down.  Of note patient was admitted at this facility on 02/25/2025 for worsening shortness of breath with malignant pleural effusion. He  underwent diagnostic thoracentesis that was consistent with adenocarcinoma with questionable primary.  PET scan revealed hypermetabolic consolidation in the right lung base with mediastinal and bilateral adenopathy and innumerable scattered hypermetabolic bone lesions.  Patient underwent endoscopic bronchoscopy, ultrasound, and biopsy after PET scan.  Biopsy of  station 4R lymph node showed poorly differentiated adenocarcinoma.  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 Patient is followed by Dr. Johnson.   Initial vital signs in ED were /69, pulse 106, respirations 21, temperature 36.7° C, and SpO2 93% on room air.  Labs revealed WBC 9.12, RBC 4.16, hemoglobin 12.6, hematocrit 38.3, MCV 92.1, chloride 108, CO2 22, glucose 142, calcium 11.1, BNP 39.6, and troponin 0.035.  COVID and RSV testing were negative.  Respiratory panel was negative.  Blood cultures x2 and Respiratory culture was obtained.  Chest x-ray revealed persistent right lung consolidation.  CTA chest revealed no pulmonary thromboembolism identified, similar mediastinal and bilateral hilar lymphadenopathy, and increased right pleural effusion and right lower lobe zone consolidation.  EKG revealed sinus tachycardia with heart rate of 105 beats per minute. Patient was admitted to hospital medicine service for further medical management.     PAST MEDICAL HISTORY:     Past Medical History:   Diagnosis Date    Anxiety     Diabetes mellitus     HLD (hyperlipidemia)     Hypertension     Major depressive disorder, single episode, unspecified     Pneumonia, unspecified organism        PAST SURGICAL HISTORY:     Past Surgical History:   Procedure Laterality Date    Cardiac stent      ENDOBRONCHIAL ULTRASOUND N/A 3/28/2025    Procedure: ENDOBRONCHIAL ULTRASOUND (EBUS);  Surgeon: Jerrod Harman Jr., MD, [facility];  Location: [facility] BRONCHOSCOPY LAB;  Service: Pulmonary;  Laterality: N/A;    HERNIA REPAIR N/A        FAMILY HISTORY:   Reviewed and negative    SOCIAL HISTORY:   Former tobacco use    ALLERGIES:   Patient has no known allergies.    HOME MEDICATIONS:     Prior to Admission medications    Medication Sig Start Date End Date Taking? Authorizing Provider   albuterol (ACCUNEB) 0.63 mg/3 mL Nebu Take 0.63 mg by nebulization every 6 (six) hours as  needed (wheezing and shortness of breath). Rescue   Yes Provider, Historical   ALPRAZolam (XANAX) 0.25 MG tablet Take 0.25 mg by mouth 3 (three) times daily as needed for Anxiety.   Yes Provider, Historical   aspirin (ECOTRIN) 81 MG EC tablet Take 81 mg by mouth once daily.   Yes Provider, Historical   busPIRone (BUSPAR) 5 MG Tab Take 5 mg by mouth 2 (two) times daily.   Yes Provider, Historical   famotidine (PEPCID) 20 MG tablet Take 20 mg by mouth once daily. 5/24/22  Yes Provider, Historical   gabapentin (NEURONTIN) 300 MG capsule Take 300 mg by mouth 3 (three) times daily.   Yes Provider, Historical   glimepiride (AMARYL) 1 MG tablet Take 2 mg by mouth before breakfast.   Yes Provider, Historical   guaiFENesin-codeine 100-10 mg/5 ml (TUSSI-ORGANIDIN NR)  mg/5 mL syrup Take 5 mLs by mouth every 4 (four) hours as needed for Cough or Congestion. 3/29/25  Yes Romaine Cervantes MD   lisinopriL (PRINIVIL,ZESTRIL) 2.5 MG tablet Take 2.5 mg by mouth once daily. 5/24/22  Yes Provider, Historical   metFORMIN (GLUCOPHAGE) 1000 MG tablet Take 2,000 mg by mouth once daily. 5/24/22  Yes Provider, Historical   polyethylene glycol (GLYCOLAX) 17 gram PwPk Take 17 g by mouth once daily.   Yes Provider, Historical   tamsulosin (FLOMAX) 0.4 mg Cap Take 0.4 mg by mouth once daily.   Yes Provider, Historical   amLODIPine (NORVASC) 5 MG tablet Take 5 mg by mouth once daily. 5/24/22   Provider, Historical   LORazepam (ATIVAN) 1 MG tablet Take  60 min before MRI, May repeat x 1 if needed 3/25/25   Niko Johnson MD   umeclidinium (INCRUSE ELLIPTA) 62.5 mcg/actuation inhalation capsule Inhale 62.5 mcg into the lungs once daily. Controller 2/28/25   Stas Moore MD     ________________________________________________________________________  INPATIENT LIST OF MEDICATIONS   Current Medications[1]    Scheduled Meds:   enoxparin  40 mg Subcutaneous Daily    piperacillin-tazobactam (Zosyn) IV (PEDS and ADULTS) (extended infusion  is not appropriate)  4.5 g Intravenous Q8H    vancomycin 1,250 mg in D5W 250 mL IVPB (admixture device)  1,250 mg Intravenous Q18H     Continuous Infusions:   0.9% NaCl   Intravenous Continuous 100 mL/hr at 04/04/25 0020 New Bag at 04/04/25 0020     PRN Meds:.  Current Facility-Administered Medications:     acetaminophen, 1,000 mg, Oral, Q6H PRN    albuterol-ipratropium, 3 mL, Nebulization, Q4H PRN    aluminum-magnesium hydroxide-simethicone, 30 mL, Oral, QID PRN    bisacodyL, 10 mg, Rectal, Daily PRN    dextrose 50%, 12.5 g, Intravenous, PRN    dextrose 50%, 25 g, Intravenous, PRN    glucagon (human recombinant), 1 mg, Intramuscular, PRN    glucose, 16 g, Oral, PRN    glucose, 24 g, Oral, PRN    insulin aspart U-100, 0-10 Units, Subcutaneous, QID (AC + HS) PRN    melatonin, 6 mg, Oral, Nightly PRN    naloxone, 0.02 mg, Intravenous, PRN    ondansetron, 4 mg, Intravenous, Q4H PRN    prochlorperazine, 5 mg, Intravenous, Q6H PRN    senna-docusate, 1 tablet, Oral, BID PRN    sodium chloride 0.9%, 10 mL, Intravenous, PRN    vancomycin - pharmacy to dose, , Intravenous, pharmacy to manage frequency      REVIEW OF SYSTEMS:   Except as documented, all other systems reviewed and negative.    PHYSICAL EXAM:     VITAL SIGNS: 24 HRS MIN & MAX LAST   Temp  Min: 97.6 °F (36.4 °C)  Max: 98 °F (36.7 °C) 97.6 °F (36.4 °C)   BP  Min: 117/72  Max: 163/94 135/70   Pulse  Min: 80  Max: 106  80   Resp  Min: 17  Max: 24 20   SpO2  Min: 91 %  Max: 96 % 95 %       General appearance:  Elderly male in no apparent distress.  HENT: Atraumatic head.   Eyes: Normal extraocular movements.   Neck: Supple.   Lungs:  Coarse breath sounds bilaterally  Heart: Regular rate and rhythm.  Abdomen: Soft, non-distended, non-tender.  Skin: No Rash.   Neuro: Awake, alert, and oriented. Motor and sensory exams grossly intact.   Psych/mental status: Appropriate mood and affect. Responds appropriately to questions.     LABS AND IMAGING:     Recent Labs   Lab  04/03/25  1553 04/04/25 0328   WBC 9.12 8.39   RBC 4.16* 3.87*   HGB 12.6* 11.6*   HCT 38.3* 35.7*   MCV 92.1 92.2   MCH 30.3 30.0   MCHC 32.9* 32.5*   RDW 13.5 13.5    154   MPV 10.2 10.6*       Recent Labs   Lab 04/03/25  1553 04/04/25 0328    140   K 4.4 4.1   * 107   CO2 22* 27   BUN 12.6 10.0   CREATININE 0.74 0.70*   CALCIUM 11.1* 11.1*   MG  --  2.00   ALBUMIN 3.0* 2.8*   ALKPHOS 80 79   ALT 7 7   AST 18 17   BILITOT 0.4 0.6       Microbiology Results (last 7 days)       Procedure Component Value Units Date/Time    Respiratory Culture [[id number]] Collected: 04/04/25 0634    Order Status: Sent Specimen: Sputum, Expectorated Updated: 04/04/25 0637    Blood Culture [[id number]] Collected: 04/03/25 2318    Order Status: Resulted Specimen: Blood, Venous Updated: 04/03/25 2328    Blood Culture [[id number]] Collected: 04/03/25 2318    Order Status: Resulted Specimen: Blood, Venous Updated: 04/03/25 2328             CTA Chest Non-Coronary (PE Studies)  Narrative: EXAMINATION:  CTA CHEST NON CORONARY (PE STUDIES)    CLINICAL HISTORY:  Pulmonary embolism (PE) suspected, high prob;    TECHNIQUE:  Sequential axial images performed of the chest using pulmonary embolism protocol following intravenous contrast bolus. Sagittal and contrast reformations performed.  MIP images are also performed.    Dose product length of 217 mGycm. Automated exposure control was utilized to minimize radiation dose.    FINDINGS:  Optimal contrast bolus timing with adequately opacified pulmonary artery system is without evidence of filling defects from pulmonary thromboembolism within the main pulmonary artery and the major branches. Thoracic aorta is without aneurysmal dilatation or dissection.    There are multiple enlarged mediastinal and bilateral hilar lymph nodes without significant interval change and these were hypermetabolic on the previous PET scan.  There is size increase of right pleural effusion which causes  further right lower lung zone compressive atelectasis.  Right lower lung lobe consolidation is again more evident and was again hypermetabolic on the previous PET scan.  Bilateral lungs are remarkable for similar extent ground opacities which could represent congestive process versus pneumonitis versus small airway disease.  Bilateral peripheral subpleural reticulations consistent with fibrosis show about similar appearance.  There are left lower lung zone some cystic formations.    No acute or aggressive skeletal abnormality.  Impression: 1.  No pulmonary thromboembolism identified.    2.  Similar mediastinal and bilateral hilar lymphadenopathy.    3.  Increased right pleural effusion and right lower lung zone consolidation.    Electronically signed by: Iam Menon  Date:    04/03/2025  Time:    21:15  X-Ray Chest PA And Lateral  Narrative: EXAMINATION:  XR CHEST PA AND LATERAL    CLINICAL HISTORY:  Shortness of breath    TECHNIQUE:  Two-view    COMPARISON:  March 26, 2025..    FINDINGS:  Cardiopericardial silhouette enlarged appearance is similar.  Persistent right perihilar posterior aspect opacity likely representing consolidation.  There are severe chronic interstitial changes of the lungs representing emphysema and fibrosis.  Unchanged size of right pleural effusion and right lower lung zone atelectasis.  Impression: Persistent right lung consolidation.    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  Hypercalcemia  Normocytic anemia, stable   Hyperglycemia with history of DM  History of hypertension, hyperlipidemia, DM, anxiety, and depression      Plan:  IV Zosyn and Vancomycin   Blood cultures pending, follow-up results  Respiratory culture pending, follow-up results   Pulmonary consulted, thoracentesis  performed today  Oncology consulted, appreciate recommendations-planning inpatient carboplatin Taxol vs carbo/alimta based on available drugs   Prn Xopenex q.6 hours   PRN antitussives   Close H&H monitoring   Close electrolyte monitoring   Insulin sliding scale with Accuchecks  Resume home medications as deemed appropriate once medication reconciliation is updated  Labs in AM    VTE Prophylaxis: Lovenox    Discharge Planning and Disposition: AILEEN BUI, Steve Diggs PA-C have reviewed and discussed the case with Itzel Webb MD   Please see the attending MD's addendum for further assessment and plan.    Steve Diggs PA-C  Department of Hospital Medicine   [facility]   04/04/2025    This note was created with the assistance of [employer] voice recognition software. There may be transcription errors as a result of using this technology, however minimal. Effort has been made to assure accuracy of transcription, but any obvious errors or omissions should be clarified with the author of the document.    _______________________________________________________________________________      04/04/2025          [1]   Current Facility-Administered Medications:     0.9% NaCl infusion, , Intravenous, Continuous, Vira Mccurdy FNP, Last Rate: 100 mL/hr at 04/04/25 0020, New Bag at 04/04/25 0020    acetaminophen tablet 1,000 mg, 1,000 mg, Oral, Q6H PRN, Vira Mccurdy FNP    albuterol-ipratropium 2.5 mg-0.5 mg/3 mL nebulizer solution 3 mL, 3 mL, Nebulization, Q4H PRN, Vira Mccurdy FNP    aluminum-magnesium hydroxide-simethicone 200-200-20 mg/5 mL suspension 30 mL, 30 mL, Oral, QID PRN, Vira Mccurdy, ALICEP    bisacodyL suppository 10 mg, 10 mg, Rectal, Daily PRN, Vira Mccurdy FNP    dextrose 50% injection 12.5 g, 12.5 g, Intravenous, PRN, Vira Mccurdy, ALICEP    dextrose 50% injection 25 g, 25 g, Intravenous, PRN, Vira Mccurdy, ALICEP    enoxaparin injection 40  mg, 40 mg, Subcutaneous, Daily, Vira Mccurdy FNP    glucagon (human recombinant) injection 1 mg, 1 mg, Intramuscular, PRN, Vira Mccurdy, FNP    glucose chewable tablet 16 g, 16 g, Oral, PRN, Vira Mccurdy, FNP    glucose chewable tablet 24 g, 24 g, Oral, PRN, Vira Mccurdy, FNP    insulin aspart U-100 injection 0-10 Units, 0-10 Units, Subcutaneous, QID (AC + HS) PRN, Vira Mccurdy FNP    melatonin tablet 6 mg, 6 mg, Oral, Nightly PRN, Vira Mccurdy, ALICEP    naloxone 0.4 mg/mL injection 0.02 mg, 0.02 mg, Intravenous, PRN, Vira Mccurdy FNP    ondansetron injection 4 mg, 4 mg, Intravenous, Q4H PRN, Vira Mccurdy, FNP    piperacillin-tazobactam (ZOSYN) 4.5 g in D5W 100 mL IVPB (MB+), 4.5 g, Intravenous, Q8H, Vira Mccurdy FNP, Stopped at 04/04/25 0328    prochlorperazine injection Soln 5 mg, 5 mg, Intravenous, Q6H PRN, Vira Mccurdy FNP    senna-docusate 8.6-50 mg per tablet 1 tablet, 1 tablet, Oral, BID PRN, Vira Mccurdy, HEMA    sodium chloride 0.9% flush 10 mL, 10 mL, Intravenous, PRN, Vira Mccurdy FNP    vancomycin - pharmacy to dose, , Intravenous, pharmacy to manage frequency, Vira Mccurdy FNP    vancomycin 1,250 mg in D5W 250 mL IVPB (admixture device), 1,250 mg, Intravenous, Q18H, Itzel Webb MD

## 2025-04-04 NOTE — PROGRESS NOTES
"Pharmacokinetic Initial Assessment: IV Vancomycin    Assessment/Plan:    Initiate intravenous vancomycin with loading dose of 1750 mg once with subsequent doses when random concentrations are less than 20 mcg/mL  Desired empiric serum trough concentration is 10 to 20 mcg/mL  Draw vancomycin random level on 0500 at 04/05.  Pharmacy will continue to follow and monitor vancomycin.      Please contact pharmacy at extension [phone] with any questions regarding this assessment.     Thank you for the consult,   Ryann Shabazz       Patient brief summary:  Juanjose Chawla is a 84 y.o. male initiated on antimicrobial therapy with IV Vancomycin for treatment of suspected  pneumonia  MRSA PCR ordered and in process. Will load for now.     Drug Allergies:   Review of patient's allergies indicates:  No Known Allergies    Actual Body Weight:   Wt Readings from Last 1 Encounters:   04/03/25 88 kg (194 lb 0.1 oz)       Renal Function:   Estimated Creatinine Clearance: 80.1 mL/min (based on SCr of 0.74 mg/dL).,     Dialysis Method (if applicable):  N/A    CBC (last 72 hours):  Recent Labs   Lab Result Units 04/03/25  1553   WBC x10(3)/mcL 9.12   Hgb g/dL 12.6*   Hct % 38.3*   Platelet x10(3)/mcL 143   Mono % % 10.4   Eos % % 2.0   Basophil % % 0.5       Metabolic Panel (last 72 hours):  Recent Labs   Lab Result Units 04/03/25  1553   Sodium mmol/L 140   Potassium mmol/L 4.4   Chloride mmol/L 108*   CO2 mmol/L 22*   Glucose mg/dL 142*   Blood Urea Nitrogen mg/dL 12.6   Creatinine mg/dL 0.74   Albumin g/dL 3.0*   Bilirubin Total mg/dL 0.4   ALP unit/L 80   AST unit/L 18   ALT unit/L 7       Drug levels (last 3 results):  No results for input(s): "VANCOMYCINRA", "VANCORANDOM", "VANCOMYCINPE", "VANCOPEAK", "VANCOMYCINTR", "VANCOTROUGH" in the last 72 hours.    Microbiologic Results:  Microbiology Results (last 7 days)       Procedure Component Value Units Date/Time    Blood Culture [[id number]] Collected: 04/03/25 5245    Order Status: " Sent Specimen: Blood, Venous Updated: 04/03/25 2328    Blood Culture [[id number]] Collected: 04/03/25 2318    Order Status: Sent Specimen: Blood, Venous Updated: 04/03/25 2328    Respiratory Culture [[id number]]     Order Status: Sent Specimen: Sputum

## 2025-04-04 NOTE — CONSULTS
[facility] - Oncology Acute  Pulmonary Critical Care Note    Patient Name: Juanjose Chawla  MRN: [id number]  Admission Date: 4/3/2025  Hospital Length of Stay: 1 days  Code Status: Full Code  Attending Provider: Itzel Webb,*  Primary Care Provider: Sadiq Hartmann MD     Subjective:     HPI:   This is an 84-year-old male with medical problems of diabetes, hypertension, anxiety, adenocarcinoma of unknown primary, most likely lung, and malignant pleural effusion who presented to the hospital for shortness of breath.  Chest CT showed a recurrent right pleural effusion.  He has had difficulty lying flat and feels better after prior thoracentesis.    24 Hour Interval History:  He is well-appearing and walking around his room.  Reports gradual onset of shortness of breath.  Denies any systemic symptoms including fevers, chills, night sweats.    Past Medical History:   Diagnosis Date    Anxiety     Diabetes mellitus     HLD (hyperlipidemia)     Hypertension     Major depressive disorder, single episode, unspecified     Pneumonia, unspecified organism        Past Surgical History:   Procedure Laterality Date    Cardiac stent      ENDOBRONCHIAL ULTRASOUND N/A 3/28/2025    Procedure: ENDOBRONCHIAL ULTRASOUND (EBUS);  Surgeon: Jerrod Harman Jr., MD, [facility];  Location: [facility] BRONCHOSCOPY LAB;  Service: Pulmonary;  Laterality: N/A;    HERNIA REPAIR N/A        Social History[1]        Current Outpatient Medications   Medication Instructions    albuterol (ACCUNEB) 0.63 mg, Every 6 hours PRN    ALPRAZolam (XANAX) 0.25 mg, 3 times daily PRN    amLODIPine (NORVASC) 5 mg, Daily    aspirin (ECOTRIN) 81 mg, Daily    busPIRone (BUSPAR) 5 mg, 2 times daily    famotidine (PEPCID) 20 mg, Daily    gabapentin (NEURONTIN) 300 mg, 3 times daily    glimepiride (AMARYL) 2 mg, Before breakfast    guaiFENesin-codeine 100-10 mg/5 ml (TUSSI-ORGANIDIN NR)  mg/5 mL syrup 5 mLs, Oral, Every 4 hours PRN    lisinopriL  (PRINIVIL,ZESTRIL) 2.5 mg, Daily    LORazepam (ATIVAN) 1 MG tablet Take  60 min before MRI, May repeat x 1 if needed    metFORMIN (GLUCOPHAGE) 2,000 mg, Daily    polyethylene glycol (GLYCOLAX) 17 g, Daily    tamsulosin (FLOMAX) 0.4 mg, Daily    umeclidinium (INCRUSE ELLIPTA) 62.5 mcg, Inhalation, Daily, Controller       Review of patient's allergies indicates:  No Known Allergies     Current Inpatient Medications   cyanocobalamin  1,000 mcg Subcutaneous Once    enoxparin  40 mg Subcutaneous Daily    folic acid  1 mg Oral Daily    piperacillin-tazobactam (Zosyn) IV (PEDS and ADULTS) (extended infusion is not appropriate)  4.5 g Intravenous Q8H    vancomycin 1,250 mg in D5W 250 mL IVPB (admixture device)  1,250 mg Intravenous Q18H    zoledronic acid  4 mg Intravenous 1 time in Clinic/HOD       Current Intravenous Infusions   0.9% NaCl   Intravenous Continuous 100 mL/hr at 04/04/25 0020 New Bag at 04/04/25 0020         Review of Systems   Constitutional:  Negative for chills, fever and malaise/fatigue.   Respiratory:  Positive for cough and shortness of breath.    Cardiovascular:  Negative for chest pain and palpitations.          Objective:       Intake/Output Summary (Last 24 hours) at 4/4/2025 0856  Last data filed at 4/4/2025 0746  Gross per 24 hour   Intake 1293.38 ml   Output --   Net 1293.38 ml         Vital Signs (Most Recent):  Temp: 97.6 °F (36.4 °C) (04/04/25 0730)  Pulse: 80 (04/04/25 0730)  Resp: 20 (04/04/25 0730)  BP: 135/70 (04/04/25 0730)  SpO2: 95 % (04/04/25 0730)  Body mass index is 27.84 kg/m².  Weight: 88 kg (194 lb 0.1 oz) Vital Signs (24h Range):  Temp:  [97.6 °F (36.4 °C)-98 °F (36.7 °C)] 97.6 °F (36.4 °C)  Pulse:  [] 80  Resp:  [17-24] 20  SpO2:  [91 %-96 %] 95 %  BP: (117-163)/() 135/70     Physical Exam  Vitals reviewed.   Cardiovascular:      Rate and Rhythm: Normal rate and regular rhythm.   Pulmonary:      Effort: Pulmonary effort is normal.   Musculoskeletal:      Right lower  leg: No edema.      Left lower leg: No edema.   Neurological:      General: No focal deficit present.      Mental Status: He is alert and oriented to person, place, and time.           Lines/Drains/Airways       Peripheral Intravenous Line  Duration                  Peripheral IV - Single Lumen 04/03/25 1552 20 G Left Antecubital <1 day                    Significant Labs:    Lab Results   Component Value Date    WBC 8.39 04/04/2025    HGB 11.6 (L) 04/04/2025    HCT 35.7 (L) 04/04/2025    MCV 92.2 04/04/2025     04/04/2025           BMP  Lab Results   Component Value Date     04/04/2025    K 4.1 04/04/2025    CO2 27 04/04/2025    BUN 10.0 04/04/2025    CREATININE 0.70 (L) 04/04/2025    CALCIUM 11.1 (H) 04/04/2025    AGAP 6.0 04/04/2025    EGFRNONAA >60 06/13/2022         Significant Imaging:  I have reviewed the pertinent imaging within the past 24 hours.  Chest CT 04/03/2025 shows recurrence of a right pleural effusion and a small right lower lobe consolidation on a background of bilateral subpleural reticulation      Assessment/Plan:     Assessment  Shortness of breath  Malignant right pleural effusion  Right lower lobe consolidation  Interstitial lung disease      Plan  Discussed options of thoracentesis versus PleurX catheter placement.  Prefers to undergo thoracentesis at this time but will consider PleurX in the future.  Thoracentesis performed but 900 mL of fluid removed.  There is a questionable small right apical pneumothorax on the follow up chest x-ray.  Repeat chest x-ray in 4 hours  Agree with vancomycin and Zosyn.  Check sputum culture.  Send MRSA nares  Follow up pleural fluid glucose and cell count  Oncology planning for inpatient chemotherapy    Pulmonary will continue to follow.      Cisco Harkins MD  Pulmonary Critical Care Medicine  [facility]  DOS: 04/04/2025          [1]   Social History  Socioeconomic History    Marital status:    Tobacco Use     Smoking status: Never    Smokeless tobacco: Never   Substance and Sexual Activity    Alcohol use: Not Currently    Drug use: Never     Social Drivers of Health     Financial Resource Strain: Low Risk  (4/4/2025)    Overall Financial Resource Strain (CARDIA)     Difficulty of Paying Living Expenses: Not hard at all   Food Insecurity: No Food Insecurity (4/4/2025)    Hunger Vital Sign     Worried About Running Out of Food in the Last Year: Never true     Ran Out of Food in the Last Year: Never true   Transportation Needs: No Transportation Needs (4/4/2025)    PRAPARE - Transportation     Lack of Transportation (Medical): No     Lack of Transportation (Non-Medical): No   Stress: No Stress Concern Present (4/4/2025)    [employer] [place] [employer] - Occupational Stress Questionnaire     Feeling of Stress : Not at all   Housing Stability: Low Risk  (4/4/2025)    Housing Stability Vital Sign     Unable to Pay for Housing in the Last Year: No     Homeless in the Last Year: No

## 2025-04-04 NOTE — CONSULTS
[facility] - Oncology Acute  Hematology/Oncology  Consult Note    Patient Name: Juanjose Chawla  MRN: [id number]  Admission Date: 4/3/2025  Hospital Length of Stay: 1 days  Attending Provider: Itzel Webb,*  Consulting Provider: Niko Johnson MD  Principal Problem:<principal problem not specified>    Consults  Subjective:     HPI: This is an 84-year-old male who was seen evaluated by Pulmonary.  He was admitted to the hospital on February 28, 2025 with progressive shortness of breath he eventually underwent a diagnostic thoracentesis  He underwent CT scan chest abdomen pelvis.  He eventually underwent a diagnostic thoracentesis with social consistent with adenocarcinoma questionable primary.  He underwent a PET scan it was referred to us for further evaluation. endoscopic bronchoscopy and ultrasound and biopsy after his PET scan    PET scan showed a pneumo bone metastases, malignant hilar and mediastinal adenopathy pleural metastasis in his malignant pleural effusion.  No other evidence of a primary.  He underwent a 2nd biopsy of the station 4R lymph node.  Which again showed a poorly differentiated adenocarcinoma questionable GI primary    We are awaiting his outpatient final pathology.  However patient came back in with increasing shortness of breath to the emergency room.  He underwent a repeat CT scan last night    04/04/2025: CTA of the chest: Multiple enlarged mediastinal and bilateral hilar nodes without significant interval change.  Increasing size of the right pleural effusion with further right lower lobe zone atelectasis.  Right lower lobe consolidation more evident.      Patient was family in room this morning.  He was sitting up eating breakfast.  He was less short of breath but still gets short of breath on minimal exertion.    He was dry mouth.  He was had constipation for 3 days.  No bleeding per rectum currently.  Still with a little bit of dysuria no frequency.    He  was on antibiotics.    We discussed the adenocarcinoma most likely lung an unknown primary.    We discussed genetic chemotherapy with carboplatin and either Taxol or Alimta.    Chemo side effects  The side effects of chemotherapy were discussed.  Including but not limited to: Nausea, vomiting, alopecia, neuropathy, neutropenia, blood transfusion, nephropathy, secondary malignancies, congestive heart failure, allergic reactions to name a few.  and patient with the opportunity to have questions answered.            Oncology Treatment Plan:   [No matching plan found]    Oncology History Overview Note   Images     February 27, 2025:  CT of the chest, COPD and emphysema, right lower lobe atelectasis, lymphadenopathy in the right hilum as well as the mediastinum and smaller nodes in the left hilum.  The right hilar lymph node measures 2.5 cm other small nodes in the right hilum.  And there is a precarinal lymph node 2.2 x 2.2 cm.  And adenopathy seen in the subcarinal space as well as the mediastinum.     03/13/2025: CT abdomen pelvis, right-sided pleural effusion interstitial fibrosis lungs bilaterally prostatic hypertrophy     03/20/2025: PET-CT: Hypermetabolic consolidation right lung base with mediastinal and bilateral adenopathy and innumerable scattered hypermetabolic bone lesions     pathology  The right pleural effusion showed malignant cells consistent with a poorly differential adenocarcinoma:  Negative for TTF 1, WT1, GATA3.  Suggest an origin of the GI tract clinical pathology is recommended    2nd biopsy  1. EBUS LYMPH NODE 4R (TWO CONVENTIONAL SMEARS, ONE THIN PREP SMEAR, ONE CELL   BLOCK):    RARE MALIGNANT CELLS CONSISTENT WITH POORLY DIFFERENTIATED ADENOCARCINOMA.      2. EBUS LYMPH NODE 7R:    POORLY DIFFERENTIATED ADENOCARCINOMA.   The immunohistochemical profile of the tumor cells is not typical of lung   primary and may be seen with adenocarcinomas of upper   gastrointestinal/pancreaticobiliary  primary.       Tempus Liquid biopsy  TP53  No actionable mutation  KEAP1 +     Adenocarcinoma of unknown primary   4/4/2025 Cancer Staged    Staging form: Lung, AJCC V9  - Clinical stage from 4/4/2025: Stage ANTONIA (cTX, cNX, cM1b)          Medications:  Continuous Infusions:   0.9% NaCl   Intravenous Continuous 100 mL/hr at 04/04/25 0020 New Bag at 04/04/25 0020     Scheduled Meds:   enoxparin  40 mg Subcutaneous Daily    piperacillin-tazobactam (Zosyn) IV (PEDS and ADULTS) (extended infusion is not appropriate)  4.5 g Intravenous Q8H    vancomycin 750 mg in D5W 250 mL IVPB (admixture device)  750 mg Intravenous Once     PRN Meds:  Current Facility-Administered Medications:     acetaminophen, 1,000 mg, Oral, Q6H PRN    albuterol-ipratropium, 3 mL, Nebulization, Q4H PRN    aluminum-magnesium hydroxide-simethicone, 30 mL, Oral, QID PRN    bisacodyL, 10 mg, Rectal, Daily PRN    dextrose 50%, 12.5 g, Intravenous, PRN    dextrose 50%, 25 g, Intravenous, PRN    glucagon (human recombinant), 1 mg, Intramuscular, PRN    glucose, 16 g, Oral, PRN    glucose, 24 g, Oral, PRN    insulin aspart U-100, 0-10 Units, Subcutaneous, QID (AC + HS) PRN    melatonin, 6 mg, Oral, Nightly PRN    naloxone, 0.02 mg, Intravenous, PRN    ondansetron, 4 mg, Intravenous, Q4H PRN    prochlorperazine, 5 mg, Intravenous, Q6H PRN    senna-docusate, 1 tablet, Oral, BID PRN    sodium chloride 0.9%, 10 mL, Intravenous, PRN    vancomycin - pharmacy to dose, , Intravenous, pharmacy to manage frequency     Review of patient's allergies indicates:  No Known Allergies     Past Medical History:   Diagnosis Date    Anxiety     Diabetes mellitus     HLD (hyperlipidemia)     Hypertension     Major depressive disorder, single episode, unspecified     Pneumonia, unspecified organism      Past Surgical History:   Procedure Laterality Date    Cardiac stent      ENDOBRONCHIAL ULTRASOUND N/A 3/28/2025    Procedure: ENDOBRONCHIAL ULTRASOUND (EBUS);  Surgeon: Kimani  Jerrod DARLING Jr., MD, [facility];  Location: [facility] BRONCHOSCOPY LAB;  Service: Pulmonary;  Laterality: N/A;    HERNIA REPAIR N/A      Family History    None       Tobacco Use    Smoking status: Never    Smokeless tobacco: Never   Substance and Sexual Activity    Alcohol use: Not Currently    Drug use: Never    Sexual activity: Not on file       Review of Systems see above in HPI  Objective:     Vital Signs (Most Recent):  Temp: 98 °F (36.7 °C) (04/04/25 0411)  Pulse: 93 (04/04/25 0411)  Resp: 20 (04/04/25 0411)  BP: (!) 143/83 (04/04/25 0411)  SpO2: (!) 93 % (04/04/25 0411) Vital Signs (24h Range):  Temp:  [98 °F (36.7 °C)] 98 °F (36.7 °C)  Pulse:  [] 93  Resp:  [17-24] 20  SpO2:  [91 %-96 %] 93 %  BP: (117-163)/() 143/83     Weight: 88 kg (194 lb 0.1 oz)  Body mass index is 27.84 kg/m².  Body surface area is 2.08 meters squared.    No intake or output data in the 24 hours ending 04/04/25 0746    Physical Exam  Vitals reviewed.   Constitutional:       General: He is awake.      Comments: Sitting up in the chair,   Tachypneic with speaking   Anxious   HENT:      Head: Normocephalic and atraumatic.      Mouth/Throat:      Mouth: Mucous membranes are moist.      Pharynx: Oropharynx is clear.   Eyes:      Extraocular Movements: Extraocular movements intact.      Conjunctiva/sclera: Conjunctivae normal.      Pupils: Pupils are equal, round, and reactive to light.   Cardiovascular:      Rate and Rhythm: Normal rate and regular rhythm.      Pulses: Normal pulses.      Heart sounds: Normal heart sounds.   Pulmonary:      Effort: Pulmonary effort is normal.      Breath sounds: Decreased air movement present. Examination of the right-middle field reveals decreased breath sounds. Examination of the right-lower field reveals decreased breath sounds. Decreased breath sounds present.   Abdominal:      General: Abdomen is flat. Bowel sounds are normal. There is no distension.      Palpations: Abdomen is soft.   Musculoskeletal:          General: Normal range of motion.      Cervical back: Normal range of motion and neck supple.   Skin:     General: Skin is warm and dry.   Neurological:      General: No focal deficit present.      Mental Status: He is alert and oriented to person, place, and time. Mental status is at baseline.      GCS: GCS eye subscore is 4. GCS verbal subscore is 5. GCS motor subscore is 6.   Psychiatric:         Attention and Perception: Attention normal.         Mood and Affect: Mood is anxious.         Behavior: Behavior normal. Behavior is cooperative.         Thought Content: Thought content normal.         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    Consider EGD with the above path report  Continue DuoNebs    Hypercalcemia (corrected calcium 12---with his albumin of 2.8)             IV fluids                 Add Zometa                 Check magnesium, phosphorus, vitamin-D in a.m.  Weight loss   Fatigue  Anemia             Nutritional support            Palliative care         Okay  for PICC line placement  If  BC/resp panel negative--agree with deescalation of antibiotics    Will see chemo for am        Thank you for your consult.     Niko Johnson MD  Hematology/Oncology  [facility] - Oncology Acute     Addendum  Taxol it was available.    We will give the patient carboplatin and Taxol.  Patient agrees to chemotherapy consent form signed.  Discussed with pharmacy.  Consent signed.    PICC line plan, we will start chemotherapy on 04/05/2025:  Carboplatin AUC of 6, Taxol 135 mg per m2   Okay for growth factors on day 2.    Follow calcium  Discontinue B12 folic acid.

## 2025-04-04 NOTE — ED PROVIDER NOTES
Encounter Date: 4/3/2025    SCRIBE #1 NOTE: I, Velvet Echavarria, am scribing for, and in the presence of,  Jasper Eubanks MD. I have scribed the following portions of the note - Other sections scribed: HPI, ROS, PE.       History     Chief Complaint   Patient presents with    Shortness of Breath     Pt arrives c/o SOB x several days worse with lying down. Denies CP, cough. Seeing Dr. Johnson for possible lung cancer. Daughter reports had fluid drained from chest 2 weeks ago.      The patient is a 84 year old male with hx of HLD, HTN, DM, pneumonia, and lung cancer presenting to the ED due to SOB.  Complains of worsening shortness of breath and inhibits ability to lay flat.. He reports of having an endobronchial scope ultrasound on 03/28. The patients daughter reports of the patient having fluid removed from the right side of lungs, total of 1800 cc. The patient denies being on oxygen at home.  That has a presented for worsening shortness of breath when lying flat concern that he needs another thoracentesis    Oncologist: Niko Johnson MD  Pulmonologist: Jerrod Harman Jr[facility]    The history is provided by the patient, medical records and a relative. No  was used.     Review of patient's allergies indicates:  No Known Allergies  Past Medical History:   Diagnosis Date    Anxiety     Diabetes mellitus     HLD (hyperlipidemia)     Hypertension     Major depressive disorder, single episode, unspecified     Pneumonia, unspecified organism      Past Surgical History:   Procedure Laterality Date    Cardiac stent      ENDOBRONCHIAL ULTRASOUND N/A 3/28/2025    Procedure: ENDOBRONCHIAL ULTRASOUND (EBUS);  Surgeon: Jerrod Harman Jr., MD, [facility];  Location: [facility] BRONCHOSCOPY LAB;  Service: Pulmonary;  Laterality: N/A;    HERNIA REPAIR N/A      No family history on file.  Social History[1]  Review of Systems   Constitutional:  Negative for chills and fever.   Respiratory:  Positive for shortness of  breath. Negative for cough.    Cardiovascular:         Orthopnea       Physical Exam     Initial Vitals [04/03/25 1519]   BP Pulse Resp Temp SpO2   138/69 106 (!) 21 98 °F (36.7 °C) (!) 93 %      MAP       --         Physical Exam    Constitutional: He appears well-developed and well-nourished. He is not diaphoretic. No distress.   Hoarse, chronic post procedure.      HENT:   Head: Normocephalic and atraumatic.   Right Ear: External ear normal.   Left Ear: External ear normal.   Nose: Nose normal.   Eyes: EOM are normal. Pupils are equal, round, and reactive to light. Right eye exhibits no discharge. Left eye exhibits no discharge.   Cardiovascular:  Normal rate, regular rhythm and normal heart sounds.     Exam reveals no gallop and no friction rub.       No murmur heard.  Pulmonary/Chest: Effort normal and breath sounds normal. No respiratory distress. He has no wheezes. He has no rhonchi. He has no rales. He exhibits no tenderness.   Crackles, bilaterally worsening in the left side.      Abdominal: Abdomen is soft. Bowel sounds are normal. He exhibits no distension and no mass. There is no abdominal tenderness. There is no rebound and no guarding.   Musculoskeletal:         General: No edema. Normal range of motion.     Neurological: He is alert and oriented to person, place, and time. No cranial nerve deficit or sensory deficit.   Skin: Skin is warm and dry. Capillary refill takes less than 2 seconds.         ED Course   Procedures  Labs Reviewed   COMPREHENSIVE METABOLIC PANEL - Abnormal       Result Value    Sodium 140      Potassium 4.4      Chloride 108 (*)     CO2 22 (*)     Glucose 142 (*)     Blood Urea Nitrogen 12.6      Creatinine 0.74      Calcium 11.1 (*)     Protein Total 7.2      Albumin 3.0 (*)     Globulin 4.2 (*)     Albumin/Globulin Ratio 0.7 (*)     Bilirubin Total 0.4      ALP 80      ALT 7      AST 18      eGFR >60      Anion Gap 10.0      BUN/Creatinine Ratio 17     CBC WITH DIFFERENTIAL -  Abnormal    WBC 9.12      RBC 4.16 (*)     Hgb 12.6 (*)     Hct 38.3 (*)     MCV 92.1      MCH 30.3      MCHC 32.9 (*)     RDW 13.5      Platelet 143      MPV 10.2      Neut % 68.8      Lymph % 17.5      Mono % 10.4      Eos % 2.0      Basophil % 0.5      Imm Grans % 0.8      Neut # 6.27      Lymph # 1.60      Mono # 0.95      Eos # 0.18      Baso # 0.05      Imm Gran # 0.07 (*)     NRBC% 0.0     B-TYPE NATRIURETIC PEPTIDE - Normal    Natriuretic Peptide 39.6     TROPONIN I - Normal    Troponin-I 0.035     BLOOD CULTURE [facility]   BLOOD CULTURE [facility]   RESPIRATORY CULTURE ([facility])   CBC W/ AUTO DIFFERENTIAL    Narrative:     The following orders were created for panel order CBC auto differential.  Procedure                               Abnormality         Status                     ---------                               -----------         ------                     CBC with Differential[[id number]]       Abnormal            Final result                 Please view results for these tests on the individual orders.   MRSA PCR   COVID/RSV/FLU A&B PCR   RESPIRATORY PANEL          Imaging Results              CTA Chest Non-Coronary (PE Studies) (Final result)  Result time 04/03/25 21:15:23      Final result by Iam Menon MD (04/03/25 21:15:23)                   Impression:      1.  No pulmonary thromboembolism identified.    2.  Similar mediastinal and bilateral hilar lymphadenopathy.    3.  Increased right pleural effusion and right lower lung zone consolidation.      Electronically signed by: Iam Menon  Date:    04/03/2025  Time:    21:15               Narrative:    EXAMINATION:  CTA CHEST NON CORONARY (PE STUDIES)    CLINICAL HISTORY:  Pulmonary embolism (PE) suspected, high prob;    TECHNIQUE:  Sequential axial images performed of the chest using pulmonary embolism protocol following intravenous contrast bolus. Sagittal and contrast reformations performed.  MIP images are also performed.    Dose product length of  217 mGycm. Automated exposure control was utilized to minimize radiation dose.    FINDINGS:  Optimal contrast bolus timing with adequately opacified pulmonary artery system is without evidence of filling defects from pulmonary thromboembolism within the main pulmonary artery and the major branches. Thoracic aorta is without aneurysmal dilatation or dissection.    There are multiple enlarged mediastinal and bilateral hilar lymph nodes without significant interval change and these were hypermetabolic on the previous PET scan.  There is size increase of right pleural effusion which causes further right lower lung zone compressive atelectasis.  Right lower lung lobe consolidation is again more evident and was again hypermetabolic on the previous PET scan.  Bilateral lungs are remarkable for similar extent ground opacities which could represent congestive process versus pneumonitis versus small airway disease.  Bilateral peripheral subpleural reticulations consistent with fibrosis show about similar appearance.  There are left lower lung zone some cystic formations.    No acute or aggressive skeletal abnormality.                                       X-Ray Chest PA And Lateral (Final result)  Result time 04/03/25 15:45:29      Final result by Iam Menon MD (04/03/25 15:45:29)                   Impression:      Persistent right lung consolidation.      Electronically signed by: Iam Menon  Date:    04/03/2025  Time:    15:45               Narrative:    EXAMINATION:  XR CHEST PA AND LATERAL    CLINICAL HISTORY:  Shortness of breath    TECHNIQUE:  Two-view    COMPARISON:  March 26, 2025..    FINDINGS:  Cardiopericardial silhouette enlarged appearance is similar.  Persistent right perihilar posterior aspect opacity likely representing consolidation.  There are severe chronic interstitial changes of the lungs representing emphysema and fibrosis.  Unchanged size of right pleural effusion and right lower lung zone  atelectasis.                                       Medications   piperacillin-tazobactam (ZOSYN) 4.5 g in D5W 100 mL IVPB (MB+) (has no administration in time range)   albuterol-ipratropium 2.5 mg-0.5 mg/3 mL nebulizer solution 3 mL (has no administration in time range)   insulin aspart U-100 injection 0-10 Units (has no administration in time range)   glucose chewable tablet 16 g (has no administration in time range)   glucose chewable tablet 24 g (has no administration in time range)   dextrose 50% injection 12.5 g (has no administration in time range)   dextrose 50% injection 25 g (has no administration in time range)   glucagon (human recombinant) injection 1 mg (has no administration in time range)   sodium chloride 0.9% flush 10 mL (has no administration in time range)   melatonin tablet 6 mg (has no administration in time range)   ondansetron injection 4 mg (has no administration in time range)   prochlorperazine injection Soln 5 mg (has no administration in time range)   senna-docusate 8.6-50 mg per tablet 1 tablet (has no administration in time range)   bisacodyL suppository 10 mg (has no administration in time range)   aluminum-magnesium hydroxide-simethicone 200-200-20 mg/5 mL suspension 30 mL (has no administration in time range)   acetaminophen tablet 1,000 mg (has no administration in time range)   naloxone 0.4 mg/mL injection 0.02 mg (has no administration in time range)   enoxaparin injection 40 mg (has no administration in time range)   0.9% NaCl infusion (has no administration in time range)   albuterol-ipratropium 2.5 mg-0.5 mg/3 mL nebulizer solution 3 mL (3 mLs Nebulization Given 4/3/25 2011)   morphine injection 2 mg (2 mg Intravenous Given 4/3/25 2001)   ondansetron injection 4 mg (4 mg Intravenous Given 4/3/25 2001)   iohexoL (OMNIPAQUE 350) injection 70 mL (70 mLs Intravenous Given 4/3/25 2050)     Medical Decision Making  Differential diagnosis includes but is not limited to, COPD, asthma,  pneumonia, viral syndrome, PE, pneumothorax, congestive heart failure, CAD, MI, pericarditis, anemia, electrolyte abnormality, hypotension, pleural effusion    Patient is awake and alert.  Somewhat anxious.  Does have worsening troponin.  CT chest reveals no PE but he is having worsening, reaccumulation of pleural effusion on the right.  I believe he would do well with the admission for further evaluation monitoring cranial troponin pulmonology versus IR eval in the a.m. for thoracentesis.  CT also with a concern for possible pneumonia we will start antibiotics.  Will plan for admission.    Amount and/or Complexity of Data Reviewed  External Data Reviewed: notes.     Details: See ED course  Labs: ordered. Decision-making details documented in ED Course.  Radiology: ordered and independent interpretation performed. Decision-making details documented in ED Course.  ECG/medicine tests: ordered and independent interpretation performed. Decision-making details documented in ED Course.    Risk  Prescription drug management.  Decision regarding hospitalization.            Scribe Attestation:   Scribe #1: I performed the above scribed service and the documentation accurately describes the services I performed. I attest to the accuracy of the note.    Attending Attestation:           Physician Attestation for Scribe:  Physician Attestation Statement for Scribe #1: I, Jasper Eubanks MD, reviewed documentation, as scribed by Velvet Echavarria in my presence, and it is both accurate and complete.             ED Course as of 04/03/25 2326   Thu Apr 03, 2025 1902 WBC: 9.12 [MM]   1902 Hemoglobin(!): 12.6 [MM]   1902 BNP: 39.6 [MM]   1902 Troponin I: 0.035 [MM]   1902 Sodium: 140 [MM]   1902 Potassium: 4.4 [MM]   1902 Chloride(!): 108 [MM]   1902 CO2(!): 22 [MM]   1902 Glucose(!): 142 [MM]   1902 BUN: 12.6 [MM]   1902 Calcium(!): 11.1 [MM]   2230 Paged hospitalist [AB]   2323 X-Ray Chest PA And Lateral  Worsening pleural effusion  on right [MM]   5024 EKG done at 3:19 p.m. shows sinus tach rate of 105 .  Left axis deviation.  High voltage QRS complexes.  No obvious ST elevation or depression. [MM]      ED Course User Index  [AB] Nichole Craft  [MM] Jasper Eubanks MD                           Clinical Impression:  Final diagnoses:  [R06.02] SOB (shortness of breath)  [J90] Recurrent pleural effusion on right (Primary)  [J18.9] Pneumonia of right lower lobe due to infectious organism          ED Disposition Condition    Admit Stable                  [1]   Social History  Tobacco Use    Smoking status: Never    Smokeless tobacco: Never   Substance Use Topics    Alcohol use: Not Currently    Drug use: Never        Jasper Eubanks MD  04/03/25 1234

## 2025-04-04 NOTE — PROCEDURES
"Juanjose Chawla is a 84 y.o. male patient.    Temp: 97.8 °F (36.6 °C) (04/04/25 1100)  Pulse: 95 (04/04/25 1154)  Resp: 20 (04/04/25 1154)  BP: (!) 148/83 (Notified MATTHEW Phillips.) (04/04/25 1100)  SpO2: 97 % (04/04/25 1154)  Weight: 88 kg (194 lb 0.1 oz) (04/03/25 1519)  Height: 5' 10" (177.8 cm) (04/03/25 1519)    PICC  Date/Time: 4/4/2025 1:27 PM  Performed by: Tommy Newsome RN  Consent Done: Yes  Time out: Immediately prior to procedure a time out was called to verify the correct patient, procedure, equipment, support staff and site/side marked as required  Indications: med administration  Preparation: skin prepped with ChloraPrep  Skin prep agent dried: skin prep agent completely dried prior to procedure  Sterile barriers: all five maximum sterile barriers used - cap, mask, sterile gown, sterile gloves, and large sterile sheet  Hand hygiene: hand hygiene performed prior to central venous catheter insertion  Location details: left basilic  Catheter type: double lumen  Catheter size: 5 Fr  Catheter Length: 43cm    Number of attempts: 1  Post-procedure: sterile dressing applied and blood return through all ports            Tommy Newsome RN  4/4/2025    "

## 2025-04-05 LAB
25(OH)D3+25(OH)D2 SERPL-MCNC: 43 NG/ML (ref 30–80)
ALBUMIN SERPL-MCNC: 2.6 G/DL (ref 3.4–4.8)
ALBUMIN/GLOB SERPL: 0.7 RATIO (ref 1.1–2)
ALP SERPL-CCNC: 78 UNIT/L (ref 40–150)
ALT SERPL-CCNC: 5 UNIT/L (ref 0–55)
ANION GAP SERPL CALC-SCNC: 11 MEQ/L
AST SERPL-CCNC: 11 UNIT/L (ref 11–45)
BASOPHILS # BLD AUTO: 0.03 X10(3)/MCL
BASOPHILS NFR BLD AUTO: 0.3 %
BILIRUB SERPL-MCNC: 0.6 MG/DL
BUN SERPL-MCNC: 11.4 MG/DL (ref 8.4–25.7)
CALCIUM SERPL-MCNC: 10 MG/DL (ref 8.8–10)
CHLORIDE SERPL-SCNC: 102 MMOL/L (ref 98–107)
CO2 SERPL-SCNC: 23 MMOL/L (ref 23–31)
CREAT SERPL-MCNC: 0.82 MG/DL (ref 0.72–1.25)
CREAT/UREA NIT SERPL: 14
EOSINOPHIL # BLD AUTO: 0.03 X10(3)/MCL (ref 0–0.9)
EOSINOPHIL NFR BLD AUTO: 0.3 %
ERYTHROCYTE [DISTWIDTH] IN BLOOD BY AUTOMATED COUNT: 13.5 % (ref 11.5–17)
GFR SERPLBLD CREATININE-BSD FMLA CKD-EPI: >60 ML/MIN/1.73/M2
GLOBULIN SER-MCNC: 3.6 GM/DL (ref 2.4–3.5)
GLUCOSE SERPL-MCNC: 142 MG/DL (ref 82–115)
GLUCOSE SERPL-MCNC: 261 MG/DL (ref 70–110)
HCT VFR BLD AUTO: 35.8 % (ref 42–52)
HGB BLD-MCNC: 11.6 G/DL (ref 14–18)
IMM GRANULOCYTES # BLD AUTO: 0.05 X10(3)/MCL (ref 0–0.04)
IMM GRANULOCYTES NFR BLD AUTO: 0.5 %
LYMPHOCYTES # BLD AUTO: 0.81 X10(3)/MCL (ref 0.6–4.6)
LYMPHOCYTES NFR BLD AUTO: 8 %
MAGNESIUM SERPL-MCNC: 1.8 MG/DL (ref 1.6–2.6)
MCH RBC QN AUTO: 29.7 PG (ref 27–31)
MCHC RBC AUTO-ENTMCNC: 32.4 G/DL (ref 33–36)
MCV RBC AUTO: 91.6 FL (ref 80–94)
MONOCYTES # BLD AUTO: 0.8 X10(3)/MCL (ref 0.1–1.3)
MONOCYTES NFR BLD AUTO: 7.9 %
NEUTROPHILS # BLD AUTO: 8.36 X10(3)/MCL (ref 2.1–9.2)
NEUTROPHILS NFR BLD AUTO: 83 %
NRBC BLD AUTO-RTO: 0 %
PHOSPHATE SERPL-MCNC: 2.7 MG/DL (ref 2.3–4.7)
PLATELET # BLD AUTO: 146 X10(3)/MCL (ref 130–400)
PMV BLD AUTO: 10.9 FL (ref 7.4–10.4)
POCT GLUCOSE: 127 MG/DL (ref 70–110)
POCT GLUCOSE: 187 MG/DL (ref 70–110)
POTASSIUM SERPL-SCNC: 3.4 MMOL/L (ref 3.5–5.1)
PROT SERPL-MCNC: 6.2 GM/DL (ref 5.8–7.6)
RBC # BLD AUTO: 3.91 X10(6)/MCL (ref 4.7–6.1)
SODIUM SERPL-SCNC: 136 MMOL/L (ref 136–145)
WBC # BLD AUTO: 10.08 X10(3)/MCL (ref 4.5–11.5)

## 2025-04-05 PROCEDURE — 25000003 PHARM REV CODE 250

## 2025-04-05 PROCEDURE — 94760 N-INVAS EAR/PLS OXIMETRY 1: CPT

## 2025-04-05 PROCEDURE — 80053 COMPREHEN METABOLIC PANEL: CPT | Performed by: PHYSICIAN ASSISTANT

## 2025-04-05 PROCEDURE — 94761 N-INVAS EAR/PLS OXIMETRY MLT: CPT

## 2025-04-05 PROCEDURE — 36415 COLL VENOUS BLD VENIPUNCTURE: CPT | Performed by: PHYSICIAN ASSISTANT

## 2025-04-05 PROCEDURE — 63600175 PHARM REV CODE 636 W HCPCS: Performed by: NURSE PRACTITIONER

## 2025-04-05 PROCEDURE — 25000242 PHARM REV CODE 250 ALT 637 W/ HCPCS: Performed by: PHYSICIAN ASSISTANT

## 2025-04-05 PROCEDURE — 25000003 PHARM REV CODE 250: Performed by: INTERNAL MEDICINE

## 2025-04-05 PROCEDURE — 99900035 HC TECH TIME PER 15 MIN (STAT)

## 2025-04-05 PROCEDURE — 63700000 PHARM REV CODE 250 ALT 637 W/O HCPCS

## 2025-04-05 PROCEDURE — 3E03305 INTRODUCTION OF OTHER ANTINEOPLASTIC INTO PERIPHERAL VEIN, PERCUTANEOUS APPROACH: ICD-10-PCS | Performed by: INTERNAL MEDICINE

## 2025-04-05 PROCEDURE — 83735 ASSAY OF MAGNESIUM: CPT | Performed by: INTERNAL MEDICINE

## 2025-04-05 PROCEDURE — 84100 ASSAY OF PHOSPHORUS: CPT | Performed by: INTERNAL MEDICINE

## 2025-04-05 PROCEDURE — 11000001 HC ACUTE MED/SURG PRIVATE ROOM

## 2025-04-05 PROCEDURE — 94640 AIRWAY INHALATION TREATMENT: CPT

## 2025-04-05 PROCEDURE — 25000003 PHARM REV CODE 250: Performed by: NURSE PRACTITIONER

## 2025-04-05 PROCEDURE — 99233 SBSQ HOSP IP/OBS HIGH 50: CPT | Mod: ,,, | Performed by: STUDENT IN AN ORGANIZED HEALTH CARE EDUCATION/TRAINING PROGRAM

## 2025-04-05 PROCEDURE — 82306 VITAMIN D 25 HYDROXY: CPT | Performed by: INTERNAL MEDICINE

## 2025-04-05 PROCEDURE — 85025 COMPLETE CBC W/AUTO DIFF WBC: CPT | Performed by: PHYSICIAN ASSISTANT

## 2025-04-05 PROCEDURE — 63600175 PHARM REV CODE 636 W HCPCS: Mod: JZ,TB | Performed by: INTERNAL MEDICINE

## 2025-04-05 PROCEDURE — 25000003 PHARM REV CODE 250: Performed by: STUDENT IN AN ORGANIZED HEALTH CARE EDUCATION/TRAINING PROGRAM

## 2025-04-05 PROCEDURE — 21400001 HC TELEMETRY ROOM

## 2025-04-05 PROCEDURE — 99900031 HC PATIENT EDUCATION (STAT)

## 2025-04-05 PROCEDURE — 27000221 HC OXYGEN, UP TO 24 HOURS

## 2025-04-05 RX ORDER — SODIUM CHLORIDE 0.9 % (FLUSH) 0.9 %
10 SYRINGE (ML) INJECTION
Status: DISCONTINUED | OUTPATIENT
Start: 2025-04-05 | End: 2025-04-09 | Stop reason: HOSPADM

## 2025-04-05 RX ORDER — POTASSIUM CHLORIDE 20 MEQ/1
40 TABLET, EXTENDED RELEASE ORAL ONCE
Status: COMPLETED | OUTPATIENT
Start: 2025-04-05 | End: 2025-04-05

## 2025-04-05 RX ORDER — METOPROLOL TARTRATE 25 MG/1
12.5 TABLET ORAL 2 TIMES DAILY
Status: DISCONTINUED | OUTPATIENT
Start: 2025-04-05 | End: 2025-04-07

## 2025-04-05 RX ORDER — HEPARIN 100 UNIT/ML
500 SYRINGE INTRAVENOUS
Status: DISCONTINUED | OUTPATIENT
Start: 2025-04-05 | End: 2025-04-09 | Stop reason: HOSPADM

## 2025-04-05 RX ORDER — FAMOTIDINE 10 MG/ML
20 INJECTION, SOLUTION INTRAVENOUS
Status: COMPLETED | OUTPATIENT
Start: 2025-04-05 | End: 2025-04-05

## 2025-04-05 RX ORDER — DEXAMETHASONE 4 MG/1
8 TABLET ORAL DAILY
Status: COMPLETED | OUTPATIENT
Start: 2025-04-06 | End: 2025-04-08

## 2025-04-05 RX ORDER — ALPRAZOLAM 0.25 MG/1
0.25 TABLET ORAL DAILY PRN
Status: DISCONTINUED | OUTPATIENT
Start: 2025-04-05 | End: 2025-04-09 | Stop reason: HOSPADM

## 2025-04-05 RX ORDER — OXYCODONE HYDROCHLORIDE 5 MG/1
5 TABLET ORAL EVERY 4 HOURS PRN
Refills: 0 | Status: DISCONTINUED | OUTPATIENT
Start: 2025-04-05 | End: 2025-04-09 | Stop reason: HOSPADM

## 2025-04-05 RX ORDER — BUSPIRONE HYDROCHLORIDE 5 MG/1
10 TABLET ORAL 2 TIMES DAILY
Status: DISCONTINUED | OUTPATIENT
Start: 2025-04-05 | End: 2025-04-09 | Stop reason: HOSPADM

## 2025-04-05 RX ORDER — DIPHENHYDRAMINE HYDROCHLORIDE 50 MG/ML
50 INJECTION, SOLUTION INTRAMUSCULAR; INTRAVENOUS ONCE AS NEEDED
Status: DISCONTINUED | OUTPATIENT
Start: 2025-04-05 | End: 2025-04-09 | Stop reason: HOSPADM

## 2025-04-05 RX ORDER — AZITHROMYCIN 250 MG/1
500 TABLET, FILM COATED ORAL DAILY
Status: COMPLETED | OUTPATIENT
Start: 2025-04-05 | End: 2025-04-07

## 2025-04-05 RX ORDER — EPINEPHRINE 0.3 MG/.3ML
0.3 INJECTION SUBCUTANEOUS ONCE AS NEEDED
Status: DISCONTINUED | OUTPATIENT
Start: 2025-04-05 | End: 2025-04-09 | Stop reason: HOSPADM

## 2025-04-05 RX ORDER — MIRTAZAPINE 15 MG/1
15 TABLET, FILM COATED ORAL NIGHTLY
Status: DISCONTINUED | OUTPATIENT
Start: 2025-04-05 | End: 2025-04-09 | Stop reason: HOSPADM

## 2025-04-05 RX ADMIN — SACUBITRIL AND VALSARTAN 1 TABLET: 24; 26 TABLET, FILM COATED ORAL at 08:04

## 2025-04-05 RX ADMIN — POTASSIUM CHLORIDE 40 MEQ: 1500 TABLET, EXTENDED RELEASE ORAL at 10:04

## 2025-04-05 RX ADMIN — PIPERACILLIN SODIUM AND TAZOBACTAM SODIUM 4.5 G: 4; .5 INJECTION, POWDER, LYOPHILIZED, FOR SOLUTION INTRAVENOUS at 10:04

## 2025-04-05 RX ADMIN — LEVALBUTEROL HYDROCHLORIDE 0.63 MG: 0.63 SOLUTION RESPIRATORY (INHALATION) at 12:04

## 2025-04-05 RX ADMIN — ONDANSETRON 4 MG: 2 INJECTION INTRAMUSCULAR; INTRAVENOUS at 10:04

## 2025-04-05 RX ADMIN — PIPERACILLIN SODIUM AND TAZOBACTAM SODIUM 4.5 G: 4; .5 INJECTION, POWDER, LYOPHILIZED, FOR SOLUTION INTRAVENOUS at 04:04

## 2025-04-05 RX ADMIN — LEVALBUTEROL HYDROCHLORIDE 0.63 MG: 0.63 SOLUTION RESPIRATORY (INHALATION) at 08:04

## 2025-04-05 RX ADMIN — TAMSULOSIN HYDROCHLORIDE 0.4 MG: 0.4 CAPSULE ORAL at 10:04

## 2025-04-05 RX ADMIN — BUSPIRONE HYDROCHLORIDE 10 MG: 5 TABLET ORAL at 08:04

## 2025-04-05 RX ADMIN — OXYCODONE HYDROCHLORIDE 5 MG: 5 TABLET ORAL at 10:04

## 2025-04-05 RX ADMIN — CARBOPLATIN 635 MG: 10 INJECTION, SOLUTION INTRAVENOUS at 03:04

## 2025-04-05 RX ADMIN — MIRTAZAPINE 15 MG: 15 TABLET, FILM COATED ORAL at 08:04

## 2025-04-05 RX ADMIN — PACLITAXEL 282 MG: 6 INJECTION, SOLUTION INTRAVENOUS at 01:04

## 2025-04-05 RX ADMIN — MUPIROCIN: 20 OINTMENT TOPICAL at 10:04

## 2025-04-05 RX ADMIN — DIPHENHYDRAMINE HYDROCHLORIDE 25 MG: 50 INJECTION INTRAMUSCULAR; INTRAVENOUS at 12:04

## 2025-04-05 RX ADMIN — PIPERACILLIN SODIUM AND TAZOBACTAM SODIUM 4.5 G: 4; .5 INJECTION, POWDER, LYOPHILIZED, FOR SOLUTION INTRAVENOUS at 11:04

## 2025-04-05 RX ADMIN — AMLODIPINE BESYLATE 5 MG: 5 TABLET ORAL at 10:04

## 2025-04-05 RX ADMIN — LEVALBUTEROL HYDROCHLORIDE 0.63 MG: 0.63 SOLUTION RESPIRATORY (INHALATION) at 11:04

## 2025-04-05 RX ADMIN — FAMOTIDINE 20 MG: 10 INJECTION, SOLUTION INTRAVENOUS at 12:04

## 2025-04-05 RX ADMIN — DEXAMETHASONE SODIUM PHOSPHATE 8 MG: 4 INJECTION, SOLUTION INTRA-ARTICULAR; INTRALESIONAL; INTRAMUSCULAR; INTRAVENOUS; SOFT TISSUE at 12:04

## 2025-04-05 RX ADMIN — BUSPIRONE HYDROCHLORIDE 10 MG: 5 TABLET ORAL at 10:04

## 2025-04-05 RX ADMIN — METOPROLOL TARTRATE 12.5 MG: 25 TABLET, FILM COATED ORAL at 11:04

## 2025-04-05 RX ADMIN — PROCHLORPERAZINE EDISYLATE 5 MG: 5 INJECTION INTRAMUSCULAR; INTRAVENOUS at 02:04

## 2025-04-05 RX ADMIN — APREPITANT 130 MG: 130 INJECTION, EMULSION INTRAVENOUS at 12:04

## 2025-04-05 RX ADMIN — MUPIROCIN: 20 OINTMENT TOPICAL at 08:04

## 2025-04-05 RX ADMIN — ASPIRIN 81 MG: 81 TABLET, COATED ORAL at 10:04

## 2025-04-05 RX ADMIN — PIPERACILLIN SODIUM AND TAZOBACTAM SODIUM 4.5 G: 4; .5 INJECTION, POWDER, LYOPHILIZED, FOR SOLUTION INTRAVENOUS at 12:04

## 2025-04-05 RX ADMIN — INSULIN ASPART 2 UNITS: 100 INJECTION, SOLUTION INTRAVENOUS; SUBCUTANEOUS at 05:04

## 2025-04-05 RX ADMIN — ALPRAZOLAM 0.25 MG: 0.25 TABLET ORAL at 10:04

## 2025-04-05 RX ADMIN — AZITHROMYCIN DIHYDRATE 500 MG: 250 TABLET ORAL at 12:04

## 2025-04-05 RX ADMIN — ENOXAPARIN SODIUM 40 MG: 40 INJECTION SUBCUTANEOUS at 04:04

## 2025-04-05 RX ADMIN — ACETAMINOPHEN 650 MG: 325 TABLET ORAL at 01:04

## 2025-04-05 RX ADMIN — ONDANSETRON 4 MG: 2 INJECTION INTRAMUSCULAR; INTRAVENOUS at 01:04

## 2025-04-05 RX ADMIN — INSULIN ASPART 3 UNITS: 100 INJECTION, SOLUTION INTRAVENOUS; SUBCUTANEOUS at 08:04

## 2025-04-05 RX ADMIN — METOPROLOL TARTRATE 12.5 MG: 25 TABLET, FILM COATED ORAL at 08:04

## 2025-04-05 NOTE — PLAN OF CARE
Problem: Adult Inpatient Plan of Care  Goal: Plan of Care Review  Outcome: Progressing  Flowsheets (Taken 4/5/2025 0459)  Plan of Care Reviewed With: patient  Goal: Patient-Specific Goal (Individualized)  Outcome: Progressing  Goal: Absence of Hospital-Acquired Illness or Injury  Outcome: Progressing  Intervention: Identify and Manage Fall Risk  Flowsheets (Taken 4/5/2025 0459)  Safety Promotion/Fall Prevention:   assistive device/personal item within reach   Fall Risk signage in place   medications reviewed   side rails raised x 2  Intervention: Prevent Skin Injury  Flowsheets (Taken 4/5/2025 0459)  Body Position: position changed independently  Skin Protection: transparent dressing maintained  Device Skin Pressure Protection: tubing/devices free from skin contact  Intervention: Prevent and Manage VTE (Venous Thromboembolism) Risk  Flowsheets (Taken 4/5/2025 0459)  VTE Prevention/Management:   bleeding risk assessed   ambulation promoted   bleeding precautions maintained   intravenous hydration   fluids promoted  Intervention: Prevent Infection  Flowsheets (Taken 4/5/2025 0459)  Infection Prevention:   rest/sleep promoted   hand hygiene promoted  Goal: Optimal Comfort and Wellbeing  Intervention: Monitor Pain and Promote Comfort  Flowsheets (Taken 4/5/2025 0459)  Pain Management Interventions:   care clustered   medication offered   relaxation techniques promoted   quiet environment facilitated  Intervention: Provide Person-Centered Care  Flowsheets (Taken 4/5/2025 0459)  Trust Relationship/Rapport:   care explained   thoughts/feelings acknowledged     Problem: Skin Injury Risk Increased  Goal: Skin Health and Integrity  Intervention: Optimize Skin Protection  Flowsheets (Taken 4/5/2025 0459)  Skin Protection: transparent dressing maintained  Activity Management: Ambulated to bathroom - L4  Head of Bed (HOB) Positioning: HOB at 30 degrees     Problem: Fall Injury Risk  Goal: Absence of Fall and Fall-Related  Injury  Intervention: Identify and Manage Contributors  Flowsheets (Taken 4/5/2025 0459)  Self-Care Promotion:   independence encouraged   BADL personal objects within reach  Medication Review/Management: medications reviewed  Intervention: Promote Injury-Free Environment  Flowsheets (Taken 4/5/2025 0459)  Safety Promotion/Fall Prevention:   assistive device/personal item within reach   Fall Risk signage in place   medications reviewed   side rails raised x 2     Problem: Infection  Goal: Absence of Infection Signs and Symptoms  Intervention: Prevent or Manage Infection  Flowsheets (Taken 4/5/2025 0459)  Infection Management: aseptic technique maintained  Isolation Precautions: precautions maintained

## 2025-04-05 NOTE — PROGRESS NOTES
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  Of note patient was admitted at this facility on 02/25/2025 for worsening shortness of breath with malignant pleural effusion. He underwent diagnostic thoracentesis that was consistent with adenocarcinoma with questionable primary.  PET scan revealed hypermetabolic consolidation in the right lung base with mediastinal and bilateral adenopathy and innumerable scattered hypermetabolic bone lesions.  Patient underwent EBUS after PET scan.  Biopsy of station 4R lymph node showed poorly differentiated adenocarcinoma.  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 Patient is followed by Dr. Johnson.      Initial vital signs in ED were /69, pulse 106, respirations 21, temperature 36.7° C, and SpO2 93% on room air.  Labs revealed WBC 9.12, RBC 4.16, hemoglobin 12.6, hematocrit 38.3, MCV 92.1, chloride 108, CO2 22, glucose 142, calcium 11.1, BNP 39.6, and troponin 0.035.  COVID and RSV testing were negative.  Respiratory panel was negative.  Blood cultures x2 and Respiratory culture was obtained.  Chest x-ray revealed persistent right lung consolidation.  CTA chest revealed no pulmonary thromboembolism identified, similar mediastinal and bilateral hilar lymphadenopathy, and increased right pleural effusion and right lower lobe zone consolidation.  EKG revealed sinus tachycardia with heart rate of 105 beats per minute. Patient was admitted to hospital medicine service for further medical management.     Patient had a thoracentesis done with pulm on 4/4.     Interval Hx:   Seen and  examined at bedside with daughter. Anxious. Complaining of cough and pain when coughing.     Case was discussed with patient's nurse and  on the floor.    Objective/physical exam:  General: In no acute distress, afebrile  Chest: Clear to auscultation bilaterally  Heart: RRR, +S1, S2, no appreciable murmur  Abdomen: Soft, nontender, BS +  MSK: Warm, +1 lower extremity edema, no clubbing or cyanosis  Neurologic: Alert and oriented x4, Cranial nerve II-XII intact, Strength 5/5 in all 4 extremities    VITAL SIGNS: 24 HRS MIN & MAX LAST   Temp  Min: 97.6 °F (36.4 °C)  Max: 99 °F (37.2 °C) 98.8 °F (37.1 °C)   BP  Min: 110/68  Max: 155/82 (!) 144/78   Pulse  Min: 67  Max: 121  67   Resp  Min: 18  Max: 20 18   SpO2  Min: 89 %  Max: 98 % (!) 91 %     I have reviewed the following labs:  Recent Labs   Lab 04/03/25 1553 04/04/25 0328 04/05/25 0329   WBC 9.12 8.39 10.08   RBC 4.16* 3.87* 3.91*   HGB 12.6* 11.6* 11.6*   HCT 38.3* 35.7* 35.8*   MCV 92.1 92.2 91.6   MCH 30.3 30.0 29.7   MCHC 32.9* 32.5* 32.4*   RDW 13.5 13.5 13.5    154 146   MPV 10.2 10.6* 10.9*     Recent Labs   Lab 04/03/25  1553 04/04/25  0328 04/05/25  0329    140 136   K 4.4 4.1 3.4*   * 107 102   CO2 22* 27 23   BUN 12.6 10.0 11.4   CREATININE 0.74 0.70* 0.82   CALCIUM 11.1* 11.1* 10.0   MG  --  2.00 1.80   ALBUMIN 3.0* 2.8* 2.6*   ALKPHOS 80 79 78   ALT 7 7 5   AST 18 17 11   BILITOT 0.4 0.6 0.6     Microbiology Results (last 7 days)       Procedure Component Value Units Date/Time    Respiratory Culture [[id number]] Collected: 04/04/25 0634    Order Status: Completed Specimen: Sputum, Expectorated Updated: 04/05/25 0727     Respiratory Culture Normal respiratory hanh     GRAM STAIN Quality 1+      Many Gram positive cocci      Few Gram Positive Rods      Rare Yeast    Blood Culture [[id number]]  (Normal) Collected: 04/03/25 2318    Order Status: Completed Specimen: Blood, Venous Updated: 04/05/25 0103     Blood Culture No  Growth At 24 Hours    Blood Culture [[id number]]  (Normal) Collected: 04/03/25 2664    Order Status: Completed Specimen: Blood, Venous Updated: 04/05/25 0103     Blood Culture No Growth At 24 Hours             See below for Radiology    Assessment/Plan:  # SOB 2/2 likely pleural effusion with possible superimposed PNA  # R sided pleural effusion  # Acute systolic and diastolic heart failure  # Adenocarcinoma unknown primary most likely lung   # Malignant pleural effusion s/p thoracentesis   - complicated by trace right apical pneumothorax  # Malignant hilar and mediastinal adenopathy   # Malignant bone lesions clinically   # Hypercalcemia - improving  # Normocytic anemia  # Cancer related pain  # Hx of CAD s/p PCI, HTN, HLD, T2DM, anxiety/depression     - MRSA negative. Continue vanc and zosyn. Stop vanc and start azithro. Follow up resp culture  - follow up BCx  - vitamin D 43, one dose zometa on 4/4. Was briefly given fluids on 4/4  - TTE noted. Cardiology consult pending  - pulm consult for thoracentesis - removed 900 cc. Follow up studies. Complicated with mild pneumothorax. Monitor with CXR  - GI consult for help with etiology of primary as recommended by oncology initially but now will hold off as per oncology  - oncology recommendations appreciated  - will start C1D1 of Carbo/Taxol on 4/5  - oxycodone for pain control  - stop amlodipine, start BB and entresto   - will need that lopressor switched to XL on discharge for GDMT  - PT/OT    ADDENDUM - after entresto given as per cardiology recommendations. BP dropped. Will give 250 cc bolus but continue entresto for now. If continues to drop again. Will stop entresto and switch to ACEi/ARB    VTE prophylaxis: lovenox    Patient condition:  Guarded    Anticipated discharge and Disposition:         All diagnosis and differential diagnosis have been reviewed; assessment and plan has been documented; I have personally reviewed the labs and test results that are  presently available; I have reviewed the patients medication list; I have reviewed the consulting providers response and recommendations. I have reviewed or attempted to review medical records based upon their availability    All of the patient's questions have been  addressed and answered. Patient's is agreeable to the above stated plan. I will continue to monitor closely and make adjustments to medical management as needed.      _____________________________________________________________________    Malnutrition Status:  Nutrition consulted. Most recent weight and BMI monitored-     Measurements:  Wt Readings from Last 1 Encounters:   04/03/25 88 kg (194 lb 0.1 oz)   Body mass index is 27.84 kg/m².    Patient has been screened and assessed by RD.    Malnutrition Type:  Context:    Level:      Malnutrition Characteristic Summary:       Interventions/Recommendations (treatment strategy):        Scheduled Med:   0.9% NaCl 250 mL flush bag   Intravenous 1 time in Clinic/HOD    amLODIPine  5 mg Oral Daily    aprepitant  130 mg Intravenous 1 time in Clinic/HOD    aspirin  81 mg Oral Daily    busPIRone  10 mg Oral BID    CARBOplatin (PARAPLATIN) 635 mg in 0.9% NaCl 348.5 mL chemo infusion  635 mg Intravenous 1 time in Clinic/HOD    [START ON 4/6/2025] dexAMETHasone  8 mg Oral Daily    diphenhydramine (BENADRYL) IV  25 mg Intravenous 1 time in Clinic/HOD    enoxparin  40 mg Subcutaneous Daily    famotidine (PF)  20 mg Intravenous 1 time in Clinic/HOD    levalbuterol  0.63 mg Nebulization Q6H    mupirocin   Nasal BID    ondansetron 8 mg, dexAMETHasone 20 mg in 0.9% NaCl 50 mL IVPB  8 mg Intravenous 1 time in Clinic/HOD    PACLitaxeL (TAXOL) 135 mg/m2 = 282 mg in 0.9% NaCl 500 mL chemo infusion  135 mg/m2 (Treatment Plan Recorded) Intravenous 1 time in Clinic/HOD    piperacillin-tazobactam (Zosyn) IV (PEDS and ADULTS) (extended infusion is not appropriate)  4.5 g Intravenous Q8H    tamsulosin  0.4 mg Oral Daily     vancomycin 1,250 mg in D5W 250 mL IVPB (admixture device)  1,250 mg Intravenous Q18H      Continuous Infusions:     PRN Meds:    Current Facility-Administered Medications:     acetaminophen, 650 mg, Oral, Q6H PRN    ALPRAZolam, 0.25 mg, Oral, Daily PRN    alteplase, 2 mg, Intra-Catheter, PRN    aluminum-magnesium hydroxide-simethicone, 30 mL, Oral, QID PRN    bisacodyL, 10 mg, Rectal, Daily PRN    dextromethorphan-guaiFENesin  mg/5 ml, 5 mL, Oral, Q6H PRN    dextrose 50%, 12.5 g, Intravenous, PRN    dextrose 50%, 25 g, Intravenous, PRN    diphenhydrAMINE, 50 mg, Intravenous, Once PRN    EPINEPHrine, 0.3 mg, Intramuscular, Once PRN    glucagon (human recombinant), 1 mg, Intramuscular, PRN    glucose, 16 g, Oral, PRN    glucose, 24 g, Oral, PRN    heparin, porcine (PF), 500 Units, Intravenous, PRN    hydrocortisone sodium succinate, 100 mg, Intravenous, Once PRN    insulin aspart U-100, 0-10 Units, Subcutaneous, QID (AC + HS) PRN    lactulose 10 gram/15 ml, 30 g, Oral, Q6H PRN    melatonin, 6 mg, Oral, Nightly PRN    naloxone, 0.02 mg, Intravenous, PRN    ondansetron, 4 mg, Intravenous, Q4H PRN    oxyCODONE, 5 mg, Oral, Q4H PRN    prochlorperazine, 5 mg, Intravenous, Q6H PRN    senna-docusate, 1 tablet, Oral, BID PRN    sodium chloride 0.9%, 10 mL, Intravenous, PRN    Flushing PICC/Midline Protocol, , , Until Discontinued **AND** sodium chloride 0.9%, 10 mL, Intravenous, Q12H PRN    sodium chloride 0.9%, 10 mL, Intravenous, PRN    vancomycin - pharmacy to dose, , Intravenous, pharmacy to manage frequency     Radiology:  I have personally reviewed the following imaging and agree with the radiologist.     X-Ray Chest AP Portable  Narrative: EXAMINATION:  Single view chest radiograph.    CLINICAL HISTORY:  Reeval Apical Pneumothorax;    TECHNIQUE:  Single view of the chest.    COMPARISON:  Chest radiograph 04/04/2025.    FINDINGS:  The left upper extremity PICC is unchanged.  The right pleural effusion and  multifocal opacities are unchanged.  The trace right apical pneumothorax is unchanged.  The heart is normal in size.  There is no acute osseous abnormality.  Impression: 1. Unchanged trace right apical pneumothorax.  2. Unchanged right pleural effusion and multifocal airspace opacities.    Electronically signed by: Loy Trimble MD  Date:    04/05/2025  Time:    09:54      Itzel Webb MD  Department of Hospital Medicine   [facility]   04/05/2025

## 2025-04-05 NOTE — PLAN OF CARE
GI Plan of Care    GI consulted by [facility] medicine to do EGD to r/o upper GI primary. Oncology was planning for pt to undergo chemo today. Spoke with Dr. LeJeune who discussed this with Dr. Johnson. They feel strongly that this is a primary lung cancer and EGD is not necessary at this time. They advised to not perform EGD today as to not delay patient receiving chemo. Requesting EGD be done as outpt. We will arrange for this.    Please call GI back if need further.    Funmilayo Frost PA-C [facility]

## 2025-04-05 NOTE — PLAN OF CARE
Problem: Adult Inpatient Plan of Care  Goal: Plan of Care Review  Outcome: Progressing  Flowsheets (Taken 4/5/2025 1359)  Plan of Care Reviewed With: patient  Goal: Patient-Specific Goal (Individualized)  Outcome: Progressing  Goal: Absence of Hospital-Acquired Illness or Injury  Outcome: Progressing  Intervention: Identify and Manage Fall Risk  Flowsheets (Taken 4/5/2025 1359)  Safety Promotion/Fall Prevention:   assistive device/personal item within reach   medications reviewed   nonskid shoes/socks when out of bed   side rails raised x 2  Intervention: Prevent Skin Injury  Flowsheets (Taken 4/5/2025 1359)  Body Position: position changed independently  Skin Protection: incontinence pads utilized  Device Skin Pressure Protection:   absorbent pad utilized/changed   tubing/devices free from skin contact  Intervention: Prevent and Manage VTE (Venous Thromboembolism) Risk  Flowsheets (Taken 4/5/2025 1359)  VTE Prevention/Management:   ambulation promoted   bleeding risk assessed   ROM (active) performed  Intervention: Prevent Infection  Flowsheets (Taken 4/5/2025 1359)  Infection Prevention:   rest/sleep promoted   single patient room provided  Goal: Optimal Comfort and Wellbeing  Outcome: Progressing  Intervention: Monitor Pain and Promote Comfort  Flowsheets (Taken 4/5/2025 1359)  Pain Management Interventions:   care clustered   medication offered   pain management plan reviewed with patient/caregiver   pillow support provided   position adjusted  Intervention: Provide Person-Centered Care  Flowsheets (Taken 4/5/2025 1359)  Trust Relationship/Rapport: care explained  Goal: Readiness for Transition of Care  Outcome: Progressing     Problem: Skin Injury Risk Increased  Goal: Skin Health and Integrity  Outcome: Progressing  Intervention: Optimize Skin Protection  Flowsheets (Taken 4/5/2025 1359)  Pressure Reduction Techniques:   frequent weight shift encouraged   weight shift assistance provided  Skin Protection:  incontinence pads utilized  Activity Management: Ambulated to bathroom - L4  Head of Bed (HOB) Positioning: HOB at 30-45 degrees     Problem: Fall Injury Risk  Goal: Absence of Fall and Fall-Related Injury  Outcome: Progressing  Intervention: Identify and Manage Contributors  Flowsheets (Taken 4/5/2025 1351)  Self-Care Promotion: independence encouraged  Medication Review/Management: medications reviewed  Intervention: Promote Injury-Free Environment  Flowsheets (Taken 4/5/2025 1354)  Safety Promotion/Fall Prevention:   assistive device/personal item within reach   medications reviewed   nonskid shoes/socks when out of bed   side rails raised x 2     Problem: Infection  Goal: Absence of Infection Signs and Symptoms  Outcome: Progressing  Intervention: Prevent or Manage Infection  Flowsheets (Taken 4/5/2025 1351)  Infection Management: aseptic technique maintained

## 2025-04-05 NOTE — CONSULTS
Inpatient consult to Cardiology  Consult performed by: Issac Guaman ANP  Consult ordered by: Itzel Webb MD  Reason for consult: HF        [facility]    Cardiology  Consult Note    Patient Name: Juanjose Chawla  MRN: [id number]  Admission Date: 4/3/2025  Hospital Length of Stay: 2 days  Code Status: Full Code   Attending Provider: Itzel Webb,*   Consulting Provider: MURPHY Traylor  Primary Care Physician: Sadiq Hartmann MD  Principal Problem:<principal problem not specified>    Patient information was obtained from patient, past medical records, and ER records.     Subjective:     Chief Complaint/Reason for Consult: HF    HPI: Mr. Chawla is an 85 y/o male who is known to [facility], Dr. Mcknight (2018). The patient presented to [facility] on 4.3.25 with c/o SOB. He was diagnosed with PNA and R Pleural Effusion. Pulmonary was consulted and the PT underwent a Thoracentesis with results of 900mL of Yellow-Orange Aspirate. He had an ECHO which revealed an LVEF of 45-50% and Grade II DD. [facility] was consulted for Newly Diagnosed HF.     PMH: Angina, HTN, HLD, DM II, Depression, Pneumonia, Lung CA   PSH: Angiogram, Bronchoscopy, Hernia Repair   Family History: Denies Family History of Heart Disease  Social History: Denies Illicit Drug, ETOH and Tobacco Use     Previous Cardiac Diagnostics:   ECHO 4.3.25:  Left Ventricle: The left ventricle is normal in size. Mildly increased wall thickness. There is mildly reduced systolic function with a visually estimated ejection fraction of 45 - 50%. Grade II diastolic dysfunction.  Right Ventricle: The right ventricle is normal in size. Systolic function is normal. TAPSE is 2.26 cm.  There are no significant valvular abnormalities.  Pericardium: There is no pericardial effusion.    Review of patient's allergies indicates:  No Known Allergies  No current facility-administered medications on file prior to encounter.     Current  Outpatient Medications on File Prior to Encounter   Medication Sig    albuterol (ACCUNEB) 0.63 mg/3 mL Nebu Take 0.63 mg by nebulization every 6 (six) hours as needed (wheezing and shortness of breath). Rescue    ALPRAZolam (XANAX) 0.25 MG tablet Take 0.25 mg by mouth 3 (three) times daily as needed for Anxiety.    amLODIPine (NORVASC) 5 MG tablet Take 5 mg by mouth once daily.    aspirin (ECOTRIN) 81 MG EC tablet Take 81 mg by mouth once daily.    busPIRone (BUSPAR) 5 MG Tab Take 5 mg by mouth 2 (two) times daily.    famotidine (PEPCID) 20 MG tablet Take 20 mg by mouth once daily.    gabapentin (NEURONTIN) 300 MG capsule Take 300 mg by mouth 3 (three) times daily.    glimepiride (AMARYL) 1 MG tablet Take 2 mg by mouth before breakfast.    guaiFENesin-codeine 100-10 mg/5 ml (TUSSI-ORGANIDIN NR)  mg/5 mL syrup Take 5 mLs by mouth every 4 (four) hours as needed for Cough or Congestion.    lisinopriL (PRINIVIL,ZESTRIL) 2.5 MG tablet Take 2.5 mg by mouth once daily.    LORazepam (ATIVAN) 1 MG tablet Take  60 min before MRI, May repeat x 1 if needed    metFORMIN (GLUCOPHAGE) 1000 MG tablet Take 2,000 mg by mouth once daily.    polyethylene glycol (GLYCOLAX) 17 gram PwPk Take 17 g by mouth once daily.    tamsulosin (FLOMAX) 0.4 mg Cap Take 0.4 mg by mouth once daily.    umeclidinium (INCRUSE ELLIPTA) 62.5 mcg/actuation inhalation capsule Inhale 62.5 mcg into the lungs once daily. Controller     Review of Systems   Constitutional:  Positive for fatigue.   Respiratory:  Positive for shortness of breath.    Cardiovascular:  Negative for chest pain, palpitations and leg swelling.   All other systems reviewed and are negative.    Objective:     Vital Signs (Most Recent):  Temp: 98.8 °F (37.1 °C) (04/05/25 0800)  Pulse: 67 (04/05/25 0805)  Resp: 18 (04/05/25 1002)  BP: (!) 144/78 (04/05/25 0800)  SpO2: (!) 91 % (04/05/25 0805) Vital Signs (24h Range):  Temp:  [97.6 °F (36.4 °C)-99 °F (37.2 °C)] 98.8 °F (37.1 °C)  Pulse:   "[] 67  Resp:  [18-20] 18  SpO2:  [89 %-98 %] 91 %  BP: (110-155)/(68-82) 144/78   Weight: 88 kg (194 lb 0.1 oz)  Body mass index is 27.84 kg/m².  SpO2: (!) 91 %       Intake/Output Summary (Last 24 hours) at 4/5/2025 1107  Last data filed at 4/5/2025 0646  Gross per 24 hour   Intake 1940.05 ml   Output 800 ml   Net 1140.05 ml     Lines/Drains/Airways       Peripherally Inserted Central Catheter Line  Duration             PICC Double Lumen 04/04/25 1326 left basilic <1 day              Drain  Duration             Male External Urinary Catheter 04/05/25 0700 Medium <1 day                  Significant Labs:   Chemistries:   Recent Labs   Lab 04/03/25  1553 04/04/25 0328 04/05/25  0329    140 136   K 4.4 4.1 3.4*   * 107 102   CO2 22* 27 23   BUN 12.6 10.0 11.4   CREATININE 0.74 0.70* 0.82   CALCIUM 11.1* 11.1* 10.0   BILITOT 0.4 0.6 0.6   ALKPHOS 80 79 78   ALT 7 7 5   AST 18 17 11   GLUCOSE 142* 129* 142*   MG  --  2.00 1.80   PHOS  --   --  2.7   TROPONINI 0.035  --   --         CBC/Anemia Labs: Coags:    Recent Labs   Lab 04/03/25  1553 04/04/25 0328 04/05/25  0329   WBC 9.12 8.39 10.08   HGB 12.6* 11.6* 11.6*   HCT 38.3* 35.7* 35.8*    154 146   MCV 92.1 92.2 91.6   RDW 13.5 13.5 13.5    No results for input(s): "PT", "INR", "APTT" in the last 168 hours.     Significant Imaging:  Imaging Results              CTA Chest Non-Coronary (PE Studies) (Final result)  Result time 04/03/25 21:15:23      Final result by Iam Menon MD (04/03/25 21:15:23)                   Impression:      1.  No pulmonary thromboembolism identified.    2.  Similar mediastinal and bilateral hilar lymphadenopathy.    3.  Increased right pleural effusion and right lower lung zone consolidation.      Electronically signed by: Iam Menon  Date:    04/03/2025  Time:    21:15               Narrative:    EXAMINATION:  CTA CHEST NON CORONARY (PE STUDIES)    CLINICAL HISTORY:  Pulmonary embolism (PE) suspected, high " prob;    TECHNIQUE:  Sequential axial images performed of the chest using pulmonary embolism protocol following intravenous contrast bolus. Sagittal and contrast reformations performed.  MIP images are also performed.    Dose product length of 217 mGycm. Automated exposure control was utilized to minimize radiation dose.    FINDINGS:  Optimal contrast bolus timing with adequately opacified pulmonary artery system is without evidence of filling defects from pulmonary thromboembolism within the main pulmonary artery and the major branches. Thoracic aorta is without aneurysmal dilatation or dissection.    There are multiple enlarged mediastinal and bilateral hilar lymph nodes without significant interval change and these were hypermetabolic on the previous PET scan.  There is size increase of right pleural effusion which causes further right lower lung zone compressive atelectasis.  Right lower lung lobe consolidation is again more evident and was again hypermetabolic on the previous PET scan.  Bilateral lungs are remarkable for similar extent ground opacities which could represent congestive process versus pneumonitis versus small airway disease.  Bilateral peripheral subpleural reticulations consistent with fibrosis show about similar appearance.  There are left lower lung zone some cystic formations.    No acute or aggressive skeletal abnormality.                                       X-Ray Chest PA And Lateral (Final result)  Result time 04/03/25 15:45:29      Final result by Iam Menon MD (04/03/25 15:45:29)                   Impression:      Persistent right lung consolidation.      Electronically signed by: Iam Menon  Date:    04/03/2025  Time:    15:45               Narrative:    EXAMINATION:  XR CHEST PA AND LATERAL    CLINICAL HISTORY:  Shortness of breath    TECHNIQUE:  Two-view    COMPARISON:  March 26, 2025..    FINDINGS:  Cardiopericardial silhouette enlarged appearance is similar.  Persistent  right perihilar posterior aspect opacity likely representing consolidation.  There are severe chronic interstitial changes of the lungs representing emphysema and fibrosis.  Unchanged size of right pleural effusion and right lower lung zone atelectasis.                                    EKG:       Telemetry: SR    Physical Exam  Constitutional:       General: He is not in acute distress.     Appearance: Normal appearance. He is ill-appearing.   HENT:      Head: Normocephalic.      Mouth/Throat:      Mouth: Mucous membranes are dry.   Eyes:      Extraocular Movements: Extraocular movements intact.   Cardiovascular:      Rate and Rhythm: Normal rate and regular rhythm.      Pulses: Normal pulses.      Heart sounds: Murmur heard.   Pulmonary:      Effort: Pulmonary effort is normal. No respiratory distress.      Breath sounds: Examination of the right-lower field reveals decreased breath sounds. Examination of the left-lower field reveals decreased breath sounds. Decreased breath sounds present.   Abdominal:      Palpations: Abdomen is soft.   Skin:     General: Skin is warm and dry.   Neurological:      General: No focal deficit present.      Mental Status: He is alert and oriented to person, place, and time. Mental status is at baseline.   Psychiatric:         Mood and Affect: Mood normal.         Behavior: Behavior normal.       Home Medications:   Medications Ordered Prior to Encounter[1]  Current Schedule Inpatient Medications:   0.9% NaCl 250 mL flush bag   Intravenous 1 time in Clinic/HOD    amLODIPine  5 mg Oral Daily    aprepitant  130 mg Intravenous 1 time in Clinic/HOD    aspirin  81 mg Oral Daily    busPIRone  10 mg Oral BID    CARBOplatin (PARAPLATIN) 635 mg in 0.9% NaCl 348.5 mL chemo infusion  635 mg Intravenous 1 time in Clinic/HOD    [START ON 4/6/2025] dexAMETHasone  8 mg Oral Daily    diphenhydramine (BENADRYL) IV  25 mg Intravenous 1 time in Clinic/HOD    enoxparin  40 mg Subcutaneous Daily     famotidine (PF)  20 mg Intravenous 1 time in Clinic/HOD    levalbuterol  0.63 mg Nebulization Q6H    mupirocin   Nasal BID    ondansetron 8 mg, dexAMETHasone 20 mg in 0.9% NaCl 50 mL IVPB  8 mg Intravenous 1 time in Clinic/HOD    PACLitaxeL (TAXOL) 135 mg/m2 = 282 mg in 0.9% NaCl 500 mL chemo infusion  135 mg/m2 (Treatment Plan Recorded) Intravenous 1 time in Clinic/HOD    piperacillin-tazobactam (Zosyn) IV (PEDS and ADULTS) (extended infusion is not appropriate)  4.5 g Intravenous Q8H    tamsulosin  0.4 mg Oral Daily    vancomycin 1,250 mg in D5W 250 mL IVPB (admixture device)  1,250 mg Intravenous Q18H     Assessment:   Newly Diagnosed Diastolic HF/Mid Rage EF 45-50%    - ECHO (4.4.25) - LVEF 45-50%, Grade II DD  Large Pleural Effusion    - s/p Thoracentesis (4.4.25) - 900mL Yellow-[place]   Lung CA  HTN  HLD  DM II  Depression  No Hx of GIB     Plan:   D/C Norvasc  Start Metoprolol Tartrate 12.5mg PO BID  Start Entresto 24/26mg PO BID  Will Need OP LexiPET for Newly Diagnosed HF  Will Continue to Follow   Labs and EKG in AM: CBC, CMP and Mg    Thank you for your consult.     MURPHY Traylor  Cardiology  [facility]     I agree with the findings of the complexity of problems addressed and take responsibility for the plan's risks and complications. I approved the plan documented by Issac Guaman NP.            [1]   No current facility-administered medications on file prior to encounter.     Current Outpatient Medications on File Prior to Encounter   Medication Sig Dispense Refill    albuterol (ACCUNEB) 0.63 mg/3 mL Nebu Take 0.63 mg by nebulization every 6 (six) hours as needed (wheezing and shortness of breath). Rescue      ALPRAZolam (XANAX) 0.25 MG tablet Take 0.25 mg by mouth 3 (three) times daily as needed for Anxiety.      amLODIPine (NORVASC) 5 MG tablet Take 5 mg by mouth once daily.      aspirin (ECOTRIN) 81 MG EC tablet Take 81 mg by mouth once daily.      busPIRone  (BUSPAR) 5 MG Tab Take 5 mg by mouth 2 (two) times daily.      famotidine (PEPCID) 20 MG tablet Take 20 mg by mouth once daily.      gabapentin (NEURONTIN) 300 MG capsule Take 300 mg by mouth 3 (three) times daily.      glimepiride (AMARYL) 1 MG tablet Take 2 mg by mouth before breakfast.      guaiFENesin-codeine 100-10 mg/5 ml (TUSSI-ORGANIDIN NR)  mg/5 mL syrup Take 5 mLs by mouth every 4 (four) hours as needed for Cough or Congestion. 100 mL 0    lisinopriL (PRINIVIL,ZESTRIL) 2.5 MG tablet Take 2.5 mg by mouth once daily.      LORazepam (ATIVAN) 1 MG tablet Take  60 min before MRI, May repeat x 1 if needed 2 tablet 0    metFORMIN (GLUCOPHAGE) 1000 MG tablet Take 2,000 mg by mouth once daily.      polyethylene glycol (GLYCOLAX) 17 gram PwPk Take 17 g by mouth once daily.      tamsulosin (FLOMAX) 0.4 mg Cap Take 0.4 mg by mouth once daily.      umeclidinium (INCRUSE ELLIPTA) 62.5 mcg/actuation inhalation capsule Inhale 62.5 mcg into the lungs once daily. Controller 30 each 0

## 2025-04-06 LAB
ALBUMIN SERPL-MCNC: 2.4 G/DL (ref 3.4–4.8)
ALBUMIN/GLOB SERPL: 0.6 RATIO (ref 1.1–2)
ALP SERPL-CCNC: 67 UNIT/L (ref 40–150)
ALT SERPL-CCNC: 7 UNIT/L (ref 0–55)
ANION GAP SERPL CALC-SCNC: 10 MEQ/L
AST SERPL-CCNC: 13 UNIT/L (ref 11–45)
BACTERIA SPEC CULT: NORMAL
BASOPHILS # BLD AUTO: 0.01 X10(3)/MCL
BASOPHILS NFR BLD AUTO: 0.2 %
BILIRUB SERPL-MCNC: 0.5 MG/DL
BUN SERPL-MCNC: 22 MG/DL (ref 8.4–25.7)
CALCIUM SERPL-MCNC: 8.7 MG/DL (ref 8.8–10)
CHLORIDE SERPL-SCNC: 106 MMOL/L (ref 98–107)
CO2 SERPL-SCNC: 20 MMOL/L (ref 23–31)
CREAT SERPL-MCNC: 1.19 MG/DL (ref 0.72–1.25)
CREAT/UREA NIT SERPL: 18
EOSINOPHIL # BLD AUTO: 0 X10(3)/MCL (ref 0–0.9)
EOSINOPHIL NFR BLD AUTO: 0 %
ERYTHROCYTE [DISTWIDTH] IN BLOOD BY AUTOMATED COUNT: 13.5 % (ref 11.5–17)
GFR SERPLBLD CREATININE-BSD FMLA CKD-EPI: 59 ML/MIN/1.73/M2
GLOBULIN SER-MCNC: 3.7 GM/DL (ref 2.4–3.5)
GLUCOSE SERPL-MCNC: 212 MG/DL (ref 82–115)
GLUCOSE SERPL-MCNC: 234 MG/DL (ref 70–110)
GLUCOSE SERPL-MCNC: 255 MG/DL (ref 70–110)
GLUCOSE SERPL-MCNC: 289 MG/DL (ref 70–110)
GRAM STN SPEC: NORMAL
HCT VFR BLD AUTO: 33.1 % (ref 42–52)
HGB BLD-MCNC: 10.7 G/DL (ref 14–18)
IMM GRANULOCYTES # BLD AUTO: 0.03 X10(3)/MCL (ref 0–0.04)
IMM GRANULOCYTES NFR BLD AUTO: 0.5 %
LYMPHOCYTES # BLD AUTO: 0.59 X10(3)/MCL (ref 0.6–4.6)
LYMPHOCYTES NFR BLD AUTO: 9.3 %
MAGNESIUM SERPL-MCNC: 2 MG/DL (ref 1.6–2.6)
MCH RBC QN AUTO: 29.9 PG (ref 27–31)
MCHC RBC AUTO-ENTMCNC: 32.3 G/DL (ref 33–36)
MCV RBC AUTO: 92.5 FL (ref 80–94)
MONOCYTES # BLD AUTO: 0.12 X10(3)/MCL (ref 0.1–1.3)
MONOCYTES NFR BLD AUTO: 1.9 %
NEUTROPHILS # BLD AUTO: 5.59 X10(3)/MCL (ref 2.1–9.2)
NEUTROPHILS NFR BLD AUTO: 88.1 %
NRBC BLD AUTO-RTO: 0 %
OHS QRS DURATION: 134 MS
OHS QTC CALCULATION: 481 MS
PLATELET # BLD AUTO: 122 X10(3)/MCL (ref 130–400)
PMV BLD AUTO: 11 FL (ref 7.4–10.4)
POCT GLUCOSE: 214 MG/DL (ref 70–110)
POCT GLUCOSE: 261 MG/DL (ref 70–110)
POCT GLUCOSE: 289 MG/DL (ref 70–110)
POTASSIUM SERPL-SCNC: 3.9 MMOL/L (ref 3.5–5.1)
PROT SERPL-MCNC: 6.1 GM/DL (ref 5.8–7.6)
RBC # BLD AUTO: 3.58 X10(6)/MCL (ref 4.7–6.1)
SODIUM SERPL-SCNC: 136 MMOL/L (ref 136–145)
WBC # BLD AUTO: 6.34 X10(3)/MCL (ref 4.5–11.5)

## 2025-04-06 PROCEDURE — 93005 ELECTROCARDIOGRAM TRACING: CPT

## 2025-04-06 PROCEDURE — 94760 N-INVAS EAR/PLS OXIMETRY 1: CPT

## 2025-04-06 PROCEDURE — 80053 COMPREHEN METABOLIC PANEL: CPT | Performed by: STUDENT IN AN ORGANIZED HEALTH CARE EDUCATION/TRAINING PROGRAM

## 2025-04-06 PROCEDURE — 63600175 PHARM REV CODE 636 W HCPCS: Performed by: NURSE PRACTITIONER

## 2025-04-06 PROCEDURE — 94761 N-INVAS EAR/PLS OXIMETRY MLT: CPT

## 2025-04-06 PROCEDURE — 27000221 HC OXYGEN, UP TO 24 HOURS

## 2025-04-06 PROCEDURE — 85025 COMPLETE CBC W/AUTO DIFF WBC: CPT | Performed by: STUDENT IN AN ORGANIZED HEALTH CARE EDUCATION/TRAINING PROGRAM

## 2025-04-06 PROCEDURE — 25000242 PHARM REV CODE 250 ALT 637 W/ HCPCS: Performed by: PHYSICIAN ASSISTANT

## 2025-04-06 PROCEDURE — 99900035 HC TECH TIME PER 15 MIN (STAT)

## 2025-04-06 PROCEDURE — 93010 ELECTROCARDIOGRAM REPORT: CPT | Mod: ,,, | Performed by: INTERNAL MEDICINE

## 2025-04-06 PROCEDURE — 36415 COLL VENOUS BLD VENIPUNCTURE: CPT | Performed by: STUDENT IN AN ORGANIZED HEALTH CARE EDUCATION/TRAINING PROGRAM

## 2025-04-06 PROCEDURE — 99900031 HC PATIENT EDUCATION (STAT)

## 2025-04-06 PROCEDURE — 63700000 PHARM REV CODE 250 ALT 637 W/O HCPCS

## 2025-04-06 PROCEDURE — 63600175 PHARM REV CODE 636 W HCPCS: Mod: JZ,TB | Performed by: STUDENT IN AN ORGANIZED HEALTH CARE EDUCATION/TRAINING PROGRAM

## 2025-04-06 PROCEDURE — 25000003 PHARM REV CODE 250: Performed by: NURSE PRACTITIONER

## 2025-04-06 PROCEDURE — 21400001 HC TELEMETRY ROOM

## 2025-04-06 PROCEDURE — 63600175 PHARM REV CODE 636 W HCPCS: Performed by: INTERNAL MEDICINE

## 2025-04-06 PROCEDURE — 83735 ASSAY OF MAGNESIUM: CPT | Performed by: STUDENT IN AN ORGANIZED HEALTH CARE EDUCATION/TRAINING PROGRAM

## 2025-04-06 PROCEDURE — 25000003 PHARM REV CODE 250

## 2025-04-06 PROCEDURE — 94640 AIRWAY INHALATION TREATMENT: CPT

## 2025-04-06 PROCEDURE — 99233 SBSQ HOSP IP/OBS HIGH 50: CPT | Mod: ,,, | Performed by: STUDENT IN AN ORGANIZED HEALTH CARE EDUCATION/TRAINING PROGRAM

## 2025-04-06 RX ORDER — GABAPENTIN 300 MG/1
300 CAPSULE ORAL 3 TIMES DAILY
Status: DISCONTINUED | OUTPATIENT
Start: 2025-04-06 | End: 2025-04-09 | Stop reason: HOSPADM

## 2025-04-06 RX ADMIN — METOPROLOL TARTRATE 12.5 MG: 25 TABLET, FILM COATED ORAL at 09:04

## 2025-04-06 RX ADMIN — LEVALBUTEROL HYDROCHLORIDE 0.63 MG: 0.63 SOLUTION RESPIRATORY (INHALATION) at 01:04

## 2025-04-06 RX ADMIN — SENNOSIDES AND DOCUSATE SODIUM 1 TABLET: 50; 8.6 TABLET ORAL at 12:04

## 2025-04-06 RX ADMIN — GABAPENTIN 300 MG: 300 CAPSULE ORAL at 04:04

## 2025-04-06 RX ADMIN — INSULIN ASPART 4 UNITS: 100 INJECTION, SOLUTION INTRAVENOUS; SUBCUTANEOUS at 12:04

## 2025-04-06 RX ADMIN — SACUBITRIL AND VALSARTAN 1 TABLET: 24; 26 TABLET, FILM COATED ORAL at 08:04

## 2025-04-06 RX ADMIN — SACUBITRIL AND VALSARTAN 1 TABLET: 24; 26 TABLET, FILM COATED ORAL at 09:04

## 2025-04-06 RX ADMIN — AZITHROMYCIN DIHYDRATE 500 MG: 250 TABLET ORAL at 09:04

## 2025-04-06 RX ADMIN — MUPIROCIN: 20 OINTMENT TOPICAL at 09:04

## 2025-04-06 RX ADMIN — LEVALBUTEROL HYDROCHLORIDE 0.63 MG: 0.63 SOLUTION RESPIRATORY (INHALATION) at 07:04

## 2025-04-06 RX ADMIN — BUSPIRONE HYDROCHLORIDE 10 MG: 5 TABLET ORAL at 08:04

## 2025-04-06 RX ADMIN — METOPROLOL TARTRATE 12.5 MG: 25 TABLET, FILM COATED ORAL at 08:04

## 2025-04-06 RX ADMIN — GABAPENTIN 300 MG: 300 CAPSULE ORAL at 08:04

## 2025-04-06 RX ADMIN — FILGRASTIM 480 MCG: 480 INJECTION, SOLUTION INTRAVENOUS; SUBCUTANEOUS at 10:04

## 2025-04-06 RX ADMIN — PIPERACILLIN SODIUM AND TAZOBACTAM SODIUM 4.5 G: 4; .5 INJECTION, POWDER, LYOPHILIZED, FOR SOLUTION INTRAVENOUS at 09:04

## 2025-04-06 RX ADMIN — ASPIRIN 81 MG: 81 TABLET, COATED ORAL at 09:04

## 2025-04-06 RX ADMIN — TAMSULOSIN HYDROCHLORIDE 0.4 MG: 0.4 CAPSULE ORAL at 09:04

## 2025-04-06 RX ADMIN — LEVALBUTEROL HYDROCHLORIDE 0.63 MG: 0.63 SOLUTION RESPIRATORY (INHALATION) at 08:04

## 2025-04-06 RX ADMIN — PIPERACILLIN SODIUM AND TAZOBACTAM SODIUM 4.5 G: 4; .5 INJECTION, POWDER, LYOPHILIZED, FOR SOLUTION INTRAVENOUS at 04:04

## 2025-04-06 RX ADMIN — LEVALBUTEROL HYDROCHLORIDE 0.63 MG: 0.63 SOLUTION RESPIRATORY (INHALATION) at 12:04

## 2025-04-06 RX ADMIN — MUPIROCIN: 20 OINTMENT TOPICAL at 08:04

## 2025-04-06 RX ADMIN — MIRTAZAPINE 15 MG: 15 TABLET, FILM COATED ORAL at 08:04

## 2025-04-06 RX ADMIN — DEXAMETHASONE 8 MG: 4 TABLET ORAL at 09:04

## 2025-04-06 RX ADMIN — INSULIN ASPART 3 UNITS: 100 INJECTION, SOLUTION INTRAVENOUS; SUBCUTANEOUS at 08:04

## 2025-04-06 RX ADMIN — INSULIN ASPART 4 UNITS: 100 INJECTION, SOLUTION INTRAVENOUS; SUBCUTANEOUS at 06:04

## 2025-04-06 RX ADMIN — BUSPIRONE HYDROCHLORIDE 10 MG: 5 TABLET ORAL at 09:04

## 2025-04-06 RX ADMIN — INSULIN ASPART 6 UNITS: 100 INJECTION, SOLUTION INTRAVENOUS; SUBCUTANEOUS at 04:04

## 2025-04-06 RX ADMIN — ENOXAPARIN SODIUM 40 MG: 40 INJECTION SUBCUTANEOUS at 04:04

## 2025-04-06 NOTE — PROGRESS NOTES
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  Of note patient was admitted at this facility on 02/25/2025 for worsening shortness of breath with malignant pleural effusion. He underwent diagnostic thoracentesis that was consistent with adenocarcinoma with questionable primary.  PET scan revealed hypermetabolic consolidation in the right lung base with mediastinal and bilateral adenopathy and innumerable scattered hypermetabolic bone lesions.  Patient underwent EBUS after PET scan.  Biopsy of station 4R lymph node showed poorly differentiated adenocarcinoma.  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 Patient is followed by Dr. Johnson.      Initial vital signs in ED were /69, pulse 106, respirations 21, temperature 36.7° C, and SpO2 93% on room air.  Labs revealed WBC 9.12, RBC 4.16, hemoglobin 12.6, hematocrit 38.3, MCV 92.1, chloride 108, CO2 22, glucose 142, calcium 11.1, BNP 39.6, and troponin 0.035.  COVID and RSV testing were negative.  Respiratory panel was negative.  Blood cultures x2 and Respiratory culture was obtained.  Chest x-ray revealed persistent right lung consolidation.  CTA chest revealed no pulmonary thromboembolism identified, similar mediastinal and bilateral hilar lymphadenopathy, and increased right pleural effusion and right lower lobe zone consolidation.  EKG revealed sinus tachycardia with heart rate of 105 beats per minute. Patient was admitted to hospital medicine service for further medical management.     Patient had a thoracentesis done with pulm on 4/4. Started on Carbo/Taxol on 4/5.      Interval Hx:   Daughter at bedside. Seen and examined. Doing better. Much more relaxed. Reports breathing is better. Tolerated chemotherapy well.     Case was discussed with patient's nurse and  on the floor.    Objective/physical exam:  General: In no acute distress, afebrile  Chest: Clear to auscultation bilaterally  Heart: RRR, +S1, S2, no appreciable murmur  Abdomen: Soft, nontender, BS +  MSK: Warm, +1 lower extremity edema, no clubbing or cyanosis  Neurologic: Alert and oriented x4, Cranial nerve II-XII intact, Strength 5/5 in all 4 extremities    VITAL SIGNS: 24 HRS MIN & MAX LAST   Temp  Min: 96.3 °F (35.7 °C)  Max: 98 °F (36.7 °C) 97.3 °F (36.3 °C)   BP  Min: 82/43  Max: 126/64 126/64   Pulse  Min: 53  Max: 92  62   Resp  Min: 16  Max: 20 18   SpO2  Min: 91 %  Max: 98 % 98 %     I have reviewed the following labs:  Recent Labs   Lab 04/04/25 0328 04/05/25 0329 04/06/25  0331   WBC 8.39 10.08 6.34   RBC 3.87* 3.91* 3.58*   HGB 11.6* 11.6* 10.7*   HCT 35.7* 35.8* 33.1*   MCV 92.2 91.6 92.5   MCH 30.0 29.7 29.9   MCHC 32.5* 32.4* 32.3*   RDW 13.5 13.5 13.5    146 122*   MPV 10.6* 10.9* 11.0*     Recent Labs   Lab 04/04/25 0328 04/05/25 0329 04/06/25  0331    136 136   K 4.1 3.4* 3.9    102 106   CO2 27 23 20*   BUN 10.0 11.4 22.0   CREATININE 0.70* 0.82 1.19   CALCIUM 11.1* 10.0 8.7*   MG 2.00 1.80 2.00   ALBUMIN 2.8* 2.6* 2.4*   ALKPHOS 79 78 67   ALT 7 5 7   AST 17 11 13   BILITOT 0.6 0.6 0.5     Microbiology Results (last 7 days)       Procedure Component Value Units Date/Time    Blood Culture [[id number]]  (Normal) Collected: 04/03/25 2318    Order Status: Completed Specimen: Blood, Venous Updated: 04/06/25 0100     Blood Culture No Growth At 48 Hours    Blood Culture [[id number]]  (Normal) Collected: 04/03/25 2318    Order Status: Completed Specimen: Blood, Venous Updated: 04/06/25 0100     Blood Culture No Growth At 48 Hours    Respiratory Culture [[id number]]  Collected: 04/04/25 0634    Order Status: Completed Specimen: Sputum, Expectorated Updated: 04/05/25 7750     Respiratory Culture Normal respiratory hanh     GRAM STAIN Quality 1+      Many Gram positive cocci      Few Gram Positive Rods      Rare Yeast             See below for Radiology    Assessment/Plan:  # SOB 2/2 likely pleural effusion with possible superimposed PNA - improved  # Acute hypoxic respiratory failure - improving  # Acute systolic and diastolic heart failure  # Adenocarcinoma unknown primary most likely lung   # Malignant pleural effusion s/p thoracentesis   - complicated by trace right apical pneumothorax  # Malignant hilar and mediastinal adenopathy   # Malignant bone lesions clinically   # Hypercalcemia - improving  # Normocytic anemia  # Cancer related pain  # Thrombocytopenia  # Hx of CAD s/p PCI, HTN, HLD, T2DM, anxiety/depression     - MRSA negative. Stopped vancomycin. Continue azithromycin and zosyn for now  - follow up Bcx  - wean off oxygen as able. Saturating high 90s on 2 L NC  - vitamin D 43, one dose zometa on 4/4. Was briefly given fluids on 4/4  - TTE noted. Cardiology consult pending  - pulm consult for thoracentesis - removed 900 cc. Follow up studies. Complicated with mild pneumothorax. Monitor with CXR  - GI consult for help with etiology of primary as recommended by oncology initially but now will hold off as per oncology  - oncology recommendations appreciated  - C1D2 of Carbo/Taxol on 4/5  - started on neupogen on 4/6  - oxycodone for pain control  - stop amlodipine, continue BB and entresto   - will need that lopressor switched to XL on discharge for GDMT  - PT/OT  - Hgb 10.7, Plts 122, Calcium 8.7      VTE prophylaxis: lovenox; monitor platelets    Patient condition:  Guarded    Anticipated discharge and Disposition:         All diagnosis and differential diagnosis have been reviewed; assessment and plan has been documented; I have personally reviewed the labs and test  results that are presently available; I have reviewed the patients medication list; I have reviewed the consulting providers response and recommendations. I have reviewed or attempted to review medical records based upon their availability    All of the patient's questions have been  addressed and answered. Patient's is agreeable to the above stated plan. I will continue to monitor closely and make adjustments to medical management as needed.      _____________________________________________________________________    Malnutrition Status:  Nutrition consulted. Most recent weight and BMI monitored-     Measurements:  Wt Readings from Last 1 Encounters:   04/03/25 88 kg (194 lb 0.1 oz)   Body mass index is 27.84 kg/m².    Patient has been screened and assessed by RD.    Malnutrition Type:  Context:    Level:      Malnutrition Characteristic Summary:       Interventions/Recommendations (treatment strategy):        Scheduled Med:   0.9% NaCl 250 mL flush bag   Intravenous 1 time in Clinic/HOD    aspirin  81 mg Oral Daily    azithromycin  500 mg Oral Daily    busPIRone  10 mg Oral BID    dexAMETHasone  8 mg Oral Daily    enoxparin  40 mg Subcutaneous Daily    filgrastim-ayow  480 mcg Subcutaneous Daily    levalbuterol  0.63 mg Nebulization Q6H    metoprolol tartrate  12.5 mg Oral BID    mirtazapine  15 mg Oral QHS    mupirocin   Nasal BID    piperacillin-tazobactam (Zosyn) IV (PEDS and ADULTS) (extended infusion is not appropriate)  4.5 g Intravenous Q8H    sacubitriL-valsartan  1 tablet Oral BID    tamsulosin  0.4 mg Oral Daily      Continuous Infusions:     PRN Meds:    Current Facility-Administered Medications:     acetaminophen, 650 mg, Oral, Q6H PRN    ALPRAZolam, 0.25 mg, Oral, Daily PRN    alteplase, 2 mg, Intra-Catheter, PRN    aluminum-magnesium hydroxide-simethicone, 30 mL, Oral, QID PRN    bisacodyL, 10 mg, Rectal, Daily PRN    dextromethorphan-guaiFENesin  mg/5 ml, 5 mL, Oral, Q6H PRN    dextrose 50%,  12.5 g, Intravenous, PRN    dextrose 50%, 25 g, Intravenous, PRN    diphenhydrAMINE, 50 mg, Intravenous, Once PRN    EPINEPHrine, 0.3 mg, Intramuscular, Once PRN    glucagon (human recombinant), 1 mg, Intramuscular, PRN    glucose, 16 g, Oral, PRN    glucose, 24 g, Oral, PRN    heparin, porcine (PF), 500 Units, Intravenous, PRN    hydrocortisone sodium succinate, 100 mg, Intravenous, Once PRN    insulin aspart U-100, 0-10 Units, Subcutaneous, QID (AC + HS) PRN    lactulose 10 gram/15 ml, 30 g, Oral, Q6H PRN    melatonin, 6 mg, Oral, Nightly PRN    naloxone, 0.02 mg, Intravenous, PRN    ondansetron, 4 mg, Intravenous, Q4H PRN    oxyCODONE, 5 mg, Oral, Q4H PRN    prochlorperazine, 5 mg, Intravenous, Q6H PRN    senna-docusate, 1 tablet, Oral, BID PRN    sodium chloride 0.9%, 10 mL, Intravenous, PRN    Flushing PICC/Midline Protocol, , , Until Discontinued **AND** sodium chloride 0.9%, 10 mL, Intravenous, Q12H PRN    sodium chloride 0.9%, 10 mL, Intravenous, PRN     Radiology:  I have personally reviewed the following imaging and agree with the radiologist.     X-Ray Chest AP Portable  Narrative: EXAMINATION:  Single view chest radiograph.    CLINICAL HISTORY:  Reeval Apical Pneumothorax;    TECHNIQUE:  Single view of the chest.    COMPARISON:  Chest radiograph 04/04/2025.    FINDINGS:  The left upper extremity PICC is unchanged.  The right pleural effusion and multifocal opacities are unchanged.  The trace right apical pneumothorax is unchanged.  The heart is normal in size.  There is no acute osseous abnormality.  Impression: 1. Unchanged trace right apical pneumothorax.  2. Unchanged right pleural effusion and multifocal airspace opacities.    Electronically signed by: Loy Trimble MD  Date:    04/05/2025  Time:    09:54      Itzel Webb MD  Department of Hospital Medicine   [facility]   04/06/2025

## 2025-04-06 NOTE — PROGRESS NOTES
[facility] - Oncology Acute  Hematology/Oncology  Consult Note    Patient Name: Juanjose Chawla  MRN: [id number]  Admission Date: 4/3/2025  Hospital Length of Stay: 3 days  Attending Provider: Itzel Webb,*  Consulting Provider: Elizabeth K Lejeune, MD  Principal Problem:<principal problem not specified>    Consults  Subjective:     HPI: This is an 84-year-old male who was seen evaluated by Pulmonary.  He was admitted to the hospital on February 28, 2025 with progressive shortness of breath he eventually underwent a diagnostic thoracentesis  He underwent CT scan chest abdomen pelvis.  He eventually underwent a diagnostic thoracentesis with social consistent with adenocarcinoma questionable primary.  He underwent a PET scan it was referred to us for further evaluation. endoscopic bronchoscopy and ultrasound and biopsy after his PET scan    PET scan showed a pneumo bone metastases, malignant hilar and mediastinal adenopathy pleural metastasis in his malignant pleural effusion.  No other evidence of a primary.  He underwent a 2nd biopsy of the station 4R lymph node.  Which again showed a poorly differentiated adenocarcinoma questionable GI primary    We are awaiting his outpatient final pathology.  However patient came back in with increasing shortness of breath to the emergency room.  He underwent a repeat CT scan last night    04/04/2025: CTA of the chest: Multiple enlarged mediastinal and bilateral hilar nodes without significant interval change.  Increasing size of the right pleural effusion with further right lower lobe zone atelectasis.  Right lower lobe consolidation more evident.      Patient was family in room this morning.  He was sitting up eating breakfast.  He was less short of breath but still gets short of breath on minimal exertion.    He was dry mouth.  He was had constipation for 3 days.  No bleeding per rectum currently.  Still with a little bit of dysuria no frequency.     He was on antibiotics.    We discussed the adenocarcinoma most likely lung an unknown primary.    We discussed genetic chemotherapy with carboplatin and either Taxol or Alimta.    Chemo side effects  The side effects of chemotherapy were discussed.  Including but not limited to: Nausea, vomiting, alopecia, neuropathy, neutropenia, blood transfusion, nephropathy, secondary malignancies, congestive heart failure, allergic reactions to name a few.  and patient with the opportunity to have questions answered.      Interval History 4/6/25 - Patient seen and examined this morning. Breathing much better today. Says he no longer has pain. More relaxed and at his baseline. Tolerated chemotherapy without issue. No nausea/vomiting. Had normal BM this AM. Tolerating PO.       Oncology Treatment Plan:   OP NSCLC PACLITAXEL CARBOPLATIN Q3W    Oncology History Overview Note   Images     February 27, 2025:  CT of the chest, COPD and emphysema, right lower lobe atelectasis, lymphadenopathy in the right hilum as well as the mediastinum and smaller nodes in the left hilum.  The right hilar lymph node measures 2.5 cm other small nodes in the right hilum.  And there is a precarinal lymph node 2.2 x 2.2 cm.  And adenopathy seen in the subcarinal space as well as the mediastinum.     03/13/2025: CT abdomen pelvis, right-sided pleural effusion interstitial fibrosis lungs bilaterally prostatic hypertrophy     03/20/2025: PET-CT: Hypermetabolic consolidation right lung base with mediastinal and bilateral adenopathy and innumerable scattered hypermetabolic bone lesions     pathology  The right pleural effusion showed malignant cells consistent with a poorly differential adenocarcinoma:  Negative for TTF 1, WT1, GATA3.  Suggest an origin of the GI tract clinical pathology is recommended    2nd biopsy  1. EBUS LYMPH NODE 4R (TWO CONVENTIONAL SMEARS, ONE THIN PREP SMEAR, ONE CELL   BLOCK):    RARE MALIGNANT CELLS CONSISTENT WITH POORLY  DIFFERENTIATED ADENOCARCINOMA.      2. EBUS LYMPH NODE 7R:    POORLY DIFFERENTIATED ADENOCARCINOMA.   The immunohistochemical profile of the tumor cells is not typical of lung   primary and may be seen with adenocarcinomas of upper   gastrointestinal/pancreaticobiliary primary.       Tempus Liquid biopsy  TP53  No actionable mutation  KEAP1 +     Metastasis to bone   3/24/2025 Initial Diagnosis    Metastasis to bone     4/5/2025 -  Chemotherapy    Treatment Summary   Plan Name: OP NSCLC PACLITAXEL CARBOPLATIN Q3W  Treatment Goal: Palliative  Status: Active  Start Date: 4/5/2025  End Date: 6/7/2025 (Planned)  Provider: Niko Johnson MD  Chemotherapy: CARBOplatin (PARAPLATIN) 635 mg in 0.9% NaCl 313.5 mL chemo infusion, 635 mg (88.4 % of original dose 715.8 mg), Intravenous, Clinic/HOD 1 time, 1 of 4 cycles  Dose modification:   (original dose 715.8 mg, Cycle 1)  PACLitaxeL (TAXOL) 135 mg/m2 = 282 mg in 0.9% NaCl 500 mL chemo infusion, 135 mg/m2 = 282 mg (100 % of original dose 135 mg/m2), Intravenous, Clinic/HOD 1 time, 1 of 4 cycles  Dose modification: 135 mg/m2 (original dose 135 mg/m2, Cycle 1, Reason: MD Discretion)     Secondary malignancy of mediastinal lymph nodes   3/24/2025 Initial Diagnosis    Secondary malignancy of mediastinal lymph nodes     4/5/2025 -  Chemotherapy    Treatment Summary   Plan Name: OP NSCLC PACLITAXEL CARBOPLATIN Q3W  Treatment Goal: Palliative  Status: Active  Start Date: 4/5/2025  End Date: 6/7/2025 (Planned)  Provider: Niko Johnson MD  Chemotherapy: CARBOplatin (PARAPLATIN) 635 mg in 0.9% NaCl 313.5 mL chemo infusion, 635 mg (88.4 % of original dose 715.8 mg), Intravenous, Clinic/HOD 1 time, 1 of 4 cycles  Dose modification:   (original dose 715.8 mg, Cycle 1)  PACLitaxeL (TAXOL) 135 mg/m2 = 282 mg in 0.9% NaCl 500 mL chemo infusion, 135 mg/m2 = 282 mg (100 % of original dose 135 mg/m2), Intravenous, Clinic/HOD 1 time, 1 of 4 cycles  Dose modification: 135 mg/m2 (original  dose 135 mg/m2, Cycle 1, Reason: MD Discretion)     Adenocarcinoma of unknown primary   4/4/2025 Cancer Staged    Staging form: Lung, AJCC V9  - Clinical stage from 4/4/2025: Stage ANTONIA (cTX, cNX, cM1b)     4/5/2025 -  Chemotherapy    Treatment Summary   Plan Name: OP NSCLC PACLITAXEL CARBOPLATIN Q3W  Treatment Goal: Palliative  Status: Active  Start Date: 4/5/2025  End Date: 6/7/2025 (Planned)  Provider: Niko Johnson MD  Chemotherapy: CARBOplatin (PARAPLATIN) 635 mg in 0.9% NaCl 313.5 mL chemo infusion, 635 mg (88.4 % of original dose 715.8 mg), Intravenous, Clinic/HOD 1 time, 1 of 4 cycles  Dose modification:   (original dose 715.8 mg, Cycle 1)  PACLitaxeL (TAXOL) 135 mg/m2 = 282 mg in 0.9% NaCl 500 mL chemo infusion, 135 mg/m2 = 282 mg (100 % of original dose 135 mg/m2), Intravenous, Clinic/HOD 1 time, 1 of 4 cycles  Dose modification: 135 mg/m2 (original dose 135 mg/m2, Cycle 1, Reason: MD Discretion)          Medications:  Continuous Infusions:      Scheduled Meds:   0.9% NaCl 250 mL flush bag   Intravenous 1 time in Clinic/HOD    aspirin  81 mg Oral Daily    azithromycin  500 mg Oral Daily    busPIRone  10 mg Oral BID    dexAMETHasone  8 mg Oral Daily    enoxparin  40 mg Subcutaneous Daily    levalbuterol  0.63 mg Nebulization Q6H    metoprolol tartrate  12.5 mg Oral BID    mirtazapine  15 mg Oral QHS    mupirocin   Nasal BID    piperacillin-tazobactam (Zosyn) IV (PEDS and ADULTS) (extended infusion is not appropriate)  4.5 g Intravenous Q8H    sacubitriL-valsartan  1 tablet Oral BID    tamsulosin  0.4 mg Oral Daily     PRN Meds:  Current Facility-Administered Medications:     acetaminophen, 650 mg, Oral, Q6H PRN    ALPRAZolam, 0.25 mg, Oral, Daily PRN    alteplase, 2 mg, Intra-Catheter, PRN    aluminum-magnesium hydroxide-simethicone, 30 mL, Oral, QID PRN    bisacodyL, 10 mg, Rectal, Daily PRN    dextromethorphan-guaiFENesin  mg/5 ml, 5 mL, Oral, Q6H PRN    dextrose 50%, 12.5 g, Intravenous, PRN     dextrose 50%, 25 g, Intravenous, PRN    diphenhydrAMINE, 50 mg, Intravenous, Once PRN    EPINEPHrine, 0.3 mg, Intramuscular, Once PRN    glucagon (human recombinant), 1 mg, Intramuscular, PRN    glucose, 16 g, Oral, PRN    glucose, 24 g, Oral, PRN    heparin, porcine (PF), 500 Units, Intravenous, PRN    hydrocortisone sodium succinate, 100 mg, Intravenous, Once PRN    insulin aspart U-100, 0-10 Units, Subcutaneous, QID (AC + HS) PRN    lactulose 10 gram/15 ml, 30 g, Oral, Q6H PRN    melatonin, 6 mg, Oral, Nightly PRN    naloxone, 0.02 mg, Intravenous, PRN    ondansetron, 4 mg, Intravenous, Q4H PRN    oxyCODONE, 5 mg, Oral, Q4H PRN    prochlorperazine, 5 mg, Intravenous, Q6H PRN    senna-docusate, 1 tablet, Oral, BID PRN    sodium chloride 0.9%, 10 mL, Intravenous, PRN    Flushing PICC/Midline Protocol, , , Until Discontinued **AND** sodium chloride 0.9%, 10 mL, Intravenous, Q12H PRN    sodium chloride 0.9%, 10 mL, Intravenous, PRN     Review of patient's allergies indicates:  No Known Allergies     Past Medical History:   Diagnosis Date    Anxiety     Diabetes mellitus     HLD (hyperlipidemia)     Hypertension     Major depressive disorder, single episode, unspecified     Pneumonia, unspecified organism      Past Surgical History:   Procedure Laterality Date    Cardiac stent      ENDOBRONCHIAL ULTRASOUND N/A 3/28/2025    Procedure: ENDOBRONCHIAL ULTRASOUND (EBUS);  Surgeon: Jerrod Harman Jr., MD, [facility];  Location: [facility];  Service: Pulmonary;  Laterality: N/A;    HERNIA REPAIR N/A      Family History    None       Tobacco Use    Smoking status: Never    Smokeless tobacco: Never   Substance and Sexual Activity    Alcohol use: Not Currently    Drug use: Never    Sexual activity: Not on file       Review of Systems see above in HPI  Objective:     Vital Signs (Most Recent):  Temp: 97.3 °F (36.3 °C) (04/06/25 0800)  Pulse: 62 (04/06/25 0906)  Resp: 18 (04/06/25 0824)  BP: 126/64 (04/06/25 0800)  SpO2: 98  % (04/06/25 0824) Vital Signs (24h Range):  Temp:  [96.3 °F (35.7 °C)-98 °F (36.7 °C)] 97.3 °F (36.3 °C)  Pulse:  [53-92] 62  Resp:  [16-20] 18  SpO2:  [91 %-98 %] 98 %  BP: ()/(43-75) 126/64     Weight: 88 kg (194 lb 0.1 oz)  Body mass index is 27.84 kg/m².  Body surface area is 2.08 meters squared.      Intake/Output Summary (Last 24 hours) at 4/6/2025 0954  Last data filed at 4/6/2025 0418  Gross per 24 hour   Intake 1136.79 ml   Output 950 ml   Net 186.79 ml       Physical Exam  Vitals reviewed.   Constitutional:       General: He is awake.      Comments: Sitting up in the chair,   Tachypneic with speaking   Anxious   HENT:      Head: Normocephalic and atraumatic.      Mouth/Throat:      Mouth: Mucous membranes are moist.      Pharynx: Oropharynx is clear.   Eyes:      Extraocular Movements: Extraocular movements intact.      Conjunctiva/sclera: Conjunctivae normal.      Pupils: Pupils are equal, round, and reactive to light.   Cardiovascular:      Rate and Rhythm: Normal rate and regular rhythm.      Pulses: Normal pulses.      Heart sounds: Normal heart sounds.   Pulmonary:      Effort: Pulmonary effort is normal.      Breath sounds: Decreased air movement present. Examination of the right-middle field reveals decreased breath sounds. Examination of the right-lower field reveals decreased breath sounds. Decreased breath sounds present.   Abdominal:      General: Abdomen is flat. Bowel sounds are normal. There is no distension.      Palpations: Abdomen is soft.   Musculoskeletal:         General: Normal range of motion.      Cervical back: Normal range of motion and neck supple.   Skin:     General: Skin is warm and dry.   Neurological:      General: No focal deficit present.      Mental Status: He is alert and oriented to person, place, and time. Mental status is at baseline.      GCS: GCS eye subscore is 4. GCS verbal subscore is 5. GCS motor subscore is 6.   Psychiatric:         Attention and  Perception: Attention normal.         Mood and Affect: Mood is anxious.         Behavior: Behavior normal. Behavior is cooperative.         Thought Content: Thought content normal.         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    Continue DuoNebs    Hypercalcemia (corrected calcium 12---with his albumin of 2.8)  - IV fluids   - Added Zometa  - Check magnesium, phosphorus, vitamin-D   Weight loss   Fatigue  Anemia             Nutritional suppor    PICC line plan, we will start chemotherapy on 04/05/2025:  Carboplatin AUC of 6, Taxol 135 mg per m2   Okay for growth factors on day 2.        PLAN  - EGD non urgent as we feel strongly this is lung primary and do not want to delay chemotherapy further.   - Toxicity reviewed, continue C1D2 of Carbo/Taxol with dose reduction as above  - Continue Oxycodon IR 5mg Q4H for cancer related pain  - Add neupogen Day 1 for chemotherapy induced neutropenia prevention    Thank you for your consult.     Elizabeth K Lejeune, MD  Hematology/Oncology  [facility] - Oncology [facility]

## 2025-04-06 NOTE — PROGRESS NOTES
"  [facility]    Cardiology  Progress Note    Patient Name: Juanjose Chawla  MRN: [id number]  Admission Date: 4/3/2025  Hospital Length of Stay: 3 days  Code Status: Full Code   Attending Provider: Itzel Webb,*   Consulting Provider: MURPHY Traylor  Primary Care Physician: Sadiq Hartmann MD  Principal Problem:<principal problem not specified>    Patient information was obtained from patient, past medical records, and ER records.     Subjective:     Chief Complaint/Reason for Consult: HF    HPI: Mr. Chawla is an 83 y/o male who is known to [facility], Dr. Mcknight (2018). The patient presented to [facility] on 4.3.25 with c/o SOB. He was diagnosed with PNA and R Pleural Effusion. Pulmonary was consulted and the PT underwent a Thoracentesis with results of 900mL of Yellow-[place] Aspirate. He had an ECHO which revealed an LVEF of 45-50% and Grade II DD. [facility] was consulted for Newly Diagnosed HF.     4.6.25: NAD. "I am fine." Denies CP, SOB and Palps.     PMH: Angina, HTN, HLD, DM II, Depression, Pneumonia, Lung CA   PSH: Angiogram, Bronchoscopy, Hernia Repair   Family History: Denies Family History of Heart Disease  Social History: Denies Illicit Drug, ETOH and Tobacco Use     Previous Cardiac Diagnostics:   ECHO 4.3.25:  Left Ventricle: The left ventricle is normal in size. Mildly increased wall thickness. There is mildly reduced systolic function with a visually estimated ejection fraction of 45 - 50%. Grade II diastolic dysfunction.  Right Ventricle: The right ventricle is normal in size. Systolic function is normal. TAPSE is 2.26 cm.  There are no significant valvular abnormalities.  Pericardium: There is no pericardial effusion.    Review of patient's allergies indicates:  No Known Allergies  No current facility-administered medications on file prior to encounter.     Current Outpatient Medications on File Prior to Encounter   Medication Sig    albuterol (ACCUNEB) 0.63 mg/3 " mL Nebu Take 0.63 mg by nebulization every 6 (six) hours as needed (wheezing and shortness of breath). Rescue    ALPRAZolam (XANAX) 0.25 MG tablet Take 0.25 mg by mouth 3 (three) times daily as needed for Anxiety.    amLODIPine (NORVASC) 5 MG tablet Take 5 mg by mouth once daily.    aspirin (ECOTRIN) 81 MG EC tablet Take 81 mg by mouth once daily.    busPIRone (BUSPAR) 5 MG Tab Take 5 mg by mouth 2 (two) times daily.    famotidine (PEPCID) 20 MG tablet Take 20 mg by mouth once daily.    gabapentin (NEURONTIN) 300 MG capsule Take 300 mg by mouth 3 (three) times daily.    glimepiride (AMARYL) 1 MG tablet Take 2 mg by mouth before breakfast.    guaiFENesin-codeine 100-10 mg/5 ml (TUSSI-ORGANIDIN NR)  mg/5 mL syrup Take 5 mLs by mouth every 4 (four) hours as needed for Cough or Congestion.    lisinopriL (PRINIVIL,ZESTRIL) 2.5 MG tablet Take 2.5 mg by mouth once daily.    LORazepam (ATIVAN) 1 MG tablet Take  60 min before MRI, May repeat x 1 if needed    metFORMIN (GLUCOPHAGE) 1000 MG tablet Take 2,000 mg by mouth once daily.    polyethylene glycol (GLYCOLAX) 17 gram PwPk Take 17 g by mouth once daily.    tamsulosin (FLOMAX) 0.4 mg Cap Take 0.4 mg by mouth once daily.    umeclidinium (INCRUSE ELLIPTA) 62.5 mcg/actuation inhalation capsule Inhale 62.5 mcg into the lungs once daily. Controller     Review of Systems   Constitutional:  Positive for fatigue.   Respiratory:  Negative for shortness of breath.    Cardiovascular:  Negative for chest pain, palpitations and leg swelling.   All other systems reviewed and are negative.    Objective:     Vital Signs (Most Recent):  Temp: 97.8 °F (36.6 °C) (04/06/25 1146)  Pulse: 74 (04/06/25 1252)  Resp: (!) 24 (04/06/25 1252)  BP: 121/65 (04/06/25 1146)  SpO2: 98 % (04/06/25 1252) Vital Signs (24h Range):  Temp:  [96.3 °F (35.7 °C)-97.8 °F (36.6 °C)] 97.8 °F (36.6 °C)  Pulse:  [53-74] 74  Resp:  [18-24] 24  SpO2:  [92 %-98 %] 98 %  BP: (101-126)/(53-75) 121/65   Weight: 88 kg  "(194 lb 0.1 oz)  Body mass index is 27.84 kg/m².  SpO2: 98 %       Intake/Output Summary (Last 24 hours) at 4/6/2025 1429  Last data filed at 4/6/2025 0418  Gross per 24 hour   Intake 1136.79 ml   Output 950 ml   Net 186.79 ml     Lines/Drains/Airways       Peripherally Inserted Central Catheter Line  Duration             PICC Double Lumen 04/04/25 1326 left basilic 2 days              Drain  Duration             Male External Urinary Catheter 04/05/25 0700 Medium 1 day                  Significant Labs:   Chemistries:   Recent Labs   Lab 04/03/25  1553 04/04/25  0328 04/05/25  0329 04/06/25  0331    140 136 136   K 4.4 4.1 3.4* 3.9   * 107 102 106   CO2 22* 27 23 20*   BUN 12.6 10.0 11.4 22.0   CREATININE 0.74 0.70* 0.82 1.19   CALCIUM 11.1* 11.1* 10.0 8.7*   BILITOT 0.4 0.6 0.6 0.5   ALKPHOS 80 79 78 67   ALT 7 7 5 7   AST 18 17 11 13   GLUCOSE 142* 129* 142* 212*   MG  --  2.00 1.80 2.00   PHOS  --   --  2.7  --    TROPONINI 0.035  --   --   --         CBC/Anemia Labs: Coags:    Recent Labs   Lab 04/04/25  0328 04/05/25  0329 04/06/25  0331   WBC 8.39 10.08 6.34   HGB 11.6* 11.6* 10.7*   HCT 35.7* 35.8* 33.1*    146 122*   MCV 92.2 91.6 92.5   RDW 13.5 13.5 13.5    No results for input(s): "PT", "INR", "APTT" in the last 168 hours.     Telemetry: SR    Physical Exam  Constitutional:       General: He is not in acute distress.     Appearance: Normal appearance. He is ill-appearing.   HENT:      Head: Normocephalic.      Mouth/Throat:      Mouth: Mucous membranes are dry.   Eyes:      Extraocular Movements: Extraocular movements intact.   Cardiovascular:      Rate and Rhythm: Normal rate and regular rhythm.      Pulses: Normal pulses.      Heart sounds: Murmur heard.   Pulmonary:      Effort: Pulmonary effort is normal. No respiratory distress.      Breath sounds: Examination of the right-lower field reveals decreased breath sounds. Examination of the left-lower field reveals decreased breath " sounds. Decreased breath sounds present.   Abdominal:      Palpations: Abdomen is soft.   Skin:     General: Skin is warm and dry.   Neurological:      General: No focal deficit present.      Mental Status: He is alert and oriented to person, place, and time.   Psychiatric:         Mood and Affect: Mood normal.         Behavior: Behavior normal.       Home Medications:   Medications Ordered Prior to Encounter[1]  Current Schedule Inpatient Medications:   0.9% NaCl 250 mL flush bag   Intravenous 1 time in Clinic/HOD    aspirin  81 mg Oral Daily    azithromycin  500 mg Oral Daily    busPIRone  10 mg Oral BID    dexAMETHasone  8 mg Oral Daily    enoxparin  40 mg Subcutaneous Daily    filgrastim-ayow  480 mcg Subcutaneous Daily    levalbuterol  0.63 mg Nebulization Q6H    metoprolol tartrate  12.5 mg Oral BID    mirtazapine  15 mg Oral QHS    mupirocin   Nasal BID    piperacillin-tazobactam (Zosyn) IV (PEDS and ADULTS) (extended infusion is not appropriate)  4.5 g Intravenous Q8H    sacubitriL-valsartan  1 tablet Oral BID    tamsulosin  0.4 mg Oral Daily     Assessment:   Newly Diagnosed Diastolic HF/Mid [place] EF 45-50% - Compensated     - ECHO (4.4.25) - LVEF 45-50%, Grade II DD  Large Pleural Effusion    - s/p Thoracentesis (4.4.25) - 900mL Yellow-[place]   Lung CA  HTN  HLD  DM II  Depression  No Hx of GIB     Plan:   D/C Norvasc  Continue ASA, BB and Entresto   Schedule OP LexiPET for Newly Diagnosed HF  F/U with Cardiology in 1-2 Weeks upon D/C   We will be available as needed    MURPHY Traylor  Cardiology  [facility]     I agree with the findings of the complexity of problems addressed and take responsibility for the plan's risks and complications. I approved the plan documented by Issac Guaman NP.            [1]   No current facility-administered medications on file prior to encounter.     Current Outpatient Medications on File Prior to Encounter   Medication Sig Dispense Refill     albuterol (ACCUNEB) 0.63 mg/3 mL Nebu Take 0.63 mg by nebulization every 6 (six) hours as needed (wheezing and shortness of breath). Rescue      ALPRAZolam (XANAX) 0.25 MG tablet Take 0.25 mg by mouth 3 (three) times daily as needed for Anxiety.      amLODIPine (NORVASC) 5 MG tablet Take 5 mg by mouth once daily.      aspirin (ECOTRIN) 81 MG EC tablet Take 81 mg by mouth once daily.      busPIRone (BUSPAR) 5 MG Tab Take 5 mg by mouth 2 (two) times daily.      famotidine (PEPCID) 20 MG tablet Take 20 mg by mouth once daily.      gabapentin (NEURONTIN) 300 MG capsule Take 300 mg by mouth 3 (three) times daily.      glimepiride (AMARYL) 1 MG tablet Take 2 mg by mouth before breakfast.      guaiFENesin-codeine 100-10 mg/5 ml (TUSSI-ORGANIDIN NR)  mg/5 mL syrup Take 5 mLs by mouth every 4 (four) hours as needed for Cough or Congestion. 100 mL 0    lisinopriL (PRINIVIL,ZESTRIL) 2.5 MG tablet Take 2.5 mg by mouth once daily.      LORazepam (ATIVAN) 1 MG tablet Take  60 min before MRI, May repeat x 1 if needed 2 tablet 0    metFORMIN (GLUCOPHAGE) 1000 MG tablet Take 2,000 mg by mouth once daily.      polyethylene glycol (GLYCOLAX) 17 gram PwPk Take 17 g by mouth once daily.      tamsulosin (FLOMAX) 0.4 mg Cap Take 0.4 mg by mouth once daily.      umeclidinium (INCRUSE ELLIPTA) 62.5 mcg/actuation inhalation capsule Inhale 62.5 mcg into the lungs once daily. Controller 30 each 0

## 2025-04-06 NOTE — PLAN OF CARE
Problem: Adult Inpatient Plan of Care  Goal: Plan of Care Review  4/6/2025 1627 by Deedee Sheldon RN  Outcome: Progressing  4/6/2025 0743 by Deedee Sheldon RN  Outcome: Progressing  Goal: Patient-Specific Goal (Individualized)  4/6/2025 1627 by Deedee Sheldon RN  Outcome: Progressing  4/6/2025 0743 by Deedee Sheldon RN  Outcome: Progressing  Goal: Absence of Hospital-Acquired Illness or Injury  4/6/2025 1627 by Deedee Sheldon RN  Outcome: Progressing  4/6/2025 0743 by Deedee Sheldon RN  Outcome: Progressing  Goal: Optimal Comfort and Wellbeing  4/6/2025 1627 by Deedee Sheldon RN  Outcome: Progressing  4/6/2025 0743 by Deedee Sheldon RN  Outcome: Progressing  Goal: Readiness for Transition of Care  4/6/2025 1627 by Deedee Sheldon RN  Outcome: Progressing  4/6/2025 0743 by Deedee Sheldon RN  Outcome: Progressing     Problem: Skin Injury Risk Increased  Goal: Skin Health and Integrity  4/6/2025 1627 by Deedee Sheldon RN  Outcome: Progressing  4/6/2025 0743 by Deedee Sheldon RN  Outcome: Progressing     Problem: Fall Injury Risk  Goal: Absence of Fall and Fall-Related Injury  4/6/2025 1627 by Deedee Sheldon RN  Outcome: Progressing  4/6/2025 0743 by Deedee Sheldon RN  Outcome: Progressing     Problem: Infection  Goal: Absence of Infection Signs and Symptoms  4/6/2025 1627 by Deedee Sheldon RN  Outcome: Progressing  4/6/2025 0743 by Deedee Sheldon RN  Outcome: Progressing

## 2025-04-06 NOTE — PLAN OF CARE
Problem: Adult Inpatient Plan of Care  Goal: Plan of Care Review  Outcome: Progressing  Flowsheets (Taken 4/5/2025 1946)  Plan of Care Reviewed With:   patient   spouse  Goal: Patient-Specific Goal (Individualized)  Outcome: Progressing  Goal: Absence of Hospital-Acquired Illness or Injury  Outcome: Progressing  Intervention: Identify and Manage Fall Risk  Flowsheets (Taken 4/5/2025 1946)  Safety Promotion/Fall Prevention:   assistive device/personal item within reach   Fall Risk reviewed with patient/family   Fall Risk signage in place   family expresses understanding of fall risk and prevention   instructed to call staff for mobility   lighting adjusted   medications reviewed   nonskid shoes/socks when out of bed   patient expresses understanding of fall risk and prevention   in recliner, wheels locked  Intervention: Prevent Skin Injury  Flowsheets (Taken 4/5/2025 1946)  Body Position: position changed independently  Skin Protection:   incontinence pads utilized   protective footwear used  Device Skin Pressure Protection:   absorbent pad utilized/changed   tubing/devices free from skin contact  Intervention: Prevent and Manage VTE (Venous Thromboembolism) Risk  Flowsheets (Taken 4/5/2025 1946)  VTE Prevention/Management:   ambulation promoted   bleeding precautions maintained   bleeding risk assessed   fluids promoted  Intervention: Prevent Infection  Flowsheets (Taken 4/5/2025 1946)  Infection Prevention:   environmental surveillance performed   equipment surfaces disinfected   hand hygiene promoted   personal protective equipment utilized   rest/sleep promoted   single patient room provided  Goal: Optimal Comfort and Wellbeing  Outcome: Progressing  Intervention: Monitor Pain and Promote Comfort  Flowsheets (Taken 4/5/2025 1946)  Pain Management Interventions:   care clustered   quiet environment facilitated   position adjusted  Intervention: Provide Person-Centered Care  Flowsheets (Taken 4/5/2025  1946)  Trust Relationship/Rapport:   care explained   choices provided   emotional support provided   empathic listening provided   questions answered   questions encouraged   reassurance provided   thoughts/feelings acknowledged  Goal: Readiness for Transition of Care  Outcome: Progressing     Problem: Skin Injury Risk Increased  Goal: Skin Health and Integrity  Outcome: Progressing  Intervention: Optimize Skin Protection  Flowsheets (Taken 4/5/2025 1946)  Pressure Reduction Techniques: frequent weight shift encouraged  Skin Protection:   incontinence pads utilized   protective footwear used  Activity Management:   Up in chair - L3   Ambulated to bathroom - L4     Problem: Fall Injury Risk  Goal: Absence of Fall and Fall-Related Injury  Outcome: Progressing  Intervention: Identify and Manage Contributors  Flowsheets (Taken 4/5/2025 1946)  Self-Care Promotion:   independence encouraged   BADL personal objects within reach  Medication Review/Management: medications reviewed  Intervention: Promote Injury-Free Environment  Flowsheets (Taken 4/5/2025 1946)  Safety Promotion/Fall Prevention:   assistive device/personal item within reach   Fall Risk reviewed with patient/family   Fall Risk signage in place   family expresses understanding of fall risk and prevention   instructed to call staff for mobility   lighting adjusted   medications reviewed   nonskid shoes/socks when out of bed   patient expresses understanding of fall risk and prevention   in recliner, wheels locked     Problem: Infection  Goal: Absence of Infection Signs and Symptoms  Outcome: Progressing  Intervention: Prevent or Manage Infection  Flowsheets (Taken 4/5/2025 1946)  Infection Management: aseptic technique maintained  Isolation Precautions: precautions maintained

## 2025-04-07 ENCOUNTER — ANESTHESIA EVENT (OUTPATIENT)
Dept: SURGERY | Facility: HOSPITAL | Age: 85
End: 2025-04-07
Payer: MEDICARE

## 2025-04-07 LAB
ABS NEUT (OLG): 22.58 X10(3)/MCL (ref 2.1–9.2)
ALBUMIN SERPL-MCNC: 2.3 G/DL (ref 3.4–4.8)
ALBUMIN/GLOB SERPL: 0.6 RATIO (ref 1.1–2)
ALP SERPL-CCNC: 66 UNIT/L (ref 40–150)
ALT SERPL-CCNC: 7 UNIT/L (ref 0–55)
ANION GAP SERPL CALC-SCNC: 10 MEQ/L
ANISOCYTOSIS BLD QL SMEAR: ABNORMAL
AST SERPL-CCNC: 14 UNIT/L (ref 11–45)
BACTERIA #/AREA URNS AUTO: ABNORMAL /HPF
BILIRUB SERPL-MCNC: 0.3 MG/DL
BILIRUB UR QL STRIP.AUTO: NEGATIVE
BUN SERPL-MCNC: 35.1 MG/DL (ref 8.4–25.7)
BURR CELLS (OLG): ABNORMAL
CALCIUM SERPL-MCNC: 7.8 MG/DL (ref 8.8–10)
CHLORIDE SERPL-SCNC: 109 MMOL/L (ref 98–107)
CLARITY UR: CLEAR
CO2 SERPL-SCNC: 19 MMOL/L (ref 23–31)
COLOR UR AUTO: ABNORMAL
CREAT SERPL-MCNC: 1.4 MG/DL (ref 0.72–1.25)
CREAT/UREA NIT SERPL: 25
ERYTHROCYTE [DISTWIDTH] IN BLOOD BY AUTOMATED COUNT: 13.7 % (ref 11.5–17)
GFR SERPLBLD CREATININE-BSD FMLA CKD-EPI: 49 ML/MIN/1.73/M2
GLOBULIN SER-MCNC: 3.6 GM/DL (ref 2.4–3.5)
GLUCOSE SERPL-MCNC: 225 MG/DL (ref 82–115)
GLUCOSE SERPL-MCNC: 411 MG/DL (ref 70–110)
GLUCOSE UR QL STRIP: ABNORMAL
HCT VFR BLD AUTO: 33.8 % (ref 42–52)
HGB BLD-MCNC: 11 G/DL (ref 14–18)
HGB UR QL STRIP: ABNORMAL
INSTRUMENT WBC (OLG): 23.77 X10(3)/MCL
KETONES UR QL STRIP: NEGATIVE
LEUKOCYTE ESTERASE UR QL STRIP: NEGATIVE
LYMPHOCYTES NFR BLD MANUAL: 0.71 X10(3)/MCL (ref 0.6–4.6)
LYMPHOCYTES NFR BLD MANUAL: 3 %
MAGNESIUM SERPL-MCNC: 2.3 MG/DL (ref 1.6–2.6)
MCH RBC QN AUTO: 30.1 PG (ref 27–31)
MCHC RBC AUTO-ENTMCNC: 32.5 G/DL (ref 33–36)
MCV RBC AUTO: 92.6 FL (ref 80–94)
METAMYELOCYTES NFR BLD MANUAL: 1 %
MONOCYTES NFR BLD MANUAL: 0.24 X10(3)/MCL (ref 0.1–1.3)
MONOCYTES NFR BLD MANUAL: 1 %
NEUTROPHILS NFR BLD MANUAL: 95 %
NITRITE UR QL STRIP: NEGATIVE
OVALOCYTES (OLG): ABNORMAL
PH UR STRIP: 5.5 [PH]
PLATELET # BLD AUTO: 123 X10(3)/MCL (ref 130–400)
PLATELET # BLD EST: ABNORMAL 10*3/UL
PMV BLD AUTO: 11.2 FL (ref 7.4–10.4)
POCT GLUCOSE: 234 MG/DL (ref 70–110)
POCT GLUCOSE: 255 MG/DL (ref 70–110)
POCT GLUCOSE: 307 MG/DL (ref 70–110)
POCT GLUCOSE: 327 MG/DL (ref 70–110)
POIKILOCYTOSIS BLD QL SMEAR: ABNORMAL
POTASSIUM SERPL-SCNC: 3.6 MMOL/L (ref 3.5–5.1)
PROT SERPL-MCNC: 5.9 GM/DL (ref 5.8–7.6)
PROT UR QL STRIP: ABNORMAL
RBC # BLD AUTO: 3.65 X10(6)/MCL (ref 4.7–6.1)
RBC #/AREA URNS AUTO: ABNORMAL /HPF
RBC MORPH BLD: ABNORMAL
SODIUM SERPL-SCNC: 138 MMOL/L (ref 136–145)
SODIUM UR-SCNC: 57 MMOL/L
SP GR UR STRIP.AUTO: 1.02 (ref 1–1.03)
SQUAMOUS #/AREA URNS LPF: ABNORMAL /HPF
UROBILINOGEN UR STRIP-ACNC: NORMAL
WBC # BLD AUTO: 23.77 X10(3)/MCL (ref 4.5–11.5)
WBC #/AREA URNS AUTO: ABNORMAL /HPF

## 2025-04-07 PROCEDURE — 63600175 PHARM REV CODE 636 W HCPCS: Performed by: STUDENT IN AN ORGANIZED HEALTH CARE EDUCATION/TRAINING PROGRAM

## 2025-04-07 PROCEDURE — 94760 N-INVAS EAR/PLS OXIMETRY 1: CPT

## 2025-04-07 PROCEDURE — 63700000 PHARM REV CODE 250 ALT 637 W/O HCPCS

## 2025-04-07 PROCEDURE — 21400001 HC TELEMETRY ROOM

## 2025-04-07 PROCEDURE — 97165 OT EVAL LOW COMPLEX 30 MIN: CPT

## 2025-04-07 PROCEDURE — 99223 1ST HOSP IP/OBS HIGH 75: CPT | Mod: 25,,, | Performed by: PHYSICIAN ASSISTANT

## 2025-04-07 PROCEDURE — 25000242 PHARM REV CODE 250 ALT 637 W/ HCPCS: Performed by: PHYSICIAN ASSISTANT

## 2025-04-07 PROCEDURE — 25000003 PHARM REV CODE 250

## 2025-04-07 PROCEDURE — 99900035 HC TECH TIME PER 15 MIN (STAT)

## 2025-04-07 PROCEDURE — 63600175 PHARM REV CODE 636 W HCPCS: Mod: JZ,TB | Performed by: STUDENT IN AN ORGANIZED HEALTH CARE EDUCATION/TRAINING PROGRAM

## 2025-04-07 PROCEDURE — 25000003 PHARM REV CODE 250: Performed by: STUDENT IN AN ORGANIZED HEALTH CARE EDUCATION/TRAINING PROGRAM

## 2025-04-07 PROCEDURE — 97161 PT EVAL LOW COMPLEX 20 MIN: CPT

## 2025-04-07 PROCEDURE — 83735 ASSAY OF MAGNESIUM: CPT

## 2025-04-07 PROCEDURE — 85025 COMPLETE CBC W/AUTO DIFF WBC: CPT | Performed by: STUDENT IN AN ORGANIZED HEALTH CARE EDUCATION/TRAINING PROGRAM

## 2025-04-07 PROCEDURE — 51798 US URINE CAPACITY MEASURE: CPT

## 2025-04-07 PROCEDURE — 63600175 PHARM REV CODE 636 W HCPCS: Performed by: NURSE PRACTITIONER

## 2025-04-07 PROCEDURE — 80053 COMPREHEN METABOLIC PANEL: CPT | Performed by: STUDENT IN AN ORGANIZED HEALTH CARE EDUCATION/TRAINING PROGRAM

## 2025-04-07 PROCEDURE — 25000003 PHARM REV CODE 250: Performed by: NURSE PRACTITIONER

## 2025-04-07 PROCEDURE — 99024 POSTOP FOLLOW-UP VISIT: CPT | Mod: POP,,, | Performed by: PHYSICIAN ASSISTANT

## 2025-04-07 PROCEDURE — 94640 AIRWAY INHALATION TREATMENT: CPT

## 2025-04-07 PROCEDURE — 63600175 PHARM REV CODE 636 W HCPCS: Performed by: INTERNAL MEDICINE

## 2025-04-07 PROCEDURE — 99900031 HC PATIENT EDUCATION (STAT)

## 2025-04-07 PROCEDURE — 36415 COLL VENOUS BLD VENIPUNCTURE: CPT | Performed by: STUDENT IN AN ORGANIZED HEALTH CARE EDUCATION/TRAINING PROGRAM

## 2025-04-07 PROCEDURE — 63600175 PHARM REV CODE 636 W HCPCS

## 2025-04-07 PROCEDURE — 84300 ASSAY OF URINE SODIUM: CPT

## 2025-04-07 PROCEDURE — 99232 SBSQ HOSP IP/OBS MODERATE 35: CPT | Mod: ,,, | Performed by: INTERNAL MEDICINE

## 2025-04-07 PROCEDURE — 81001 URINALYSIS AUTO W/SCOPE: CPT

## 2025-04-07 RX ORDER — SODIUM CHLORIDE, SODIUM LACTATE, POTASSIUM CHLORIDE, CALCIUM CHLORIDE 600; 310; 30; 20 MG/100ML; MG/100ML; MG/100ML; MG/100ML
INJECTION, SOLUTION INTRAVENOUS CONTINUOUS
Status: ACTIVE | OUTPATIENT
Start: 2025-04-07 | End: 2025-04-07

## 2025-04-07 RX ORDER — CEFTRIAXONE 2 G/1
2 INJECTION, POWDER, FOR SOLUTION INTRAMUSCULAR; INTRAVENOUS
Status: DISCONTINUED | OUTPATIENT
Start: 2025-04-07 | End: 2025-04-09 | Stop reason: HOSPADM

## 2025-04-07 RX ORDER — AZITHROMYCIN 250 MG/1
500 TABLET, FILM COATED ORAL DAILY
Status: DISCONTINUED | OUTPATIENT
Start: 2025-04-08 | End: 2025-04-09 | Stop reason: HOSPADM

## 2025-04-07 RX ADMIN — SODIUM CHLORIDE, POTASSIUM CHLORIDE, SODIUM LACTATE AND CALCIUM CHLORIDE: 600; 310; 30; 20 INJECTION, SOLUTION INTRAVENOUS at 09:04

## 2025-04-07 RX ADMIN — MUPIROCIN: 20 OINTMENT TOPICAL at 09:04

## 2025-04-07 RX ADMIN — LEVALBUTEROL HYDROCHLORIDE 0.63 MG: 0.63 SOLUTION RESPIRATORY (INHALATION) at 01:04

## 2025-04-07 RX ADMIN — AZITHROMYCIN DIHYDRATE 500 MG: 250 TABLET ORAL at 09:04

## 2025-04-07 RX ADMIN — INSULIN ASPART 5 UNITS: 100 INJECTION, SOLUTION INTRAVENOUS; SUBCUTANEOUS at 08:04

## 2025-04-07 RX ADMIN — INSULIN ASPART 8 UNITS: 100 INJECTION, SOLUTION INTRAVENOUS; SUBCUTANEOUS at 12:04

## 2025-04-07 RX ADMIN — MUPIROCIN: 20 OINTMENT TOPICAL at 08:04

## 2025-04-07 RX ADMIN — SENNOSIDES AND DOCUSATE SODIUM 1 TABLET: 50; 8.6 TABLET ORAL at 11:04

## 2025-04-07 RX ADMIN — FILGRASTIM 480 MCG: 480 INJECTION, SOLUTION INTRAVENOUS; SUBCUTANEOUS at 09:04

## 2025-04-07 RX ADMIN — INSULIN ASPART 8 UNITS: 100 INJECTION, SOLUTION INTRAVENOUS; SUBCUTANEOUS at 04:04

## 2025-04-07 RX ADMIN — DEXAMETHASONE 8 MG: 4 TABLET ORAL at 09:04

## 2025-04-07 RX ADMIN — GABAPENTIN 300 MG: 300 CAPSULE ORAL at 08:04

## 2025-04-07 RX ADMIN — PIPERACILLIN SODIUM AND TAZOBACTAM SODIUM 4.5 G: 4; .5 INJECTION, POWDER, LYOPHILIZED, FOR SOLUTION INTRAVENOUS at 01:04

## 2025-04-07 RX ADMIN — MIRTAZAPINE 15 MG: 15 TABLET, FILM COATED ORAL at 08:04

## 2025-04-07 RX ADMIN — LEVALBUTEROL HYDROCHLORIDE 0.63 MG: 0.63 SOLUTION RESPIRATORY (INHALATION) at 07:04

## 2025-04-07 RX ADMIN — ASPIRIN 81 MG: 81 TABLET, COATED ORAL at 09:04

## 2025-04-07 RX ADMIN — METOPROLOL TARTRATE 12.5 MG: 25 TABLET, FILM COATED ORAL at 09:04

## 2025-04-07 RX ADMIN — TAMSULOSIN HYDROCHLORIDE 0.4 MG: 0.4 CAPSULE ORAL at 09:04

## 2025-04-07 RX ADMIN — CEFTRIAXONE SODIUM 2 G: 2 INJECTION, POWDER, FOR SOLUTION INTRAMUSCULAR; INTRAVENOUS at 09:04

## 2025-04-07 RX ADMIN — OXYCODONE HYDROCHLORIDE 5 MG: 5 TABLET ORAL at 11:04

## 2025-04-07 RX ADMIN — ENOXAPARIN SODIUM 40 MG: 40 INJECTION SUBCUTANEOUS at 04:04

## 2025-04-07 RX ADMIN — BUSPIRONE HYDROCHLORIDE 10 MG: 5 TABLET ORAL at 08:04

## 2025-04-07 RX ADMIN — SACUBITRIL AND VALSARTAN 1 TABLET: 24; 26 TABLET, FILM COATED ORAL at 08:04

## 2025-04-07 RX ADMIN — GABAPENTIN 300 MG: 300 CAPSULE ORAL at 02:04

## 2025-04-07 RX ADMIN — BUSPIRONE HYDROCHLORIDE 10 MG: 5 TABLET ORAL at 09:04

## 2025-04-07 RX ADMIN — GABAPENTIN 300 MG: 300 CAPSULE ORAL at 09:04

## 2025-04-07 RX ADMIN — SACUBITRIL AND VALSARTAN 1 TABLET: 24; 26 TABLET, FILM COATED ORAL at 09:04

## 2025-04-07 RX ADMIN — INSULIN ASPART 4 UNITS: 100 INJECTION, SOLUTION INTRAVENOUS; SUBCUTANEOUS at 06:04

## 2025-04-07 NOTE — PROGRESS NOTES
[facility] - Oncology Acute  Hematology/Oncology  Consult Note    Patient Name: Juanjose Chawla  MRN: [id number]  Admission Date: 4/3/2025  Hospital Length of Stay: 4 days  Attending Provider: Itzel Webb,*  Consulting Provider: Niko Johnson MD  Principal Problem:<principal problem not specified>    Consults  Subjective:     HPI: This is an 84-year-old male who was seen evaluated by Pulmonary.  He was admitted to the hospital on February 28, 2025 with progressive shortness of breath he eventually underwent a diagnostic thoracentesis  He underwent CT scan chest abdomen pelvis.  He eventually underwent a diagnostic thoracentesis with social consistent with adenocarcinoma questionable primary.  He underwent a PET scan it was referred to us for further evaluation. endoscopic bronchoscopy and ultrasound and biopsy after his PET scan    PET scan showed a pneumo bone metastases, malignant hilar and mediastinal adenopathy pleural metastasis in his malignant pleural effusion.  No other evidence of a primary.  He underwent a 2nd biopsy of the station 4R lymph node.  Which again showed a poorly differentiated adenocarcinoma questionable GI primary    We are awaiting his outpatient final pathology.  However patient came back in with increasing shortness of breath to the emergency room.  He underwent a repeat CT scan last night    04/04/2025: CTA of the chest: Multiple enlarged mediastinal and bilateral hilar nodes without significant interval change.  Increasing size of the right pleural effusion with further right lower lobe zone atelectasis.  Right lower lobe consolidation more evident.      Patient was family in room this morning.  He was sitting up eating breakfast.  He was less short of breath but still gets short of breath on minimal exertion.    He was dry mouth.  He was had constipation for 3 days.  No bleeding per rectum currently.  Still with a little bit of dysuria no frequency.    He  was on antibiotics.    We discussed the adenocarcinoma most likely lung an unknown primary.    We discussed genetic chemotherapy with carboplatin and either Taxol or Alimta.    Chemo side effects  The side effects of chemotherapy were discussed.  Including but not limited to: Nausea, vomiting, alopecia, neuropathy, neutropenia, blood transfusion, nephropathy, secondary malignancies, congestive heart failure, allergic reactions to name a few.  and patient with the opportunity to have questions answered.      Interval History 47/25 -   Cycle 1 day 3 chemo Carb/taxol  S/p zometa 4 days ago  PICC line    Oncology Treatment Plan:   OP NSCLC PACLITAXEL CARBOPLATIN Q3W    Oncology History Overview Note   Images     February 27, 2025:  CT of the chest, COPD and emphysema, right lower lobe atelectasis, lymphadenopathy in the right hilum as well as the mediastinum and smaller nodes in the left hilum.  The right hilar lymph node measures 2.5 cm other small nodes in the right hilum.  And there is a precarinal lymph node 2.2 x 2.2 cm.  And adenopathy seen in the subcarinal space as well as the mediastinum.     03/13/2025: CT abdomen pelvis, right-sided pleural effusion interstitial fibrosis lungs bilaterally prostatic hypertrophy     03/20/2025: PET-CT: Hypermetabolic consolidation right lung base with mediastinal and bilateral adenopathy and innumerable scattered hypermetabolic bone lesions     pathology  The right pleural effusion showed malignant cells consistent with a poorly differential adenocarcinoma:  Negative for TTF 1, WT1, GATA3.  Suggest an origin of the GI tract clinical pathology is recommended    2nd biopsy  1. EBUS LYMPH NODE 4R (TWO CONVENTIONAL SMEARS, ONE THIN PREP SMEAR, ONE CELL   BLOCK):    RARE MALIGNANT CELLS CONSISTENT WITH POORLY DIFFERENTIATED ADENOCARCINOMA.      2. EBUS LYMPH NODE 7R:    POORLY DIFFERENTIATED ADENOCARCINOMA.   The immunohistochemical profile of the tumor cells is not typical of lung    primary and may be seen with adenocarcinomas of upper   gastrointestinal/pancreaticobiliary primary.       Tempus Liquid biopsy  TP53  No actionable mutation  KEAP1 +     Metastasis to bone   3/24/2025 Initial Diagnosis    Metastasis to bone     Secondary malignancy of mediastinal lymph nodes   3/24/2025 Initial Diagnosis    Secondary malignancy of mediastinal lymph nodes     Adenocarcinoma of unknown primary   4/4/2025 Cancer Staged    Staging form: Lung, AJCC V9  - Clinical stage from 4/4/2025: Stage ANTONIA (cTX, cNX, cM1b)     4/5/2025 -  Chemotherapy    Treatment Summary   Plan Name: OP NSCLC PACLITAXEL CARBOPLATIN Q3W  Treatment Goal: Palliative  Status: Active  Start Date: 4/5/2025  End Date: 6/7/2025 (Planned)  Provider: Niko Johnson MD  Chemotherapy: CARBOplatin (PARAPLATIN) 635 mg in 0.9% NaCl 348.5 mL chemo infusion, 635 mg (88.4 % of original dose 715.8 mg), Intravenous, Clinic/HOD 1 time, 1 of 4 cycles  Dose modification:   (original dose 715.8 mg, Cycle 1)  Administration: 635 mg (4/5/2025)  PACLitaxeL (TAXOL) 135 mg/m2 = 282 mg in 0.9% NaCl 500 mL chemo infusion, 135 mg/m2 = 282 mg (100 % of original dose 135 mg/m2), Intravenous, Clinic/HOD 1 time, 1 of 4 cycles  Dose modification: 135 mg/m2 (original dose 135 mg/m2, Cycle 1, Reason: MD Discretion)  Administration: 282 mg (4/5/2025)          Medications:  Continuous Infusions:   lactated ringers   Intravenous Continuous           Scheduled Meds:   0.9% NaCl 250 mL flush bag   Intravenous 1 time in Clinic/HOD    aspirin  81 mg Oral Daily    azithromycin  500 mg Oral Daily    busPIRone  10 mg Oral BID    cefTRIAXone (Rocephin) IV (PEDS and ADULTS)  2 g Intravenous Q24H    dexAMETHasone  8 mg Oral Daily    enoxparin  40 mg Subcutaneous Daily    filgrastim-ayow  480 mcg Subcutaneous Daily    gabapentin  300 mg Oral TID    levalbuterol  0.63 mg Nebulization Q6H    metoprolol tartrate  12.5 mg Oral BID    mirtazapine  15 mg Oral QHS    mupirocin   Nasal  BID    sacubitriL-valsartan  1 tablet Oral BID    tamsulosin  0.4 mg Oral Daily     PRN Meds:  Current Facility-Administered Medications:     acetaminophen, 650 mg, Oral, Q6H PRN    ALPRAZolam, 0.25 mg, Oral, Daily PRN    alteplase, 2 mg, Intra-Catheter, PRN    aluminum-magnesium hydroxide-simethicone, 30 mL, Oral, QID PRN    bisacodyL, 10 mg, Rectal, Daily PRN    dextromethorphan-guaiFENesin  mg/5 ml, 5 mL, Oral, Q6H PRN    dextrose 50%, 12.5 g, Intravenous, PRN    dextrose 50%, 25 g, Intravenous, PRN    diphenhydrAMINE, 50 mg, Intravenous, Once PRN    EPINEPHrine, 0.3 mg, Intramuscular, Once PRN    glucagon (human recombinant), 1 mg, Intramuscular, PRN    glucose, 16 g, Oral, PRN    glucose, 24 g, Oral, PRN    heparin, porcine (PF), 500 Units, Intravenous, PRN    hydrocortisone sodium succinate, 100 mg, Intravenous, Once PRN    insulin aspart U-100, 0-10 Units, Subcutaneous, QID (AC + HS) PRN    lactulose 10 gram/15 ml, 30 g, Oral, Q6H PRN    melatonin, 6 mg, Oral, Nightly PRN    naloxone, 0.02 mg, Intravenous, PRN    ondansetron, 4 mg, Intravenous, Q4H PRN    oxyCODONE, 5 mg, Oral, Q4H PRN    prochlorperazine, 5 mg, Intravenous, Q6H PRN    senna-docusate, 1 tablet, Oral, BID PRN    sodium chloride 0.9%, 10 mL, Intravenous, PRN    Flushing PICC/Midline Protocol, , , Until Discontinued **AND** sodium chloride 0.9%, 10 mL, Intravenous, Q12H PRN    sodium chloride 0.9%, 10 mL, Intravenous, PRN     Review of patient's allergies indicates:  No Known Allergies     Past Medical History:   Diagnosis Date    Anxiety     Diabetes mellitus     HLD (hyperlipidemia)     Hypertension     Major depressive disorder, single episode, unspecified     Pneumonia, unspecified organism      Past Surgical History:   Procedure Laterality Date    Cardiac stent      ENDOBRONCHIAL ULTRASOUND N/A 3/28/2025    Procedure: ENDOBRONCHIAL ULTRASOUND (EBUS);  Surgeon: Jerrod Harman Jr., MD, [facility];  Location: [facility] BRONCHOSCOPY LAB;  Service:  Pulmonary;  Laterality: N/A;    HERNIA REPAIR N/A      Family History    None       Tobacco Use    Smoking status: Never    Smokeless tobacco: Never   Substance and Sexual Activity    Alcohol use: Not Currently    Drug use: Never    Sexual activity: Not on file       Review of Systems see above in HPI  Objective:     Vital Signs (Most Recent):  Temp: 97.4 °F (36.3 °C) (04/07/25 0352)  Pulse: 63 (04/07/25 0735)  Resp: 19 (04/07/25 0735)  BP: (!) 115/59 (04/07/25 0735)  SpO2: 97 % (04/07/25 0735) Vital Signs (24h Range):  Temp:  [97.4 °F (36.3 °C)-97.8 °F (36.6 °C)] 97.4 °F (36.3 °C)  Pulse:  [52-88] 63  Resp:  [18-26] 19  SpO2:  [93 %-98 %] 97 %  BP: ()/(52-65) 115/59     Weight: 88 kg (194 lb 0.1 oz)  Body mass index is 27.84 kg/m².  Body surface area is 2.08 meters squared.      Intake/Output Summary (Last 24 hours) at 4/7/2025 0809  Last data filed at 4/7/2025 0625  Gross per 24 hour   Intake 446.67 ml   Output --   Net 446.67 ml       Physical Exam  Vitals reviewed.   Constitutional:       General: He is awake.      Comments: Sitting up in the chair,   Tachypneic with speaking   Anxious   HENT:      Head: Normocephalic and atraumatic.      Mouth/Throat:      Mouth: Mucous membranes are moist.      Pharynx: Oropharynx is clear.   Eyes:      Extraocular Movements: Extraocular movements intact.      Conjunctiva/sclera: Conjunctivae normal.      Pupils: Pupils are equal, round, and reactive to light.   Cardiovascular:      Rate and Rhythm: Normal rate and regular rhythm.      Pulses: Normal pulses.      Heart sounds: Normal heart sounds.   Pulmonary:      Effort: Pulmonary effort is normal.      Breath sounds: Decreased air movement present. Examination of the right-middle field reveals decreased breath sounds. Examination of the right-lower field reveals decreased breath sounds. Decreased breath sounds present.   Abdominal:      General: Abdomen is flat. Bowel sounds are normal. There is no distension.       Palpations: Abdomen is soft.   Musculoskeletal:         General: Normal range of motion.      Cervical back: Normal range of motion and neck supple.   Skin:     General: Skin is warm and dry.   Neurological:      General: No focal deficit present.      Mental Status: He is alert and oriented to person, place, and time. Mental status is at baseline.      GCS: GCS eye subscore is 4. GCS verbal subscore is 5. GCS motor subscore is 6.   Psychiatric:         Attention and Perception: Attention normal.         Mood and Affect: Mood is anxious.         Behavior: Behavior normal. Behavior is cooperative.         Thought Content: Thought content normal.         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    - Toxicity reviewed, continue C1D3 of Carbo/Taxol with dose reduction as above  - Continue Oxycodon IR 5mg Q4H for cancer related pain  - Add neupogen daily  for chemotherapy induced neutropenia prevention  -- has f/u 4/15/25 with me           Wbc increase  with GCSF    With increase Cr                     D/c nsaids, d/c Zosyn                          Adjust meds                   Watch with acidosis--now on entresto by cardiology    ? D/c in am   Will keep PICC line      Thank you for your consult.     Niko Johnson MD  Hematology/Oncology  [facility] - Oncology [facility]

## 2025-04-07 NOTE — PROGRESS NOTES
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  Of note patient was admitted at this facility on 02/25/2025 for worsening shortness of breath with malignant pleural effusion. He underwent diagnostic thoracentesis that was consistent with adenocarcinoma with questionable primary.  PET scan revealed hypermetabolic consolidation in the right lung base with mediastinal and bilateral adenopathy and innumerable scattered hypermetabolic bone lesions.  Patient underwent EBUS after PET scan.  Biopsy of station 4R lymph node showed poorly differentiated adenocarcinoma.  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 Patient is followed by Dr. Johnson.      Initial vital signs in ED were /69, pulse 106, respirations 21, temperature 36.7° C, and SpO2 93% on room air.  Labs revealed WBC 9.12, RBC 4.16, hemoglobin 12.6, hematocrit 38.3, MCV 92.1, chloride 108, CO2 22, glucose 142, calcium 11.1, BNP 39.6, and troponin 0.035.  COVID and RSV testing were negative.  Respiratory panel was negative.  Blood cultures x2 and Respiratory culture was obtained.  Chest x-ray revealed persistent right lung consolidation.  CTA chest revealed no pulmonary thromboembolism identified, similar mediastinal and bilateral hilar lymphadenopathy, and increased right pleural effusion and right lower lobe zone consolidation.  EKG revealed sinus tachycardia with heart rate of 105 beats per minute. Patient was admitted to hospital medicine service for further medical management.     Patient had a thoracentesis done with pulm on 4/4. Started on Carbo/Taxol on 4/5.      Interval Hx:   NAEO. Seen and examined. Doing well. Denies chest pain or SOB. Otherwise no acute issues. Family at bedside.     Case was discussed with patient's nurse and  on the floor.    Objective/physical exam:  General: In no acute distress, afebrile  Chest: Clear to auscultation bilaterally  Heart: RRR, +S1, S2, no appreciable murmur  Abdomen: Soft, nontender, BS +  MSK: Warm, +1 lower extremity edema, no clubbing or cyanosis  Neurologic: Alert and oriented x4, Cranial nerve II-XII intact, Strength 5/5 in all 4 extremities    VITAL SIGNS: 24 HRS MIN & MAX LAST   Temp  Min: 97.4 °F (36.3 °C)  Max: 97.7 °F (36.5 °C) 97.7 °F (36.5 °C)   BP  Min: 94/53  Max: 130/48 (!) 130/48   Pulse  Min: 52  Max: 88  (!) 59   Resp  Min: 18  Max: 26 19   SpO2  Min: 93 %  Max: 98 % (!) 94 %     I have reviewed the following labs:  Recent Labs   Lab 04/05/25 0329 04/06/25  0331 04/07/25  0341   WBC 10.08 6.34 23.77  23.77*   RBC 3.91* 3.58* 3.65*   HGB 11.6* 10.7* 11.0*   HCT 35.8* 33.1* 33.8*   MCV 91.6 92.5 92.6   MCH 29.7 29.9 30.1   MCHC 32.4* 32.3* 32.5*   RDW 13.5 13.5 13.7    122* 123*   MPV 10.9* 11.0* 11.2*     Recent Labs   Lab 04/05/25 0329 04/06/25  0331 04/07/25  0341    136 138   K 3.4* 3.9 3.6    106 109*   CO2 23 20* 19*   BUN 11.4 22.0 35.1*   CREATININE 0.82 1.19 1.40*   CALCIUM 10.0 8.7* 7.8*   MG 1.80 2.00 2.30   ALBUMIN 2.6* 2.4* 2.3*   ALKPHOS 78 67 66   ALT 5 7 7   AST 11 13 14   BILITOT 0.6 0.5 0.3     Microbiology Results (last 7 days)       Procedure Component Value Units Date/Time    Blood Culture [[id number]]  (Normal) Collected: 04/03/25 2318    Order Status: Completed Specimen: Blood, Venous Updated: 04/07/25 0103     Blood Culture No Growth At 72 Hours    Blood Culture [[id number]]  (Normal) Collected: 04/03/25 2318    Order Status: Completed Specimen: Blood, Venous Updated: 04/07/25 0103     Blood Culture No Growth At 72 Hours    Respiratory Culture [[id number]]  Collected: 04/04/25 0634    Order Status: Completed Specimen: Sputum, Expectorated Updated: 04/06/25 1326     Respiratory Culture Normal respiratory hanh     GRAM STAIN Quality 1+      Many Gram positive cocci      Few Gram Positive Rods      Rare Yeast             See below for Radiology    Assessment/Plan:  # SOB 2/2 likely pleural effusion with possible superimposed PNA - improved  # Acute hypoxic respiratory failure - resolved  # NIA 2/2 likely entresto use vs zosyn  # Acute systolic and diastolic heart failure  # Adenocarcinoma unknown primary most likely lung   # Malignant pleural effusion s/p thoracentesis   - complicated by trace right apical pneumothorax  # Malignant hilar and mediastinal adenopathy   # Malignant bone lesions clinically   # Hypercalcemia - improving  # Normocytic anemia  # Cancer related pain  # Thrombocytopenia  # Steroid induced leukocytosis and neupogen  # Hx of CAD s/p PCI, HTN, HLD, T2DM, anxiety/depression     - MRSA negative. Stopped vancomycin and zosyn. Azithro and ceftriaxone to complete 7 day course  - follow up Bcx  - on room air  - urine sodium and UA  - gentle fluids  - will continue entresto for now but may have to stop if NIA does not resolve  - vitamin D 43, one dose zometa on 4/4  - TTE noted. Cardiology consult noted  - pulm consult for thoracentesis - removed 900 cc. Follow up studies. Complicated with mild pneumothorax. Monitor with CXR  - CT surgery consult requested by pulm for pleurX catheter  - GI consult for help with etiology of primary as recommended by oncology initially but now will hold off as per oncology  - oncology recommendations appreciated  - C1D2 of Carbo/Taxol on 4/5  - started on neupogen on 4/6  - oxycodone for pain control  - stop amlodipine, continue BB and entresto   - will switch lopressor to XL on discharge for GDMT  - PT/OT  - Hgb 11.7, Plts 123, Calcium 7.8, Cr 1.40      VTE prophylaxis: lovenox; monitor platelets    Patient condition:   Guarded    Anticipated discharge and Disposition:         All diagnosis and differential diagnosis have been reviewed; assessment and plan has been documented; I have personally reviewed the labs and test results that are presently available; I have reviewed the patients medication list; I have reviewed the consulting providers response and recommendations. I have reviewed or attempted to review medical records based upon their availability    All of the patient's questions have been  addressed and answered. Patient's is agreeable to the above stated plan. I will continue to monitor closely and make adjustments to medical management as needed.      _____________________________________________________________________    Malnutrition Status:  Nutrition consulted. Most recent weight and BMI monitored-     Measurements:  Wt Readings from Last 1 Encounters:   04/03/25 88 kg (194 lb 0.1 oz)   Body mass index is 27.84 kg/m².    Patient has been screened and assessed by RD.    Malnutrition Type:  Context:    Level:      Malnutrition Characteristic Summary:       Interventions/Recommendations (treatment strategy):        Scheduled Med:   0.9% NaCl 250 mL flush bag   Intravenous 1 time in Clinic/HOD    aspirin  81 mg Oral Daily    [START ON 4/8/2025] azithromycin  500 mg Oral Daily    busPIRone  10 mg Oral BID    cefTRIAXone (Rocephin) IV (PEDS and ADULTS)  2 g Intravenous Q24H    dexAMETHasone  8 mg Oral Daily    enoxparin  40 mg Subcutaneous Daily    filgrastim-ayow  480 mcg Subcutaneous Daily    gabapentin  300 mg Oral TID    levalbuterol  0.63 mg Nebulization Q6H    metoprolol tartrate  12.5 mg Oral BID    mirtazapine  15 mg Oral QHS    mupirocin   Nasal BID    sacubitriL-valsartan  1 tablet Oral BID    tamsulosin  0.4 mg Oral Daily      Continuous Infusions:   lactated ringers   Intravenous Continuous 75 mL/hr at 04/07/25 0947 New Bag at 04/07/25 0947      PRN Meds:    Current Facility-Administered Medications:      acetaminophen, 650 mg, Oral, Q6H PRN    ALPRAZolam, 0.25 mg, Oral, Daily PRN    alteplase, 2 mg, Intra-Catheter, PRN    aluminum-magnesium hydroxide-simethicone, 30 mL, Oral, QID PRN    bisacodyL, 10 mg, Rectal, Daily PRN    dextromethorphan-guaiFENesin  mg/5 ml, 5 mL, Oral, Q6H PRN    dextrose 50%, 12.5 g, Intravenous, PRN    dextrose 50%, 25 g, Intravenous, PRN    diphenhydrAMINE, 50 mg, Intravenous, Once PRN    EPINEPHrine, 0.3 mg, Intramuscular, Once PRN    glucagon (human recombinant), 1 mg, Intramuscular, PRN    glucose, 16 g, Oral, PRN    glucose, 24 g, Oral, PRN    heparin, porcine (PF), 500 Units, Intravenous, PRN    hydrocortisone sodium succinate, 100 mg, Intravenous, Once PRN    insulin aspart U-100, 0-10 Units, Subcutaneous, QID (AC + HS) PRN    lactulose 10 gram/15 ml, 30 g, Oral, Q6H PRN    melatonin, 6 mg, Oral, Nightly PRN    naloxone, 0.02 mg, Intravenous, PRN    ondansetron, 4 mg, Intravenous, Q4H PRN    oxyCODONE, 5 mg, Oral, Q4H PRN    prochlorperazine, 5 mg, Intravenous, Q6H PRN    senna-docusate, 1 tablet, Oral, BID PRN    sodium chloride 0.9%, 10 mL, Intravenous, PRN    Flushing PICC/Midline Protocol, , , Until Discontinued **AND** sodium chloride 0.9%, 10 mL, Intravenous, Q12H PRN    sodium chloride 0.9%, 10 mL, Intravenous, PRN     Radiology:  I have personally reviewed the following imaging and agree with the radiologist.     X-Ray Chest 1 View  EXAMINATION  XR CHEST 1 VIEW    CLINICAL HISTORY  Pneumothorax;    TECHNIQUE  A total of 2 frontal image(s) of the chest.    COMPARISON  5 April 2025    FINDINGS  Lines/tubes/devices: Left upper extremity PICC is unchanged.    The cardiac silhouette and central vascular structures are unchanged.  The trachea is midline. There is similar trace right apical pneumothorax.  Irregular right perihilar airspace opacity and bilateral diffuse lung interstitial coarsening unchanged in comparison.  There is no enlarging pleural effusion or convincing  pneumothorax.    Regional osseous structures and extrathoracic soft tissues are similar.    IMPRESSION  Similar trace right apical pneumothorax and perihilar opacity.    Electronically signed by: Slava Leggett  Date:    04/07/2025  Time:    06:57      Itzel Webb MD  Department of Hospital Medicine   [facility]   04/07/2025

## 2025-04-07 NOTE — PT/OT/SLP EVAL
"Occupational Therapy   Evaluation and Discharge Note    Name: Juanjose Chawla  MRN: [id number]  Admitting Diagnosis:   1. Recurrent pleural effusion on right    2. SOB (shortness of breath)    3. Chest pain    4. Pneumonia of right lower lobe due to infectious organism    5. Adenocarcinoma of unknown primary    6. Hypercalcemia    7. 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 CAD (coronary artery disease)        Recent Surgery: * No surgery found *      Recommendations:     Discharge therapy intensity: Low Intensity Therapy   Discharge Equipment Recommendations: none  Barriers to discharge:  None    Assessment:     Juanjose Chawla is a 84 y.o. male with a medical diagnosis of .o. On eval, patient presents with malignant pleural effusion with bone and lymph node mets, primary unknown. Pt presents with SOB sp thoracentesis. At [facility], pt is mod I with BADL's and mobility with RW. Skilled OT services are not warranted at this time.    Plan:     OT to sign off as acute OT services are not warranted at this time.  Please re-consult if situation changes during this hospitalization.    Plan of Care Reviewed with: patient, daughter    Subjective     Chief Complaint: none stated  Patient/Family Comments/goals: "I've been up to the bathroom this morning"    Occupational Profile:  Living Environment: lives with wife in SS home, walk in shower; dtr lives across the street  Previous level of function: independent, mowed 17 acres 3 weeks ago  Roles and Routines: dad,   Equipment Used at Home: walker, rolling, shower chair (raised toilet, handicapped accessible)  Assistance upon Discharge: yes, wife, dtr    Pain/Comfort:  Pain Rating 1: 0/10    Patients cultural, spiritual, Mosque conflicts given the current situation:      Objective:     OT communicated with nsg prior to session.      Patient was found up in chair with   upon OT entry to room.    General Precautions: Standard,  "   Orthopedic Precautions:    Braces:      Vital Signs:     Bed Mobility:        Functional Mobility/Transfers:  Ambulated > community distance with RW with RW with mod I  Toilet transfer mod I with GB and RW    Activities of Daily Living:  Donned tennis shoes mod I    AMPAC 6 Click ADL:  [facility] Total Score:      Functional Cognition:  intact    Visual Perceptual Skills:  intact    Upper Extremity Function:  Right Upper Extremity:   wfl    Left Upper Extremity:  wfl    Balance:   Good seated and standing with RW    Patient Education:  Patient and daughter/s were provided with verbal education education regarding OT role/goals/POC, Discharge/DME recommendations, and encouraged ambulation with dtr, nsg as much as tolerated .  Understanding was verbalized.     Patient left up in chair with  dtr present.    History:     Past Medical History:   Diagnosis Date    Anxiety     Diabetes mellitus     HLD (hyperlipidemia)     Hypertension     Major depressive disorder, single episode, unspecified     Pneumonia, unspecified organism          Past Surgical History:   Procedure Laterality Date    Cardiac stent      ENDOBRONCHIAL ULTRASOUND N/A 3/28/2025    Procedure: ENDOBRONCHIAL ULTRASOUND (EBUS);  Surgeon: Jerrod Harman Jr., MD, [facility];  Location: [facility] BRONCHOSCOPY LAB;  Service: Pulmonary;  Laterality: N/A;    HERNIA REPAIR N/A        Time Tracking:     OT Date of Treatment: 04/07/25  OT Start Time: 0842  OT Stop Time: 0855  OT Total Time (min): 13 min    Billable Minutes:Evaluation low complexity    4/7/2025

## 2025-04-07 NOTE — PROGRESS NOTES
[facility] Acute  Pulmonary Critical Care Note    Patient Name: Juanjose Chawla  MRN: [id number]  Admission Date: 4/3/2025  Hospital Length of Stay: 4 days  Code Status: Full Code  Attending Provider: Itzel Webb,*  Primary Care Provider: Sadiq Hartmann MD     Subjective:     HPI:   This is an 84-year-old male with medical problems of diabetes, hypertension, anxiety, adenocarcinoma of unknown primary, most likely lung, and malignant pleural effusion who presented to the hospital for shortness of breath.  Chest CT showed a recurrent right pleural effusion.  He has had difficulty lying flat and feels better after prior thoracentesis.    24 Hour Interval History:  Started on carboplatin and Taxol.  There is no evidence of pneumothorax on my interpretation of the chest x-ray.  Respiratory culture with no growth.  Pleural fluid glucose low but neutrophil count is also low.  Suspect due to malignant pleural effusion.    Past Medical History:   Diagnosis Date    Anxiety     Diabetes mellitus     HLD (hyperlipidemia)     Hypertension     Major depressive disorder, single episode, unspecified     Pneumonia, unspecified organism        Past Surgical History:   Procedure Laterality Date    Cardiac stent      ENDOBRONCHIAL ULTRASOUND N/A 3/28/2025    Procedure: ENDOBRONCHIAL ULTRASOUND (EBUS);  Surgeon: Jerrod Harman Jr., MD, [facility];  Location: [facility] BRONCHOSCOPY LAB;  Service: Pulmonary;  Laterality: N/A;    HERNIA REPAIR N/A        Social History[1]        Current Outpatient Medications   Medication Instructions    albuterol (ACCUNEB) 0.63 mg, Every 6 hours PRN    ALPRAZolam (XANAX) 0.25 mg, 3 times daily PRN    amLODIPine (NORVASC) 5 mg, Daily    aspirin (ECOTRIN) 81 mg, Daily    busPIRone (BUSPAR) 5 mg, 2 times daily    famotidine (PEPCID) 20 mg, Daily    gabapentin (NEURONTIN) 300 mg, 3 times daily    glimepiride (AMARYL) 2 mg, Before breakfast    guaiFENesin-codeine 100-10 mg/5 ml  (TUSSI-ORGANIDIN NR)  mg/5 mL syrup 5 mLs, Oral, Every 4 hours PRN    lisinopriL (PRINIVIL,ZESTRIL) 2.5 mg, Daily    LORazepam (ATIVAN) 1 MG tablet Take  60 min before MRI, May repeat x 1 if needed    metFORMIN (GLUCOPHAGE) 2,000 mg, Daily    polyethylene glycol (GLYCOLAX) 17 g, Daily    tamsulosin (FLOMAX) 0.4 mg, Daily    umeclidinium (INCRUSE ELLIPTA) 62.5 mcg, Inhalation, Daily, Controller       Review of patient's allergies indicates:  No Known Allergies     Current Inpatient Medications   0.9% NaCl 250 mL flush bag   Intravenous 1 time in Clinic/HOD    aspirin  81 mg Oral Daily    azithromycin  500 mg Oral Daily    busPIRone  10 mg Oral BID    dexAMETHasone  8 mg Oral Daily    enoxparin  40 mg Subcutaneous Daily    filgrastim-ayow  480 mcg Subcutaneous Daily    gabapentin  300 mg Oral TID    levalbuterol  0.63 mg Nebulization Q6H    metoprolol tartrate  12.5 mg Oral BID    mirtazapine  15 mg Oral QHS    mupirocin   Nasal BID    piperacillin-tazobactam (Zosyn) IV (PEDS and ADULTS) (extended infusion is not appropriate)  4.5 g Intravenous Q8H    sacubitriL-valsartan  1 tablet Oral BID    tamsulosin  0.4 mg Oral Daily       Current Intravenous Infusions          Review of Systems   Constitutional:  Negative for chills, fever and malaise/fatigue.   Respiratory:  Negative for cough and shortness of breath.    Cardiovascular:  Negative for chest pain and palpitations.          Objective:       Intake/Output Summary (Last 24 hours) at 4/7/2025 0721  Last data filed at 4/7/2025 0625  Gross per 24 hour   Intake 446.67 ml   Output --   Net 446.67 ml         Vital Signs (Most Recent):  Temp: 97.4 °F (36.3 °C) (04/07/25 0352)  Pulse: (!) 54 (04/07/25 0352)  Resp: 20 (04/07/25 0352)  BP: (!) 111/53 (04/07/25 0352)  SpO2: (!) 93 % (04/07/25 0352)  Body mass index is 27.84 kg/m².  Weight: 88 kg (194 lb 0.1 oz) Vital Signs (24h Range):  Temp:  [97.3 °F (36.3 °C)-97.8 °F (36.6 °C)] 97.4 °F (36.3 °C)  Pulse:  [52-88]  54  Resp:  [18-26] 20  SpO2:  [93 %-98 %] 93 %  BP: ()/(52-65) 111/53     Physical Exam  Vitals reviewed.   Cardiovascular:      Rate and Rhythm: Normal rate and regular rhythm.   Pulmonary:      Effort: Pulmonary effort is normal.   Musculoskeletal:      Right lower leg: No edema.      Left lower leg: No edema.   Neurological:      General: No focal deficit present.      Mental Status: He is alert and oriented to person, place, and time.           Lines/Drains/Airways       Peripherally Inserted Central Catheter Line  Duration             PICC Double Lumen 04/04/25 1326 left basilic 2 days              Drain  Duration             Male External Urinary Catheter 04/05/25 0700 Medium 2 days                    Significant Labs:    Lab Results   Component Value Date    WBC 23.77 (H) 04/07/2025    WBC 23.77 04/07/2025    HGB 11.0 (L) 04/07/2025    HCT 33.8 (L) 04/07/2025    MCV 92.6 04/07/2025     (L) 04/07/2025           BMP  Lab Results   Component Value Date     04/07/2025    K 3.6 04/07/2025    CO2 19 (L) 04/07/2025    BUN 35.1 (H) 04/07/2025    CREATININE 1.40 (H) 04/07/2025    CALCIUM 7.8 (L) 04/07/2025    AGAP 10.0 04/07/2025    EGFRNONAA >60 06/13/2022         Significant Imaging:  I have reviewed the pertinent imaging within the past 24 hours.  Chest CT 4/3/2025 shows recurrence of a right pleural effusion and a small right lower lobe consolidation on a background of bilateral subpleural reticulation      Assessment/Plan:     Assessment  Post-procedure pneumothorax  Malignant right pleural effusion  Right lower lobe consolidation  Interstitial lung disease      Plan  Narrow antibiotics to ceftriaxone and azithromycin to complete a 7 day course  No need for follow up of the postprocedure pneumothorax  Would be a good candidate for PleurX catheter in the future    Pulmonary will continue to follow.      Cisco Harkins MD  Pulmonary Critical Care Medicine  [facility]  Acute  DOS: 04/07/2025     Addendum:  Discussed with Dr. Harman.  Agrees with plans for PleurX catheter.  Consult CV surgery.       [1]   Social History  Socioeconomic History    Marital status:    Tobacco Use    Smoking status: Never    Smokeless tobacco: Never   Substance and Sexual Activity    Alcohol use: Not Currently    Drug use: Never     Social Drivers of Health     Financial Resource Strain: Low Risk  (4/5/2025)    Overall Financial Resource Strain (CARDIA)     Difficulty of Paying Living Expenses: Not hard at all   Food Insecurity: No Food Insecurity (4/5/2025)    Hunger Vital Sign     Worried About Running Out of Food in the Last Year: Never true     Ran Out of Food in the Last Year: Never true   Transportation Needs: No Transportation Needs (4/4/2025)    PRAPARE - Transportation     Lack of Transportation (Medical): No     Lack of Transportation (Non-Medical): No   Stress: No Stress Concern Present (4/5/2025)    [employer] [place] [employer] - Occupational Stress Questionnaire     Feeling of Stress : Not at all   Housing Stability: Low Risk  (4/5/2025)    Housing Stability Vital Sign     Unable to Pay for Housing in the Last Year: No     Homeless in the Last Year: No

## 2025-04-07 NOTE — CONSULTS
CT SURGERY PROGRESS NOTE  Juanjose Chawla  84 y.o.  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  Chest CT showed a recurrent right pleural effusion.  He has had difficulty lying flat and feels better after prior thoracentesis.    CVTS cosnulted for rigth sided pleurx placement        Review of Systems   Constitutional:  Positive for malaise/fatigue.   HENT: Negative.     Eyes: Negative.    Respiratory:  Positive for shortness of breath.    Genitourinary: Negative.    Musculoskeletal: Negative.    Neurological: Negative.        Medication List  Infusions   lactated ringers   Intravenous Continuous 75 mL/hr at 04/07/25 0947 New Bag at 04/07/25 0947     Scheduled   0.9% NaCl 250 mL flush bag   Intravenous 1 time in Clinic/HOD    aspirin  81 mg Oral Daily    [START ON 4/8/2025] azithromycin  500 mg Oral Daily    busPIRone  10 mg Oral BID    cefTRIAXone (Rocephin) IV (PEDS and ADULTS)  2 g Intravenous Q24H    dexAMETHasone  8 mg Oral Daily    enoxparin  40 mg Subcutaneous Daily    filgrastim-ayow  480 mcg Subcutaneous Daily    gabapentin  300 mg Oral TID    levalbuterol  0.63 mg Nebulization Q6H    metoprolol tartrate  12.5 mg Oral BID    mirtazapine  15 mg Oral QHS    mupirocin   Nasal BID    sacubitriL-valsartan  1 tablet Oral BID    tamsulosin  0.4 mg Oral Daily       Objective:  Recent Vitals:  Temp:  [97.4 °F (36.3 °C)-97.7 °F (36.5 °C)] 97.7 °F (36.5 °C)  Pulse:  [52-88] 59  Resp:  [18-26] 19  SpO2:  [93 %-98 %] 94 %  BP: ()/(48-62) 130/48    Physical Exam  Constitutional:       Appearance: Normal appearance.   Cardiovascular:      Rate and Rhythm: Normal rate.   Pulmonary:      Breath sounds: Rhonchi present.      Comments: [id number] bases right more  "than left   Musculoskeletal:      Cervical back: Normal range of motion.   Neurological:      Mental Status: He is alert.          I/O last 24 hrs:  Intake/Output - Last 3 Shifts         04/05 0700 04/06 0659 04/06 0700 04/07 0659 04/07 0700 04/08 0659    P.O. 540      I.V. (mL/kg)       IV Piggyback 596.8 446.7     Total Intake(mL/kg) 1136.8 (12.9) 446.7 (5.1)     Urine (mL/kg/hr) 950 (0.4)  300 (0.6)    Stool 0      Total Output 950  300    Net +186.8 +446.7 -300           Stool Occurrence 0 x 2 x             Labs  BMP:   Recent Labs   Lab 04/07/25  0341      K 3.6   *   CO2 19*   BUN 35.1*   CREATININE 1.40*   CALCIUM 7.8*   MG 2.30     CBC:   Recent Labs   Lab 04/07/25  0341   WBC 23.77  23.77*   RBC 3.65*   HGB 11.0*   HCT 33.8*   *   MCV 92.6   MCH 30.1   MCHC 32.5*     CMP:   Recent Labs   Lab 04/07/25  0341   CALCIUM 7.8*   ALBUMIN 2.3*      K 3.6   CO2 19*   *   BUN 35.1*   CREATININE 1.40*   ALKPHOS 66   ALT 7   AST 14   BILITOT 0.3     Coagulation: No results for input(s): "PT", "INR", "APTT" in the last 48 hours.  Lactic Acid: No results for input(s): "LACTATE" in the last 48 hours.      Imaging:   CT: No results found in the last 24 hours.  CXR: No results found in the last 24 hours.    S Images for [facility] Viewer     Show images for CTA Chest Non-Coronary (PE Studies)  CTA Chest Non-Coronary (PE Studies)  Order: [id number]   Status: Final result       Next appt: 04/15/2025 at 02:00 PM in Hematology and Oncology (Niko Johnson MD)    Test Result Released: Yes (seen)    0 Result Notes  Details    Reading Physician Reading Date Result Priority   Iam Menon MD  [phone]  4/3/2025 STAT     Narrative & Impression  EXAMINATION:  CTA CHEST NON CORONARY (PE STUDIES)     CLINICAL HISTORY:  Pulmonary embolism (PE) suspected, high prob;     TECHNIQUE:  Sequential axial images performed of the chest using pulmonary embolism protocol following intravenous " contrast bolus. Sagittal and contrast reformations performed.  MIP images are also performed.     Dose product length of 217 mGycm. Automated exposure control was utilized to minimize radiation dose.     FINDINGS:  Optimal contrast bolus timing with adequately opacified pulmonary artery system is without evidence of filling defects from pulmonary thromboembolism within the main pulmonary artery and the major branches. Thoracic aorta is without aneurysmal dilatation or dissection.     There are multiple enlarged mediastinal and bilateral hilar lymph nodes without significant interval change and these were hypermetabolic on the previous PET scan.  There is size increase of right pleural effusion which causes further right lower lung zone compressive atelectasis.  Right lower lung lobe consolidation is again more evident and was again hypermetabolic on the previous PET scan.  Bilateral lungs are remarkable for similar extent ground opacities which could represent congestive process versus pneumonitis versus small airway disease.  Bilateral peripheral subpleural reticulations consistent with fibrosis show about similar appearance.  There are left lower lung zone some cystic formations.     No acute or aggressive skeletal abnormality.     Impression:     1.  No pulmonary thromboembolism identified.     2.  Similar mediastinal and bilateral hilar lymphadenopathy.     3.  Increased right pleural effusion and right lower lung zone consolidation.        Electronically signed by:Iam Menon  Date:                                            04/03/2025  Time:                                           21:15       ASSESSMENT/PLAN:  Right sided pleural effusion     Eval for drainage/ pleurx in progress Dr Hull     Will proceed tomorrow     Case and plan of care discussed with MD Loy Reyse PA-C

## 2025-04-07 NOTE — PT/OT/SLP EVAL
Physical Therapy Evaluation and Discharge Note    Patient Name:  Juanjose Chawla   MRN:  [id number]    Recommendations:     Discharge therapy intensity: No Therapy Indicated   Discharge Equipment Recommendations: none   Barriers to discharge: None    Assessment:     Juanjose Chawla is a 84 y.o. male admitted with a medical diagnosis of malignant pleural effusion. At this time, patient is functioning at their prior level of function and does not require further acute PT services.     Recent Surgery: * No surgery found *      Plan:     During this hospitalization, patient does not require further acute PT services.  Please re-consult if situation changes.      Subjective     Chief Complaint: none  Patient/Family Comments/goals: none  Pain/Comfort:  Pain Rating 1: 0/10    Patients cultural, spiritual, Anabaptist conflicts given the current situation: no    Living Environment:  Lives with spouse in a H.   Prior to admission, patients level of function was independent.  Equipment used at home: shower chair, walker, rolling (raised toilet).  DME owned (not currently used): none.  Upon discharge, patient will have assistance from family.    Objective:     Communicated with nurse prior to session.  Patient found supine with telemetry upon PT entry to room.    General Precautions: Standard, fall (Cytotoxic precautions)    Orthopedic Precautions:N/A   Braces: N/A  Respiratory Status: Room air    Exams:  Cognitive Exam:  Patient is oriented to Person, Place, Time, and Situation  Sensation: -       Intact  RLE ROM: WFL  RLE Strength: WFL  LLE ROM: WFL  LLE Strength: WFL    Functional Mobility:  Transfers:  Sit to Stand:  independence with rolling walker  Gait: 275 ft with RW & MOD I   Balance: good    AM-PAC 6 CLICK MOBILITY  Total Score:24     Education Provided:  Role and goals of PT, transfer training, bed mobility, gait training, balance training, safety awareness, assistive device, strengthening exercises, and  importance of participating in PT to return to PLOF.      Patient left up in chair with all lines intact, call button in reach, and daughter present.    GOALS:   Multidisciplinary Problems       Physical Therapy Goals       Not on file                    History:     Past Medical History:   Diagnosis Date    Anxiety     Diabetes mellitus     HLD (hyperlipidemia)     Hypertension     Major depressive disorder, single episode, unspecified     Pneumonia, unspecified organism        Past Surgical History:   Procedure Laterality Date    Cardiac stent      ENDOBRONCHIAL ULTRASOUND N/A 3/28/2025    Procedure: ENDOBRONCHIAL ULTRASOUND (EBUS);  Surgeon: Jerrod Harman Jr., MD, [facility];  Location: [facility];  Service: Pulmonary;  Laterality: N/A;    HERNIA REPAIR N/A        Time Tracking:     PT Received On: 04/07/25  PT Start Time: 0838     PT Stop Time: 0850  PT Total Time (min): 12 min     Billable Minutes: Evaluation 12 minutes      04/07/2025

## 2025-04-07 NOTE — PLAN OF CARE
Problem: Adult Inpatient Plan of Care  Goal: Plan of Care Review  Outcome: Progressing  Flowsheets (Taken 4/7/2025 1518)  Plan of Care Reviewed With:   patient   family  Goal: Patient-Specific Goal (Individualized)  Outcome: Progressing  Goal: Absence of Hospital-Acquired Illness or Injury  Outcome: Progressing  Intervention: Identify and Manage Fall Risk  Flowsheets (Taken 4/7/2025 1518)  Safety Promotion/Fall Prevention:   assistive device/personal item within reach   medications reviewed   nonskid shoes/socks when out of bed   side rails raised x 2  Intervention: Prevent Skin Injury  Flowsheets (Taken 4/7/2025 1518)  Body Position: position changed independently  Skin Protection: incontinence pads utilized  Device Skin Pressure Protection:   absorbent pad utilized/changed   tubing/devices free from skin contact  Intervention: Prevent and Manage VTE (Venous Thromboembolism) Risk  Flowsheets (Taken 4/7/2025 1518)  VTE Prevention/Management:   ambulation promoted   bleeding risk assessed   fluids promoted   intravenous hydration   ROM (active) performed  Intervention: Prevent Infection  Flowsheets (Taken 4/7/2025 1518)  Infection Prevention:   hand hygiene promoted   rest/sleep promoted   single patient room provided  Goal: Optimal Comfort and Wellbeing  Outcome: Progressing  Intervention: Monitor Pain and Promote Comfort  Flowsheets (Taken 4/7/2025 1518)  Pain Management Interventions:   care clustered   medication offered   pain management plan reviewed with patient/caregiver   pillow support provided   position adjusted  Intervention: Provide Person-Centered Care  Flowsheets (Taken 4/7/2025 1518)  Trust Relationship/Rapport: care explained  Goal: Readiness for Transition of Care  Outcome: Progressing     Problem: Skin Injury Risk Increased  Goal: Skin Health and Integrity  Outcome: Progressing  Intervention: Optimize Skin Protection  Flowsheets (Taken 4/7/2025 1518)  Pressure Reduction Techniques:   frequent  weight shift encouraged   weight shift assistance provided  Skin Protection: incontinence pads utilized  Activity Management:   Ambulated to bathroom - L4   Ambulated in melchor - L4   Ambulated in room - L4   Up in chair - L3  Head of Bed (HOB) Positioning: HOB at 30-45 degrees  Intervention: Promote and Optimize Oral Intake  Flowsheets (Taken 4/7/2025 1518)  Oral Nutrition Promotion: rest periods promoted     Problem: Fall Injury Risk  Goal: Absence of Fall and Fall-Related Injury  Outcome: Progressing  Intervention: Identify and Manage Contributors  Flowsheets (Taken 4/7/2025 1518)  Self-Care Promotion: independence encouraged  Medication Review/Management: medications reviewed  Intervention: Promote Injury-Free Environment  Flowsheets (Taken 4/7/2025 1518)  Safety Promotion/Fall Prevention:   assistive device/personal item within reach   medications reviewed   nonskid shoes/socks when out of bed   side rails raised x 2     Problem: Infection  Goal: Absence of Infection Signs and Symptoms  Outcome: Progressing  Intervention: Prevent or Manage Infection  Flowsheets (Taken 4/7/2025 1518)  Infection Management: aseptic technique maintained

## 2025-04-08 ENCOUNTER — ANESTHESIA (OUTPATIENT)
Dept: SURGERY | Facility: HOSPITAL | Age: 85
End: 2025-04-08
Payer: MEDICARE

## 2025-04-08 LAB
ABS NEUT (OLG): 25.15 X10(3)/MCL (ref 2.1–9.2)
ANION GAP SERPL CALC-SCNC: 9 MEQ/L
BUN SERPL-MCNC: 33.8 MG/DL (ref 8.4–25.7)
BURR CELLS (OLG): ABNORMAL
CALCIUM SERPL-MCNC: 7.4 MG/DL (ref 8.8–10)
CHLORIDE SERPL-SCNC: 109 MMOL/L (ref 98–107)
CO2 SERPL-SCNC: 21 MMOL/L (ref 23–31)
CREAT SERPL-MCNC: 1.04 MG/DL (ref 0.72–1.25)
CREAT/UREA NIT SERPL: 33
ERYTHROCYTE [DISTWIDTH] IN BLOOD BY AUTOMATED COUNT: 14 % (ref 11.5–17)
GFR SERPLBLD CREATININE-BSD FMLA CKD-EPI: >60 ML/MIN/1.73/M2
GLUCOSE SERPL-MCNC: 220 MG/DL (ref 82–115)
GLUCOSE SERPL-MCNC: 311 MG/DL (ref 70–110)
GROUP & RH: NORMAL
HCT VFR BLD AUTO: 33.8 % (ref 42–52)
HGB BLD-MCNC: 10.9 G/DL (ref 14–18)
INDIRECT COOMBS: NORMAL
INSTRUMENT WBC (OLG): 27.64 X10(3)/MCL
LYMPHOCYTES NFR BLD MANUAL: 0.55 X10(3)/MCL (ref 0.6–4.6)
LYMPHOCYTES NFR BLD MANUAL: 2 %
MAGNESIUM SERPL-MCNC: 2.3 MG/DL (ref 1.6–2.6)
MCH RBC QN AUTO: 30.3 PG (ref 27–31)
MCHC RBC AUTO-ENTMCNC: 32.2 G/DL (ref 33–36)
MCV RBC AUTO: 93.9 FL (ref 80–94)
MONOCYTES NFR BLD MANUAL: 0.83 X10(3)/MCL (ref 0.1–1.3)
MONOCYTES NFR BLD MANUAL: 3 %
MYELOCYTES NFR BLD MANUAL: 4 %
NEUTROPHILS NFR BLD MANUAL: 91 %
PLATELET # BLD AUTO: 125 X10(3)/MCL (ref 130–400)
PLATELET # BLD EST: ABNORMAL 10*3/UL
PMV BLD AUTO: 11.6 FL (ref 7.4–10.4)
POCT GLUCOSE: 179 MG/DL (ref 70–110)
POCT GLUCOSE: 370 MG/DL (ref 70–110)
POCT GLUCOSE: 411 MG/DL (ref 70–110)
POIKILOCYTOSIS BLD QL SMEAR: ABNORMAL
POTASSIUM SERPL-SCNC: 3.6 MMOL/L (ref 3.5–5.1)
PSYCHE PATHOLOGY RESULT: NORMAL
RBC # BLD AUTO: 3.6 X10(6)/MCL (ref 4.7–6.1)
RBC MORPH BLD: ABNORMAL
SODIUM SERPL-SCNC: 139 MMOL/L (ref 136–145)
SPECIMEN OUTDATE: NORMAL
WBC # BLD AUTO: 27.64 X10(3)/MCL (ref 4.5–11.5)

## 2025-04-08 PROCEDURE — 94640 AIRWAY INHALATION TREATMENT: CPT

## 2025-04-08 PROCEDURE — 32550 INSERT PLEURAL CATH: CPT | Mod: RT,,, | Performed by: THORACIC SURGERY (CARDIOTHORACIC VASCULAR SURGERY)

## 2025-04-08 PROCEDURE — 37000009 HC ANESTHESIA EA ADD 15 MINS: Performed by: THORACIC SURGERY (CARDIOTHORACIC VASCULAR SURGERY)

## 2025-04-08 PROCEDURE — 63600175 PHARM REV CODE 636 W HCPCS

## 2025-04-08 PROCEDURE — 25000003 PHARM REV CODE 250: Performed by: NURSE PRACTITIONER

## 2025-04-08 PROCEDURE — 99900031 HC PATIENT EDUCATION (STAT)

## 2025-04-08 PROCEDURE — 85025 COMPLETE CBC W/AUTO DIFF WBC: CPT

## 2025-04-08 PROCEDURE — 63700000 PHARM REV CODE 250 ALT 637 W/O HCPCS

## 2025-04-08 PROCEDURE — 25000242 PHARM REV CODE 250 ALT 637 W/ HCPCS: Performed by: PHYSICIAN ASSISTANT

## 2025-04-08 PROCEDURE — 36415 COLL VENOUS BLD VENIPUNCTURE: CPT

## 2025-04-08 PROCEDURE — 36000704 HC OR TIME LEV I 1ST 15 MIN: Performed by: THORACIC SURGERY (CARDIOTHORACIC VASCULAR SURGERY)

## 2025-04-08 PROCEDURE — 63600175 PHARM REV CODE 636 W HCPCS: Performed by: STUDENT IN AN ORGANIZED HEALTH CARE EDUCATION/TRAINING PROGRAM

## 2025-04-08 PROCEDURE — D9220A PRA ANESTHESIA: Mod: CRNA,,,

## 2025-04-08 PROCEDURE — 25000003 PHARM REV CODE 250

## 2025-04-08 PROCEDURE — D9220A PRA ANESTHESIA: Mod: ANES,,, | Performed by: ANESTHESIOLOGY

## 2025-04-08 PROCEDURE — 99900035 HC TECH TIME PER 15 MIN (STAT)

## 2025-04-08 PROCEDURE — 63600175 PHARM REV CODE 636 W HCPCS: Performed by: NURSE PRACTITIONER

## 2025-04-08 PROCEDURE — 94760 N-INVAS EAR/PLS OXIMETRY 1: CPT

## 2025-04-08 PROCEDURE — 83735 ASSAY OF MAGNESIUM: CPT

## 2025-04-08 PROCEDURE — 80048 BASIC METABOLIC PNL TOTAL CA: CPT

## 2025-04-08 PROCEDURE — 63600175 PHARM REV CODE 636 W HCPCS: Mod: JZ,TB | Performed by: STUDENT IN AN ORGANIZED HEALTH CARE EDUCATION/TRAINING PROGRAM

## 2025-04-08 PROCEDURE — 36415 COLL VENOUS BLD VENIPUNCTURE: CPT | Performed by: ANESTHESIOLOGY

## 2025-04-08 PROCEDURE — 63600175 PHARM REV CODE 636 W HCPCS: Performed by: THORACIC SURGERY (CARDIOTHORACIC VASCULAR SURGERY)

## 2025-04-08 PROCEDURE — 36000705 HC OR TIME LEV I EA ADD 15 MIN: Performed by: THORACIC SURGERY (CARDIOTHORACIC VASCULAR SURGERY)

## 2025-04-08 PROCEDURE — 21400001 HC TELEMETRY ROOM

## 2025-04-08 PROCEDURE — 63600175 PHARM REV CODE 636 W HCPCS: Performed by: INTERNAL MEDICINE

## 2025-04-08 PROCEDURE — 86901 BLOOD TYPING SEROLOGIC RH(D): CPT | Performed by: ANESTHESIOLOGY

## 2025-04-08 PROCEDURE — C1729 CATH, DRAINAGE: HCPCS | Performed by: THORACIC SURGERY (CARDIOTHORACIC VASCULAR SURGERY)

## 2025-04-08 PROCEDURE — 0W9930Z DRAINAGE OF RIGHT PLEURAL CAVITY WITH DRAINAGE DEVICE, PERCUTANEOUS APPROACH: ICD-10-PCS | Performed by: THORACIC SURGERY (CARDIOTHORACIC VASCULAR SURGERY)

## 2025-04-08 PROCEDURE — 27201423 OPTIME MED/SURG SUP & DEVICES STERILE SUPPLY: Performed by: THORACIC SURGERY (CARDIOTHORACIC VASCULAR SURGERY)

## 2025-04-08 PROCEDURE — 37000008 HC ANESTHESIA 1ST 15 MINUTES: Performed by: THORACIC SURGERY (CARDIOTHORACIC VASCULAR SURGERY)

## 2025-04-08 PROCEDURE — 99024 POSTOP FOLLOW-UP VISIT: CPT | Mod: POP,,, | Performed by: PHYSICIAN ASSISTANT

## 2025-04-08 RX ORDER — LIDOCAINE HYDROCHLORIDE 10 MG/ML
INJECTION, SOLUTION INFILTRATION; PERINEURAL
Status: DISCONTINUED | OUTPATIENT
Start: 2025-04-08 | End: 2025-04-08 | Stop reason: HOSPADM

## 2025-04-08 RX ORDER — ONDANSETRON HYDROCHLORIDE 2 MG/ML
4 INJECTION, SOLUTION INTRAVENOUS DAILY PRN
Status: CANCELLED | OUTPATIENT
Start: 2025-04-08

## 2025-04-08 RX ORDER — PROPOFOL 10 MG/ML
VIAL (ML) INTRAVENOUS CONTINUOUS PRN
Status: DISCONTINUED | OUTPATIENT
Start: 2025-04-08 | End: 2025-04-08

## 2025-04-08 RX ORDER — ONDANSETRON 4 MG/1
8 TABLET, ORALLY DISINTEGRATING ORAL EVERY 8 HOURS PRN
Status: CANCELLED | OUTPATIENT
Start: 2025-04-08

## 2025-04-08 RX ORDER — LIDOCAINE HYDROCHLORIDE 10 MG/ML
INJECTION, SOLUTION EPIDURAL; INFILTRATION; INTRACAUDAL; PERINEURAL
Status: DISCONTINUED | OUTPATIENT
Start: 2025-04-08 | End: 2025-04-08

## 2025-04-08 RX ORDER — SODIUM CHLORIDE 9 MG/ML
INJECTION, SOLUTION INTRAVENOUS CONTINUOUS
Status: CANCELLED | OUTPATIENT
Start: 2025-04-08

## 2025-04-08 RX ORDER — GLUCAGON 1 MG
1 KIT INJECTION
Status: CANCELLED | OUTPATIENT
Start: 2025-04-08

## 2025-04-08 RX ORDER — HYDROMORPHONE HYDROCHLORIDE 2 MG/ML
0.2 INJECTION, SOLUTION INTRAMUSCULAR; INTRAVENOUS; SUBCUTANEOUS EVERY 5 MIN PRN
Refills: 0 | Status: CANCELLED | OUTPATIENT
Start: 2025-04-08

## 2025-04-08 RX ORDER — OXYCODONE HYDROCHLORIDE 5 MG/1
5 TABLET ORAL
Refills: 0 | Status: CANCELLED | OUTPATIENT
Start: 2025-04-08

## 2025-04-08 RX ORDER — DIPHENHYDRAMINE HYDROCHLORIDE 50 MG/ML
25 INJECTION, SOLUTION INTRAMUSCULAR; INTRAVENOUS EVERY 6 HOURS PRN
Status: CANCELLED | OUTPATIENT
Start: 2025-04-08

## 2025-04-08 RX ADMIN — INSULIN ASPART 4 UNITS: 100 INJECTION, SOLUTION INTRAVENOUS; SUBCUTANEOUS at 09:04

## 2025-04-08 RX ADMIN — ONDANSETRON 4 MG: 2 INJECTION INTRAMUSCULAR; INTRAVENOUS at 05:04

## 2025-04-08 RX ADMIN — GABAPENTIN 300 MG: 300 CAPSULE ORAL at 09:04

## 2025-04-08 RX ADMIN — LEVALBUTEROL HYDROCHLORIDE 0.63 MG: 0.63 SOLUTION RESPIRATORY (INHALATION) at 01:04

## 2025-04-08 RX ADMIN — FILGRASTIM 480 MCG: 480 INJECTION, SOLUTION INTRAVENOUS; SUBCUTANEOUS at 09:04

## 2025-04-08 RX ADMIN — SACUBITRIL AND VALSARTAN 1 TABLET: 24; 26 TABLET, FILM COATED ORAL at 08:04

## 2025-04-08 RX ADMIN — MIRTAZAPINE 15 MG: 15 TABLET, FILM COATED ORAL at 09:04

## 2025-04-08 RX ADMIN — SODIUM CHLORIDE, SODIUM GLUCONATE, SODIUM ACETATE, POTASSIUM CHLORIDE AND MAGNESIUM CHLORIDE: 526; 502; 368; 37; 30 INJECTION, SOLUTION INTRAVENOUS at 11:04

## 2025-04-08 RX ADMIN — HUMAN INSULIN 7 UNITS: 100 INJECTION, SOLUTION SUBCUTANEOUS at 01:04

## 2025-04-08 RX ADMIN — METOPROLOL SUCCINATE 12.5 MG: 25 TABLET, EXTENDED RELEASE ORAL at 08:04

## 2025-04-08 RX ADMIN — DEXAMETHASONE 8 MG: 4 TABLET ORAL at 01:04

## 2025-04-08 RX ADMIN — BUSPIRONE HYDROCHLORIDE 10 MG: 5 TABLET ORAL at 09:04

## 2025-04-08 RX ADMIN — LEVALBUTEROL HYDROCHLORIDE 0.63 MG: 0.63 SOLUTION RESPIRATORY (INHALATION) at 07:04

## 2025-04-08 RX ADMIN — PROPOFOL 80 MCG/KG/MIN: 10 INJECTION, EMULSION INTRAVENOUS at 11:04

## 2025-04-08 RX ADMIN — ENOXAPARIN SODIUM 40 MG: 40 INJECTION SUBCUTANEOUS at 05:04

## 2025-04-08 RX ADMIN — TAMSULOSIN HYDROCHLORIDE 0.4 MG: 0.4 CAPSULE ORAL at 08:04

## 2025-04-08 RX ADMIN — MUPIROCIN: 20 OINTMENT TOPICAL at 09:04

## 2025-04-08 RX ADMIN — LIDOCAINE HYDROCHLORIDE 50 MG: 10 INJECTION, SOLUTION EPIDURAL; INFILTRATION; INTRACAUDAL; PERINEURAL at 11:04

## 2025-04-08 RX ADMIN — INSULIN ASPART 4 UNITS: 100 INJECTION, SOLUTION INTRAVENOUS; SUBCUTANEOUS at 06:04

## 2025-04-08 RX ADMIN — INSULIN ASPART 2 UNITS: 100 INJECTION, SOLUTION INTRAVENOUS; SUBCUTANEOUS at 02:04

## 2025-04-08 RX ADMIN — INSULIN ASPART 4 UNITS: 100 INJECTION, SOLUTION INTRAVENOUS; SUBCUTANEOUS at 05:04

## 2025-04-08 RX ADMIN — GABAPENTIN 300 MG: 300 CAPSULE ORAL at 02:04

## 2025-04-08 RX ADMIN — CEFTRIAXONE SODIUM 2 G: 2 INJECTION, POWDER, FOR SOLUTION INTRAMUSCULAR; INTRAVENOUS at 09:04

## 2025-04-08 RX ADMIN — MUPIROCIN: 20 OINTMENT TOPICAL at 01:04

## 2025-04-08 RX ADMIN — AZITHROMYCIN DIHYDRATE 500 MG: 250 TABLET ORAL at 09:04

## 2025-04-08 RX ADMIN — LEVALBUTEROL HYDROCHLORIDE 0.63 MG: 0.63 SOLUTION RESPIRATORY (INHALATION) at 08:04

## 2025-04-08 NOTE — PLAN OF CARE
The patient will need home health for pleurx drain.  Freedom of choice for home health given to the wife and she will call me with her selection.

## 2025-04-08 NOTE — TRANSFER OF CARE
"Anesthesia Transfer of Care Note    Patient: Juanjose Chawla    Procedure(s) Performed: Procedure(s) (LRB):  INSERTION, CATHETER, PLEURAL (N/A)    Patient location: Telemetry/Step Down Unit    Anesthesia Type: general    Transport from OR: Transported from OR on room air with adequate spontaneous ventilation    Post pain: adequate analgesia    Post assessment: no apparent anesthetic complications and tolerated procedure well    Post vital signs: stable    Level of consciousness: awake    Nausea/Vomiting: no nausea/vomiting    Complications: none    Transfer of care protocol was followed      Last vitals: Visit Vitals  BP (!) 140/77 (BP Location: Right arm, Patient Position: Lying)   Pulse 80   Temp 36.3 °C (97.4 °F) (Oral)   Resp 18   Ht 5' 10" (1.778 m)   Wt 88 kg (194 lb 0.1 oz)   SpO2 95%   BMI 27.84 kg/m²     "

## 2025-04-08 NOTE — OP NOTE
[facility]                       [address] [place] [id number]    PATIENT NAME:      RITA CINTRON  YOB: 1940  CSN:               [id number]  MRN:               [id number]  ADMIT DATE:        04/03/2025 15:23:00  PHYSICIAN:         Roslyn Hull MD                          OPERATIVE REPORT      DATE OF SURGERY:    04/08/2025 00:00:00    SURGEON:  Roslyn Hull MD    PREOPERATIVE DIAGNOSIS:  Malignant right pleural effusion.    PROCEDURE:  Placement of right PleurX catheter under MAC.  Functional catheter,   total of 1 L of fluid removed.    POSTOPERATIVE DIAGNOSIS:  Malignant right pleural effusion.    ANESTHESIA:  MAC.    BLOOD LOSS:  Minimal.    TECHNIQUE:  Under informed consent, the patient was taken to the OR.  MAC   anesthesia was induced.  Skin over the right chest was prepped and draped in a   usual sterile fashion.  Local anesthesia was instilled in the posterior chest.    I was able to make a small incision and penetrate the left chest.  A   counterincision was made and a PleurX catheter was tunneled under the skin.  The   tip was advanced in the chest that sat nicely.  I did test it and a total of 1   L of fluid was removed.  The wound was closed in layers of absorbable suture and   the PleurX was secured to the skin.  The patient tolerated the procedure well.        ______________________________  Roslyn Hull MD    MAA/AQS  DD:  04/08/2025  Time:  01:06PM  DT:  04/08/2025  Time:  02:04PM  Job #:  [id number]      OPERATIVE REPORT

## 2025-04-08 NOTE — ANESTHESIA POSTPROCEDURE EVALUATION
Anesthesia Post Evaluation    Patient: Juanjose Chawla    Procedure(s) Performed: Procedure(s) (LRB):  INSERTION, CATHETER, PLEURAL (N/A)    Final Anesthesia Type: MAC      Patient location during evaluation: floor  Patient participation: Yes- Able to Participate  Level of consciousness: awake  Post-procedure vital signs: reviewed and stable  Pain management: adequate  Airway patency: patent  FELY mitigation strategies: Multimodal analgesia  PONV status at discharge: No PONV  Anesthetic complications: no      Cardiovascular status: hemodynamically stable  Respiratory status: spontaneous ventilation  Hydration status: euvolemic  Follow-up not needed.              Vitals Value Taken Time   /62 04/08/25 12:45   Temp 36.3 °C (97.4 °F) 04/08/25 12:30   Pulse 73 04/08/25 12:45   Resp 16 04/08/25 13:28   SpO2 94 % 04/08/25 12:45         No case tracking events are documented in the log.      Pain/Vashti Score: Pain Rating Prior to Med Admin: 5 (4/7/2025 11:37 AM)  Pain Rating Post Med Admin: 0 (4/7/2025 12:37 PM)

## 2025-04-08 NOTE — PLAN OF CARE
Spoke with the family and they selected [facility] home health.  Referral sent via [facility].  Spoke with Fransisca at [facility].  She stated that she will need an order with how often to drain and who be the MD that signs the home health orders for the pleurx drain.  Also order needs to be sent to [employer] for the canisters because they do not supply it.

## 2025-04-08 NOTE — ANESTHESIA PREPROCEDURE EVALUATION
2025  Juanjose Chawla is a 84 y.o., male.    84-year-old male with medical problems of diabetes, hypertension, anxiety, adenocarcinoma of unknown primary, most likely lung, and malignant pleural effusion who presented to the hospital for shortness of breath.  Chest CT showed a recurrent right pleural effusion.  He has had difficulty lying flat and feels better after prior thoracentesis.     CVTS consulted for right sided pleurx placement     Past Medical History:   Diagnosis Date    Anxiety     Diabetes mellitus     HLD (hyperlipidemia)     Hypertension     Major depressive disorder, single episode, unspecified     Pneumonia, unspecified organism      Past Surgical History:   Procedure Laterality Date    Cardiac stent      ENDOBRONCHIAL ULTRASOUND N/A 3/28/2025    Procedure: ENDOBRONCHIAL ULTRASOUND (EBUS);  Surgeon: Jerrod Harman Jr., MD, [facility];  Location: [facility];  Service: Pulmonary;  Laterality: N/A;    HERNIA REPAIR N/A      Facility-Administered Medications as of 2025   Medication Dose Route Frequency Provider Last Rate Last Admin    0.9% NaCl 250 mL flush bag   Intravenous 1 time in Clinic/HOD Niko Johnson MD        [] 0.9% NaCl infusion   Intravenous Continuous Vira Mccurdy  mL/hr at 25 0020 New Bag at 25 0020    acetaminophen tablet 650 mg  650 mg Oral Q6H PRN Itzel Webb MD   650 mg at 25 0100    [COMPLETED] albuterol-ipratropium 2.5 mg-0.5 mg/3 mL nebulizer solution 3 mL  3 mL Nebulization ED 1 Time Jasper Eubanks MD   3 mL at 25    ALPRAZolam tablet 0.25 mg  0.25 mg Oral Daily PRN Itzel Webb MD   0.25 mg at 25 1002    [COMPLETED] ALPRAZolam tablet 0.5 mg  0.5 mg Oral Once Vira Mccurdy FNP   0.5 mg at 25    alteplase injection 2 mg  2 mg Intra-Catheter PRN  Niko Johnson MD        aluminum-magnesium hydroxide-simethicone 200-200-20 mg/5 mL suspension 30 mL  30 mL Oral QID PRN Vira Mccurdy FNP        [COMPLETED] aprepitant (Cinvanti) injection 130 mg  130 mg Intravenous 1 time in Clinic/HOD Niko Johnson MD   130 mg at 04/05/25 1232    aspirin EC tablet 81 mg  81 mg Oral Daily Itzel Webb MD   81 mg at 04/07/25 0917    [COMPLETED] azithromycin tablet 500 mg  500 mg Oral Daily Itzel Webb MD   500 mg at 04/07/25 0917    azithromycin tablet 500 mg  500 mg Oral Daily Itzel Webb MD        bisacodyL suppository 10 mg  10 mg Rectal Daily PRN Vira Mccurdy FNP        busPIRone tablet 10 mg  10 mg Oral BID Itzel Webb MD   10 mg at 04/07/25 2053    [COMPLETED] CARBOplatin (PARAPLATIN) 635 mg in 0.9% NaCl 348.5 mL chemo infusion  635 mg Intravenous 1 time in Clinic/HOD Niko Johnson MD   Stopped at 04/05/25 1629    cefTRIAXone injection 2 g  2 g Intravenous Q24H Cisco Harkins MD   2 g at 04/07/25 0918    [COMPLETED] cyanocobalamin injection 1,000 mcg  1,000 mcg Subcutaneous Once Niko Johnson MD   1,000 mcg at 04/04/25 0948    dexAMETHasone tablet 8 mg  8 mg Oral Daily Niko Johnson MD   8 mg at 04/07/25 0917    dextromethorphan-guaiFENesin  mg/5 ml liquid 5 mL  5 mL Oral Q6H PRN Steve Diggs PA-C   5 mL at 04/04/25 2127    dextrose 50% injection 12.5 g  12.5 g Intravenous PRN Vira Mccurdy FNP        dextrose 50% injection 25 g  25 g Intravenous PRN Vira Mccurdy FNP        [COMPLETED] diphenhydrAMINE (BENADRYL) 25 mg in 0.9% NaCl 50 mL IVPB  25 mg Intravenous 1 time in Clinic/HOD Niko Johnson MD   Stopped at 04/05/25 1257    diphenhydrAMINE injection 50 mg  50 mg Intravenous Once PRN Niko Johnson MD        enoxaparin injection 40 mg  40 mg Subcutaneous Daily Vira Mccurdy FNP   40 mg at 04/07/25 1633    EPINEPHrine (EPIPEN) 0.3 mg/0.3 mL pen injection  0.3 mg  0.3 mg Intramuscular Once PRN Niko Johnson MD        [COMPLETED] famotidine (PF) injection 20 mg  20 mg Intravenous 1 time in Clinic/HOD Niko Johnson MD   20 mg at 25 1231    filgrastim-ayow (Releuko) injection 480 mcg/0.8 mL (Preferred Regimen)  480 mcg Subcutaneous Daily Lejeune, Elizabeth Kennedy, MD   480 mcg at 25 0918    gabapentin capsule 300 mg  300 mg Oral TID Itzel Webb MD   300 mg at 25    glucagon (human recombinant) injection 1 mg  1 mg Intramuscular PRN Molbert, Vira L, FNP        glucose chewable tablet 16 g  16 g Oral PRN Molbert Vira L, FNP        glucose chewable tablet 24 g  24 g Oral PRN Molbert Vira L, FNP        heparin, porcine (PF) 100 unit/mL injection flush 500 Units  500 Units Intravenous PRN Niko Johnson MD        hydrocortisone sodium succinate injection 100 mg  100 mg Intravenous Once PRN Niko Johnson MD        insulin aspart U-100 injection 0-10 Units  0-10 Units Subcutaneous QID (AC + HS) PRN Molnichol Vira L, FNP   4 Units at 25 0611    [COMPLETED] insulin regular injection 7 Units 0.07 mL  7 Units Intravenous Once Reyes, Thairy G, DO   7 Units at 25 0130    [COMPLETED] iohexoL (OMNIPAQUE 350) injection 70 mL  70 mL Intravenous ONCE PRN Jasper Eubanks MD   70 mL at 25    [] lactated ringers infusion   Intravenous Continuous Itzel Webb MD 75 mL/hr at 258 New Bag at 25    [] lactated ringers infusion   Intravenous Continuous Itzel Webb MD 75 mL/hr at 25 0947 New Bag at 25 0947    lactulose 10 gram/15 ml solution 30 g  30 g Oral Q6H PRN Niko Johnson MD   30 g at 25 0948    levalbuterol nebulizer solution 0.63 mg  0.63 mg Nebulization Q6H Dontae, Steve A, PA-C   0.63 mg at 25 0145    melatonin tablet 6 mg  6 mg Oral Nightly PRN Vira Mccurdy, HEMA        metoprolol succinate (TOPROL-XL) 24  hr split tablet 12.5 mg  12.5 mg Oral Daily Itzel Webb MD        mirtazapine tablet 15 mg  15 mg Oral QHS Itzel Webb MD   15 mg at 04/07/25 2053    [COMPLETED] morphine injection 2 mg  2 mg Intravenous ED 1 Time Jasper Eubanks MD   2 mg at 04/03/25 2001    mupirocin 2 % ointment   Nasal BID Itzel Webb MD   Given at 04/07/25 2054    naloxone 0.4 mg/mL injection 0.02 mg  0.02 mg Intravenous PRN Vira Mccurdy FNP        [COMPLETED] ondansetron 8 mg, dexAMETHasone 20 mg in 0.9% NaCl 50 mL IVPB  8 mg Intravenous 1 time in Clinic/HOD Niko Johnson MD   Stopped at 04/05/25 1310    [COMPLETED] ondansetron injection 4 mg  4 mg Intravenous ED 1 Time Jasper Eubanks MD   4 mg at 04/03/25 2001    ondansetron injection 4 mg  4 mg Intravenous Q4H PRN Vira Mccurdy FNP   4 mg at 04/05/25 1002    oxyCODONE immediate release tablet 5 mg  5 mg Oral Q4H PRN Lejeune, Elizabeth Kennedy, MD   5 mg at 04/07/25 1137    [COMPLETED] PACLitaxeL (TAXOL) 135 mg/m2 = 282 mg in 0.9% NaCl 500 mL chemo infusion  135 mg/m2 (Treatment Plan Recorded) Intravenous 1 time in Clinic/HOD Niko Johnson  mL/hr at 04/05/25 1319 282 mg at 04/05/25 1319    [COMPLETED] potassium chloride SA CR tablet 40 mEq  40 mEq Oral Once Itzel Webb MD   40 mEq at 04/05/25 1001    prochlorperazine injection Soln 5 mg  5 mg Intravenous Q6H PRN Vira Mccurdy FNP   5 mg at 04/05/25 0233    sacubitriL-valsartan 24-26 mg per tablet 1 tablet  1 tablet Oral BID Issac Guaman ANP   1 tablet at 04/07/25 2053    senna-docusate 8.6-50 mg per tablet 1 tablet  1 tablet Oral BID PRN Vira Mccurdy FNP   1 tablet at 04/07/25 1125    sodium chloride 0.9% flush 10 mL  10 mL Intravenous PRN Vira Mccurdy FNP        sodium chloride 0.9% flush 10 mL  10 mL Intravenous Q12H PRN Itzel Webb MD        sodium chloride 0.9% flush 10 mL  10 mL Intravenous PRN Niko Johnson MD         tamsulosin 24 hr capsule 0.4 mg  0.4 mg Oral Daily Itzel Webb MD   0.4 mg at 04/07/25 0917    [COMPLETED] vancomycin (VANCOCIN) 1,000 mg in D5W 250 mL IVPB (admixture device)  1,000 mg Intravenous Once Vira Mccurdy FNP   Stopped at 04/04/25 0634    Followed by    [COMPLETED] vancomycin 750 mg in D5W 250 mL IVPB (admixture device)  750 mg Intravenous Once Vira Mccurdy FNP   Stopped at 04/04/25 0801    [COMPLETED] zoledronic 4 mg/100 mL infusion 4 mg  4 mg Intravenous 1 time in Clinic/HOD Niko Johnson MD   Stopped at 04/04/25 1106     Outpatient Medications as of 4/8/2025   Medication Sig Dispense Refill    albuterol (ACCUNEB) 0.63 mg/3 mL Nebu Take 0.63 mg by nebulization every 6 (six) hours as needed (wheezing and shortness of breath). Rescue      ALPRAZolam (XANAX) 0.25 MG tablet Take 0.25 mg by mouth 3 (three) times daily as needed for Anxiety.      amLODIPine (NORVASC) 5 MG tablet Take 5 mg by mouth once daily.      aspirin (ECOTRIN) 81 MG EC tablet Take 81 mg by mouth once daily.      busPIRone (BUSPAR) 5 MG Tab Take 5 mg by mouth 2 (two) times daily.      famotidine (PEPCID) 20 MG tablet Take 20 mg by mouth once daily.      gabapentin (NEURONTIN) 300 MG capsule Take 300 mg by mouth 3 (three) times daily.      glimepiride (AMARYL) 1 MG tablet Take 2 mg by mouth before breakfast.      guaiFENesin-codeine 100-10 mg/5 ml (TUSSI-ORGANIDIN NR)  mg/5 mL syrup Take 5 mLs by mouth every 4 (four) hours as needed for Cough or Congestion. 100 mL 0    lisinopriL (PRINIVIL,ZESTRIL) 2.5 MG tablet Take 2.5 mg by mouth once daily.      LORazepam (ATIVAN) 1 MG tablet Take  60 min before MRI, May repeat x 1 if needed 2 tablet 0    metFORMIN (GLUCOPHAGE) 1000 MG tablet Take 2,000 mg by mouth once daily.      polyethylene glycol (GLYCOLAX) 17 gram PwPk Take 17 g by mouth once daily.      tamsulosin (FLOMAX) 0.4 mg Cap Take 0.4 mg by mouth once daily.      umeclidinium (INCRUSE ELLIPTA) 62.5  mcg/actuation inhalation capsule Inhale 62.5 mcg into the lungs once daily. Controller 30 each 0     4/4/25 TTE:    Left Ventricle: The left ventricle is normal in size. Mildly increased wall thickness. There is mildly reduced systolic function with a visually estimated ejection fraction of 45 - 50%. Grade II diastolic dysfunction.    Right Ventricle: The right ventricle is normal in size. Systolic function is normal. TAPSE is 2.26 cm.    There are no significant valvular abnormalities.    Pericardium: There is no pericardial effusion.                          Pre-op Assessment    I have reviewed the Patient Summary Reports.     I have reviewed the Nursing Notes. I have reviewed the NPO Status.   I have reviewed the Medications.     Review of Systems  Anesthesia Hx:  No problems with previous Anesthesia                Hematology/Oncology:       -- Anemia:                                  Cardiovascular:  Exercise tolerance: poor   Hypertension   CAD   CABG/stent        hyperlipidemia   ECG has been reviewed.                            Pulmonary:  Pneumonia       Right pleural effusion               Endocrine:  Diabetes           Psych:   anxiety depression                Physical Exam  General: Cooperative, Alert and Oriented    Airway:  Mallampati: II   Mouth Opening: Normal  TM Distance: Normal  Tongue: Normal  Neck ROM: Extension Decreased    Dental:  Intact        Anesthesia Plan  Type of Anesthesia, risks & benefits discussed:    Anesthesia Type: Gen Natural Airway  Intra-op Monitoring Plan: Standard ASA Monitors  Post Op Pain Control Plan: multimodal analgesia  Induction:  IV  Informed Consent: Informed consent signed with the Patient and all parties understand the risks and agree with anesthesia plan.  All questions answered.   ASA Score: 3  Day of Surgery Review of History & Physical: H&P Update referred to the surgeon/provider.I have interviewed and examined the patient. I have reviewed the patient's H&P  dated: There are no significant changes.     Ready For Surgery From Anesthesia Perspective.     .

## 2025-04-08 NOTE — PROGRESS NOTES
CT SURGERY PROGRESS NOTE  Juanjose Chawla  84 y.o.  1940    Patients Procedure: Procedure(s) (LRB):  INSERTION, CATHETER, PLEURAL (N/A)    Subjective  Interval History:  84-year-old male with medical problems of diabetes, hypertension, anxiety, adenocarcinoma of unknown primary, most likely lung, and malignant pleural effusion who presented to the hospital for shortness of breath.  Chest CT showed a recurrent right pleural effusion.  He has had difficulty lying flat and feels better after prior thoracentesis.     CVTS cosnulted for rigth sided pleurx placement     ROS    Medication List  Infusions    Scheduled   0.9% NaCl 250 mL flush bag   Intravenous 1 time in Clinic/HOD    aspirin  81 mg Oral Daily    azithromycin  500 mg Oral Daily    busPIRone  10 mg Oral BID    cefTRIAXone (Rocephin) IV (PEDS and ADULTS)  2 g Intravenous Q24H    dexAMETHasone  8 mg Oral Daily    enoxparin  40 mg Subcutaneous Daily    filgrastim-ayow  480 mcg Subcutaneous Daily    gabapentin  300 mg Oral TID    levalbuterol  0.63 mg Nebulization Q6H    metoprolol succinate  12.5 mg Oral Daily    mirtazapine  15 mg Oral QHS    mupirocin   Nasal BID    sacubitriL-valsartan  1 tablet Oral BID    tamsulosin  0.4 mg Oral Daily       Objective:  Recent Vitals:  Temp:  [97.4 °F (36.3 °C)-97.7 °F (36.5 °C)] 97.4 °F (36.3 °C)  Pulse:  [56-87] 66  Resp:  [16-20] 16  SpO2:  [93 %-96 %] 94 %  BP: (111-130)/(48-72) 111/49    Physical Exam     I/O last 24 hrs:  Intake/Output - Last 3 Shifts         04/06 0700  04/07 0659 04/07 0700  04/08 0659 04/08 0700  04/09 0659    P.O.  360     I.V. (mL/kg)  726.3 (8.3)     IV Piggyback 446.7      Total Intake(mL/kg) 446.7 (5.1) 1086.3 (12.3)     Urine (mL/kg/hr)  300 (0.1)     Stool  0     Total Output  300     Net +446.7 +786.3            Urine Occurrence  3 x     Stool Occurrence 2 x 1 x             Labs  BMP:   Recent Labs   Lab 04/08/25  0349      K 3.6   *   CO2 21*   BUN 33.8*   CREATININE  1.04   CALCIUM 7.4*   MG 2.30     CBC:   Recent Labs   Lab 04/08/25  0349   WBC 27.64  27.64*   RBC 3.60*   HGB 10.9*   HCT 33.8*   *   MCV 93.9   MCH 30.3   MCHC 32.2*     CMP:   Recent Labs   Lab 04/07/25  0341 04/08/25  0349   CALCIUM 7.8* 7.4*   ALBUMIN 2.3*  --     139   K 3.6 3.6   CO2 19* 21*   * 109*   BUN 35.1* 33.8*   CREATININE 1.40* 1.04   ALKPHOS 66  --    ALT 7  --    AST 14  --    BILITOT 0.3  --          Imaging:   CXR: No results found in the last 24 hours.       S Images for [facility] [place] Viewer      Show images for CTA Chest Non-Coronary (PE Studies)  CTA Chest Non-Coronary (PE Studies)  Order: [id number]   Status: Final result       Next appt: 04/15/2025 at 02:00 PM in Hematology and Oncology (Niko Johnson MD)    Test Result Released: Yes (seen)    0 Result Notes  Details     Reading Physician Reading Date Result Priority   Iam Menon MD  [phone]  4/3/2025 STAT      Narrative & Impression  EXAMINATION:  CTA CHEST NON CORONARY (PE STUDIES)     CLINICAL HISTORY:  Pulmonary embolism (PE) suspected, high prob;     TECHNIQUE:  Sequential axial images performed of the chest using pulmonary embolism protocol following intravenous contrast bolus. Sagittal and contrast reformations performed.  MIP images are also performed.     Dose product length of 217 mGycm. Automated exposure control was utilized to minimize radiation dose.     FINDINGS:  Optimal contrast bolus timing with adequately opacified pulmonary artery system is without evidence of filling defects from pulmonary thromboembolism within the main pulmonary artery and the major branches. Thoracic aorta is without aneurysmal dilatation or dissection.     There are multiple enlarged mediastinal and bilateral hilar lymph nodes without significant interval change and these were hypermetabolic on the previous PET scan.  There is size increase of right pleural effusion which causes further right lower lung zone  compressive atelectasis.  Right lower lung lobe consolidation is again more evident and was again hypermetabolic on the previous PET scan.  Bilateral lungs are remarkable for similar extent ground opacities which could represent congestive process versus pneumonitis versus small airway disease.  Bilateral peripheral subpleural reticulations consistent with fibrosis show about similar appearance.  There are left lower lung zone some cystic formations.     No acute or aggressive skeletal abnormality.     Impression:     1.  No pulmonary thromboembolism identified.     2.  Similar mediastinal and bilateral hilar lymphadenopathy.     3.  Increased right pleural effusion and right lower lung zone consolidation.        Electronically signed by:Iam Menon  Date:                                            04/03/2025  Time:                                           21:15          ASSESSMENT/PLAN:    Right sided pleural effusion      Eval for drainage/ pleurx Dr Hull      Will proceed today         Case and plan of care discussed with MD Loy Reyes PA-C

## 2025-04-08 NOTE — PROGRESS NOTES
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  Of note patient was admitted at this facility on 02/25/2025 for worsening shortness of breath with malignant pleural effusion. He underwent diagnostic thoracentesis that was consistent with adenocarcinoma with questionable primary.  PET scan revealed hypermetabolic consolidation in the right lung base with mediastinal and bilateral adenopathy and innumerable scattered hypermetabolic bone lesions.  Patient underwent EBUS after PET scan.  Biopsy of station 4R lymph node showed poorly differentiated adenocarcinoma.  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 Patient is followed by Dr. Johnson.      Initial vital signs in ED were /69, pulse 106, respirations 21, temperature 36.7° C, and SpO2 93% on room air.  Labs revealed WBC 9.12, RBC 4.16, hemoglobin 12.6, hematocrit 38.3, MCV 92.1, chloride 108, CO2 22, glucose 142, calcium 11.1, BNP 39.6, and troponin 0.035.  COVID and RSV testing were negative.  Respiratory panel was negative.  Blood cultures x2 and Respiratory culture was obtained.  Chest x-ray revealed persistent right lung consolidation.  CTA chest revealed no pulmonary thromboembolism identified, similar mediastinal and bilateral hilar lymphadenopathy, and increased right pleural effusion and right lower lobe zone consolidation.  EKG revealed sinus tachycardia with heart rate of 105 beats per minute. Patient was admitted to hospital medicine service for further medical management.     Patient had a thoracentesis done with pulm on 4/4. Started on Carbo/Taxol on 4/5.  Planned for pleurX cather today.     Interval Hx:   NAEO. Seen and examined. No SOB. Family at bedside. Planned for pleurX catheter today.    Case was discussed with patient's nurse and  on the floor.    Objective/physical exam:  General: In no acute distress, afebrile  Chest: Clear to auscultation bilaterally  Heart: RRR, +S1, S2, no appreciable murmur  Abdomen: Soft, nontender, BS +  MSK: Warm, +1 lower extremity edema, no clubbing or cyanosis  Neurologic: Alert and oriented x4, Cranial nerve II-XII intact, Strength 5/5 in all 4 extremities    VITAL SIGNS: 24 HRS MIN & MAX LAST   Temp  Min: 97.4 °F (36.3 °C)  Max: 97.5 °F (36.4 °C) 97.4 °F (36.3 °C)   BP  Min: 111/49  Max: 140/77 117/62   Pulse  Min: 56  Max: 88  73   Resp  Min: 16  Max: 20 18   SpO2  Min: 93 %  Max: 96 % (!) 94 %     I have reviewed the following labs:  Recent Labs   Lab 04/06/25 0331 04/07/25 0341 04/08/25 0349   WBC 6.34 23.77  23.77* 27.64  27.64*   RBC 3.58* 3.65* 3.60*   HGB 10.7* 11.0* 10.9*   HCT 33.1* 33.8* 33.8*   MCV 92.5 92.6 93.9   MCH 29.9 30.1 30.3   MCHC 32.3* 32.5* 32.2*   RDW 13.5 13.7 14.0   * 123* 125*   MPV 11.0* 11.2* 11.6*     Recent Labs   Lab 04/05/25  0329 04/06/25 0331 04/07/25 0341 04/08/25  0349    136 138 139   K 3.4* 3.9 3.6 3.6    106 109* 109*   CO2 23 20* 19* 21*   BUN 11.4 22.0 35.1* 33.8*   CREATININE 0.82 1.19 1.40* 1.04   CALCIUM 10.0 8.7* 7.8* 7.4*   MG 1.80 2.00 2.30 2.30   ALBUMIN 2.6* 2.4* 2.3*  --    ALKPHOS 78 67 66  --    ALT 5 7 7  --    AST 11 13 14  --    BILITOT 0.6 0.5 0.3  --      Microbiology Results (last 7 days)       Procedure Component Value Units Date/Time    Blood Culture [[id number]]  (Normal) Collected: 04/03/25 2318    Order Status: Completed Specimen: Blood, Venous Updated: 04/08/25 0105     Blood Culture No Growth At 96 Hours    Blood Culture [[id number]]  (Normal) Collected: 04/03/25 2318    Order Status: Completed Specimen: Blood, Venous Updated:  04/08/25 0105     Blood Culture No Growth At 96 Hours    Respiratory Culture [[id number]] Collected: 04/04/25 0634    Order Status: Completed Specimen: Sputum, Expectorated Updated: 04/06/25 1326     Respiratory Culture Normal respiratory hanh     GRAM STAIN Quality 1+      Many Gram positive cocci      Few Gram Positive Rods      Rare Yeast             See below for Radiology    Assessment/Plan:  # SOB 2/2 likely pleural effusion with possible superimposed PNA - improved  # Acute hypoxic respiratory failure - resolved  # NIA 2/2 likely entresto use vs zosyn - resolved  # Acute systolic and diastolic heart failure  # Adenocarcinoma unknown primary most likely lung   # Malignant pleural effusion s/p thoracentesis   - complicated by trace right apical pneumothorax  # Malignant hilar and mediastinal adenopathy   # Malignant bone lesions clinically   # Hypercalcemia - improving  # Normocytic anemia  # Cancer related pain  # Thrombocytopenia  # Steroid induced leukocytosis and neupogen  # Steroid induced hyperglycemia  # Hx of CAD s/p PCI, HTN, HLD, T2DM, anxiety/depression     - MRSA negative. Stopped vancomycin and zosyn. Azithro and ceftriaxone to complete 7 day course  - follow up Bcx  - on room air  - urine sodium 57 and UA grossly normal. Stop fluids. NIA improved. If resumes again, will stop entreso  - vitamin D 43, one dose zometa on 4/4  - TTE noted. Cardiology consult noted  - pulm consult for thoracentesis - removed 900 cc. Follow up studies. Complicated with mild pneumothorax. Monitor with CXR  - CT surgery consult requested by pulm for pleurX catheter   - planned for 4/8  - GI consult for help with etiology of primary as recommended by oncology initially but now will hold off as per oncology  - oncology recommendations appreciated  - C1D2 of Carbo/Taxol on 4/5  - started on neupogen on 4/6 - Dr. Johnson wants to continue it till discharge or unless WBC > 30  - oxycodone for pain control  - stop amlodipine,  continue BB and entresto   - will switch lopressor to XL  for GDMT  - PT/OT  - obtain A1c  - WBC 27.64, Hgb 10.9, Plts 125, Cr 1.04       VTE prophylaxis: lovenox; monitor platelets    Patient condition:  Guarded    Anticipated discharge and Disposition:         All diagnosis and differential diagnosis have been reviewed; assessment and plan has been documented; I have personally reviewed the labs and test results that are presently available; I have reviewed the patients medication list; I have reviewed the consulting providers response and recommendations. I have reviewed or attempted to review medical records based upon their availability    All of the patient's questions have been  addressed and answered. Patient's is agreeable to the above stated plan. I will continue to monitor closely and make adjustments to medical management as needed.      _____________________________________________________________________    Malnutrition Status:  Nutrition consulted. Most recent weight and BMI monitored-     Measurements:  Wt Readings from Last 1 Encounters:   04/03/25 88 kg (194 lb 0.1 oz)   Body mass index is 27.84 kg/m².    Patient has been screened and assessed by RD.    Malnutrition Type:  Context:    Level:      Malnutrition Characteristic Summary:       Interventions/Recommendations (treatment strategy):        Scheduled Med:   0.9% NaCl 250 mL flush bag   Intravenous 1 time in Clinic/HOD    aspirin  81 mg Oral Daily    azithromycin  500 mg Oral Daily    busPIRone  10 mg Oral BID    cefTRIAXone (Rocephin) IV (PEDS and ADULTS)  2 g Intravenous Q24H    dexAMETHasone  8 mg Oral Daily    enoxparin  40 mg Subcutaneous Daily    filgrastim-ayow  480 mcg Subcutaneous Daily    gabapentin  300 mg Oral TID    levalbuterol  0.63 mg Nebulization Q6H    metoprolol succinate  12.5 mg Oral Daily    mirtazapine  15 mg Oral QHS    mupirocin   Nasal BID    sacubitriL-valsartan  1 tablet Oral BID    tamsulosin  0.4 mg Oral Daily       Continuous Infusions:       PRN Meds:    Current Facility-Administered Medications:     acetaminophen, 650 mg, Oral, Q6H PRN    ALPRAZolam, 0.25 mg, Oral, Daily PRN    alteplase, 2 mg, Intra-Catheter, PRN    aluminum-magnesium hydroxide-simethicone, 30 mL, Oral, QID PRN    bisacodyL, 10 mg, Rectal, Daily PRN    dextromethorphan-guaiFENesin  mg/5 ml, 5 mL, Oral, Q6H PRN    dextrose 50%, 12.5 g, Intravenous, PRN    dextrose 50%, 25 g, Intravenous, PRN    diphenhydrAMINE, 50 mg, Intravenous, Once PRN    EPINEPHrine, 0.3 mg, Intramuscular, Once PRN    glucagon (human recombinant), 1 mg, Intramuscular, PRN    glucose, 16 g, Oral, PRN    glucose, 24 g, Oral, PRN    heparin, porcine (PF), 500 Units, Intravenous, PRN    hydrocortisone sodium succinate, 100 mg, Intravenous, Once PRN    insulin aspart U-100, 0-10 Units, Subcutaneous, QID (AC + HS) PRN    lactulose 10 gram/15 ml, 30 g, Oral, Q6H PRN    melatonin, 6 mg, Oral, Nightly PRN    naloxone, 0.02 mg, Intravenous, PRN    ondansetron, 4 mg, Intravenous, Q4H PRN    oxyCODONE, 5 mg, Oral, Q4H PRN    prochlorperazine, 5 mg, Intravenous, Q6H PRN    senna-docusate, 1 tablet, Oral, BID PRN    sodium chloride 0.9%, 10 mL, Intravenous, PRN    Flushing PICC/Midline Protocol, , , Until Discontinued **AND** sodium chloride 0.9%, 10 mL, Intravenous, Q12H PRN    sodium chloride 0.9%, 10 mL, Intravenous, PRN     Radiology:  I have personally reviewed the following imaging and agree with the radiologist.     Cardiac catheterization  Procedure performed in the Invasive Lab    - See Procedure Log link below for nursing documentation    - See OpNote on Surgeries Tab for physician findings    - See Imaging Tab for radiologist dictation      Itzel Webb MD  Department of Hospital Medicine   [facility]   04/08/2025

## 2025-04-09 VITALS
HEIGHT: 70 IN | TEMPERATURE: 98 F | WEIGHT: 194 LBS | BODY MASS INDEX: 27.77 KG/M2 | HEART RATE: 84 BPM | DIASTOLIC BLOOD PRESSURE: 53 MMHG | SYSTOLIC BLOOD PRESSURE: 104 MMHG | OXYGEN SATURATION: 96 % | RESPIRATION RATE: 25 BRPM

## 2025-04-09 LAB
ABS NEUT (OLG): 25.64 X10(3)/MCL (ref 2.1–9.2)
ANION GAP SERPL CALC-SCNC: 11 MEQ/L
ANISOCYTOSIS BLD QL SMEAR: ABNORMAL
BACTERIA BLD CULT: NORMAL
BACTERIA BLD CULT: NORMAL
BUN SERPL-MCNC: 36.5 MG/DL (ref 8.4–25.7)
BURR CELLS (OLG): ABNORMAL
CALCIUM SERPL-MCNC: 6.9 MG/DL (ref 8.8–10)
CHLORIDE SERPL-SCNC: 109 MMOL/L (ref 98–107)
CO2 SERPL-SCNC: 20 MMOL/L (ref 23–31)
CREAT SERPL-MCNC: 1.07 MG/DL (ref 0.72–1.25)
CREAT/UREA NIT SERPL: 34
ERYTHROCYTE [DISTWIDTH] IN BLOOD BY AUTOMATED COUNT: 14.3 % (ref 11.5–17)
EST. AVERAGE GLUCOSE BLD GHB EST-MCNC: 151.3 MG/DL
GFR SERPLBLD CREATININE-BSD FMLA CKD-EPI: >60 ML/MIN/1.73/M2
GLUCOSE SERPL-MCNC: 280 MG/DL (ref 82–115)
GLUCOSE SERPL-MCNC: 287 MG/DL (ref 70–110)
GLUCOSE SERPL-MCNC: 424 MG/DL (ref 70–110)
HBA1C MFR BLD: 6.9 %
HCT VFR BLD AUTO: 32.4 % (ref 42–52)
HGB BLD-MCNC: 10.9 G/DL (ref 14–18)
INSTRUMENT WBC (OLG): 26.43 X10(3)/MCL
LYMPHOCYTES NFR BLD MANUAL: 0.26 X10(3)/MCL (ref 0.6–4.6)
LYMPHOCYTES NFR BLD MANUAL: 1 %
MCH RBC QN AUTO: 31 PG (ref 27–31)
MCHC RBC AUTO-ENTMCNC: 33.6 G/DL (ref 33–36)
MCV RBC AUTO: 92 FL (ref 80–94)
METAMYELOCYTES NFR BLD MANUAL: 2 %
NEUTROPHILS NFR BLD MANUAL: 97 %
OVALOCYTES (OLG): ABNORMAL
PLATELET # BLD AUTO: 106 X10(3)/MCL (ref 130–400)
PLATELET # BLD EST: ABNORMAL 10*3/UL
PMV BLD AUTO: 11.4 FL (ref 7.4–10.4)
POCT GLUCOSE: 313 MG/DL (ref 70–110)
POCT GLUCOSE: 424 MG/DL (ref 70–110)
POIKILOCYTOSIS BLD QL SMEAR: ABNORMAL
POTASSIUM SERPL-SCNC: 4.2 MMOL/L (ref 3.5–5.1)
RBC # BLD AUTO: 3.52 X10(6)/MCL (ref 4.7–6.1)
RBC MORPH BLD: ABNORMAL
SODIUM SERPL-SCNC: 140 MMOL/L (ref 136–145)
WBC # BLD AUTO: 26.43 X10(3)/MCL (ref 4.5–11.5)

## 2025-04-09 PROCEDURE — 83036 HEMOGLOBIN GLYCOSYLATED A1C: CPT

## 2025-04-09 PROCEDURE — 63700000 PHARM REV CODE 250 ALT 637 W/O HCPCS

## 2025-04-09 PROCEDURE — 99232 SBSQ HOSP IP/OBS MODERATE 35: CPT | Mod: ,,, | Performed by: INTERNAL MEDICINE

## 2025-04-09 PROCEDURE — 63600175 PHARM REV CODE 636 W HCPCS: Mod: JZ,TB | Performed by: STUDENT IN AN ORGANIZED HEALTH CARE EDUCATION/TRAINING PROGRAM

## 2025-04-09 PROCEDURE — 99900031 HC PATIENT EDUCATION (STAT)

## 2025-04-09 PROCEDURE — 85025 COMPLETE CBC W/AUTO DIFF WBC: CPT

## 2025-04-09 PROCEDURE — 80048 BASIC METABOLIC PNL TOTAL CA: CPT

## 2025-04-09 PROCEDURE — 25000242 PHARM REV CODE 250 ALT 637 W/ HCPCS: Performed by: PHYSICIAN ASSISTANT

## 2025-04-09 PROCEDURE — 94760 N-INVAS EAR/PLS OXIMETRY 1: CPT

## 2025-04-09 PROCEDURE — 25000003 PHARM REV CODE 250

## 2025-04-09 PROCEDURE — 36415 COLL VENOUS BLD VENIPUNCTURE: CPT

## 2025-04-09 PROCEDURE — 99900035 HC TECH TIME PER 15 MIN (STAT)

## 2025-04-09 PROCEDURE — 63600175 PHARM REV CODE 636 W HCPCS: Performed by: STUDENT IN AN ORGANIZED HEALTH CARE EDUCATION/TRAINING PROGRAM

## 2025-04-09 PROCEDURE — 63600175 PHARM REV CODE 636 W HCPCS: Performed by: NURSE PRACTITIONER

## 2025-04-09 PROCEDURE — 94640 AIRWAY INHALATION TREATMENT: CPT

## 2025-04-09 RX ORDER — MIRTAZAPINE 15 MG/1
15 TABLET, FILM COATED ORAL NIGHTLY
Qty: 30 TABLET | Refills: 0 | Status: SHIPPED | OUTPATIENT
Start: 2025-04-09 | End: 2025-04-15 | Stop reason: SDUPTHER

## 2025-04-09 RX ORDER — GLIMEPIRIDE 2 MG/1
2 TABLET ORAL 2 TIMES DAILY
Qty: 60 TABLET | Refills: 0 | Status: SHIPPED | OUTPATIENT
Start: 2025-04-09 | End: 2025-05-09

## 2025-04-09 RX ORDER — METOPROLOL SUCCINATE 25 MG/1
12.5 TABLET, EXTENDED RELEASE ORAL DAILY
Qty: 15 TABLET | Refills: 0 | Status: SHIPPED | OUTPATIENT
Start: 2025-04-10 | End: 2025-05-10

## 2025-04-09 RX ORDER — AZITHROMYCIN 500 MG/1
500 TABLET, FILM COATED ORAL DAILY
Qty: 3 TABLET | Refills: 0 | Status: SHIPPED | OUTPATIENT
Start: 2025-04-10 | End: 2025-04-13

## 2025-04-09 RX ORDER — CEFPODOXIME PROXETIL 100 MG/1
100 TABLET, FILM COATED ORAL 2 TIMES DAILY
Qty: 6 TABLET | Refills: 0 | Status: SHIPPED | OUTPATIENT
Start: 2025-04-09 | End: 2025-04-12

## 2025-04-09 RX ADMIN — MUPIROCIN: 20 OINTMENT TOPICAL at 10:04

## 2025-04-09 RX ADMIN — BUSPIRONE HYDROCHLORIDE 10 MG: 5 TABLET ORAL at 09:04

## 2025-04-09 RX ADMIN — METOPROLOL SUCCINATE 12.5 MG: 25 TABLET, EXTENDED RELEASE ORAL at 09:04

## 2025-04-09 RX ADMIN — FILGRASTIM 480 MCG: 480 INJECTION, SOLUTION INTRAVENOUS; SUBCUTANEOUS at 08:04

## 2025-04-09 RX ADMIN — ASPIRIN 81 MG: 81 TABLET, COATED ORAL at 09:04

## 2025-04-09 RX ADMIN — CEFTRIAXONE SODIUM 2 G: 2 INJECTION, POWDER, FOR SOLUTION INTRAMUSCULAR; INTRAVENOUS at 08:04

## 2025-04-09 RX ADMIN — LEVALBUTEROL HYDROCHLORIDE 0.63 MG: 0.63 SOLUTION RESPIRATORY (INHALATION) at 08:04

## 2025-04-09 RX ADMIN — LEVALBUTEROL HYDROCHLORIDE 0.63 MG: 0.63 SOLUTION RESPIRATORY (INHALATION) at 01:04

## 2025-04-09 RX ADMIN — AZITHROMYCIN DIHYDRATE 500 MG: 250 TABLET ORAL at 09:04

## 2025-04-09 RX ADMIN — INSULIN ASPART 10 UNITS: 100 INJECTION, SOLUTION INTRAVENOUS; SUBCUTANEOUS at 05:04

## 2025-04-09 RX ADMIN — GABAPENTIN 300 MG: 300 CAPSULE ORAL at 03:04

## 2025-04-09 RX ADMIN — GABAPENTIN 300 MG: 300 CAPSULE ORAL at 09:04

## 2025-04-09 RX ADMIN — INSULIN ASPART 6 UNITS: 100 INJECTION, SOLUTION INTRAVENOUS; SUBCUTANEOUS at 01:04

## 2025-04-09 RX ADMIN — TAMSULOSIN HYDROCHLORIDE 0.4 MG: 0.4 CAPSULE ORAL at 09:04

## 2025-04-09 NOTE — PLAN OF CARE
Problem: Adult Inpatient Plan of Care  Goal: Plan of Care Review  Outcome: Met  Goal: Patient-Specific Goal (Individualized)  Outcome: Met  Goal: Absence of Hospital-Acquired Illness or Injury  Outcome: Met  Goal: Optimal Comfort and Wellbeing  Outcome: Met  Goal: Readiness for Transition of Care  Outcome: Met     Problem: Skin Injury Risk Increased  Goal: Skin Health and Integrity  Outcome: Met     Problem: Fall Injury Risk  Goal: Absence of Fall and Fall-Related Injury  Outcome: Met     Problem: Infection  Goal: Absence of Infection Signs and Symptoms  Outcome: Met     Problem: Wound  Goal: Optimal Coping  Outcome: Met  Goal: Optimal Functional Ability  Outcome: Met  Goal: Absence of Infection Signs and Symptoms  Outcome: Met  Goal: Improved Oral Intake  Outcome: Met  Goal: Optimal Pain Control and Function  Outcome: Met  Goal: Skin Health and Integrity  Outcome: Met  Goal: Optimal Wound Healing  Outcome: Met

## 2025-04-09 NOTE — PLAN OF CARE
Discharge home today with home health.  AVS sent to [facility] via [facility].  Spoke with Fransisca and the patient is accepted.  Order for peurx drain supplies faxed to [facility] at fax [phone] (phone number [phone].

## 2025-04-09 NOTE — DISCHARGE SUMMARY
[facility] Hospital Medicine Discharge Summary    Admit Date: 4/3/2025  Discharge Date and Time: 4/9/202512:58 PM  Admitting Physician:  Team  Discharging Physician: Itzel Webb MD.  Primary Care Physician: Sadiq Hartmann MD  Consults: Cardiology, Cardiothoracic/Vascular Surgery, pulm, Gastroenterology, and Hematology/Oncology    Discharge Diagnoses:  # SOB 2/2 likely pleural effusion with possible superimposed PNA - resolverd  # Acute hypoxic respiratory failure - resolved  # NIA 2/2 likely entresto use vs zosyn - resolved  # Acute systolic and diastolic heart failure - compensated  # Adenocarcinoma unknown primary most likely lung   # Malignant pleural effusion s/p thoracentesis                 - complicated by trace right apical pneumothorax  # Malignant hilar and mediastinal adenopathy   # Malignant bone lesions clinically   # Hypercalcemia - resolved   - corrected calcium today 8.3 on day of discharge  # Normocytic anemia  # Cancer related pain  # Thrombocytopenia  # Steroid induced leukocytosis and neupogen  # Steroid induced hyperglycemia  # Hx of CAD s/p PCI, HTN, HLD, T2DM, anxiety/depression    Hospital Course:   84 year old man with a PMHx of CAD s/p PCI, HTN, HLD, T2DM, anxiety/depression and recently diagnosed adenocarcinoma of unknown primary (most likely lung) presents for worsening shortness of breath for the past several days worse with lying down.  Of note patient was admitted at this facility on 02/25/2025 for worsening shortness of breath with malignant pleural effusion. He underwent diagnostic thoracentesis that was consistent with adenocarcinoma with questionable primary.  PET scan revealed hypermetabolic consolidation in the right lung base with mediastinal and bilateral adenopathy and innumerable scattered hypermetabolic bone lesions.  Patient underwent EBUS after PET scan.  Biopsy of station 4R lymph node showed poorly differentiated  adenocarcinoma.  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 Patient is followed by Dr. Johnson.      Initial vital signs in ED were /69, pulse 106, respirations 21, temperature 36.7° C, and SpO2 93% on room air.  Labs revealed WBC 9.12, RBC 4.16, hemoglobin 12.6, hematocrit 38.3, MCV 92.1, chloride 108, CO2 22, glucose 142, calcium 11.1, BNP 39.6, and troponin 0.035.  COVID and RSV testing were negative.  Respiratory panel was negative.  Blood cultures x2 and Respiratory culture was obtained and remained negative.  Chest x-ray revealed persistent right lung consolidation.  CTA chest revealed no pulmonary thromboembolism identified, similar mediastinal and bilateral hilar lymphadenopathy, and increased right pleural effusion and right lower lobe zone consolidation.  EKG revealed sinus tachycardia with heart rate of 105 beats per minute. Patient was admitted to hospital medicine service for further medical management. He was give zometa for his hypercalcemia     Patient had a thoracentesis done with pulm on 4/4. He was treated with IV Abx.  His TTE showed EF 45-50% and G1DD. Cardiology consulted. Started on Carbo/Taxol on 4/5. He was given neupogen. Patient had pleurx catheter placed on 4/8. Will stop entresto on discharge given low BP. Will allow cardiology to titrate GDMT outpatient. GI consult for help with etiology of primary as recommended by oncology initially but now will hold off as per oncology and they will decide outpatient.    Pt was seen and examined on the day of discharge. Patient will be discharged with PCP, cardiology, CT surgery, GI and oncology follow up. I had an extensive discussion regarding steroid induced hyperglycemia. A1c was 6.9. patient does not want insulin on discharge. I increased his oral hypoglycemics. I told him to keep an eye on his finger sticks and call his PCP for further adjustments. Daughter at bedside. All  questions answered. He will need repeat CBC and CMP next week - Dr. Johnson to arrange. Will discharge on PO Abx to complete course. Patient very very eager to go home - does not want to stay to keep and eye on his BP and wants to be discharged.     Vitals:  VITAL SIGNS: 24 HRS MIN & MAX LAST   Temp  Min: 97.4 °F (36.3 °C)  Max: 98.7 °F (37.1 °C) 97.9 °F (36.6 °C)   BP  Min: 66/38  Max: 132/81 (!) 104/53 (verified with charge nurse Kaitlin. the BP and HR was stable to adsminister)   Pulse  Min: 66  Max: 101  84 (verified with charge nurse Kaitlin. the BP and HR was stable to adsminister)   Resp  Min: 15  Max: 36 (!) 25   SpO2  Min: 92 %  Max: 99 % 96 %       Physical Exam:  General: In no acute distress, afebrile  Chest: Clear to auscultation bilaterally  Heart: RRR, +S1, S2, no appreciable murmur  Abdomen: Soft, nontender, BS +  MSK: Warm, trace lower extremity edema, no clubbing or cyanosis  Neurologic: Alert and oriented x4, Cranial nerve II-XII intact, Strength 5/5 in all 4 extremities    Procedures Performed: No admission procedures for hospital encounter.     Significant Diagnostic Studies: See Full reports for all details    Recent Labs   Lab 04/07/25 0341 04/08/25 0349 04/09/25  0252   WBC 23.77  23.77* 27.64  27.64* 26.43  26.43*   RBC 3.65* 3.60* 3.52*   HGB 11.0* 10.9* 10.9*   HCT 33.8* 33.8* 32.4*   MCV 92.6 93.9 92.0   MCH 30.1 30.3 31.0   MCHC 32.5* 32.2* 33.6   RDW 13.7 14.0 14.3   * 125* 106*   MPV 11.2* 11.6* 11.4*       Recent Labs   Lab 04/05/25  0329 04/06/25  0331 04/07/25  0341 04/08/25  0349 04/09/25  0252    136 138 139 140   K 3.4* 3.9 3.6 3.6 4.2    106 109* 109* 109*   CO2 23 20* 19* 21* 20*   BUN 11.4 22.0 35.1* 33.8* 36.5*   CREATININE 0.82 1.19 1.40* 1.04 1.07   CALCIUM 10.0 8.7* 7.8* 7.4* 6.9*   MG 1.80 2.00 2.30 2.30  --    ALBUMIN 2.6* 2.4* 2.3*  --   --    ALKPHOS 78 67 66  --   --    ALT 5 7 7  --   --    AST 11 13 14  --   --    BILITOT 0.6 0.5 0.3  --   --          Microbiology Results (last 7 days)       Procedure Component Value Units Date/Time    Blood Culture [[id number]]  (Normal) Collected: 04/03/25 2318    Order Status: Completed Specimen: Blood, Venous Updated: 04/09/25 0105     Blood Culture No Growth at 5 days    Blood Culture [[id number]]  (Normal) Collected: 04/03/25 2318    Order Status: Completed Specimen: Blood, Venous Updated: 04/09/25 0105     Blood Culture No Growth at 5 days    Respiratory Culture [[id number]] Collected: 04/04/25 0634    Order Status: Completed Specimen: Sputum, Expectorated Updated: 04/06/25 1326     Respiratory Culture Normal respiratory hanh     GRAM STAIN Quality 1+      Many Gram positive cocci      Few Gram Positive Rods      Rare Yeast             Cardiac catheterization  Procedure performed in the Invasive Lab    - See Procedure Log link below for nursing documentation    - See OpNote on Surgeries Tab for physician findings    - See Imaging Tab for radiologist dictation         Medication List        START taking these medications      azithromycin 500 MG tablet  Commonly known as: ZITHROMAX  Take 1 tablet (500 mg total) by mouth once daily. for 3 days  Start taking on: April 10, 2025     cefpodoxime 100 MG tablet  Commonly known as: VANTIN  Take 1 tablet (100 mg total) by mouth 2 (two) times daily. for 3 days     metoprolol succinate 25 MG 24 hr tablet  Commonly known as: TOPROL-XL  Take 0.5 tablets (12.5 mg total) by mouth once daily.  Start taking on: April 10, 2025     mirtazapine 15 MG tablet  Commonly known as: REMERON  Take 1 tablet (15 mg total) by mouth every evening.            CHANGE how you take these medications      glimepiride 2 MG tablet  Commonly known as: AMARYL  Take 1 tablet (2 mg total) by mouth 2 (two) times a day.  What changed:   medication strength  when to take this            CONTINUE taking these medications      albuterol 0.63 mg/3 mL Nebu  Commonly known as: ACCUNEB     ALPRAZolam 0.25 MG  tablet  Commonly known as: XANAX     aspirin 81 MG EC tablet  Commonly known as: ECOTRIN     busPIRone 5 MG Tab  Commonly known as: BUSPAR     famotidine 20 MG tablet  Commonly known as: PEPCID     gabapentin 300 MG capsule  Commonly known as: NEURONTIN     guaiFENesin-codeine 100-10 mg/5 ml  mg/5 mL syrup  Commonly known as: TUSSI-ORGANIDIN NR  Take 5 mLs by mouth every 4 (four) hours as needed for Cough or Congestion.     metFORMIN 1000 MG tablet  Commonly known as: GLUCOPHAGE     polyethylene glycol 17 gram Pwpk  Commonly known as: GLYCOLAX     tamsulosin 0.4 mg Cap  Commonly known as: FLOMAX     umeclidinium 62.5 mcg/actuation inhalation capsule  Commonly known as: INCRUSE ELLIPTA  Inhale 62.5 mcg into the lungs once daily. Controller            STOP taking these medications      amLODIPine 5 MG tablet  Commonly known as: NORVASC     lisinopriL 2.5 MG tablet  Commonly known as: PRINIVIL,ZESTRIL     LORazepam 1 MG tablet  Commonly known as: ATIVAN               Where to Get Your Medications        These medications were sent to [facility] [address] [address]      Phone: [phone]   azithromycin 500 MG tablet  cefpodoxime 100 MG tablet  glimepiride 2 MG tablet  metoprolol succinate 25 MG 24 hr tablet  mirtazapine 15 MG tablet          Explained in detail to the patient about the discharge plan, medications, and follow-up visits. Pt understands and agrees with the treatment plan  Discharge Disposition: home with home health  Discharged Condition: stable  Diet-   Dietary Orders (From admission, onward)       Start     Ordered    04/09/25 0715  Diet Consistent Carbohydrate 2000 Calories (up to 75 gm per meal)  Diet effective now        Question:  Total calories / carbs:  Answer:  2000 Calories (up to 75 gm per meal)    04/09/25 0714                   Medications Per DC med rec  Activities as tolerated   Follow-up Information       Sadiq Hartmann MD.  Call .    Specialty: Family Medicine  Why: f/u in 1-2 weeks  Contact information:  [address]  [place] [address]  [phone]               Jessy Judge MD Follow up in 1 week(s).    Specialties: Cardiovascular Disease, Cardiology  Why: Felipa PET Stress Test in 1-2 Weeks, Results on F/U with Dr. Judge  Contact information:  [address] [facility] [address]  [phone]               Loy Reyes PA-C Follow up.    Specialty: Cardiothoracic Surgery  Why: f/u in 1-2 weeks  Contact information:  [address] [facility]  [address]  [facility] [address]  [phone]               Niko Johnson MD Follow up.    Specialties: Oncology, Hematology and Oncology  Why: Go to appointment as scheduled  Contact information:  [address]  [address]  [facility] [address]  [phone]               Funmilayo Frost PA-C Follow up in 1 week(s).    Specialty: Gastroenterology  Contact information:  [address] [facility]  [address]  [place] [address]  [phone]               Roslyn Hull MD. Call in 1 week(s).    Specialties: Cardiothoracic Surgery, Cardiology  Contact information:  [address] [place] [address]  [address]  [place] [address]  [phone]               Beto Chowdhury MD. Call in 1 week(s).    Specialty: Gastroenterology  Contact information:  [address] [facility]  [address]  [place] [address]  [phone]                           For further questions contact hospitalist office    Discharge time 75 minutes    For worsening symptoms, chest pain, shortness of breath, increased abdominal pain, high grade fever, stroke or stroke like symptoms, immediately go to the nearest Emergency Room or call 911 as soon as possible.      Itzel Saucedo M.D, on 4/9/2025. at 12:58 PM.

## 2025-04-09 NOTE — PROGRESS NOTES
[facility] - Oncology Acute  Hematology/Oncology  Consult Note    Patient Name: Juanjose Chawla  MRN: [id number]  Admission Date: 4/3/2025  Hospital Length of Stay: 6 days  Attending Provider: Itzel Webb,*  Consulting Provider: Niko Johnson MD  Principal Problem:<principal problem not specified>    Consults  Subjective:     HPI: This is an 84-year-old male who was seen evaluated by Pulmonary.  He was admitted to the hospital on February 28, 2025 with progressive shortness of breath he eventually underwent a diagnostic thoracentesis  He underwent CT scan chest abdomen pelvis.  He eventually underwent a diagnostic thoracentesis with social consistent with adenocarcinoma questionable primary.  He underwent a PET scan it was referred to us for further evaluation. endoscopic bronchoscopy and ultrasound and biopsy after his PET scan    PET scan showed a pneumo bone metastases, malignant hilar and mediastinal adenopathy pleural metastasis in his malignant pleural effusion.  No other evidence of a primary.  He underwent a 2nd biopsy of the station 4R lymph node.  Which again showed a poorly differentiated adenocarcinoma questionable GI primary    We are awaiting his outpatient final pathology.  However patient came back in with increasing shortness of breath to the emergency room.  He underwent a repeat CT scan last night    04/04/2025: CTA of the chest: Multiple enlarged mediastinal and bilateral hilar nodes without significant interval change.  Increasing size of the right pleural effusion with further right lower lobe zone atelectasis.  Right lower lobe consolidation more evident.      Patient was family in room this morning.  He was sitting up eating breakfast.  He was less short of breath but still gets short of breath on minimal exertion.    He was dry mouth.  He was had constipation for 3 days.  No bleeding per rectum currently.  Still with a little bit of dysuria no frequency.    He  was on antibiotics.    We discussed the adenocarcinoma most likely lung an unknown primary.    We discussed genetic chemotherapy with carboplatin and either Taxol or Alimta.    Chemo side effects  The side effects of chemotherapy were discussed.  Including but not limited to: Nausea, vomiting, alopecia, neuropathy, neutropenia, blood transfusion, nephropathy, secondary malignancies, congestive heart failure, allergic reactions to name a few.  and patient with the opportunity to have questions answered.      Interval History 04/09/2025:-   Cycle 1 Carb/taxol  S/p zometa   PICC line  PleurX catheter placed    Oncology Treatment Plan:   OP NSCLC PACLITAXEL CARBOPLATIN Q3W    Oncology History Overview Note   Images     February 27, 2025:  CT of the chest, COPD and emphysema, right lower lobe atelectasis, lymphadenopathy in the right hilum as well as the mediastinum and smaller nodes in the left hilum.  The right hilar lymph node measures 2.5 cm other small nodes in the right hilum.  And there is a precarinal lymph node 2.2 x 2.2 cm.  And adenopathy seen in the subcarinal space as well as the mediastinum.     03/13/2025: CT abdomen pelvis, right-sided pleural effusion interstitial fibrosis lungs bilaterally prostatic hypertrophy     03/20/2025: PET-CT: Hypermetabolic consolidation right lung base with mediastinal and bilateral adenopathy and innumerable scattered hypermetabolic bone lesions     pathology  The right pleural effusion showed malignant cells consistent with a poorly differential adenocarcinoma:  Negative for TTF 1, WT1, GATA3.  Suggest an origin of the GI tract clinical pathology is recommended    2nd biopsy  1. EBUS LYMPH NODE 4R (TWO CONVENTIONAL SMEARS, ONE THIN PREP SMEAR, ONE CELL   BLOCK):    RARE MALIGNANT CELLS CONSISTENT WITH POORLY DIFFERENTIATED ADENOCARCINOMA.      2. EBUS LYMPH NODE 7R:    POORLY DIFFERENTIATED ADENOCARCINOMA.   The immunohistochemical profile of the tumor cells is not typical  of lung   primary and may be seen with adenocarcinomas of upper   gastrointestinal/pancreaticobiliary primary.       Tempus Liquid biopsy  TP53  No actionable mutation  KEAP1 +     Metastasis to bone   3/24/2025 Initial Diagnosis    Metastasis to bone     Secondary malignancy of mediastinal lymph nodes   3/24/2025 Initial Diagnosis    Secondary malignancy of mediastinal lymph nodes     Adenocarcinoma of unknown primary   4/4/2025 Cancer Staged    Staging form: Lung, AJCC V9  - Clinical stage from 4/4/2025: Stage ANTONIA (cTX, cNX, cM1b)     4/5/2025 -  Chemotherapy    Treatment Summary   Plan Name: OP NSCLC PACLITAXEL CARBOPLATIN Q3W  Treatment Goal: Palliative  Status: Active  Start Date: 4/5/2025  End Date: 6/7/2025 (Planned)  Provider: Niko Johnson MD  Chemotherapy: CARBOplatin (PARAPLATIN) 635 mg in 0.9% NaCl 348.5 mL chemo infusion, 635 mg (88.4 % of original dose 715.8 mg), Intravenous, Clinic/HOD 1 time, 1 of 4 cycles  Dose modification:   (original dose 715.8 mg, Cycle 1)  Administration: 635 mg (4/5/2025)  PACLitaxeL (TAXOL) 135 mg/m2 = 282 mg in 0.9% NaCl 500 mL chemo infusion, 135 mg/m2 = 282 mg (100 % of original dose 135 mg/m2), Intravenous, Clinic/HOD 1 time, 1 of 4 cycles  Dose modification: 135 mg/m2 (original dose 135 mg/m2, Cycle 1, Reason: MD Discretion)  Administration: 282 mg (4/5/2025)          Medications:  Continuous Infusions:        Scheduled Meds:   0.9% NaCl 250 mL flush bag   Intravenous 1 time in Clinic/HOD    aspirin  81 mg Oral Daily    azithromycin  500 mg Oral Daily    busPIRone  10 mg Oral BID    cefTRIAXone (Rocephin) IV (PEDS and ADULTS)  2 g Intravenous Q24H    enoxparin  40 mg Subcutaneous Daily    filgrastim-ayow  480 mcg Subcutaneous Daily    gabapentin  300 mg Oral TID    levalbuterol  0.63 mg Nebulization Q6H    metoprolol succinate  12.5 mg Oral Daily    mirtazapine  15 mg Oral QHS    mupirocin   Nasal BID    sacubitriL-valsartan  1 tablet Oral BID    tamsulosin  0.4 mg  Oral Daily     PRN Meds:  Current Facility-Administered Medications:     acetaminophen, 650 mg, Oral, Q6H PRN    ALPRAZolam, 0.25 mg, Oral, Daily PRN    alteplase, 2 mg, Intra-Catheter, PRN    aluminum-magnesium hydroxide-simethicone, 30 mL, Oral, QID PRN    bisacodyL, 10 mg, Rectal, Daily PRN    dextromethorphan-guaiFENesin  mg/5 ml, 5 mL, Oral, Q6H PRN    dextrose 50%, 12.5 g, Intravenous, PRN    dextrose 50%, 25 g, Intravenous, PRN    diphenhydrAMINE, 50 mg, Intravenous, Once PRN    EPINEPHrine, 0.3 mg, Intramuscular, Once PRN    glucagon (human recombinant), 1 mg, Intramuscular, PRN    glucose, 16 g, Oral, PRN    glucose, 24 g, Oral, PRN    heparin, porcine (PF), 500 Units, Intravenous, PRN    hydrocortisone sodium succinate, 100 mg, Intravenous, Once PRN    insulin aspart U-100, 0-10 Units, Subcutaneous, QID (AC + HS) PRN    lactulose 10 gram/15 ml, 30 g, Oral, Q6H PRN    melatonin, 6 mg, Oral, Nightly PRN    naloxone, 0.02 mg, Intravenous, PRN    ondansetron, 4 mg, Intravenous, Q4H PRN    oxyCODONE, 5 mg, Oral, Q4H PRN    prochlorperazine, 5 mg, Intravenous, Q6H PRN    senna-docusate, 1 tablet, Oral, BID PRN    sodium chloride 0.9%, 10 mL, Intravenous, PRN    Flushing PICC/Midline Protocol, , , Until Discontinued **AND** sodium chloride 0.9%, 10 mL, Intravenous, Q12H PRN    sodium chloride 0.9%, 10 mL, Intravenous, PRN     Review of patient's allergies indicates:  No Known Allergies     Past Medical History:   Diagnosis Date    Anxiety     Diabetes mellitus     HLD (hyperlipidemia)     Hypertension     Major depressive disorder, single episode, unspecified     Pneumonia, unspecified organism      Past Surgical History:   Procedure Laterality Date    Cardiac stent      ENDOBRONCHIAL ULTRASOUND N/A 3/28/2025    Procedure: ENDOBRONCHIAL ULTRASOUND (EBUS);  Surgeon: Jerrod Harman Jr., MD, [facility];  Location: [facility] BRONCHOSCOPY LAB;  Service: Pulmonary;  Laterality: N/A;    HERNIA REPAIR N/A      Family History     None       Tobacco Use    Smoking status: Never    Smokeless tobacco: Never   Substance and Sexual Activity    Alcohol use: Not Currently    Drug use: Never    Sexual activity: Not on file       Review of Systems see above in HPI  Objective:     Vital Signs (Most Recent):  Temp: 98.5 °F (36.9 °C) (04/08/25 2343)  Pulse: 72 (04/09/25 0118)  Resp: 15 (04/09/25 0118)  BP: (!) 119/57 (04/08/25 2343)  SpO2: 97 % (04/09/25 0118) Vital Signs (24h Range):  Temp:  [97.4 °F (36.3 °C)-98.7 °F (37.1 °C)] 98.5 °F (36.9 °C)  Pulse:  [] 72  Resp:  [15-36] 15  SpO2:  [92 %-99 %] 97 %  BP: ()/(38-81) 119/57     Weight: 88 kg (194 lb 0.1 oz)  Body mass index is 27.84 kg/m².  Body surface area is 2.08 meters squared.      Intake/Output Summary (Last 24 hours) at 4/9/2025 0743  Last data filed at 4/8/2025 1412  Gross per 24 hour   Intake --   Output 250 ml   Net -250 ml       Physical Exam  Vitals reviewed.   Constitutional:       General: He is awake.      Comments: Sitting up in the chair,   Tachypneic with speaking   Anxious   HENT:      Head: Normocephalic and atraumatic.      Mouth/Throat:      Mouth: Mucous membranes are moist.      Pharynx: Oropharynx is clear.   Eyes:      Extraocular Movements: Extraocular movements intact.      Conjunctiva/sclera: Conjunctivae normal.      Pupils: Pupils are equal, round, and reactive to light.   Cardiovascular:      Rate and Rhythm: Normal rate and regular rhythm.      Pulses: Normal pulses.      Heart sounds: Normal heart sounds.   Pulmonary:      Effort: Pulmonary effort is normal.      Breath sounds: Decreased air movement present. Examination of the right-middle field reveals decreased breath sounds. Examination of the right-lower field reveals decreased breath sounds. Decreased breath sounds present.   Abdominal:      General: Abdomen is flat. Bowel sounds are normal. There is no distension.      Palpations: Abdomen is soft.   Musculoskeletal:         General: Normal  range of motion.      Cervical back: Normal range of motion and neck supple.   Skin:     General: Skin is warm and dry.   Neurological:      General: No focal deficit present.      Mental Status: He is alert and oriented to person, place, and time. Mental status is at baseline.      GCS: GCS eye subscore is 4. GCS verbal subscore is 5. GCS motor subscore is 6.   Psychiatric:         Attention and Perception: Attention normal.         Mood and Affect: Mood is anxious.         Behavior: Behavior normal. Behavior is cooperative.         Thought Content: Thought content normal.         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         Can be seen as an outpatient  - Toxicity reviewed, continue C1D3 of Carbo/Taxol with dose reduction as above  - Continue Oxycodon IR 5mg Q4H for cancer related pain  -  neupogen daily  for chemotherapy induced neutropenia prevention--stop at d/c  -- has f/u 4/15/25 with me           Wbc increase with GCSF    With increase Cr                     D/c nsaids,                           Adjust  meds                   Watch with acidosis--now on entresto by cardiology  D/c today  Will keep PICC line---dressing change weekly    Drain PleurX catheter 2 to 3 times a week.  Five hundred to a 1000 mils      Thank you for your consult.     Niko Johnson MD  Hematology/Oncology  [facility] - Oncology [facility]

## 2025-04-10 ENCOUNTER — TELEPHONE (OUTPATIENT)
Dept: HEMATOLOGY/ONCOLOGY | Facility: CLINIC | Age: 85
End: 2025-04-10
Payer: MEDICARE

## 2025-04-10 ENCOUNTER — PATIENT OUTREACH (OUTPATIENT)
Dept: ADMINISTRATIVE | Facility: CLINIC | Age: 85
End: 2025-04-10
Payer: MEDICARE

## 2025-04-10 LAB
POCT GLUCOSE: 211 MG/DL (ref 70–110)
POCT GLUCOSE: 287 MG/DL (ref 70–110)

## 2025-04-10 NOTE — TELEPHONE ENCOUNTER
Oma with [facility] calling for picc flush / care orders for patient's off weeks. Verbal given to flush picc line weekly with 10-20cc NS followed by 250u heparin. Also, dressing and cap change weekly. She verbalized understanding.

## 2025-04-10 NOTE — TELEPHONE ENCOUNTER
Contacted patient and daughter and advised of additional instructions. They verbalized understanding.

## 2025-04-10 NOTE — TELEPHONE ENCOUNTER
Received call from patient. He reports that he is having numbness/tingling to his face. He denies redness, swelling, rash. He denies trouble breathing or swallowing. Speech is not slurred. He denies any weakness to any limbs. His daughter who is with him denies facial droop. She states that symptoms started about 20 minutes ago. He is anxious but feels fine otherwise.     Please advise

## 2025-04-10 NOTE — TELEPHONE ENCOUNTER
Spoke with Dr. Johnson in person. He would like the patient to take tums. Contacted patient and daughter and advised this. They had no further questions or concerns and voiced understanding.

## 2025-04-11 ENCOUNTER — TELEPHONE (OUTPATIENT)
Dept: HEMATOLOGY/ONCOLOGY | Facility: CLINIC | Age: 85
End: 2025-04-11
Payer: MEDICARE

## 2025-04-11 NOTE — TELEPHONE ENCOUNTER
Called patient's daughter, Nahomi, relayed NP message, she verbalized understanding with no further questions.

## 2025-04-11 NOTE — TELEPHONE ENCOUNTER
Received call from patient's daughter Nahomi. She states that patient has had 4 episodes of diarrhea this morning. He was previously constipated, but states he hasn't taken stool softeners/miralax in several days. He taking zithromax and cefpodoximine currently. He has been taking probiotics as well. He has not taken anything for diarrhea as of yet. He has imodium on hand but was concerned with the amount of calcium in it as he is also taking tums per Dr. Johnson's instructions yesterday.     Please advise.

## 2025-04-14 ENCOUNTER — LAB REQUISITION (OUTPATIENT)
Dept: LAB | Facility: HOSPITAL | Age: 85
End: 2025-04-14
Attending: INTERNAL MEDICINE
Payer: MEDICARE

## 2025-04-14 DIAGNOSIS — C78.2 SECONDARY MALIGNANT NEOPLASM OF PLEURA: ICD-10-CM

## 2025-04-14 DIAGNOSIS — C77.1 SECONDARY AND UNSPECIFIED MALIGNANT NEOPLASM OF INTRATHORACIC LYMPH NODES: ICD-10-CM

## 2025-04-14 DIAGNOSIS — G89.3 NEOPLASM RELATED PAIN (ACUTE) (CHRONIC): ICD-10-CM

## 2025-04-14 DIAGNOSIS — J91.0 MALIGNANT PLEURAL EFFUSION: ICD-10-CM

## 2025-04-14 DIAGNOSIS — D63.0 ANEMIA IN NEOPLASTIC DISEASE: ICD-10-CM

## 2025-04-14 DIAGNOSIS — C79.51 SECONDARY MALIGNANT NEOPLASM OF BONE: ICD-10-CM

## 2025-04-14 DIAGNOSIS — C34.91 MALIGNANT NEOPLASM OF UNSPECIFIED PART OF RIGHT BRONCHUS OR LUNG: ICD-10-CM

## 2025-04-14 LAB
ABS NEUT CALC (OHS): 0.36 X10(3)/MCL (ref 2.1–9.2)
ALBUMIN SERPL-MCNC: 1.9 G/DL (ref 3.4–4.8)
ALBUMIN/GLOB SERPL: 0.5 RATIO (ref 1.1–2)
ALP SERPL-CCNC: 74 UNIT/L (ref 40–150)
ALT SERPL-CCNC: 13 UNIT/L (ref 0–55)
ANION GAP SERPL CALC-SCNC: 6 MEQ/L
ANISOCYTOSIS BLD QL SMEAR: ABNORMAL
AST SERPL-CCNC: 15 UNIT/L (ref 11–45)
BILIRUB SERPL-MCNC: 0.3 MG/DL
BUN SERPL-MCNC: 19.3 MG/DL (ref 8.4–25.7)
CALCIUM SERPL-MCNC: 7.1 MG/DL (ref 8.8–10)
CHLORIDE SERPL-SCNC: 109 MMOL/L (ref 98–107)
CO2 SERPL-SCNC: 23 MMOL/L (ref 23–31)
CREAT SERPL-MCNC: 0.8 MG/DL (ref 0.72–1.25)
CREAT/UREA NIT SERPL: 24
ERYTHROCYTE [DISTWIDTH] IN BLOOD BY AUTOMATED COUNT: 13.3 % (ref 11.5–17)
GFR SERPLBLD CREATININE-BSD FMLA CKD-EPI: >60 ML/MIN/1.73/M2
GLOBULIN SER-MCNC: 3.5 GM/DL (ref 2.4–3.5)
GLUCOSE SERPL-MCNC: 101 MG/DL (ref 82–115)
HCT VFR BLD AUTO: 25.2 % (ref 42–52)
HGB BLD-MCNC: 8.2 G/DL (ref 14–18)
HYPOCHROMIA BLD QL SMEAR: ABNORMAL
LYMPHOCYTES NFR BLD MANUAL: 1.14 X10(3)/MCL (ref 0.6–4.6)
LYMPHOCYTES NFR BLD MANUAL: 72 % (ref 13–40)
MCH RBC QN AUTO: 30.3 PG (ref 27–31)
MCHC RBC AUTO-ENTMCNC: 32.5 G/DL (ref 33–36)
MCV RBC AUTO: 93 FL (ref 80–94)
MONOCYTES NFR BLD MANUAL: 0.08 X10(3)/MCL (ref 0.1–1.3)
MONOCYTES NFR BLD MANUAL: 5 % (ref 2–11)
NEUTROPHILS NFR BLD MANUAL: 18 % (ref 47–80)
NEUTS BAND NFR BLD MANUAL: 5 % (ref 0–11)
PLATELET # BLD AUTO: 26 X10(3)/MCL (ref 130–400)
PLATELET # BLD EST: ABNORMAL 10*3/UL
PMV BLD AUTO: 13.7 FL (ref 7.4–10.4)
POTASSIUM SERPL-SCNC: 3.8 MMOL/L (ref 3.5–5.1)
PROT SERPL-MCNC: 5.4 GM/DL (ref 5.8–7.6)
RBC # BLD AUTO: 2.71 X10(6)/MCL (ref 4.7–6.1)
RBC MORPH BLD: ABNORMAL
SODIUM SERPL-SCNC: 138 MMOL/L (ref 136–145)
WBC # BLD AUTO: 1.58 X10(3)/MCL (ref 4.5–11.5)

## 2025-04-14 PROCEDURE — 80053 COMPREHEN METABOLIC PANEL: CPT | Performed by: INTERNAL MEDICINE

## 2025-04-14 PROCEDURE — 85025 COMPLETE CBC W/AUTO DIFF WBC: CPT | Performed by: INTERNAL MEDICINE

## 2025-04-14 NOTE — PROGRESS NOTES
Subjective:        PATIENT: Juanjose Chawla  MRN: [id number]  DATE: 4/15/2025    PCP : Sadiq Hartmann MD     Chief Complaint: Follow-up          04/15/2025  Patient presents for follow up for malignant pulmonary embolism with his wife and 2 daughters.  He is utilizing manual wheel chair.  He is extremely anxious; drained 950 ml of fluid yesterday.  He is schedule 3 times a week.  Discussed ability to have fluid drained more than 3 times a week if SOB or needed.  Discussed lab levels and advised to take Tums TID.  He has left arm edema and bilateral ankle edema.  Denies abnormal bleeding, nausea, vomiting and elevated temperature.  He is extremely anxious with being SOB due to fluid build up.  Will order hospital bed to assist with positioning to allow sleep and better breathing.  Discussed ok to have physical therapy at home.  He has some diarrhea from abx and chemo.  Will ask home health to monitor O2  at home to verify no drops in oxygen warranting O2 at home.  Discussed future treatment.  He is not eating to much; daughters monitor his BS.  BS is managed by PCP.  Discussed BP and advised if BP is 120 or higher and pulse greater than 60 ok to give RX metoprolol.  Will refer to [facility] area.  PICC to be flushed once a week by [facility].  Increase Remeron RX to 30 mg and add 6 mg melatonin at night to assist with sleep.  Advised not to use any OTC product with diphenhydramine.  Ok to take Gabapentin at night.  Will refer to  for his anxiousness.  Encouraged family to call PCP to advise adjustment of Remeron and allow PCP to adjust Buspar accordingly if needed.  4/16/25 - post op visit with Dr. Hull    Oncology History Overview Note   Images     February 27, 2025:  CT of the chest, COPD and emphysema, right lower lobe atelectasis, lymphadenopathy in the right hilum as well as the mediastinum and smaller nodes in the left hilum.  The right hilar lymph node measures 2.5 cm other  small nodes in the right hilum.  And there is a precarinal lymph node 2.2 x 2.2 cm.  And adenopathy seen in the subcarinal space as well as the mediastinum.     03/13/2025: CT abdomen pelvis, right-sided pleural effusion interstitial fibrosis lungs bilaterally prostatic hypertrophy     03/20/2025: PET-CT: Hypermetabolic consolidation right lung base with mediastinal and bilateral adenopathy and innumerable scattered hypermetabolic bone lesions     pathology  The right pleural effusion showed malignant cells consistent with a poorly differential adenocarcinoma:  Negative for TTF 1, WT1, GATA3.  Suggest an origin of the GI tract clinical pathology is recommended    2nd biopsy  1. EBUS LYMPH NODE 4R (TWO CONVENTIONAL SMEARS, ONE THIN PREP SMEAR, ONE CELL   BLOCK):    RARE MALIGNANT CELLS CONSISTENT WITH POORLY DIFFERENTIATED ADENOCARCINOMA.      2. EBUS LYMPH NODE 7R:    POORLY DIFFERENTIATED ADENOCARCINOMA.   The immunohistochemical profile of the tumor cells is not typical of lung   primary and may be seen with adenocarcinomas of upper   gastrointestinal/pancreaticobiliary primary.       Tempus Liquid biopsy  TP53  No actionable mutation  KEAP1 +     Metastasis to bone   3/24/2025 Initial Diagnosis    Metastasis to bone     Secondary malignancy of mediastinal lymph nodes   3/24/2025 Initial Diagnosis    Secondary malignancy of mediastinal lymph nodes     Adenocarcinoma of unknown primary   4/4/2025 Cancer Staged    Staging form: Lung, AJCC V9  - Clinical stage from 4/4/2025: Stage ANTONIA (cTX, cNX, cM1b)     4/5/2025 -  Chemotherapy    Treatment Summary   Plan Name: OP NSCLC PACLITAXEL CARBOPLATIN Q3W  Treatment Goal: Palliative  Status: Active  Start Date: 4/5/2025  End Date: 6/9/2025 (Planned)  Provider: Niko Johnson MD  Chemotherapy: CARBOplatin (PARAPLATIN) 635 mg in 0.9% NaCl 348.5 mL chemo infusion, 635 mg (88.4 % of original dose 715.8 mg), Intravenous, Clinic/HOD 1 time, 1 of 4 cycles  Dose modification:    (original dose 715.8 mg, Cycle 1)  Administration: 635 mg (4/5/2025)  PACLitaxeL (TAXOL) 135 mg/m2 = 282 mg in 0.9% NaCl 500 mL chemo infusion, 135 mg/m2 = 282 mg (100 % of original dose 135 mg/m2), Intravenous, Clinic/HOD 1 time, 1 of 4 cycles  Dose modification: 135 mg/m2 (original dose 135 mg/m2, Cycle 1, Reason: MD Discretion)  Administration: 282 mg (4/5/2025)          Past Medical History:   Diagnosis Date    Anxiety     Diabetes mellitus     HLD (hyperlipidemia)     Hypertension     Major depressive disorder, single episode, unspecified     Pneumonia, unspecified organism       .  Past Surgical History:   Procedure Laterality Date    Cardiac stent      ENDOBRONCHIAL ULTRASOUND N/A 3/28/2025    Procedure: ENDOBRONCHIAL ULTRASOUND (EBUS);  Surgeon: Jerrod Harman Jr., MD, [facility];  Location: [facility];  Service: Pulmonary;  Laterality: N/A;    HERNIA REPAIR N/A     INSERTION OF PLEURAL CATHETER N/A 4/8/2025    Procedure: INSERTION, CATHETER, PLEURAL;  Surgeon: Roslyn Hull MD;  Location: [facility] OR;  Service: Cardiovascular;  Laterality: N/A;  PLEUR-X CATHETER INSERTION / RIGHT      .No family history on file.   Social History[1]   .Review of patient's allergies indicates:  No Known Allergies   Current Medications[2]   Review of Systems   Constitutional:  Positive for appetite change. Negative for unexpected weight change.   HENT:  Negative for mouth sores.    Eyes:  Negative for visual disturbance.   Respiratory:  Positive for shortness of breath. Negative for cough.    Cardiovascular:  Negative for chest pain.   Gastrointestinal:  Positive for diarrhea. Negative for abdominal pain.   Genitourinary:  Positive for frequency.   Musculoskeletal:  Negative for back pain.   Skin:  Negative for rash.   Neurological:  Positive for headaches.   Hematological:  Negative for adenopathy.   Psychiatric/Behavioral:  The patient is nervous/anxious.         Pertinent positives above in interim history  Objective:      Vitals:    04/15/25 1419   BP: 129/74   Pulse: 103   Resp: 18   Temp: 97.7 °F (36.5 °C)           Physical Exam  Vitals reviewed.   Constitutional:       General: He is awake.      Appearance: Normal appearance. He is not ill-appearing or toxic-appearing.   HENT:      Head: Normocephalic and atraumatic.      Mouth/Throat:      Mouth: Mucous membranes are moist.      Pharynx: Oropharynx is clear.   Eyes:      Extraocular Movements: Extraocular movements intact.      Conjunctiva/sclera: Conjunctivae normal.      Pupils: Pupils are equal, round, and reactive to light.   Cardiovascular:      Rate and Rhythm: Regular rhythm. Tachycardia present.      Pulses: Normal pulses.      Heart sounds: Normal heart sounds.   Pulmonary:      Breath sounds: Decreased air movement present. Examination of the right-middle field reveals decreased breath sounds. Examination of the right-lower field reveals decreased breath sounds. Decreased breath sounds present.   Chest:      Comments: Tube in place  Abdominal:      General: Abdomen is flat. Bowel sounds are normal. There is no distension.      Palpations: Abdomen is soft.   Musculoskeletal:         General: Normal range of motion.      Cervical back: Normal range of motion and neck supple.   Lymphadenopathy:      Cervical: No cervical adenopathy.      Lower Body: No right inguinal adenopathy. No left inguinal adenopathy.   Skin:     General: Skin is warm and dry.   Neurological:      General: No focal deficit present.      Mental Status: He is alert, oriented to person, place, and time and easily aroused. Mental status is at baseline.      GCS: GCS eye subscore is 4. GCS verbal subscore is 5. GCS motor subscore is 6.   Psychiatric:         Attention and Perception: Attention normal.         Mood and Affect: Mood is anxious.         Speech: Speech is rapid and pressured.         Behavior: Behavior is hyperactive.         Judgment: Judgment normal.       ECOG SCORE            .Lab  Review:  Previous labs and images reviewed in epic      Assessment/Plan:   This is an 84-year-old male with a clinical appearance of stage IV non-small-cell lung carcinoma adenocarcinoma with malignant pleural effusion, bone Mets hilar and mediastinal adenopathy.  This is more consistent with a lung primary in the clinical picture.    Goals: Palliation   Noncurable  Control pain, decrease shortness of breath,         1.Malignant pleural effusion           Drain 2-3 times per week, and p.r.n.           Home PleurX catheter management    2 Adenocarcinoma unknown primary most likely lung   Malignant hilar and mediastinal adenopathy   Malignant bone lesions clinically   -  s/p inpatient carboplatin Taxol    -      Continue DuoNebs  --hospital bed   Home O2 monitoring, if less than 88% consider home oxygen  We will return to clinic with CBC, CMP, magnesium, plan cycle 2 carboplatin Taxol  Repeat imaging after 2 cycles  Return to clinic for cycle 2 approximately ---April 28, 2025     3. Hypercalcemia (corrected calcium 12---with his albumin of 2.8)  -bone mets  - status post Zometa--will repeat in future with bone mets--due in 7/2025  - now controlled at <8  Continue Tums 2 to 3 times a day with facial numbness and tingling    4 Weight loss              Continue nutritional support    5 Anemia/pancytopenia secondary to chemotherapy              We will add growth factors with next chemo.  Long-acting                Keep hemoglobin greater than 8               Transfuse if platelets <10                Expected recovery by next treatment                 6 Insomnia              Increase Remeron to 30 mg q.h.s.               6 mg melatonin qhs  7. Depression/ anxiety       Continue BuSpar with primary care           Consider dose adjustment or alternative medication next week with PCP         Social work to help with counseling    Other Recs   - EGD non urgent      Can be seen as an outpatient----while pathology right this is  a unknown primary.  And potential GI origin, there is no indication on CT or PET.  We will complete scope at some point  - okay for  Oxycodon IR 5mg Q4H for cancer related pain if needed        Will keep PICC line---dressing change weekly---consider port if responding to therapy  Hold metoprolol if systolic blood pressure less than 120 or heart rate <60        Bridgett BUI LPN, acted solely as a scribe for and in the presence of, Dr. Niko Johnson who performed the service.       Follow up in about 13 days (around 4/28/2025) for Labs cbc/cmp/mg, TD EOV with Alex and same day infusion.   Orders Placed This Encounter    HOSPITAL BED FOR HOME USE    Ambulatory Referral/Consult to Oncology Social Work      Niko Johnson MD                 [1]   Social History  Socioeconomic History    Marital status:    Tobacco Use    Smoking status: Never    Smokeless tobacco: Never   Substance and Sexual Activity    Alcohol use: Not Currently    Drug use: Never     Social Drivers of Health     Financial Resource Strain: Low Risk  (4/5/2025)    Overall Financial Resource Strain (CARDIA)     Difficulty of Paying Living Expenses: Not hard at all   Food Insecurity: No Food Insecurity (4/5/2025)    Hunger Vital Sign     Worried About Running Out of Food in the Last Year: Never true     Ran Out of Food in the Last Year: Never true   Transportation Needs: No Transportation Needs (4/4/2025)    PRAPARE - Transportation     Lack of Transportation (Medical): No     Lack of Transportation (Non-Medical): No   Stress: No Stress Concern Present (4/5/2025)    [employer] - Occupational Stress Questionnaire     Feeling of Stress : Not at all   Housing Stability: Low Risk  (4/5/2025)    Housing Stability Vital Sign     Unable to Pay for Housing in the Last Year: No     Homeless in the Last Year: No   [2]   Current Outpatient Medications:     albuterol (ACCUNEB) 0.63 mg/3 mL Nebu, Take 0.63 mg by  nebulization every 6 (six) hours as needed (wheezing and shortness of breath). Rescue, Disp: , Rfl:     ALPRAZolam (XANAX) 0.25 MG tablet, Take 0.25 mg by mouth 3 (three) times daily as needed for Anxiety., Disp: , Rfl:     aspirin (ECOTRIN) 81 MG EC tablet, Take 81 mg by mouth once daily., Disp: , Rfl:     busPIRone (BUSPAR) 5 MG Tab, Take 5 mg by mouth 2 (two) times daily., Disp: , Rfl:     famotidine (PEPCID) 20 MG tablet, Take 20 mg by mouth once daily., Disp: , Rfl:     gabapentin (NEURONTIN) 300 MG capsule, Take 300 mg by mouth 3 (three) times daily., Disp: , Rfl:     glimepiride (AMARYL) 2 MG tablet, Take 1 tablet (2 mg total) by mouth 2 (two) times a day., Disp: 60 tablet, Rfl: 0    guaiFENesin-codeine 100-10 mg/5 ml (TUSSI-ORGANIDIN NR)  mg/5 mL syrup, Take 5 mLs by mouth every 4 (four) hours as needed for Cough or Congestion., Disp: 100 mL, Rfl: 0    metFORMIN (GLUCOPHAGE) 1000 MG tablet, Take 2,000 mg by mouth once daily., Disp: , Rfl:     metoprolol succinate (TOPROL-XL) 25 MG 24 hr tablet, Take 0.5 tablets (12.5 mg total) by mouth once daily., Disp: 15 tablet, Rfl: 0    polyethylene glycol (GLYCOLAX) 17 gram PwPk, Take 17 g by mouth once daily., Disp: , Rfl:     tamsulosin (FLOMAX) 0.4 mg Cap, Take 0.4 mg by mouth once daily., Disp: , Rfl:     umeclidinium (INCRUSE ELLIPTA) 62.5 mcg/actuation inhalation capsule, Inhale 62.5 mcg into the lungs once daily. Controller, Disp: 30 each, Rfl: 0    mirtazapine (REMERON) 30 MG tablet, Take 1 tablet (30 mg total) by mouth every evening., Disp: 30 tablet, Rfl: 1

## 2025-04-15 ENCOUNTER — RESULTS FOLLOW-UP (OUTPATIENT)
Dept: HEMATOLOGY/ONCOLOGY | Facility: CLINIC | Age: 85
End: 2025-04-15

## 2025-04-15 ENCOUNTER — OFFICE VISIT (OUTPATIENT)
Dept: HEMATOLOGY/ONCOLOGY | Facility: CLINIC | Age: 85
End: 2025-04-15
Payer: MEDICARE

## 2025-04-15 VITALS
HEART RATE: 103 BPM | RESPIRATION RATE: 18 BRPM | OXYGEN SATURATION: 91 % | DIASTOLIC BLOOD PRESSURE: 74 MMHG | HEIGHT: 70 IN | TEMPERATURE: 98 F | SYSTOLIC BLOOD PRESSURE: 129 MMHG | WEIGHT: 196.69 LBS | BODY MASS INDEX: 28.16 KG/M2

## 2025-04-15 DIAGNOSIS — C80.1 ADENOCARCINOMA OF UNKNOWN PRIMARY: ICD-10-CM

## 2025-04-15 DIAGNOSIS — C77.1 SECONDARY MALIGNANCY OF MEDIASTINAL LYMPH NODES: Primary | ICD-10-CM

## 2025-04-15 DIAGNOSIS — E83.52 SERUM CALCIUM ELEVATED: ICD-10-CM

## 2025-04-15 DIAGNOSIS — R45.89 ANXIETY ABOUT TREATMENT: Primary | ICD-10-CM

## 2025-04-15 DIAGNOSIS — J91.0 MALIGNANT PLEURAL EFFUSION: ICD-10-CM

## 2025-04-15 DIAGNOSIS — C79.51 METASTASIS TO BONE: ICD-10-CM

## 2025-04-15 DIAGNOSIS — F43.9 STRESS: ICD-10-CM

## 2025-04-15 PROCEDURE — 99999 PR PBB SHADOW E&M-EST. PATIENT-LVL V: CPT | Mod: PBBFAC,,, | Performed by: INTERNAL MEDICINE

## 2025-04-15 PROCEDURE — 99215 OFFICE O/P EST HI 40 MIN: CPT | Mod: S$PBB,,, | Performed by: INTERNAL MEDICINE

## 2025-04-15 PROCEDURE — 99215 OFFICE O/P EST HI 40 MIN: CPT | Mod: PBBFAC | Performed by: INTERNAL MEDICINE

## 2025-04-15 RX ORDER — MIRTAZAPINE 30 MG/1
30 TABLET, FILM COATED ORAL NIGHTLY
Qty: 30 TABLET | Refills: 1 | Status: SHIPPED | OUTPATIENT
Start: 2025-04-15

## 2025-04-16 ENCOUNTER — TELEPHONE (OUTPATIENT)
Dept: HEMATOLOGY/ONCOLOGY | Facility: CLINIC | Age: 85
End: 2025-04-16
Payer: MEDICARE

## 2025-04-16 ENCOUNTER — OFFICE VISIT (OUTPATIENT)
Dept: CARDIAC SURGERY | Facility: CLINIC | Age: 85
End: 2025-04-16
Payer: MEDICARE

## 2025-04-16 VITALS
HEART RATE: 92 BPM | BODY MASS INDEX: 28.15 KG/M2 | DIASTOLIC BLOOD PRESSURE: 72 MMHG | SYSTOLIC BLOOD PRESSURE: 131 MMHG | WEIGHT: 196.63 LBS | HEIGHT: 70 IN | OXYGEN SATURATION: 93 %

## 2025-04-16 DIAGNOSIS — C80.1 ADENOCARCINOMA OF UNKNOWN PRIMARY: ICD-10-CM

## 2025-04-16 DIAGNOSIS — J91.0 MALIGNANT PLEURAL EFFUSION: ICD-10-CM

## 2025-04-16 DIAGNOSIS — C79.51 METASTASIS TO BONE: ICD-10-CM

## 2025-04-16 DIAGNOSIS — E83.52 HYPERCALCEMIA: Primary | ICD-10-CM

## 2025-04-16 DIAGNOSIS — R06.02 SOB (SHORTNESS OF BREATH): ICD-10-CM

## 2025-04-16 DIAGNOSIS — J91.0 MALIGNANT PLEURAL EFFUSION: Primary | ICD-10-CM

## 2025-04-16 DIAGNOSIS — R53.83 OTHER FATIGUE: ICD-10-CM

## 2025-04-16 DIAGNOSIS — C77.1 SECONDARY MALIGNANCY OF MEDIASTINAL LYMPH NODES: ICD-10-CM

## 2025-04-16 NOTE — PROGRESS NOTES
"  Past Medical History:   Diagnosis Date    Anxiety     Diabetes mellitus     HLD (hyperlipidemia)     Hypertension     Major depressive disorder, single episode, unspecified     Pneumonia, unspecified organism      No past surgical history pertinent negatives on file.  Scheduled Meds:  Continuous Infusions:  PRN Meds:    Review of patient's allergies indicates:  No Known Allergies  There are no hospital problems to display for this patient.    Blood pressure 131/72, pulse 92, height 5' 10" (1.778 m), weight 89.2 kg (196 lb 10.4 oz), SpO2 (!) 93%.    Subjective:  The patient is presenting for postop visit status post PleurX catheter placement.  He has been draining his PleurX every other day and still occasionally feels short of breath.  Outputs are anywhere from 600-900.      Objective:  He is currently short of breath.  His wounds have healed well.      Assessment & Plan:  I will proceed with draining his PleurX catheter now.  I will recommend changing the frequency to once a day.  RTC p.raddy Hull MD  4/16/2025    "

## 2025-04-16 NOTE — TELEPHONE ENCOUNTER
Spoke to patients wife and advised of below; she verbalized understanding.        ----- Message from Nurse Urias sent at 4/15/2025  8:08 AM CDT -----    ----- Message -----  From: Niko Johnson MD  Sent: 4/15/2025   8:07 AM CDT  To: Anne Eddy, PharmD; Alex DARLING #    We will need growth factors with next cycle  Preferably ---on body injector      Please make sure he stops his aspirin.    He should  report to the ER if fever or bleeding                ----- Message -----  From: Deborah Bautista  Sent: 4/7/2025  10:23 PM CDT  To: Niko Johnson MD

## 2025-04-21 ENCOUNTER — TELEPHONE (OUTPATIENT)
Dept: HEMATOLOGY/ONCOLOGY | Facility: CLINIC | Age: 85
End: 2025-04-21
Payer: MEDICARE

## 2025-04-23 ENCOUNTER — TELEPHONE (OUTPATIENT)
Dept: HEMATOLOGY/ONCOLOGY | Facility: CLINIC | Age: 85
End: 2025-04-23
Payer: MEDICARE

## 2025-04-23 NOTE — TELEPHONE ENCOUNTER
Oma with [facility] notified. She stated that she spoke with her manager regarding the O2. They will test him tomorrow to see if he qualifies. She will let us know the outcome.

## 2025-04-23 NOTE — TELEPHONE ENCOUNTER
Oma with [facility] was wondering if we have sent an order for hospital bed to medical supply company. States patient mentioned that Dr. Johnson was going to send an order at his last visit. She is just calling to check status. If one has not been sent, she stated that we can send order to [facility] to take care of.     Also, patient does not qualify for O2 by their standards. She is stating that if he runs 88% or below here in office, he would qualify. Has OV 4/28/25.

## 2025-04-24 ENCOUNTER — TELEPHONE (OUTPATIENT)
Dept: HEMATOLOGY/ONCOLOGY | Facility: CLINIC | Age: 85
End: 2025-04-24
Payer: MEDICARE

## 2025-04-24 ENCOUNTER — CLINICAL SUPPORT (OUTPATIENT)
Dept: HEMATOLOGY/ONCOLOGY | Facility: CLINIC | Age: 85
End: 2025-04-24
Payer: MEDICARE

## 2025-04-24 DIAGNOSIS — R45.89 ANXIETY ABOUT HEALTH: Primary | ICD-10-CM

## 2025-04-24 PROCEDURE — 99499 UNLISTED E&M SERVICE: CPT | Mod: S$PBB,,, | Performed by: SOCIAL WORKER

## 2025-04-24 NOTE — TELEPHONE ENCOUNTER
I called and spoke to Mr. Chawla as we had discussed within the past week. He is still waiting on a determination for oxygen. He is also waiting on PT and HH to deliver a hospital bed. He stated he feels like he is handling things ok. He has strong family support. He is obviously short of breath during our phone conversation. We spoke for approximately 15 minutes. I wanted him to be prepared for PT so we ended the call after 15 minutes. His desire is to get better and have a quality of life. I will schedule to call him again next week.

## 2025-04-24 NOTE — PROGRESS NOTES
I contacted Clayton Denton as we discussed about a week ago. He appreciates the support. I offered to continue calling him and he agreed and stated it helps him.

## 2025-04-25 ENCOUNTER — TELEPHONE (OUTPATIENT)
Dept: HEMATOLOGY/ONCOLOGY | Facility: CLINIC | Age: 85
End: 2025-04-25
Payer: MEDICARE

## 2025-04-25 DIAGNOSIS — J91.0 MALIGNANT PLEURAL EFFUSION: ICD-10-CM

## 2025-04-25 DIAGNOSIS — C34.90 MALIGNANT NEOPLASM OF UNSPECIFIED PART OF UNSPECIFIED BRONCHUS OR LUNG: ICD-10-CM

## 2025-04-25 DIAGNOSIS — R06.02 SOB (SHORTNESS OF BREATH): Primary | ICD-10-CM

## 2025-04-25 DIAGNOSIS — C79.51 METASTASIS TO BONE: ICD-10-CM

## 2025-04-25 DIAGNOSIS — C77.1 SECONDARY MALIGNANCY OF MEDIASTINAL LYMPH NODES: ICD-10-CM

## 2025-04-25 DIAGNOSIS — J91.0 MALIGNANT PLEURAL EFFUSION: Primary | ICD-10-CM

## 2025-04-25 DIAGNOSIS — C80.1 ADENOCARCINOMA OF UNKNOWN PRIMARY: ICD-10-CM

## 2025-04-25 NOTE — PROGRESS NOTES
Subjective:        PATIENT: Juanjose Chawla  MRN: [id number]  DATE: 4/28/2025    PCP : Sadiq Hartmann MD     Chief Complaint: 13 day f/u    OP NSCLC PACLITAXEL CARBOPLATIN Q3W       04/28/2025  Patient presents for follow up for malignant pulmonary embolism with his daughter and son in law.  He is utilizing manual wheel chair and on pulsated O2 2L via NC.  He received a hospital bed but is not comfortable.  His neck is hurting due to sleeping in chair and keeping his head down.  He did not take his RX medication this morning.  He is not as anxious; drained 250 ml of fluid yesterday and stopped because of pain. Advised ok to take Tylenol up to 1000 mg prior to draining.  Continue to take TUMS.  Reviewed cbc - anemic; will order 2 unit of PRBC and hold tx.  Encouraged family to drain more fluid via cath.  He is lethargic; low appetite and fluid intake.   He does complete PT at home; encouraged leg exercise and standing for blood flow.    Oncology History Overview Note   Images     February 27, 2025:  CT of the chest, COPD and emphysema, right lower lobe atelectasis, lymphadenopathy in the right hilum as well as the mediastinum and smaller nodes in the left hilum.  The right hilar lymph node measures 2.5 cm other small nodes in the right hilum.  And there is a precarinal lymph node 2.2 x 2.2 cm.  And adenopathy seen in the subcarinal space as well as the mediastinum.     03/13/2025: CT abdomen pelvis, right-sided pleural effusion interstitial fibrosis lungs bilaterally prostatic hypertrophy     03/20/2025: PET-CT: Hypermetabolic consolidation right lung base with mediastinal and bilateral adenopathy and innumerable scattered hypermetabolic bone lesions     pathology  The right pleural effusion showed malignant cells consistent with a poorly differential adenocarcinoma:  Negative for TTF 1, WT1, GATA3.  Suggest an origin of the GI tract clinical pathology is recommended    2nd biopsy  1. EBUS LYMPH NODE 4R (TWO  CONVENTIONAL SMEARS, ONE THIN PREP SMEAR, ONE CELL   BLOCK):    RARE MALIGNANT CELLS CONSISTENT WITH POORLY DIFFERENTIATED ADENOCARCINOMA.      2. EBUS LYMPH NODE 7R:    POORLY DIFFERENTIATED ADENOCARCINOMA.   The immunohistochemical profile of the tumor cells is not typical of lung   primary and may be seen with adenocarcinomas of upper   gastrointestinal/pancreaticobiliary primary.       Tempus Liquid biopsy  TP53  No actionable mutation  KEAP1 +     Metastasis to bone   3/24/2025 Initial Diagnosis    Metastasis to bone     Secondary malignancy of mediastinal lymph nodes   3/24/2025 Initial Diagnosis    Secondary malignancy of mediastinal lymph nodes     Adenocarcinoma of unknown primary   4/4/2025 Cancer Staged    Staging form: Lung, AJCC V9  - Clinical stage from 4/4/2025: Stage ANTONIA (cTX, cNX, cM1b)     4/5/2025 -  Chemotherapy    Treatment Summary   Plan Name: OP NSCLC PACLITAXEL CARBOPLATIN Q3W  Treatment Goal: Palliative  Status: Active  Start Date: 4/5/2025  End Date: 6/9/2025 (Planned)  Provider: Niko Johnson MD  Chemotherapy: CARBOplatin (PARAPLATIN) 635 mg in 0.9% NaCl 348.5 mL chemo infusion, 635 mg (88.4 % of original dose 715.8 mg), Intravenous, Clinic/HOD 1 time, 1 of 4 cycles  Dose modification:   (original dose 715.8 mg, Cycle 1)  Administration: 635 mg (4/5/2025)  PACLitaxeL (TAXOL) 135 mg/m2 = 282 mg in 0.9% NaCl 500 mL chemo infusion, 135 mg/m2 = 282 mg (100 % of original dose 135 mg/m2), Intravenous, Clinic/HOD 1 time, 1 of 4 cycles  Dose modification: 135 mg/m2 (original dose 135 mg/m2, Cycle 1, Reason: MD Discretion)  Administration: 282 mg (4/5/2025)          Past Medical History:   Diagnosis Date    Anxiety     Diabetes mellitus     HLD (hyperlipidemia)     Hypertension     Major depressive disorder, single episode, unspecified     Pneumonia, unspecified organism       .  Past Surgical History:   Procedure Laterality Date    Cardiac stent      ENDOBRONCHIAL ULTRASOUND N/A 3/28/2025     Procedure: ENDOBRONCHIAL ULTRASOUND (EBUS);  Surgeon: Jerrod Harman Jr., MD, [facility];  Location: [facility] BRONCHOSCOPY LAB;  Service: Pulmonary;  Laterality: N/A;    HERNIA REPAIR N/A     INSERTION OF PLEURAL CATHETER N/A 4/8/2025    Procedure: INSERTION, CATHETER, PLEURAL;  Surgeon: Roslyn Hull MD;  Location: [facility] OR;  Service: Cardiovascular;  Laterality: N/A;  PLEUR-X CATHETER INSERTION / RIGHT      .No family history on file.   Social History[1]   .Review of patient's allergies indicates:  No Known Allergies   Current Medications[2]   Review of Systems   Constitutional:  Positive for appetite change. Negative for unexpected weight change.   HENT:  Negative for mouth sores.    Eyes:  Negative for visual disturbance.   Respiratory:  Positive for shortness of breath. Negative for cough.    Cardiovascular:  Negative for chest pain.   Gastrointestinal:  Positive for diarrhea. Negative for abdominal pain.   Genitourinary:  Positive for frequency.   Musculoskeletal:  Negative for back pain.   Skin:  Negative for rash.   Neurological:  Positive for headaches.   Hematological:  Negative for adenopathy.   Psychiatric/Behavioral:  The patient is nervous/anxious.         Pertinent positives above in interim history  Objective:     Vitals:    04/28/25 0858   BP: (!) 88/61   Pulse: (!) 115   Resp: 20             Physical Exam  Vitals reviewed.   Constitutional:       Appearance: He is ill-appearing.   HENT:      Head: Normocephalic and atraumatic.      Mouth/Throat:      Mouth: Mucous membranes are moist.      Pharynx: Oropharynx is clear.   Eyes:      Extraocular Movements: Extraocular movements intact.      Conjunctiva/sclera: Conjunctivae normal.      Pupils: Pupils are equal, round, and reactive to light.   Cardiovascular:      Rate and Rhythm: Regular rhythm. Tachycardia present.      Pulses: Normal pulses.      Heart sounds: Normal heart sounds.   Pulmonary:      Breath sounds: Decreased air movement present.  Examination of the right-middle field reveals decreased breath sounds. Examination of the right-lower field reveals decreased breath sounds. Decreased breath sounds present.   Chest:      Comments: Tube in place  Abdominal:      General: Abdomen is flat. Bowel sounds are normal. There is no distension.      Palpations: Abdomen is soft.   Musculoskeletal:         General: Normal range of motion.      Cervical back: Normal range of motion and neck supple.   Lymphadenopathy:      Cervical: No cervical adenopathy.      Lower Body: No right inguinal adenopathy. No left inguinal adenopathy.   Skin:     General: Skin is warm and dry.   Neurological:      General: No focal deficit present.      Mental Status: He is alert and oriented to person, place, and time. Mental status is at baseline.      GCS: GCS eye subscore is 4. GCS verbal subscore is 5. GCS motor subscore is 6.   Psychiatric:         Attention and Perception: Attention normal.         Mood and Affect: Mood is anxious.         Speech: Speech is rapid and pressured.         Behavior: Behavior is hyperactive.         Judgment: Judgment normal.       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  This is more consistent with a lung primary in the clinical picture.    Goals: Palliation   Noncurable  Control pain, decrease shortness of breath,         1.Malignant pleural effusion           Drain 2-3 times per week, and p.r.n.           Home PleurX catheter management          We need to see if he can drain up to a L with home health.    2 Adenocarcinoma unknown primary most likely lung   Malignant hilar and mediastinal adenopathy   Malignant bone lesions clinically   -  s/p inpatient carboplatin Taxol    -      Continue  Bettina  --hospital bed   Continue home oxygen   Given his symptomatic anemia we will hold cycle 2 today and try to push the Thursday  We will return to clinic with CBC, CMP, magnesium, plan cycle 3 carboplatin Taxol  Repeat imaging after 2 -3 cycles  Return to clinic for cycle 3 approximately ---         3. Hypercalcemia (corrected calcium 12---with his albumin of 2.8)  -bone mets  - status post Zometa--will repeat in future with bone mets--due in 7/2025  - now controlled at <8  Discontinue Tums    4 Weight loss              Continue nutritional support    5 Anemia/pancytopenia secondary to chemotherapy              We will add growth factors with next chemo.  Long-acting                Keep hemoglobin greater than 8               Transfuse if platelets <10                Expected recovery by next treatment                 6 Insomnia              Continue Remeron to 30 mg q.h.s.               6 mg melatonin qhs  7. Depression/ anxiety       Continue BuSpar with primary care           Consider dose adjustment or alternative medication next week with PCP         Social work to help with counseling    8. Pancytopenia secondary to chemo---       Resolution of neutropenia and thrombocytopenia     Symptomatic anemia---transfuse 2 units of packed red blood cells today    Other Recs   - EGD non urgent      Can be seen as an outpatient----while pathology right this is a unknown primary.  And potential GI origin, there is no indication on CT or PET.  We will complete scope at some point  - okay for  Oxycodon IR 5mg Q4H for cancer related pain if needed        Will keep PICC line---dressing change weekly---consider port if responding to therapy  Hold metoprolol if systolic blood pressure less than 120 or heart rate <60  Hold chemotherapy today.    Transfuse 2 units of packed red blood cells   Recheck CBC for Thursday and consider re-initiation of therapy.    Continue palliative care   Encourage mobility,   Long discussion  with the patient the family regarding end of life care.    Discussed that if his calcium increases again and he continues to have more symptoms it may be more beneficial for us to not treat his hypercalcemia.  Discussed the need for further advanced directive discussion.    CPR mechanical ventilation would be futile.         Follow up in about 3 weeks (around 5/19/2025) for lab and TX only 5/1/25; then 3 wks labs cbc/cmp, TD Saccaro and same day TX.   Orders Placed This Encounter    Type & Screen    Prepare RBC 1 Unit     I, Bridegtt Aldridge LPN, acted solely as a scribe for and in the presence of, Dr. Niko Johnson who performed the service.     Niko Johnson MD          Total 60im  Time spent with the patient and family 35-40 minutes, 20 minutes spent in review of labs and documentation and orders         [1]   Social History  Socioeconomic History    Marital status:    Tobacco Use    Smoking status: Never    Smokeless tobacco: Never   Substance and Sexual Activity    Alcohol use: Not Currently    Drug use: Never     Social Drivers of Health     Financial Resource Strain: Low Risk  (4/5/2025)    Overall Financial Resource Strain (CARDIA)     Difficulty of Paying Living Expenses: Not hard at all   Food Insecurity: No Food Insecurity (4/5/2025)    Hunger Vital Sign     Worried About Running Out of Food in the Last Year: Never true     Ran Out of Food in the Last Year: Never true   Transportation Needs: No Transportation Needs (4/4/2025)    PRAPARE - Transportation     Lack of Transportation (Medical): No     Lack of Transportation (Non-Medical): No   Stress: No Stress Concern Present (4/5/2025)    [employer] - Occupational Stress Questionnaire     Feeling of Stress : Not at all   Housing Stability: Low Risk  (4/5/2025)    Housing Stability Vital Sign     Unable to Pay for Housing in the Last Year: No     Homeless in the Last Year: No   [2]   Current Outpatient  Medications:     albuterol (ACCUNEB) 0.63 mg/3 mL Nebu, Take 0.63 mg by nebulization every 6 (six) hours as needed (wheezing and shortness of breath). Rescue, Disp: , Rfl:     ALPRAZolam (XANAX) 0.25 MG tablet, Take 0.25 mg by mouth 3 (three) times daily as needed for Anxiety., Disp: , Rfl:     busPIRone (BUSPAR) 5 MG Tab, Take 5 mg by mouth 2 (two) times daily., Disp: , Rfl:     famotidine (PEPCID) 20 MG tablet, Take 20 mg by mouth once daily., Disp: , Rfl:     gabapentin (NEURONTIN) 300 MG capsule, Take 300 mg by mouth 3 (three) times daily., Disp: , Rfl:     glimepiride (AMARYL) 2 MG tablet, Take 1 tablet (2 mg total) by mouth 2 (two) times a day., Disp: 60 tablet, Rfl: 0    guaiFENesin-codeine 100-10 mg/5 ml (TUSSI-ORGANIDIN NR)  mg/5 mL syrup, Take 5 mLs by mouth every 4 (four) hours as needed for Cough or Congestion., Disp: 100 mL, Rfl: 0    metFORMIN (GLUCOPHAGE) 1000 MG tablet, Take 2,000 mg by mouth once daily., Disp: , Rfl:     metoprolol succinate (TOPROL-XL) 25 MG 24 hr tablet, Take 0.5 tablets (12.5 mg total) by mouth once daily., Disp: 15 tablet, Rfl: 0    mirtazapine (REMERON) 30 MG tablet, Take 1 tablet (30 mg total) by mouth every evening., Disp: 30 tablet, Rfl: 1    polyethylene glycol (GLYCOLAX) 17 gram PwPk, Take 17 g by mouth once daily., Disp: , Rfl:     tamsulosin (FLOMAX) 0.4 mg Cap, Take 0.4 mg by mouth once daily., Disp: , Rfl:     umeclidinium (INCRUSE ELLIPTA) 62.5 mcg/actuation inhalation capsule, Inhale 62.5 mcg into the lungs once daily. Controller, Disp: 30 each, Rfl: 0    aspirin (ECOTRIN) 81 MG EC tablet, Take 81 mg by mouth once daily. (Patient not taking: Reported on 4/28/2025), Disp: , Rfl:     Current Facility-Administered Medications:     0.9%  NaCl infusion (for blood administration), , Intravenous, Once, Niko Johnson MD    0.9%  NaCl infusion (for blood administration), , Intravenous, Once, Niko Johnson MD    acetaminophen tablet 650 mg, 650 mg, Oral, Once,  Niko Johnson MD

## 2025-04-28 ENCOUNTER — RESULTS FOLLOW-UP (OUTPATIENT)
Dept: HEMATOLOGY/ONCOLOGY | Facility: CLINIC | Age: 85
End: 2025-04-28

## 2025-04-28 ENCOUNTER — INFUSION (OUTPATIENT)
Dept: INFUSION THERAPY | Facility: HOSPITAL | Age: 85
End: 2025-04-28
Attending: INTERNAL MEDICINE
Payer: MEDICARE

## 2025-04-28 ENCOUNTER — OFFICE VISIT (OUTPATIENT)
Dept: HEMATOLOGY/ONCOLOGY | Facility: CLINIC | Age: 85
End: 2025-04-28
Payer: MEDICARE

## 2025-04-28 ENCOUNTER — LAB VISIT (OUTPATIENT)
Dept: LAB | Facility: HOSPITAL | Age: 85
End: 2025-04-28
Attending: INTERNAL MEDICINE
Payer: MEDICARE

## 2025-04-28 VITALS
WEIGHT: 191.5 LBS | OXYGEN SATURATION: 96 % | RESPIRATION RATE: 18 BRPM | HEIGHT: 70 IN | DIASTOLIC BLOOD PRESSURE: 54 MMHG | HEART RATE: 77 BPM | BODY MASS INDEX: 27.41 KG/M2 | SYSTOLIC BLOOD PRESSURE: 105 MMHG | TEMPERATURE: 98 F

## 2025-04-28 VITALS
BODY MASS INDEX: 27.41 KG/M2 | HEART RATE: 115 BPM | RESPIRATION RATE: 20 BRPM | WEIGHT: 191.5 LBS | SYSTOLIC BLOOD PRESSURE: 88 MMHG | OXYGEN SATURATION: 84 % | HEIGHT: 70 IN | DIASTOLIC BLOOD PRESSURE: 61 MMHG

## 2025-04-28 DIAGNOSIS — C79.51 METASTASIS TO BONE: ICD-10-CM

## 2025-04-28 DIAGNOSIS — E83.52 HYPERCALCEMIA: ICD-10-CM

## 2025-04-28 DIAGNOSIS — C80.1 ADENOCARCINOMA OF UNKNOWN PRIMARY: Primary | ICD-10-CM

## 2025-04-28 DIAGNOSIS — C80.1 ADENOCARCINOMA OF UNKNOWN PRIMARY: ICD-10-CM

## 2025-04-28 DIAGNOSIS — C77.1 SECONDARY MALIGNANCY OF MEDIASTINAL LYMPH NODES: ICD-10-CM

## 2025-04-28 DIAGNOSIS — J91.0 MALIGNANT PLEURAL EFFUSION: ICD-10-CM

## 2025-04-28 LAB
ABO + RH BLD: NORMAL
ABO + RH BLD: NORMAL
ALBUMIN SERPL-MCNC: 2.2 G/DL (ref 3.4–4.8)
ALBUMIN/GLOB SERPL: 0.4 RATIO (ref 1.1–2)
ALP SERPL-CCNC: 128 UNIT/L (ref 40–150)
ALT SERPL-CCNC: 6 UNIT/L (ref 0–55)
ANION GAP SERPL CALC-SCNC: 10 MEQ/L
AST SERPL-CCNC: 10 UNIT/L (ref 11–45)
BASOPHILS # BLD AUTO: 0.02 X10(3)/MCL
BASOPHILS NFR BLD AUTO: 0.3 %
BILIRUB SERPL-MCNC: 0.3 MG/DL
BLD PROD TYP BPU: NORMAL
BLD PROD TYP BPU: NORMAL
BLOOD UNIT EXPIRATION DATE: NORMAL
BLOOD UNIT EXPIRATION DATE: NORMAL
BLOOD UNIT TYPE CODE: 600
BLOOD UNIT TYPE CODE: 600
BUN SERPL-MCNC: 12.3 MG/DL (ref 8.4–25.7)
CALCIUM SERPL-MCNC: 8.5 MG/DL (ref 8.8–10)
CHLORIDE SERPL-SCNC: 111 MMOL/L (ref 98–107)
CO2 SERPL-SCNC: 23 MMOL/L (ref 23–31)
CREAT SERPL-MCNC: 1 MG/DL (ref 0.72–1.25)
CREAT/UREA NIT SERPL: 12
CROSSMATCH INTERPRETATION: NORMAL
CROSSMATCH INTERPRETATION: NORMAL
DISPENSE STATUS: NORMAL
DISPENSE STATUS: NORMAL
EOSINOPHIL # BLD AUTO: 0.09 X10(3)/MCL (ref 0–0.9)
EOSINOPHIL NFR BLD AUTO: 1.5 %
ERYTHROCYTE [DISTWIDTH] IN BLOOD BY AUTOMATED COUNT: 15.3 % (ref 11.5–17)
GFR SERPLBLD CREATININE-BSD FMLA CKD-EPI: >60 ML/MIN/1.73/M2
GLOBULIN SER-MCNC: 4.9 GM/DL (ref 2.4–3.5)
GLUCOSE SERPL-MCNC: 171 MG/DL (ref 82–115)
GROUP & RH: NORMAL
HCT VFR BLD AUTO: 27 % (ref 42–52)
HGB BLD-MCNC: 8.2 G/DL (ref 14–18)
IMM GRANULOCYTES # BLD AUTO: 0.07 X10(3)/MCL (ref 0–0.04)
IMM GRANULOCYTES NFR BLD AUTO: 1.2 %
INDIRECT COOMBS: NORMAL
LYMPHOCYTES # BLD AUTO: 2.51 X10(3)/MCL (ref 0.6–4.6)
LYMPHOCYTES NFR BLD AUTO: 42.8 %
MAGNESIUM SERPL-MCNC: 1.9 MG/DL (ref 1.6–2.6)
MCH RBC QN AUTO: 30.6 PG (ref 27–31)
MCHC RBC AUTO-ENTMCNC: 30.4 G/DL (ref 33–36)
MCV RBC AUTO: 100.7 FL (ref 80–94)
MONOCYTES # BLD AUTO: 0.95 X10(3)/MCL (ref 0.1–1.3)
MONOCYTES NFR BLD AUTO: 16.2 %
NEUTROPHILS # BLD AUTO: 2.23 X10(3)/MCL (ref 2.1–9.2)
NEUTROPHILS NFR BLD AUTO: 38 %
PLATELET # BLD AUTO: 214 X10(3)/MCL (ref 130–400)
PMV BLD AUTO: 9.4 FL (ref 7.4–10.4)
POTASSIUM SERPL-SCNC: 4.6 MMOL/L (ref 3.5–5.1)
PROT SERPL-MCNC: 7.1 GM/DL (ref 5.8–7.6)
RBC # BLD AUTO: 2.68 X10(6)/MCL (ref 4.7–6.1)
SODIUM SERPL-SCNC: 144 MMOL/L (ref 136–145)
SPECIMEN OUTDATE: NORMAL
UNIT NUMBER: NORMAL
UNIT NUMBER: NORMAL
WBC # BLD AUTO: 5.87 X10(3)/MCL (ref 4.5–11.5)

## 2025-04-28 PROCEDURE — 86900 BLOOD TYPING SEROLOGIC ABO: CPT | Performed by: INTERNAL MEDICINE

## 2025-04-28 PROCEDURE — 99214 OFFICE O/P EST MOD 30 MIN: CPT | Mod: PBBFAC,25 | Performed by: INTERNAL MEDICINE

## 2025-04-28 PROCEDURE — 36430 TRANSFUSION BLD/BLD COMPNT: CPT

## 2025-04-28 PROCEDURE — 85025 COMPLETE CBC W/AUTO DIFF WBC: CPT

## 2025-04-28 PROCEDURE — 99215 OFFICE O/P EST HI 40 MIN: CPT | Mod: S$PBB,,, | Performed by: INTERNAL MEDICINE

## 2025-04-28 PROCEDURE — 80053 COMPREHEN METABOLIC PANEL: CPT

## 2025-04-28 PROCEDURE — P9016 RBC LEUKOCYTES REDUCED: HCPCS | Performed by: INTERNAL MEDICINE

## 2025-04-28 PROCEDURE — 25000003 PHARM REV CODE 250: Performed by: INTERNAL MEDICINE

## 2025-04-28 PROCEDURE — 86923 COMPATIBILITY TEST ELECTRIC: CPT | Mod: 91 | Performed by: INTERNAL MEDICINE

## 2025-04-28 PROCEDURE — 83735 ASSAY OF MAGNESIUM: CPT

## 2025-04-28 PROCEDURE — 99999 PR PBB SHADOW E&M-EST. PATIENT-LVL IV: CPT | Mod: PBBFAC,,, | Performed by: INTERNAL MEDICINE

## 2025-04-28 RX ORDER — ACETAMINOPHEN 325 MG/1
650 TABLET ORAL ONCE
Status: COMPLETED | OUTPATIENT
Start: 2025-04-28 | End: 2025-04-28

## 2025-04-28 RX ORDER — HYDROCODONE BITARTRATE AND ACETAMINOPHEN 500; 5 MG/1; MG/1
TABLET ORAL ONCE
Status: ACTIVE | OUTPATIENT
Start: 2025-04-28

## 2025-04-28 RX ORDER — ACETAMINOPHEN 325 MG/1
650 TABLET ORAL ONCE
Status: CANCELLED | OUTPATIENT
Start: 2025-04-28

## 2025-04-28 RX ORDER — HYDROCODONE BITARTRATE AND ACETAMINOPHEN 500; 5 MG/1; MG/1
TABLET ORAL ONCE
Status: CANCELLED | OUTPATIENT
Start: 2025-04-28 | End: 2025-04-28

## 2025-04-28 RX ADMIN — ACETAMINOPHEN 650 MG: 325 TABLET ORAL at 11:04

## 2025-04-29 ENCOUNTER — TELEPHONE (OUTPATIENT)
Dept: HEMATOLOGY/ONCOLOGY | Facility: CLINIC | Age: 85
End: 2025-04-29
Payer: MEDICARE

## 2025-04-29 NOTE — TELEPHONE ENCOUNTER
Drain was placed by Dr. Hull. Spoke with his nurse, Mouna who will contact [facility] nurse regarding her findings. Stated they may order cxr and get him back in for OV.

## 2025-04-29 NOTE — TELEPHONE ENCOUNTER
Per Oma with [facility] - She is having a hard time draining pleur-x. States he has a lot of blood clots. Flow is almost at a standstill; barely getting a drop. Feels as though he still has fluid; hears crackles on auscultation. Please advise.

## 2025-04-30 ENCOUNTER — TELEPHONE (OUTPATIENT)
Dept: CARDIAC SURGERY | Facility: CLINIC | Age: 85
End: 2025-04-30
Payer: MEDICARE

## 2025-04-30 NOTE — TELEPHONE ENCOUNTER
CXR report received from home health after report pleural cath drainage yesterday 100 cc with some clots and draining slowly.  Sent to pa with new orders to drain m,w,f and prn symptoms, decrease freq to once weekly with minimal drainage.  Home Health nurse Twila from [facility] notified.

## 2025-05-01 ENCOUNTER — CLINICAL SUPPORT (OUTPATIENT)
Dept: HEMATOLOGY/ONCOLOGY | Facility: CLINIC | Age: 85
End: 2025-05-01
Payer: MEDICARE

## 2025-05-01 DIAGNOSIS — R45.89 ANXIETY ABOUT HEALTH: Primary | ICD-10-CM

## 2025-05-01 PROCEDURE — 99499 UNLISTED E&M SERVICE: CPT | Mod: S$PBB,,, | Performed by: SOCIAL WORKER

## 2025-05-01 NOTE — PROGRESS NOTES
I called Mr. Sow for a follow up phone call. He agreed to a weekly phone contact to discuss his emotional state. Today he stated he is feeling less anxious than before. He stated he contributes his lessening feelings of anxiousness to having his daughters home with him. I asked if his daughters give him a sense of security or safety and he agreed. He also said they handle all of his phone calls and appointments so he feels less responsible for keeping track of those particulars. He also stated he feels safe and comfortable with his wife home. She is very capable of tending to him. I discussed a safety plan and he stated they have something in place. He received his oxygen and while he stills sounds out of breath it is much improved from last week. He went outside over this past weekend and rode on a golf cart. It brought his amarilis. I suggested that he continue to engage in things that bring him amarilis and perhaps taking another ride on the golf cart. He is considering it. I will schedule him for a phone follow up for next Thursday. He stated he received a phone call yesterday reminding him of his appointment and he was told he had to come in by the person calling. I will include a better description of the appointment in notes.

## 2025-05-02 ENCOUNTER — LAB VISIT (OUTPATIENT)
Dept: LAB | Facility: HOSPITAL | Age: 85
End: 2025-05-02
Attending: INTERNAL MEDICINE
Payer: MEDICARE

## 2025-05-02 ENCOUNTER — INFUSION (OUTPATIENT)
Dept: INFUSION THERAPY | Facility: HOSPITAL | Age: 85
End: 2025-05-02
Attending: INTERNAL MEDICINE
Payer: MEDICARE

## 2025-05-02 VITALS
WEIGHT: 191.5 LBS | HEART RATE: 77 BPM | TEMPERATURE: 99 F | OXYGEN SATURATION: 95 % | HEIGHT: 70 IN | RESPIRATION RATE: 16 BRPM | SYSTOLIC BLOOD PRESSURE: 143 MMHG | DIASTOLIC BLOOD PRESSURE: 76 MMHG | BODY MASS INDEX: 27.41 KG/M2

## 2025-05-02 DIAGNOSIS — C79.51 METASTASIS TO BONE: ICD-10-CM

## 2025-05-02 DIAGNOSIS — E83.52 HYPERCALCEMIA: ICD-10-CM

## 2025-05-02 DIAGNOSIS — C77.1 SECONDARY MALIGNANCY OF MEDIASTINAL LYMPH NODES: ICD-10-CM

## 2025-05-02 DIAGNOSIS — J91.0 MALIGNANT PLEURAL EFFUSION: ICD-10-CM

## 2025-05-02 DIAGNOSIS — C80.1 ADENOCARCINOMA OF UNKNOWN PRIMARY: ICD-10-CM

## 2025-05-02 DIAGNOSIS — C80.1 ADENOCARCINOMA OF UNKNOWN PRIMARY: Primary | ICD-10-CM

## 2025-05-02 DIAGNOSIS — C34.90 MALIGNANT NEOPLASM OF UNSPECIFIED PART OF UNSPECIFIED BRONCHUS OR LUNG: Primary | ICD-10-CM

## 2025-05-02 LAB
ALBUMIN SERPL-MCNC: 2.1 G/DL (ref 3.4–4.8)
ALBUMIN/GLOB SERPL: 0.4 RATIO (ref 1.1–2)
ALP SERPL-CCNC: 137 UNIT/L (ref 40–150)
ALT SERPL-CCNC: 6 UNIT/L (ref 0–55)
ANION GAP SERPL CALC-SCNC: 7 MEQ/L
AST SERPL-CCNC: 14 UNIT/L (ref 11–45)
BASOPHILS # BLD AUTO: 0.01 X10(3)/MCL
BASOPHILS NFR BLD AUTO: 0.2 %
BILIRUB SERPL-MCNC: 0.3 MG/DL
BUN SERPL-MCNC: 10.1 MG/DL (ref 8.4–25.7)
CALCIUM SERPL-MCNC: 8.1 MG/DL (ref 8.8–10)
CHLORIDE SERPL-SCNC: 111 MMOL/L (ref 98–107)
CO2 SERPL-SCNC: 25 MMOL/L (ref 23–31)
CREAT SERPL-MCNC: 0.88 MG/DL (ref 0.72–1.25)
CREAT/UREA NIT SERPL: 11
EOSINOPHIL # BLD AUTO: 0.09 X10(3)/MCL (ref 0–0.9)
EOSINOPHIL NFR BLD AUTO: 1.7 %
ERYTHROCYTE [DISTWIDTH] IN BLOOD BY AUTOMATED COUNT: 16.2 % (ref 11.5–17)
GFR SERPLBLD CREATININE-BSD FMLA CKD-EPI: >60 ML/MIN/1.73/M2
GLOBULIN SER-MCNC: 5.1 GM/DL (ref 2.4–3.5)
GLUCOSE SERPL-MCNC: 120 MG/DL (ref 82–115)
HCT VFR BLD AUTO: 34.1 % (ref 42–52)
HGB BLD-MCNC: 10.6 G/DL (ref 14–18)
IMM GRANULOCYTES # BLD AUTO: 0.09 X10(3)/MCL (ref 0–0.04)
IMM GRANULOCYTES NFR BLD AUTO: 1.7 %
LYMPHOCYTES # BLD AUTO: 1.78 X10(3)/MCL (ref 0.6–4.6)
LYMPHOCYTES NFR BLD AUTO: 34.2 %
MAGNESIUM SERPL-MCNC: 1.8 MG/DL (ref 1.6–2.6)
MCH RBC QN AUTO: 29.9 PG (ref 27–31)
MCHC RBC AUTO-ENTMCNC: 31.1 G/DL (ref 33–36)
MCV RBC AUTO: 96.3 FL (ref 80–94)
MONOCYTES # BLD AUTO: 0.95 X10(3)/MCL (ref 0.1–1.3)
MONOCYTES NFR BLD AUTO: 18.2 %
NEUTROPHILS # BLD AUTO: 2.29 X10(3)/MCL (ref 2.1–9.2)
NEUTROPHILS NFR BLD AUTO: 44 %
PLATELET # BLD AUTO: 201 X10(3)/MCL (ref 130–400)
PMV BLD AUTO: 10 FL (ref 7.4–10.4)
POTASSIUM SERPL-SCNC: 4.6 MMOL/L (ref 3.5–5.1)
PROT SERPL-MCNC: 7.2 GM/DL (ref 5.8–7.6)
RBC # BLD AUTO: 3.54 X10(6)/MCL (ref 4.7–6.1)
SODIUM SERPL-SCNC: 143 MMOL/L (ref 136–145)
WBC # BLD AUTO: 5.21 X10(3)/MCL (ref 4.5–11.5)

## 2025-05-02 PROCEDURE — 25000003 PHARM REV CODE 250: Performed by: INTERNAL MEDICINE

## 2025-05-02 PROCEDURE — 83735 ASSAY OF MAGNESIUM: CPT

## 2025-05-02 PROCEDURE — 63600175 PHARM REV CODE 636 W HCPCS: Performed by: INTERNAL MEDICINE

## 2025-05-02 PROCEDURE — 85025 COMPLETE CBC W/AUTO DIFF WBC: CPT

## 2025-05-02 PROCEDURE — 80053 COMPREHEN METABOLIC PANEL: CPT

## 2025-05-02 RX ORDER — SODIUM CHLORIDE 0.9 % (FLUSH) 0.9 %
10 SYRINGE (ML) INJECTION
Status: DISCONTINUED | OUTPATIENT
Start: 2025-05-02 | End: 2025-05-02 | Stop reason: HOSPADM

## 2025-05-02 RX ORDER — DIPHENHYDRAMINE HYDROCHLORIDE 50 MG/ML
50 INJECTION, SOLUTION INTRAMUSCULAR; INTRAVENOUS ONCE AS NEEDED
Status: CANCELLED | OUTPATIENT
Start: 2025-05-02

## 2025-05-02 RX ORDER — EPINEPHRINE 0.3 MG/.3ML
0.3 INJECTION SUBCUTANEOUS ONCE AS NEEDED
Status: DISCONTINUED | OUTPATIENT
Start: 2025-05-02 | End: 2025-05-02 | Stop reason: HOSPADM

## 2025-05-02 RX ORDER — SODIUM CHLORIDE 0.9 % (FLUSH) 0.9 %
10 SYRINGE (ML) INJECTION
Status: CANCELLED | OUTPATIENT
Start: 2025-05-02

## 2025-05-02 RX ORDER — HEPARIN 100 UNIT/ML
500 SYRINGE INTRAVENOUS
Status: DISCONTINUED | OUTPATIENT
Start: 2025-05-02 | End: 2025-05-02 | Stop reason: HOSPADM

## 2025-05-02 RX ORDER — EPINEPHRINE 0.3 MG/.3ML
0.3 INJECTION SUBCUTANEOUS ONCE AS NEEDED
Status: CANCELLED | OUTPATIENT
Start: 2025-05-02

## 2025-05-02 RX ORDER — DIPHENHYDRAMINE HYDROCHLORIDE 50 MG/ML
50 INJECTION, SOLUTION INTRAMUSCULAR; INTRAVENOUS ONCE AS NEEDED
Status: DISCONTINUED | OUTPATIENT
Start: 2025-05-02 | End: 2025-05-02 | Stop reason: HOSPADM

## 2025-05-02 RX ORDER — HEPARIN 100 UNIT/ML
500 SYRINGE INTRAVENOUS
Status: CANCELLED | OUTPATIENT
Start: 2025-05-02

## 2025-05-02 RX ORDER — FAMOTIDINE 10 MG/ML
20 INJECTION, SOLUTION INTRAVENOUS
Status: CANCELLED | OUTPATIENT
Start: 2025-05-02

## 2025-05-02 RX ORDER — FAMOTIDINE 10 MG/ML
20 INJECTION, SOLUTION INTRAVENOUS
Status: COMPLETED | OUTPATIENT
Start: 2025-05-02 | End: 2025-05-02

## 2025-05-02 RX ADMIN — DIPHENHYDRAMINE HYDROCHLORIDE 25 MG: 50 INJECTION INTRAMUSCULAR; INTRAVENOUS at 08:05

## 2025-05-02 RX ADMIN — HEPARIN 500 UNITS: 100 SYRINGE at 01:05

## 2025-05-02 RX ADMIN — CARBOPLATIN 600 MG: 10 INJECTION, SOLUTION INTRAVENOUS at 12:05

## 2025-05-02 RX ADMIN — DEXAMETHASONE SODIUM PHOSPHATE 0.25 MG: 4 INJECTION, SOLUTION INTRA-ARTICULAR; INTRALESIONAL; INTRAMUSCULAR; INTRAVENOUS; SOFT TISSUE at 09:05

## 2025-05-02 RX ADMIN — FAMOTIDINE 20 MG: 10 INJECTION, SOLUTION INTRAVENOUS at 08:05

## 2025-05-02 RX ADMIN — PACLITAXEL 282 MG: 6 INJECTION, SOLUTION INTRAVENOUS at 09:05

## 2025-05-02 RX ADMIN — PEGFILGRASTIM 6 MG: KIT SUBCUTANEOUS at 01:05

## 2025-05-02 RX ADMIN — APREPITANT 130 MG: 130 INJECTION, EMULSION INTRAVENOUS at 08:05

## 2025-05-02 NOTE — PLAN OF CARE
Patient tolerated C2 D1 Taxol/Carboplatin, Neulasta OBI. Patient and daughter Nahomi requested to have a prescription for nausea. Notified NICO Guan: sent in prescription for Zofran 8mg ODT to [facility] pharmacy. Updated patient and daughter and understanding verbalized. Patient discharged home.

## 2025-05-08 ENCOUNTER — CLINICAL SUPPORT (OUTPATIENT)
Dept: HEMATOLOGY/ONCOLOGY | Facility: CLINIC | Age: 85
End: 2025-05-08
Payer: MEDICARE

## 2025-05-08 DIAGNOSIS — R45.89 ANXIETY ABOUT HEALTH: Primary | ICD-10-CM

## 2025-05-08 DIAGNOSIS — F32.A DEPRESSION, UNSPECIFIED DEPRESSION TYPE: ICD-10-CM

## 2025-05-08 PROCEDURE — 99499 UNLISTED E&M SERVICE: CPT | Mod: S$PBB,,, | Performed by: SOCIAL WORKER

## 2025-05-08 NOTE — PROGRESS NOTES
I called Mr. Sow as scheduled for a follow up conversation. He sounded out of breath and stated he had just finished with PT and was taking a short nap. He stated he doesn't feel that stressed but feels a little sad because he hasn't seen his daughter who lives in [place] in a while. He hasn't gone outside since the ride in the golf cart. He stated he feels very cold when he goes outside. I encouraged him to bundle up and try to sit outside in the sunshine and fresh air. He said he would think about it. He enjoys the phone call. I will schedule another one in about 10 days.

## 2025-05-21 NOTE — PROGRESS NOTES
Subjective:        PATIENT: Juanjose Chawla  MRN: [id number]  DATE: 5/22/2025    PCP : Sadiq Hartmann MD     Chief Complaint: 3 wk f/u (SOB)    OP NSCLC PACLITAXEL CARBOPLATIN Q3W       05/22/2025  Patient presents for follow up for malignant pulmonary embolism with his daughter via manual wheel chair.  He is utilizing manual wheel chair and on pulsated O2 2L via NC.    He is not as anxious today, skin tone is pink and interacting with conversation today.  Daughter advised he drains 300 ml of fluid every other day. Daughter called Dr. Harman due to some labored breathing while sitting in recliner; will complete Xray today.  He drove his side by side one day this week and picked blackberries.  He is able to sit upright better in wheelchair. His taste has returned and he is eating more food.  Heartburn has resolved.  His feet still tingle; discussed possible side effect from TX.  Reviewed cbc - anemic; will order 1 unit of PRBC and hold tx today.  Discussed possibly treating next week with reduced dose.  Discussed future appointments.    Oncology History Overview Note   Images     February 27, 2025:  CT of the chest, COPD and emphysema, right lower lobe atelectasis, lymphadenopathy in the right hilum as well as the mediastinum and smaller nodes in the left hilum.  The right hilar lymph node measures 2.5 cm other small nodes in the right hilum.  And there is a precarinal lymph node 2.2 x 2.2 cm.  And adenopathy seen in the subcarinal space as well as the mediastinum.     03/13/2025: CT abdomen pelvis, right-sided pleural effusion interstitial fibrosis lungs bilaterally prostatic hypertrophy     03/20/2025: PET-CT: Hypermetabolic consolidation right lung base with mediastinal and bilateral adenopathy and innumerable scattered hypermetabolic bone lesions     pathology  The right pleural effusion showed malignant cells consistent with a poorly differential adenocarcinoma:  Negative for TTF 1, WT1, GATA3.  Suggest an  origin of the GI tract clinical pathology is recommended    2nd biopsy  1. EBUS LYMPH NODE 4R (TWO CONVENTIONAL SMEARS, ONE THIN PREP SMEAR, ONE CELL   BLOCK):    RARE MALIGNANT CELLS CONSISTENT WITH POORLY DIFFERENTIATED ADENOCARCINOMA.      2. EBUS LYMPH NODE 7R:    POORLY DIFFERENTIATED ADENOCARCINOMA.   The immunohistochemical profile of the tumor cells is not typical of lung   primary and may be seen with adenocarcinomas of upper   gastrointestinal/pancreaticobiliary primary.       Tempus Liquid biopsy  TP53  No actionable mutation  KEAP1 +     Metastasis to bone   3/24/2025 Initial Diagnosis    Metastasis to bone     Secondary malignancy of mediastinal lymph nodes   3/24/2025 Initial Diagnosis    Secondary malignancy of mediastinal lymph nodes     Adenocarcinoma of unknown primary   4/4/2025 Cancer Staged    Staging form: Lung, AJCC V9  - Clinical stage from 4/4/2025: Stage ANTONIA (cTX, cNX, cM1b)     4/5/2025 -  Chemotherapy    Treatment Summary   Plan Name: OP NSCLC PACLITAXEL CARBOPLATIN Q3W  Treatment Goal: Palliative  Status: Active  Start Date: 4/5/2025  End Date: 6/19/2025 (Planned)  Provider: Niko Johnson MD  Chemotherapy: CARBOplatin (PARAPLATIN) 635 mg in 0.9% NaCl 348.5 mL chemo infusion, 635 mg (88.4 % of original dose 715.8 mg), Intravenous, Clinic/HOD 1 time, 2 of 4 cycles  Dose modification:   (original dose 715.8 mg, Cycle 1)  Administration: 635 mg (4/5/2025), 600 mg (5/2/2025)  PACLitaxeL (TAXOL) 135 mg/m2 = 282 mg in 0.9% NaCl 500 mL chemo infusion, 135 mg/m2 = 282 mg (100 % of original dose 135 mg/m2), Intravenous, Clinic/HOD 1 time, 2 of 4 cycles  Dose modification: 135 mg/m2 (original dose 135 mg/m2, Cycle 1, Reason: MD Discretion)  Administration: 282 mg (4/5/2025), 282 mg (5/2/2025)          Past Medical History:   Diagnosis Date    Anxiety     Diabetes mellitus     HLD (hyperlipidemia)     Hypertension     Major depressive disorder, single episode, unspecified     Pneumonia,  unspecified organism       .  Past Surgical History:   Procedure Laterality Date    Cardiac stent      ENDOBRONCHIAL ULTRASOUND N/A 3/28/2025    Procedure: ENDOBRONCHIAL ULTRASOUND (EBUS);  Surgeon: Jerrod Harman Jr., MD, [facility];  Location: [facility] BRONCHOSCOPY [facility];  Service: Pulmonary;  Laterality: N/A;    HERNIA REPAIR N/A     INSERTION OF PLEURAL CATHETER N/A 4/8/2025    Procedure: INSERTION, CATHETER, PLEURAL;  Surgeon: Roslyn Hull MD;  Location: [facility] OR;  Service: Cardiovascular;  Laterality: N/A;  PLEUR-X CATHETER INSERTION / RIGHT      .No family history on file.   Social History[1]   .Review of patient's allergies indicates:  No Known Allergies   Current Medications[2]   Review of Systems   Constitutional:  Positive for appetite change. Negative for unexpected weight change.   HENT:  Negative for mouth sores.    Eyes:  Negative for visual disturbance.   Respiratory:  Positive for shortness of breath. Negative for cough.    Cardiovascular:  Negative for chest pain.   Gastrointestinal:  Positive for diarrhea. Negative for abdominal pain.   Genitourinary:  Positive for frequency.   Musculoskeletal:  Negative for back pain.   Skin:  Negative for rash.   Neurological:  Positive for headaches.   Hematological:  Negative for adenopathy.   Psychiatric/Behavioral:  The patient is nervous/anxious.         Pertinent positives above in interim history  Objective:     Vitals:    05/22/25 1040   BP: 110/68   Pulse: 97   Resp: 18   Temp: 98.2 °F (36.8 °C)               Physical Exam  Vitals reviewed.   Constitutional:       Appearance: He is ill-appearing.   HENT:      Head: Normocephalic and atraumatic.      Mouth/Throat:      Mouth: Mucous membranes are moist.      Pharynx: Oropharynx is clear.   Eyes:      Extraocular Movements: Extraocular movements intact.      Conjunctiva/sclera: Conjunctivae normal.      Pupils: Pupils are equal, round, and reactive to light.   Cardiovascular:      Rate and Rhythm: Regular  rhythm. Tachycardia present.      Pulses: Normal pulses.      Heart sounds: Normal heart sounds.   Pulmonary:      Breath sounds: Decreased air movement present. Examination of the right-middle field reveals decreased breath sounds. Examination of the right-lower field reveals decreased breath sounds. Decreased breath sounds present.   Chest:      Comments: Tube in place  Abdominal:      General: Abdomen is flat. Bowel sounds are normal. There is no distension.      Palpations: Abdomen is soft.   Musculoskeletal:         General: Normal range of motion.      Cervical back: Normal range of motion and neck supple.   Lymphadenopathy:      Cervical: No cervical adenopathy.      Lower Body: No right inguinal adenopathy. No left inguinal adenopathy.   Skin:     General: Skin is warm and dry.   Neurological:      General: No focal deficit present.      Mental Status: He is alert and oriented to person, place, and time. Mental status is at baseline.      GCS: GCS eye subscore is 4. GCS verbal subscore is 5. GCS motor subscore is 6.   Psychiatric:         Attention and Perception: Attention normal.         Mood and Affect: Mood is anxious.         Speech: Speech is rapid and pressured.         Behavior: Behavior is hyperactive.         Judgment: Judgment normal.         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  This is more consistent with a lung primary in the clinical picture.    Goals: Palliation   Noncurable  Control pain, decrease shortness of breath,       1.Malignant pleural effusion           Drain 2-3 times per week, and p.r.n.           Home PleurX catheter management          We need to see if he can drain up to a L with home health.    2 Adenocarcinoma unknown primary  most likely lung   Malignant hilar and mediastinal adenopathy   Malignant bone lesions clinically   -  s/p inpatient carboplatin Taxol    -      Continue DuoNebs  --hospital bed   Continue home oxygen     Repeat imaging after 2 -3 cycles  Return to clinic for cycle 3 approximately --- NEXT WEEK DROP CARBO TO AUC 5   AND REPEAT SCAN IN 3 WEEK     3. Hypercalcemia (corrected calcium 12---with his albumin of 2.8)  -bone mets  - status post Zometa--will repeat in future with bone mets--due in 7/2025  - now controlled at <8  Discontinue Tums    4 Weight loss              Continue nutritional support    5 Anemia/pancytopenia secondary to chemotherapy              We will add growth factors with next chemo.  Long-acting                Keep hemoglobin greater than 8               Transfuse if platelets <10                Expected recovery by next treatment                 6 Insomnia              Continue Remeron to 30 mg q.h.s.               6 mg melatonin qhs    7. Depression/ anxiety       Continue BuSpar with primary care           Consider dose adjustment or alternative medication next week with PCP         Social work to help with counseling    8. Pancytopenia secondary to chemo---       Resolution of neutropenia STILL WITH LOW PLAT     Symptomatic anemia---transfuse 1 units of packed red blood cells today    Other Recs   - EGD non urgent      Can be seen as an outpatient----while pathology right this is a unknown primary.  And potential GI origin, there is no indication on CT or PET.  We will complete scope at some point  - okay for  Oxycodon IR 5mg Q4H for cancer related pain if needed        Will keep PICC line---dressing change weekly---consider port if responding to therapy  Hold metoprolol if systolic blood pressure less than 120 or heart rate <60  Hold chemotherapy today.    Transfuse 1 units of packed red blood cells   Recheck CBC for  NEX Thursday and consider re-initiation of therapy.  WITH CARBO Auc 5 WITH  LOW PLAT  Continue palliative care   Encourage mobility,   Long discussion with the patient the family regarding end of life care.    Discussed that if his calcium increases again and he continues to have more symptoms it may be more beneficial for us to not treat his hypercalcemia.  Discussed the need for further advanced directive discussion.    CPR mechanical ventilation would be futile.     Follow up in about 1 week (around 5/29/2025) for lab cbc/cmp/t&s and tx only then 3 weeks labs, TX and same day TX.   Orders Placed This Encounter    Type & Screen    Prepare RBC 1 Unit     I, Bridgett Aldridge LPN, acted solely as a scribe for and in the presence of, Dr. Niko Johnson who performed the service.     Niko Johnson MD           [1]   Social History  Socioeconomic History    Marital status:    Tobacco Use    Smoking status: Never    Smokeless tobacco: Never   Substance and Sexual Activity    Alcohol use: Not Currently    Drug use: Never     Social Drivers of Health     Financial Resource Strain: Low Risk  (4/5/2025)    Overall Financial Resource Strain (CARDIA)     Difficulty of Paying Living Expenses: Not hard at all   Food Insecurity: No Food Insecurity (4/5/2025)    Hunger Vital Sign     Worried About Running Out of Food in the Last Year: Never true     Ran Out of Food in the Last Year: Never true   Transportation Needs: No Transportation Needs (4/4/2025)    PRAPARE - Transportation     Lack of Transportation (Medical): No     Lack of Transportation (Non-Medical): No   Stress: No Stress Concern Present (4/5/2025)    [employer] [place] [employer] - Occupational Stress Questionnaire     Feeling of Stress : Not at all   Housing Stability: Low Risk  (4/5/2025)    Housing Stability Vital Sign     Unable to Pay for Housing in the Last Year: No     Homeless in the Last Year: No   [2]   Current Outpatient Medications:     albuterol (ACCUNEB) 0.63 mg/3 mL Nebu, Take 0.63 mg by nebulization  every 6 (six) hours as needed (wheezing and shortness of breath). Rescue, Disp: , Rfl:     ALPRAZolam (XANAX) 0.25 MG tablet, Take 0.25 mg by mouth 3 (three) times daily as needed for Anxiety., Disp: , Rfl:     aspirin (ECOTRIN) 81 MG EC tablet, Take 81 mg by mouth once daily., Disp: , Rfl:     busPIRone (BUSPAR) 5 MG Tab, Take 5 mg by mouth 2 (two) times daily., Disp: , Rfl:     famotidine (PEPCID) 20 MG tablet, Take 20 mg by mouth once daily., Disp: , Rfl:     gabapentin (NEURONTIN) 300 MG capsule, Take 300 mg by mouth 3 (three) times daily., Disp: , Rfl:     guaiFENesin-codeine 100-10 mg/5 ml (TUSSI-ORGANIDIN NR)  mg/5 mL syrup, Take 5 mLs by mouth every 4 (four) hours as needed for Cough or Congestion., Disp: 100 mL, Rfl: 0    metFORMIN (GLUCOPHAGE) 1000 MG tablet, Take 2,000 mg by mouth once daily., Disp: , Rfl:     mirtazapine (REMERON) 30 MG tablet, Take 1 tablet (30 mg total) by mouth every evening., Disp: 30 tablet, Rfl: 1    polyethylene glycol (GLYCOLAX) 17 gram PwPk, Take 17 g by mouth once daily., Disp: , Rfl:     tamsulosin (FLOMAX) 0.4 mg Cap, Take 0.4 mg by mouth once daily., Disp: , Rfl:     umeclidinium (INCRUSE ELLIPTA) 62.5 mcg/actuation inhalation capsule, Inhale 62.5 mcg into the lungs once daily. Controller, Disp: 30 each, Rfl: 0    glimepiride (AMARYL) 2 MG tablet, Take 1 tablet (2 mg total) by mouth 2 (two) times a day., Disp: 60 tablet, Rfl: 0    metoprolol succinate (TOPROL-XL) 25 MG 24 hr tablet, Take 0.5 tablets (12.5 mg total) by mouth once daily., Disp: 15 tablet, Rfl: 0    Current Facility-Administered Medications:     0.9%  NaCl infusion (for blood administration), , Intravenous, Once, Niko Johnson MD    0.9%  NaCl infusion (for blood administration), , Intravenous, Once, Niko Johnson MD

## 2025-05-22 ENCOUNTER — HOSPITAL ENCOUNTER (OUTPATIENT)
Dept: RADIOLOGY | Facility: HOSPITAL | Age: 85
Discharge: HOME | End: 2025-05-22
Attending: INTERNAL MEDICINE
Payer: MEDICARE

## 2025-05-22 ENCOUNTER — OFFICE VISIT (OUTPATIENT)
Dept: HEMATOLOGY/ONCOLOGY | Facility: CLINIC | Age: 85
End: 2025-05-22
Payer: MEDICARE

## 2025-05-22 ENCOUNTER — INFUSION (OUTPATIENT)
Dept: INFUSION THERAPY | Facility: HOSPITAL | Age: 85
End: 2025-05-22
Attending: INTERNAL MEDICINE
Payer: MEDICARE

## 2025-05-22 VITALS
RESPIRATION RATE: 18 BRPM | HEART RATE: 97 BPM | BODY MASS INDEX: 25.77 KG/M2 | WEIGHT: 180 LBS | OXYGEN SATURATION: 92 % | HEIGHT: 70 IN | DIASTOLIC BLOOD PRESSURE: 68 MMHG | TEMPERATURE: 98 F | SYSTOLIC BLOOD PRESSURE: 110 MMHG

## 2025-05-22 VITALS
DIASTOLIC BLOOD PRESSURE: 86 MMHG | SYSTOLIC BLOOD PRESSURE: 125 MMHG | RESPIRATION RATE: 18 BRPM | HEART RATE: 75 BPM | TEMPERATURE: 98 F

## 2025-05-22 DIAGNOSIS — J91.0 MALIGNANT PLEURAL EFFUSION: ICD-10-CM

## 2025-05-22 DIAGNOSIS — C79.51 METASTASIS TO BONE: ICD-10-CM

## 2025-05-22 DIAGNOSIS — C77.1 SECONDARY MALIGNANCY OF MEDIASTINAL LYMPH NODES: ICD-10-CM

## 2025-05-22 DIAGNOSIS — C80.1 ADENOCARCINOMA OF UNKNOWN PRIMARY: Primary | ICD-10-CM

## 2025-05-22 DIAGNOSIS — C80.1 ADENOCARCINOMA OF UNKNOWN PRIMARY: ICD-10-CM

## 2025-05-22 DIAGNOSIS — D61.810 PANCYTOPENIA DUE TO CHEMOTHERAPY: ICD-10-CM

## 2025-05-22 DIAGNOSIS — J91.0 MALIGNANT PLEURAL EFFUSION: Primary | ICD-10-CM

## 2025-05-22 DIAGNOSIS — J90 PLEURAL EFFUSION: ICD-10-CM

## 2025-05-22 DIAGNOSIS — R06.02 SOB (SHORTNESS OF BREATH): ICD-10-CM

## 2025-05-22 LAB
ABO + RH BLD: NORMAL
BLD PROD TYP BPU: NORMAL
BLOOD UNIT EXPIRATION DATE: NORMAL
BLOOD UNIT TYPE CODE: 6200
CROSSMATCH INTERPRETATION: NORMAL
DISPENSE STATUS: NORMAL
UNIT NUMBER: NORMAL

## 2025-05-22 PROCEDURE — 99999 PR PBB SHADOW E&M-EST. PATIENT-LVL IV: CPT | Mod: PBBFAC,,, | Performed by: INTERNAL MEDICINE

## 2025-05-22 PROCEDURE — 99214 OFFICE O/P EST MOD 30 MIN: CPT | Mod: PBBFAC | Performed by: INTERNAL MEDICINE

## 2025-05-22 PROCEDURE — 36430 TRANSFUSION BLD/BLD COMPNT: CPT

## 2025-05-22 PROCEDURE — 99215 OFFICE O/P EST HI 40 MIN: CPT | Mod: S$PBB,,, | Performed by: INTERNAL MEDICINE

## 2025-05-22 PROCEDURE — P9016 RBC LEUKOCYTES REDUCED: HCPCS | Performed by: INTERNAL MEDICINE

## 2025-05-22 PROCEDURE — 71046 X-RAY EXAM CHEST 2 VIEWS: CPT | Mod: TC

## 2025-05-22 PROCEDURE — 25000003 PHARM REV CODE 250: Performed by: INTERNAL MEDICINE

## 2025-05-22 PROCEDURE — 86923 COMPATIBILITY TEST ELECTRIC: CPT | Performed by: INTERNAL MEDICINE

## 2025-05-22 RX ORDER — ACETAMINOPHEN 325 MG/1
650 TABLET ORAL ONCE
Status: COMPLETED | OUTPATIENT
Start: 2025-05-22 | End: 2025-05-22

## 2025-05-22 RX ORDER — DIPHENHYDRAMINE HCL 25 MG
25 CAPSULE ORAL ONCE
Status: COMPLETED | OUTPATIENT
Start: 2025-05-22 | End: 2025-05-22

## 2025-05-22 RX ORDER — HYDROCODONE BITARTRATE AND ACETAMINOPHEN 500; 5 MG/1; MG/1
TABLET ORAL ONCE
Status: CANCELLED | OUTPATIENT
Start: 2025-05-22 | End: 2025-05-22

## 2025-05-22 RX ORDER — DIPHENHYDRAMINE HCL 25 MG
25 CAPSULE ORAL ONCE
Status: CANCELLED | OUTPATIENT
Start: 2025-05-22

## 2025-05-22 RX ORDER — ACETAMINOPHEN 325 MG/1
650 TABLET ORAL ONCE
Status: CANCELLED | OUTPATIENT
Start: 2025-05-22

## 2025-05-22 RX ORDER — HYDROCODONE BITARTRATE AND ACETAMINOPHEN 500; 5 MG/1; MG/1
TABLET ORAL ONCE
Status: ACTIVE | OUTPATIENT
Start: 2025-05-22

## 2025-05-22 RX ADMIN — ACETAMINOPHEN 650 MG: 325 TABLET ORAL at 12:05

## 2025-05-22 RX ADMIN — DIPHENHYDRAMINE HYDROCHLORIDE 25 MG: 25 CAPSULE ORAL at 12:05

## 2025-05-29 ENCOUNTER — INFUSION (OUTPATIENT)
Dept: INFUSION THERAPY | Facility: HOSPITAL | Age: 85
End: 2025-05-29
Attending: INTERNAL MEDICINE
Payer: MEDICARE

## 2025-05-29 VITALS
BODY MASS INDEX: 25.77 KG/M2 | HEIGHT: 70 IN | SYSTOLIC BLOOD PRESSURE: 126 MMHG | HEART RATE: 80 BPM | RESPIRATION RATE: 16 BRPM | DIASTOLIC BLOOD PRESSURE: 85 MMHG | OXYGEN SATURATION: 96 % | TEMPERATURE: 98 F | WEIGHT: 180 LBS

## 2025-05-29 DIAGNOSIS — C79.51 METASTASIS TO BONE: ICD-10-CM

## 2025-05-29 DIAGNOSIS — J91.0 MALIGNANT PLEURAL EFFUSION: Primary | ICD-10-CM

## 2025-05-29 DIAGNOSIS — C80.1 ADENOCARCINOMA OF UNKNOWN PRIMARY: ICD-10-CM

## 2025-05-29 PROCEDURE — 25000003 PHARM REV CODE 250: Performed by: INTERNAL MEDICINE

## 2025-05-29 PROCEDURE — 96367 TX/PROPH/DG ADDL SEQ IV INF: CPT

## 2025-05-29 PROCEDURE — 96375 TX/PRO/DX INJ NEW DRUG ADDON: CPT

## 2025-05-29 PROCEDURE — 63600175 PHARM REV CODE 636 W HCPCS: Performed by: INTERNAL MEDICINE

## 2025-05-29 PROCEDURE — 96417 CHEMO IV INFUS EACH ADDL SEQ: CPT

## 2025-05-29 PROCEDURE — 96415 CHEMO IV INFUSION ADDL HR: CPT

## 2025-05-29 PROCEDURE — 96413 CHEMO IV INFUSION 1 HR: CPT

## 2025-05-29 RX ORDER — HEPARIN 100 UNIT/ML
500 SYRINGE INTRAVENOUS
Status: CANCELLED | OUTPATIENT
Start: 2025-05-29

## 2025-05-29 RX ORDER — EPINEPHRINE 0.3 MG/.3ML
0.3 INJECTION SUBCUTANEOUS ONCE AS NEEDED
Status: DISCONTINUED | OUTPATIENT
Start: 2025-05-29 | End: 2025-05-29 | Stop reason: HOSPADM

## 2025-05-29 RX ORDER — EPINEPHRINE 0.3 MG/.3ML
0.3 INJECTION SUBCUTANEOUS ONCE AS NEEDED
Status: CANCELLED | OUTPATIENT
Start: 2025-05-29

## 2025-05-29 RX ORDER — DIPHENHYDRAMINE HYDROCHLORIDE 50 MG/ML
50 INJECTION, SOLUTION INTRAMUSCULAR; INTRAVENOUS ONCE AS NEEDED
Status: DISCONTINUED | OUTPATIENT
Start: 2025-05-29 | End: 2025-05-29 | Stop reason: HOSPADM

## 2025-05-29 RX ORDER — HEPARIN 100 UNIT/ML
500 SYRINGE INTRAVENOUS
Status: DISCONTINUED | OUTPATIENT
Start: 2025-05-29 | End: 2025-05-29 | Stop reason: HOSPADM

## 2025-05-29 RX ORDER — FAMOTIDINE 10 MG/ML
20 INJECTION, SOLUTION INTRAVENOUS
Status: CANCELLED | OUTPATIENT
Start: 2025-05-29

## 2025-05-29 RX ORDER — SODIUM CHLORIDE 0.9 % (FLUSH) 0.9 %
10 SYRINGE (ML) INJECTION
Status: DISCONTINUED | OUTPATIENT
Start: 2025-05-29 | End: 2025-05-29 | Stop reason: HOSPADM

## 2025-05-29 RX ORDER — DIPHENHYDRAMINE HYDROCHLORIDE 50 MG/ML
50 INJECTION, SOLUTION INTRAMUSCULAR; INTRAVENOUS ONCE AS NEEDED
Status: CANCELLED | OUTPATIENT
Start: 2025-05-29

## 2025-05-29 RX ORDER — FAMOTIDINE 10 MG/ML
20 INJECTION, SOLUTION INTRAVENOUS
Status: COMPLETED | OUTPATIENT
Start: 2025-05-29 | End: 2025-05-29

## 2025-05-29 RX ORDER — SODIUM CHLORIDE 0.9 % (FLUSH) 0.9 %
10 SYRINGE (ML) INJECTION
Status: CANCELLED | OUTPATIENT
Start: 2025-05-29

## 2025-05-29 RX ADMIN — APREPITANT 130 MG: 130 INJECTION, EMULSION INTRAVENOUS at 11:05

## 2025-05-29 RX ADMIN — HEPARIN 500 UNITS: 100 SYRINGE at 03:05

## 2025-05-29 RX ADMIN — DIPHENHYDRAMINE HYDROCHLORIDE 12.5 MG: 50 INJECTION INTRAMUSCULAR; INTRAVENOUS at 11:05

## 2025-05-29 RX ADMIN — PEGFILGRASTIM 6 MG: KIT SUBCUTANEOUS at 03:05

## 2025-05-29 RX ADMIN — CARBOPLATIN 505 MG: 10 INJECTION, SOLUTION INTRAVENOUS at 03:05

## 2025-05-29 RX ADMIN — FAMOTIDINE 20 MG: 10 INJECTION, SOLUTION INTRAVENOUS at 11:05

## 2025-05-29 RX ADMIN — DEXAMETHASONE SODIUM PHOSPHATE 0.25 MG: 4 INJECTION, SOLUTION INTRA-ARTICULAR; INTRALESIONAL; INTRAMUSCULAR; INTRAVENOUS; SOFT TISSUE at 11:05

## 2025-05-29 RX ADMIN — PACLITAXEL 282 MG: 6 INJECTION, SOLUTION INTRAVENOUS at 12:05

## 2025-06-03 ENCOUNTER — EXTERNAL HOME HEALTH (OUTPATIENT)
Dept: HOME HEALTH SERVICES | Facility: HOSPITAL | Age: 85
End: 2025-06-03
Payer: MEDICARE

## 2025-06-03 ENCOUNTER — TELEPHONE (OUTPATIENT)
Dept: HEMATOLOGY/ONCOLOGY | Facility: CLINIC | Age: 85
End: 2025-06-03
Payer: MEDICARE

## 2025-06-09 ENCOUNTER — TELEPHONE (OUTPATIENT)
Dept: HEMATOLOGY/ONCOLOGY | Facility: CLINIC | Age: 85
End: 2025-06-09
Payer: MEDICARE

## 2025-06-09 PROCEDURE — G0179 MD RECERTIFICATION HHA PT: HCPCS | Mod: ,,, | Performed by: INTERNAL MEDICINE

## 2025-06-09 NOTE — TELEPHONE ENCOUNTER
Received call from patient's daughter, Sylvia. She states that patient's condition is mostly unchanged from when we spoke on 6/3/25 (see telephone call). He is not eating and drinking very much. He occasionally hallucinates when he gets very anxious. She is now saying that he didn't actually have a fall last week but had an episode of weakness and what sounds like a possible assisted fall. No injury per Sylvia. Sylvia feels that a lot of his symptoms are from not eating or drinking well and his anxiety. I explained that that could be the case but the recommendation was for the patient to be evaluated to see which symptoms were reversible and what the cause was. She verbalized understanding. Patient does not wish to go to the ER. They are requesting an office visit with Dr. Johnson. She feels that they could get him here for an office visit. They are scheduled for PET 6/12 and office visit 6/19. They are requesting a sooner appointment.     We also discussed palliative care and hospice care and the differences. Patient is interested in possible palliative care. He would like to try to continue chemotherapy.

## 2025-06-10 NOTE — TELEPHONE ENCOUNTER
I can contact home health to see if this can be arranged. Patient and family member have expressed that they would really like to speak with Dr. Johnson sooner than their next visit. Whether that is a virtual visit, phone call, or office visit.

## 2025-06-10 NOTE — TELEPHONE ENCOUNTER
Spoke with Carol at [facility]. They are going to see patient tomorrow 6/11/25. They will check CBC and CMP. Along with vitals and orthostatic vitals. Patient scheduled to see Dr. Johnson on 6/13/25.

## 2025-06-10 NOTE — TELEPHONE ENCOUNTER
Spoke with patient's daughter Sylvia. She is aware of 6/13 appt and that home health will draw labs and check vitals 6/11.

## 2025-06-11 ENCOUNTER — LAB REQUISITION (OUTPATIENT)
Dept: LAB | Facility: HOSPITAL | Age: 85
End: 2025-06-11
Payer: MEDICARE

## 2025-06-11 DIAGNOSIS — C77.1 SECONDARY AND UNSPECIFIED MALIGNANT NEOPLASM OF INTRATHORACIC LYMPH NODES: ICD-10-CM

## 2025-06-11 DIAGNOSIS — C34.91 MALIGNANT NEOPLASM OF UNSPECIFIED PART OF RIGHT BRONCHUS OR LUNG: ICD-10-CM

## 2025-06-11 DIAGNOSIS — C79.51 SECONDARY MALIGNANT NEOPLASM OF BONE: ICD-10-CM

## 2025-06-11 DIAGNOSIS — C78.2 SECONDARY MALIGNANT NEOPLASM OF PLEURA: ICD-10-CM

## 2025-06-11 LAB
ALBUMIN SERPL-MCNC: 2.2 G/DL (ref 3.4–4.8)
ALBUMIN/GLOB SERPL: 0.6 RATIO (ref 1.1–2)
ALP SERPL-CCNC: 146 UNIT/L (ref 40–150)
ALT SERPL-CCNC: 6 UNIT/L (ref 0–55)
ANION GAP SERPL CALC-SCNC: 6 MEQ/L
ANISOCYTOSIS BLD QL SMEAR: ABNORMAL
AST SERPL-CCNC: 13 UNIT/L (ref 11–45)
BASOPHILS # BLD AUTO: 0.02 X10(3)/MCL
BASOPHILS NFR BLD AUTO: 0.3 %
BILIRUB SERPL-MCNC: 0.4 MG/DL
BUN SERPL-MCNC: 16.7 MG/DL (ref 8.4–25.7)
CALCIUM SERPL-MCNC: 8.2 MG/DL (ref 8.8–10)
CHLORIDE SERPL-SCNC: 105 MMOL/L (ref 98–107)
CO2 SERPL-SCNC: 35 MMOL/L (ref 23–31)
CREAT SERPL-MCNC: 0.87 MG/DL (ref 0.72–1.25)
CREAT/UREA NIT SERPL: 19
EOSINOPHIL # BLD AUTO: 0.04 X10(3)/MCL (ref 0–0.9)
EOSINOPHIL NFR BLD AUTO: 0.6 %
ERYTHROCYTE [DISTWIDTH] IN BLOOD BY AUTOMATED COUNT: 17.5 % (ref 11.5–17)
GFR SERPLBLD CREATININE-BSD FMLA CKD-EPI: >60 ML/MIN/1.73/M2
GLOBULIN SER-MCNC: 3.7 GM/DL (ref 2.4–3.5)
GLUCOSE SERPL-MCNC: 148 MG/DL (ref 82–115)
HCT VFR BLD AUTO: 22.4 % (ref 42–52)
HGB BLD-MCNC: 6.8 G/DL (ref 14–18)
HYPOCHROMIA BLD QL SMEAR: ABNORMAL
IMM GRANULOCYTES # BLD AUTO: 0.06 X10(3)/MCL (ref 0–0.04)
IMM GRANULOCYTES NFR BLD AUTO: 0.9 %
LYMPHOCYTES # BLD AUTO: 1.46 X10(3)/MCL (ref 0.6–4.6)
LYMPHOCYTES NFR BLD AUTO: 22.8 %
MACROCYTES BLD QL SMEAR: ABNORMAL
MCH RBC QN AUTO: 31.2 PG (ref 27–31)
MCHC RBC AUTO-ENTMCNC: 30.4 G/DL (ref 33–36)
MCV RBC AUTO: 102.8 FL (ref 80–94)
MONOCYTES # BLD AUTO: 0.58 X10(3)/MCL (ref 0.1–1.3)
MONOCYTES NFR BLD AUTO: 9 %
NEUTROPHILS # BLD AUTO: 4.25 X10(3)/MCL (ref 2.1–9.2)
NEUTROPHILS NFR BLD AUTO: 66.4 %
PLATELET # BLD AUTO: 11 X10(3)/MCL (ref 130–400)
PLATELET # BLD EST: ABNORMAL 10*3/UL
PMV BLD AUTO: 13.4 FL (ref 7.4–10.4)
POTASSIUM SERPL-SCNC: 3.8 MMOL/L (ref 3.5–5.1)
PROT SERPL-MCNC: 5.9 GM/DL (ref 5.8–7.6)
RBC # BLD AUTO: 2.18 X10(6)/MCL (ref 4.7–6.1)
RBC MORPH BLD: ABNORMAL
SODIUM SERPL-SCNC: 146 MMOL/L (ref 136–145)
WBC # BLD AUTO: 6.41 X10(3)/MCL (ref 4.5–11.5)

## 2025-06-11 PROCEDURE — 80053 COMPREHEN METABOLIC PANEL: CPT | Performed by: INTERNAL MEDICINE

## 2025-06-11 PROCEDURE — 85025 COMPLETE CBC W/AUTO DIFF WBC: CPT | Performed by: INTERNAL MEDICINE

## 2025-06-12 ENCOUNTER — TELEPHONE (OUTPATIENT)
Dept: HEMATOLOGY/ONCOLOGY | Facility: CLINIC | Age: 85
End: 2025-06-12
Payer: MEDICARE

## 2025-06-12 ENCOUNTER — HOSPITAL ENCOUNTER (INPATIENT)
Facility: HOSPITAL | Age: 85
LOS: 6 days | Discharge: HOME HEALTH | DRG: 812 | End: 2025-06-18
Attending: STUDENT IN AN ORGANIZED HEALTH CARE EDUCATION/TRAINING PROGRAM | Admitting: INTERNAL MEDICINE
Payer: MEDICARE

## 2025-06-12 DIAGNOSIS — D64.9 SYMPTOMATIC ANEMIA: Primary | ICD-10-CM

## 2025-06-12 DIAGNOSIS — R07.9 CHEST PAIN: ICD-10-CM

## 2025-06-12 DIAGNOSIS — C34.90 MALIGNANT NEOPLASM OF LUNG, UNSPECIFIED LATERALITY, UNSPECIFIED PART OF LUNG: ICD-10-CM

## 2025-06-12 DIAGNOSIS — D69.6 THROMBOCYTOPENIA: ICD-10-CM

## 2025-06-12 LAB
ABO + RH BLD: NORMAL
ALBUMIN SERPL-MCNC: 2.4 G/DL (ref 3.4–4.8)
ALBUMIN/GLOB SERPL: 0.5 RATIO (ref 1.1–2)
ALP SERPL-CCNC: 156 UNIT/L (ref 40–150)
ALT SERPL-CCNC: 6 UNIT/L (ref 0–55)
ANION GAP SERPL CALC-SCNC: 10 MEQ/L
AST SERPL-CCNC: 14 UNIT/L (ref 11–45)
BASOPHILS # BLD AUTO: 0.08 X10(3)/MCL
BASOPHILS NFR BLD AUTO: 0.9 %
BILIRUB SERPL-MCNC: <0.5 MG/DL
BLD PROD TYP BPU: NORMAL
BLOOD UNIT EXPIRATION DATE: NORMAL
BLOOD UNIT TYPE CODE: 6200
BUN SERPL-MCNC: 15.5 MG/DL (ref 8.4–25.7)
CALCIUM SERPL-MCNC: 8.4 MG/DL (ref 8.8–10)
CHLORIDE SERPL-SCNC: 104 MMOL/L (ref 98–107)
CO2 SERPL-SCNC: 30 MMOL/L (ref 23–31)
CREAT SERPL-MCNC: 0.86 MG/DL (ref 0.72–1.25)
CREAT/UREA NIT SERPL: 18
CROSSMATCH INTERPRETATION: NORMAL
DISPENSE STATUS: NORMAL
EOSINOPHIL # BLD AUTO: 0.03 X10(3)/MCL (ref 0–0.9)
EOSINOPHIL NFR BLD AUTO: 0.3 %
ERYTHROCYTE [DISTWIDTH] IN BLOOD BY AUTOMATED COUNT: 18.1 % (ref 11.5–17)
FERRITIN SERPL-MCNC: 1641.81 NG/ML (ref 21.81–274.66)
GFR SERPLBLD CREATININE-BSD FMLA CKD-EPI: >60 ML/MIN/1.73/M2
GLOBULIN SER-MCNC: 4.5 GM/DL (ref 2.4–3.5)
GLUCOSE SERPL-MCNC: 112 MG/DL (ref 82–115)
GROUP & RH: NORMAL
HCT VFR BLD AUTO: 24.5 % (ref 42–52)
HEMATOLOGIST REVIEW: NORMAL
HGB BLD-MCNC: 7.3 G/DL (ref 14–18)
IMM GRANULOCYTES # BLD AUTO: 0.08 X10(3)/MCL (ref 0–0.04)
IMM GRANULOCYTES NFR BLD AUTO: 0.9 %
INDIRECT COOMBS: NORMAL
INR PPP: 1
IRON SATN MFR SERPL: 50 % (ref 20–50)
IRON SERPL-MCNC: 70 UG/DL (ref 65–175)
LYMPHOCYTES # BLD AUTO: 1.99 X10(3)/MCL (ref 0.6–4.6)
LYMPHOCYTES NFR BLD AUTO: 22.7 %
MCH RBC QN AUTO: 30.2 PG (ref 27–31)
MCHC RBC AUTO-ENTMCNC: 29.8 G/DL (ref 33–36)
MCV RBC AUTO: 101.2 FL (ref 80–94)
MONOCYTES # BLD AUTO: 0.77 X10(3)/MCL (ref 0.1–1.3)
MONOCYTES NFR BLD AUTO: 8.8 %
NEUTROPHILS # BLD AUTO: 5.8 X10(3)/MCL (ref 2.1–9.2)
NEUTROPHILS NFR BLD AUTO: 66.4 %
NRBC BLD AUTO-RTO: 0 %
PLATELET # BLD AUTO: 11 X10(3)/MCL (ref 130–400)
PLATELETS.RETICULATED NFR BLD AUTO: 4.6 % (ref 0.9–11.2)
PMV BLD AUTO: ABNORMAL FL
POCT GLUCOSE: 106 MG/DL (ref 70–110)
POTASSIUM SERPL-SCNC: 4.2 MMOL/L (ref 3.5–5.1)
PROT SERPL-MCNC: 6.9 GM/DL (ref 5.8–7.6)
PROTHROMBIN TIME: 13.3 SECONDS (ref 12.5–14.5)
RBC # BLD AUTO: 2.42 X10(6)/MCL (ref 4.7–6.1)
SODIUM SERPL-SCNC: 144 MMOL/L (ref 136–145)
SPECIMEN OUTDATE: NORMAL
TIBC SERPL-MCNC: 139 UG/DL (ref 250–450)
TIBC SERPL-MCNC: 69 UG/DL (ref 60–240)
TRANSFERRIN SERPL-MCNC: 109 MG/DL
UNIT NUMBER: NORMAL
VIT B12 SERPL-MCNC: >2000 PG/ML (ref 213–816)
WBC # BLD AUTO: 8.75 X10(3)/MCL (ref 4.5–11.5)

## 2025-06-12 PROCEDURE — 86923 COMPATIBILITY TEST ELECTRIC: CPT | Mod: 91 | Performed by: STUDENT IN AN ORGANIZED HEALTH CARE EDUCATION/TRAINING PROGRAM

## 2025-06-12 PROCEDURE — 30233N1 TRANSFUSION OF NONAUTOLOGOUS RED BLOOD CELLS INTO PERIPHERAL VEIN, PERCUTANEOUS APPROACH: ICD-10-PCS | Performed by: STUDENT IN AN ORGANIZED HEALTH CARE EDUCATION/TRAINING PROGRAM

## 2025-06-12 PROCEDURE — 27000221 HC OXYGEN, UP TO 24 HOURS

## 2025-06-12 PROCEDURE — 82607 VITAMIN B-12: CPT | Performed by: INTERNAL MEDICINE

## 2025-06-12 PROCEDURE — 25000003 PHARM REV CODE 250: Performed by: INTERNAL MEDICINE

## 2025-06-12 PROCEDURE — 25000003 PHARM REV CODE 250: Performed by: CLINICAL NURSE SPECIALIST

## 2025-06-12 PROCEDURE — P9037 PLATE PHERES LEUKOREDU IRRAD: HCPCS | Performed by: STUDENT IN AN ORGANIZED HEALTH CARE EDUCATION/TRAINING PROGRAM

## 2025-06-12 PROCEDURE — 82728 ASSAY OF FERRITIN: CPT | Performed by: INTERNAL MEDICINE

## 2025-06-12 PROCEDURE — 11000001 HC ACUTE MED/SURG PRIVATE ROOM

## 2025-06-12 PROCEDURE — 83550 IRON BINDING TEST: CPT | Performed by: INTERNAL MEDICINE

## 2025-06-12 PROCEDURE — 80053 COMPREHEN METABOLIC PANEL: CPT

## 2025-06-12 PROCEDURE — 86850 RBC ANTIBODY SCREEN: CPT

## 2025-06-12 PROCEDURE — 99223 1ST HOSP IP/OBS HIGH 75: CPT | Mod: ,,, | Performed by: INTERNAL MEDICINE

## 2025-06-12 PROCEDURE — P9016 RBC LEUKOCYTES REDUCED: HCPCS | Performed by: STUDENT IN AN ORGANIZED HEALTH CARE EDUCATION/TRAINING PROGRAM

## 2025-06-12 PROCEDURE — 36430 TRANSFUSION BLD/BLD COMPNT: CPT

## 2025-06-12 PROCEDURE — 85025 COMPLETE CBC W/AUTO DIFF WBC: CPT

## 2025-06-12 PROCEDURE — 99285 EMERGENCY DEPT VISIT HI MDM: CPT | Mod: 25

## 2025-06-12 PROCEDURE — 85610 PROTHROMBIN TIME: CPT

## 2025-06-12 PROCEDURE — 30233R1 TRANSFUSION OF NONAUTOLOGOUS PLATELETS INTO PERIPHERAL VEIN, PERCUTANEOUS APPROACH: ICD-10-PCS | Performed by: STUDENT IN AN ORGANIZED HEALTH CARE EDUCATION/TRAINING PROGRAM

## 2025-06-12 RX ORDER — TAMSULOSIN HYDROCHLORIDE 0.4 MG/1
0.4 CAPSULE ORAL DAILY
Status: DISCONTINUED | OUTPATIENT
Start: 2025-06-13 | End: 2025-06-18 | Stop reason: HOSPADM

## 2025-06-12 RX ORDER — ONDANSETRON HYDROCHLORIDE 2 MG/ML
4 INJECTION, SOLUTION INTRAVENOUS EVERY 6 HOURS PRN
Status: DISCONTINUED | OUTPATIENT
Start: 2025-06-12 | End: 2025-06-18 | Stop reason: HOSPADM

## 2025-06-12 RX ORDER — BUSPIRONE HYDROCHLORIDE 5 MG/1
10 TABLET ORAL 2 TIMES DAILY
Status: DISCONTINUED | OUTPATIENT
Start: 2025-06-12 | End: 2025-06-13

## 2025-06-12 RX ORDER — HYDROCODONE BITARTRATE AND ACETAMINOPHEN 500; 5 MG/1; MG/1
TABLET ORAL
Status: DISCONTINUED | OUTPATIENT
Start: 2025-06-12 | End: 2025-06-14

## 2025-06-12 RX ORDER — GABAPENTIN 300 MG/1
300 CAPSULE ORAL 2 TIMES DAILY
Status: DISCONTINUED | OUTPATIENT
Start: 2025-06-12 | End: 2025-06-16

## 2025-06-12 RX ORDER — IBUPROFEN 200 MG
16 TABLET ORAL
Status: DISCONTINUED | OUTPATIENT
Start: 2025-06-12 | End: 2025-06-18 | Stop reason: HOSPADM

## 2025-06-12 RX ORDER — INSULIN ASPART 100 [IU]/ML
0-5 INJECTION, SOLUTION INTRAVENOUS; SUBCUTANEOUS
Status: DISCONTINUED | OUTPATIENT
Start: 2025-06-12 | End: 2025-06-18 | Stop reason: HOSPADM

## 2025-06-12 RX ORDER — GLUCAGON 1 MG
1 KIT INJECTION
Status: DISCONTINUED | OUTPATIENT
Start: 2025-06-12 | End: 2025-06-18 | Stop reason: HOSPADM

## 2025-06-12 RX ORDER — MIRTAZAPINE 15 MG/1
30 TABLET, FILM COATED ORAL NIGHTLY
Status: DISCONTINUED | OUTPATIENT
Start: 2025-06-12 | End: 2025-06-18 | Stop reason: HOSPADM

## 2025-06-12 RX ORDER — BENZONATATE 100 MG/1
100 CAPSULE ORAL 3 TIMES DAILY PRN
Status: DISCONTINUED | OUTPATIENT
Start: 2025-06-12 | End: 2025-06-18 | Stop reason: HOSPADM

## 2025-06-12 RX ORDER — ACETAMINOPHEN 325 MG/1
650 TABLET ORAL EVERY 6 HOURS PRN
Status: DISCONTINUED | OUTPATIENT
Start: 2025-06-12 | End: 2025-06-18 | Stop reason: HOSPADM

## 2025-06-12 RX ORDER — POLYETHYLENE GLYCOL 3350 17 G/17G
17 POWDER, FOR SOLUTION ORAL DAILY
Status: DISCONTINUED | OUTPATIENT
Start: 2025-06-13 | End: 2025-06-15

## 2025-06-12 RX ORDER — CALCIUM CARBONATE 200(500)MG
500 TABLET,CHEWABLE ORAL 3 TIMES DAILY PRN
Status: DISCONTINUED | OUTPATIENT
Start: 2025-06-12 | End: 2025-06-18 | Stop reason: HOSPADM

## 2025-06-12 RX ORDER — HYDRALAZINE HYDROCHLORIDE 20 MG/ML
10 INJECTION INTRAMUSCULAR; INTRAVENOUS EVERY 6 HOURS PRN
Status: DISCONTINUED | OUTPATIENT
Start: 2025-06-12 | End: 2025-06-18 | Stop reason: HOSPADM

## 2025-06-12 RX ORDER — MUPIROCIN 20 MG/G
OINTMENT TOPICAL 2 TIMES DAILY
Status: DISPENSED | OUTPATIENT
Start: 2025-06-12 | End: 2025-06-17

## 2025-06-12 RX ORDER — SODIUM CHLORIDE 0.9 % (FLUSH) 0.9 %
10 SYRINGE (ML) INJECTION EVERY 12 HOURS PRN
Status: DISCONTINUED | OUTPATIENT
Start: 2025-06-12 | End: 2025-06-18 | Stop reason: HOSPADM

## 2025-06-12 RX ORDER — IBUPROFEN 200 MG
24 TABLET ORAL
Status: DISCONTINUED | OUTPATIENT
Start: 2025-06-12 | End: 2025-06-18 | Stop reason: HOSPADM

## 2025-06-12 RX ORDER — ALPRAZOLAM 0.25 MG/1
0.25 TABLET ORAL 3 TIMES DAILY PRN
Status: DISCONTINUED | OUTPATIENT
Start: 2025-06-12 | End: 2025-06-18 | Stop reason: HOSPADM

## 2025-06-12 RX ORDER — CYPROHEPTADINE HYDROCHLORIDE 4 MG/1
4 TABLET ORAL 3 TIMES DAILY
Status: DISCONTINUED | OUTPATIENT
Start: 2025-06-12 | End: 2025-06-18 | Stop reason: HOSPADM

## 2025-06-12 RX ORDER — GUAIFENESIN 100 MG/5ML
200 LIQUID ORAL EVERY 4 HOURS PRN
Status: DISCONTINUED | OUTPATIENT
Start: 2025-06-12 | End: 2025-06-18 | Stop reason: HOSPADM

## 2025-06-12 RX ORDER — ASPIRIN 81 MG/1
81 TABLET ORAL DAILY
Status: DISCONTINUED | OUTPATIENT
Start: 2025-06-13 | End: 2025-06-18 | Stop reason: HOSPADM

## 2025-06-12 RX ORDER — FAMOTIDINE 20 MG/1
20 TABLET, FILM COATED ORAL DAILY
Status: DISCONTINUED | OUTPATIENT
Start: 2025-06-13 | End: 2025-06-18 | Stop reason: HOSPADM

## 2025-06-12 RX ORDER — NALOXONE HCL 0.4 MG/ML
0.02 VIAL (ML) INJECTION
Status: DISCONTINUED | OUTPATIENT
Start: 2025-06-12 | End: 2025-06-18 | Stop reason: HOSPADM

## 2025-06-12 RX ADMIN — GABAPENTIN 300 MG: 300 CAPSULE ORAL at 08:06

## 2025-06-12 RX ADMIN — CYPROHEPTADINE HYDROCHLORIDE 4 MG: 4 TABLET ORAL at 08:06

## 2025-06-12 RX ADMIN — ACETAMINOPHEN 650 MG: 325 TABLET ORAL at 10:06

## 2025-06-12 RX ADMIN — MUPIROCIN: 20 OINTMENT TOPICAL at 08:06

## 2025-06-12 RX ADMIN — BUSPIRONE HYDROCHLORIDE 10 MG: 5 TABLET ORAL at 08:06

## 2025-06-12 RX ADMIN — MIRTAZAPINE 30 MG: 15 TABLET, FILM COATED ORAL at 08:06

## 2025-06-12 RX ADMIN — ALPRAZOLAM 0.25 MG: 0.25 TABLET ORAL at 08:06

## 2025-06-12 NOTE — CONSULTS
[facility] - Oncology Acute  Hematology/Oncology  Consult Note    Patient Name: Juanjose Chawla  MRN: [id number]  Admission Date: 6/12/2025  Hospital Length of Stay: 0 days  Attending Provider: Harvey Osborn MD  Consulting Provider: Niko Johnson MD  Principal Problem:<principal problem not specified>    Inpatient consult to Oncology  Consult performed by: Niko Johnson MD  Consult ordered by: Jasper Eubanks MD        Subjective:     HPI: This is an 84-year-old patient he was last seen by us in clinic on 05/21/2026.  He was given 1 unit of packed red blood cells, therapy was held and then he was reinstituted on chemotherapy on 05/29/2025.  This is his 3rd cycle of carboplatin and Taxol with growth factor support.        His family called he was having episodes of confusion and weakness he was asked to come to the emergency room.    Family declined.    He had multiple phone calls with different progressive symptoms he was asked to come to the emergency room the family again declined they ask for IV fluids at home.  The impression of the nursing staff was that he needed more aggressive care and evaluation.  He was not brought to the emergency room.  Home health then went out the patient was noted to be orthostatic his blood counts were drawn.  He is noted to have severe symptomatic anemia and thrombocytopenia and he was asked to come to the emergency room.      His blood work on 06/11 showed a hemoglobin of 6.8 with a platelet count of 11,000.      He was seen evaluated in the ER.  Repeat blood evaluation shows the patient's hemoglobin is 7.3 he has a normal white count but his platelet count is still 11.  His sodium is normal his potassium is normal his BUN is 15 with a creatinine 0.8 in his calcium is 8.4 he has a mild elevation of his alk-phos at    he is scheduled for 2 units of blood and 1 unit of platelets    And we were consulted for his history.  I spoke to the ER physician  discussing him the orthostatic, rule out sepsis as well    He currently in his anorexia    He denies any current headache.  All Korin's had occasional.  No visual change.  He still feels short of breath.  They have been draining less fluid out of his lung 300 at times.  He has a lot of anxiety get him going to the bathroom because it is difficult for him.    He has difficulty with the eating no difficulty swallowing just nothing tastes right.  He has lost his taste for food.  Has a metallic taste.    No bleeding.  No rash.  No neuropathy.  Pain is well-controlled.  Still super anxious    He has.  This severe anxiety and confusion.  And then gets better.  His family was present with the bedside.      Oncology Treatment Plan:   OP NSCLC PACLITAXEL CARBOPLATIN Q3W    Oncology History Overview Note   Images     February 27, 2025:  CT of the chest, COPD and emphysema, right lower lobe atelectasis, lymphadenopathy in the right hilum as well as the mediastinum and smaller nodes in the left hilum.  The right hilar lymph node measures 2.5 cm other small nodes in the right hilum.  And there is a precarinal lymph node 2.2 x 2.2 cm.  And adenopathy seen in the subcarinal space as well as the mediastinum.     03/13/2025: CT abdomen pelvis, right-sided pleural effusion interstitial fibrosis lungs bilaterally prostatic hypertrophy     03/20/2025: PET-CT: Hypermetabolic consolidation right lung base with mediastinal and bilateral adenopathy and innumerable scattered hypermetabolic bone lesions     pathology  The right pleural effusion showed malignant cells consistent with a poorly differential adenocarcinoma:  Negative for TTF 1, WT1, GATA3.  Suggest an origin of the GI tract clinical pathology is recommended    2nd biopsy  1. EBUS LYMPH NODE 4R (TWO CONVENTIONAL SMEARS, ONE THIN PREP SMEAR, ONE CELL   BLOCK):    RARE MALIGNANT CELLS CONSISTENT WITH POORLY DIFFERENTIATED ADENOCARCINOMA.      2. EBUS LYMPH NODE 7R:    POORLY  DIFFERENTIATED ADENOCARCINOMA.   The immunohistochemical profile of the tumor cells is not typical of lung   primary and may be seen with adenocarcinomas of upper   gastrointestinal/pancreaticobiliary primary.       Tempus Liquid biopsy  TP53  No actionable mutation  KEAP1 +     Metastasis to bone   3/24/2025 Initial Diagnosis    Metastasis to bone     Secondary malignancy of mediastinal lymph nodes   3/24/2025 Initial Diagnosis    Secondary malignancy of mediastinal lymph nodes     Adenocarcinoma of unknown primary   4/4/2025 Cancer Staged    Staging form: Lung, AJCC V9  - Clinical stage from 4/4/2025: Stage ANTONIA (cTX, cNX, cM1b)     4/5/2025 -  Chemotherapy    Treatment Summary   Plan Name: OP NSCLC PACLITAXEL CARBOPLATIN Q3W  Treatment Goal: Palliative  Status: Active  Start Date: 4/5/2025  End Date: 6/19/2025 (Planned)  Provider: Niko Johnson MD  Chemotherapy: CARBOplatin (PARAPLATIN) 635 mg in 0.9% NaCl 348.5 mL chemo infusion, 635 mg (88.4 % of original dose 715.8 mg), Intravenous, Clinic/HOD 1 time, 3 of 4 cycles  Dose modification:   (original dose 715.8 mg, Cycle 1)  Administration: 635 mg (4/5/2025), 600 mg (5/2/2025)  PACLitaxeL (TAXOL) 135 mg/m2 = 282 mg in 0.9% NaCl 500 mL chemo infusion, 135 mg/m2 = 282 mg (100 % of original dose 135 mg/m2), Intravenous, Clinic/HOD 1 time, 3 of 4 cycles  Dose modification: 135 mg/m2 (original dose 135 mg/m2, Cycle 1, Reason: MD Discretion)  Administration: 282 mg (4/5/2025), 282 mg (5/2/2025)          Medications:  Continuous Infusions:    Scheduled Meds:   [START ON 6/13/2025] aspirin  81 mg Oral Daily    busPIRone  10 mg Oral BID    [START ON 6/13/2025] famotidine  20 mg Oral Daily    gabapentin  300 mg Oral BID    [START ON 6/13/2025] metoprolol succinate  12.5 mg Oral Daily    mirtazapine  30 mg Oral QHS    mupirocin   Nasal BID    [START ON 6/13/2025] polyethylene glycol  17 g Oral Daily    [START ON 6/13/2025] tamsulosin  0.4 mg Oral Daily     PRN  Meds:  Current Facility-Administered Medications:     0.9%  NaCl infusion (for blood administration), , Intravenous, Q24H PRN    0.9%  NaCl infusion (for blood administration), , Intravenous, Q24H PRN    acetaminophen, 650 mg, Oral, Q6H PRN    ALPRAZolam, 0.25 mg, Oral, TID PRN    benzonatate, 100 mg, Oral, TID PRN    calcium carbonate, 500 mg, Oral, TID PRN    dextrose 50%, 12.5 g, Intravenous, PRN    dextrose 50%, 25 g, Intravenous, PRN    glucagon (human recombinant), 1 mg, Intramuscular, PRN    glucose, 16 g, Oral, PRN    glucose, 24 g, Oral, PRN    guaiFENesin 100 mg/5 ml, 200 mg, Oral, Q4H PRN    hydrALAZINE, 10 mg, Intravenous, Q6H PRN    insulin aspart U-100, 0-5 Units, Subcutaneous, QID (AC + HS) PRN    naloxone, 0.02 mg, Intravenous, PRN    ondansetron, 4 mg, Intravenous, Q6H PRN    sodium chloride 0.9%, 10 mL, Intravenous, Q12H PRN     Review of patient's allergies indicates:  No Known Allergies     Past Medical History:   Diagnosis Date    Anxiety     Diabetes mellitus     HLD (hyperlipidemia)     Hypertension     Major depressive disorder, single episode, unspecified     Pneumonia, unspecified organism      Past Surgical History:   Procedure Laterality Date    Cardiac stent      ENDOBRONCHIAL ULTRASOUND N/A 3/28/2025    Procedure: ENDOBRONCHIAL ULTRASOUND (EBUS);  Surgeon: Jerrod Harman Jr., MD, [facility];  Location: [facility] BRONCHOSCOPY LAB;  Service: Pulmonary;  Laterality: N/A;    HERNIA REPAIR N/A     INSERTION OF PLEURAL CATHETER N/A 4/8/2025    Procedure: INSERTION, CATHETER, PLEURAL;  Surgeon: Roslyn Hull MD;  Location: [facility] OR;  Service: Cardiovascular;  Laterality: N/A;  PLEUR-X CATHETER INSERTION / RIGHT     Family History    None       Tobacco Use    Smoking status: Never    Smokeless tobacco: Never   Substance and Sexual Activity    Alcohol use: Not Currently    Drug use: Never    Sexual activity: Not on file       Review of Systems see above in HPI  Objective:     Vital Signs (Most  Recent):  Temp: 98.2 °F (36.8 °C) (06/12/25 1756)  Pulse: 75 (06/12/25 1756)  Resp: (!) 22 (06/12/25 1700)  BP: (!) 127/57 (06/12/25 1756)  SpO2: 100 % (06/12/25 1756) Vital Signs (24h Range):  Temp:  [97.6 °F (36.4 °C)-98.2 °F (36.8 °C)] 98.2 °F (36.8 °C)  Pulse:  [] 75  Resp:  [14-22] 22  SpO2:  [93 %-100 %] 100 %  BP: (101-147)/(50-99) 127/57     Weight: 80.7 kg (177 lb 14.4 oz)  Body mass index is 24.13 kg/m².  Body surface area is 2.02 meters squared.      Intake/Output Summary (Last 24 hours) at 6/12/2025 1845  Last data filed at 6/12/2025 1756  Gross per 24 hour   Intake 196.35 ml   Output --   Net 196.35 ml       Physical Exam  Vitals reviewed.   Constitutional:       General: He is awake. He is not in acute distress.     Appearance: He is ill-appearing.   HENT:      Head: Normocephalic and atraumatic.      Mouth/Throat:      Mouth: Mucous membranes are moist.      Pharynx: Oropharynx is clear.   Eyes:      Extraocular Movements: Extraocular movements intact.      Pupils: Pupils are equal, round, and reactive to light.      Comments: palor   Cardiovascular:      Rate and Rhythm: Normal rate and regular rhythm.      Heart sounds: Normal heart sounds.   Pulmonary:      Effort: Tachypnea and prolonged expiration present. No accessory muscle usage.      Breath sounds: Decreased air movement present.   Chest:      Comments: Cath in place  Abdominal:      General: Abdomen is flat. Bowel sounds are normal. There is no distension.      Palpations: Abdomen is soft.   Musculoskeletal:         General: Normal range of motion.      Cervical back: Normal range of motion and neck supple.      Right lower leg: No edema.      Left lower leg: No edema.   Lymphadenopathy:      Upper Body:      Right upper body: No supraclavicular adenopathy.      Left upper body: No supraclavicular adenopathy.   Skin:     General: Skin is warm and dry.   Neurological:      General: No focal deficit present.      Mental Status: He is  alert and oriented to person, place, and time. Mental status is at baseline.      GCS: GCS eye subscore is 4. GCS verbal subscore is 5. GCS motor subscore is 6.      Cranial Nerves: Cranial nerves 2-12 are intact.   Psychiatric:         Attention and Perception: Attention normal.         Mood and Affect: Mood is anxious.         Behavior: Behavior is hyperactive.         Significant Labs:   CBC:   Recent Labs   Lab 06/11/25  0945 06/12/25  1211   WBC 6.41 8.75   HGB 6.8* 7.3*   HCT 22.4* 24.5*   PLT 11* 11*    and CMP:   Recent Labs   Lab 06/11/25  0945 06/12/25  1211   * 144   K 3.8 4.2    104   CO2 35* 30   * 112   BUN 16.7 15.5   CREATININE 0.87 0.86   CALCIUM 8.2* 8.4*   PROT 5.9 6.9   ALBUMIN 2.2* 2.4*   BILITOT 0.4 <0.5   ALKPHOS 146 156*   AST 13 14   ALT 6 6       Diagnostic Results:      Assessment/Plan:   Anemia and thrombocytopenia most likely secondary to chemotherapy   Acute symptomatic anemia   Non-small-cell lung carcinoma              Malignant pleural effusions               Malignant hilar and mediastinal adenopathy                 Malignant bone lesions                  History of hypercalcemia    History of depression anxiety ---was on BuSpar at home  History of insomnia: On Remeron in the past      Recommendations   Keep hemoglobin greater than 8 -                 -if still symptomatic at 8---- May try to get closer to 10 based on volume status  Keep platelets gout greater than 20 unless bleeding   Palliative care consultation  vs psych with anxiety-- continue BuSpar                       With Xanax p.r.n.  --we will try to wait till he is more stable from a blood standpoint before CT scan of the chest abdomen pelvis  PICC line care okay to use  Add ensure pudding or equivalent supplement  Consider draining pleural effusion if worse  Periactin for appetite stimulation      I explained to the patient's daughters they were unsure how much time he has left.    We discussed his  disease options are palliative.      We discussed 1st correcting his anemia and thrombocytopenia.    Understanding he will have difficulty tolerating the more aggressive chemotherapy.  Once he stable consider repeat CT scanning to see how his cancer is doing.  With repeat staging    He knows the goals of care or palliation  SCDs for now.  Avoid Lovenox with severe anemia and thrombocytopenia    Chest x-ray in a.m. portable        Thank you for your consult.     Niko Johnson MD  Hematology/Oncology  [facility]  Oncology Acute

## 2025-06-12 NOTE — H&P
[facility] Hospital Medicine History & Physical Examination       Patient Name: Juanjose Chawla  MRN: [id number]  Patient Class: IP- Inpatient   Admission Date: 6/12/2025   Admitting Physician: NORMA Service   Length of Stay: 0  Attending Physician: KALPESH Davis   Primary Care Provider: Sadiq Hartmann MD  Face-to-Face encounter date: 06/12/2025  Code Status:Code Status Discussion Note   Chief Complaint: Abnormal Lab (Hx of stage 4 lung CA. On chemo. Last treatment on 5/29. Sent to ED by PCP for low H&H and low platelets. GCS 15. Pt denies complaints. )        Patient information was obtained from patient, patient's family, past medical records and ER records.     HISTORY OF PRESENT ILLNESS:   Juanjose Chawla is a 84 y.o. male who  has a past medical history of Anxiety, Diabetes mellitus, HLD (hyperlipidemia), Hypertension, Major depressive disorder, single episode, unspecified, and Pneumonia, unspecified organism.. The patient presented to [facility] on 6/12/2025 with a primary complaint of abnormal lab work and generalized weakness.  Patient is sent by primary oncologist secondary to platelet level 11 and hemoglobin 7 hematocrit 24.  Patient with lung CA on chemotherapy at this time.  Otherwise without complaint.  At the time of evaluation awake alert oriented resting comfortably in ED bed denies headache or dizziness, denies shortness of breath chest pain, denies nausea vomiting diarrhea abdominal pain.    PAST MEDICAL HISTORY:     Past Medical History:   Diagnosis Date    Anxiety     Diabetes mellitus     HLD (hyperlipidemia)     Hypertension     Major depressive disorder, single episode, unspecified     Pneumonia, unspecified organism        PAST SURGICAL HISTORY:     Past Surgical History:   Procedure Laterality Date    Cardiac stent      ENDOBRONCHIAL ULTRASOUND N/A 3/28/2025    Procedure: ENDOBRONCHIAL ULTRASOUND (EBUS);  Surgeon: Jerrod Harman Jr., MD, [facility];  Location:  [facility] BRONCHOSCOPY LAB;  Service: Pulmonary;  Laterality: N/A;    HERNIA REPAIR N/A     INSERTION OF PLEURAL CATHETER N/A 4/8/2025    Procedure: INSERTION, CATHETER, PLEURAL;  Surgeon: Roslyn Hull MD;  Location: [facility] OR;  Service: Cardiovascular;  Laterality: N/A;  PLEUR-X CATHETER INSERTION / RIGHT       ALLERGIES:   Patient has no known allergies.    FAMILY HISTORY:   Reviewed and negative    SOCIAL HISTORY:     Social History     Tobacco Use    Smoking status: Never    Smokeless tobacco: Never   Substance Use Topics    Alcohol use: Not Currently        HOME MEDICATIONS:     Prior to Admission medications    Medication Sig Start Date End Date Taking? Authorizing Provider   ALPRAZolam (XANAX) 0.25 MG tablet Take 0.25 mg by mouth 3 (three) times daily as needed for Anxiety.   Yes Provider, Historical   busPIRone (BUSPAR) 5 MG Tab Take 10 mg by mouth 2 (two) times daily.   Yes Provider, Historical   famotidine (PEPCID) 20 MG tablet Take 20 mg by mouth once daily. 5/24/22  Yes Provider, Historical   gabapentin (NEURONTIN) 300 MG capsule Take 300 mg by mouth 2 (two) times daily.   Yes Provider, Historical   glimepiride (AMARYL) 2 MG tablet Take 1 tablet (2 mg total) by mouth 2 (two) times a day. 4/9/25 6/12/25 Yes Itzel Webb MD   metFORMIN (GLUCOPHAGE) 1000 MG tablet Take 2,000 mg by mouth once daily. Pt states he takes as needed 5/24/22  Yes Provider, Historical   metoprolol succinate (TOPROL-XL) 25 MG 24 hr tablet Take 0.5 tablets (12.5 mg total) by mouth once daily.  Patient taking differently: Take 12.5 mg by mouth once daily. Pt states he takes as needed 4/10/25 6/12/25 Yes Itzel Webb MD   mirtazapine (REMERON) 30 MG tablet Take 1 tablet (30 mg total) by mouth every evening. 4/15/25  Yes Niko Johnson MD   tamsulosin (FLOMAX) 0.4 mg Cap Take 0.4 mg by mouth once daily.   Yes Provider, Historical   albuterol (ACCUNEB) 0.63 mg/3 mL Nebu Take 0.63 mg by nebulization every 6 (six)  hours as needed (wheezing and shortness of breath). Rescue    Provider, Historical   aspirin (ECOTRIN) 81 MG EC tablet Take 81 mg by mouth once daily.    Provider, Historical   guaiFENesin-codeine 100-10 mg/5 ml (TUSSI-ORGANIDIN NR)  mg/5 mL syrup Take 5 mLs by mouth every 4 (four) hours as needed for Cough or Congestion. 3/29/25   Romaine Cervantes MD   polyethylene glycol (GLYCOLAX) 17 gram PwPk Take 17 g by mouth once daily.    Provider, Historical   umeclidinium (INCRUSE ELLIPTA) 62.5 mcg/actuation inhalation capsule Inhale 62.5 mcg into the lungs once daily. Controller 2/28/25   Stas Moore MD       REVIEW OF SYSTEMS:   Except as documented, all other systems reviewed and negative     PHYSICAL EXAM:     VITAL SIGNS: 24 HRS MIN & MAX LAST   Temp  Min: 97.6 °F (36.4 °C)  Max: 98.1 °F (36.7 °C) 97.8 °F (36.6 °C)   BP  Min: 101/57  Max: 146/74 121/85   Pulse  Min: 74  Max: 110  94   Resp  Min: 14  Max: 22 16   SpO2  Min: 94 %  Max: 98 % 98 %       General appearance: Well-developed, well-nourished male in no apparent distress.  HENT: Atraumatic head. Moist mucous membranes of oral cavity.  Eyes: Normal extraocular movements.   Neck: Supple.   Lungs: Clear to auscultation bilaterally. No wheezing present.   Heart: Regular rate and rhythm. S1 and S2 present with no murmurs/gallop/rub. No pedal edema. No JVD present.   Abdomen: Soft, non-distended, non-tender. No rebound tenderness/guarding. Bowel sounds are normal.   Extremities: No cyanosis, clubbing, or edema.  Skin: No Rash.   Neuro: Motor and sensory exams grossly intact. Good tone. Muscle strength 5/5 in all 4 extremities  Psych/mental status: Appropriate mood and affect. Responds appropriately to questions.     LABS AND IMAGING:     Recent Labs   Lab 06/11/25  0945 06/12/25  1211   WBC 6.41 8.75   RBC 2.18* 2.42*   HGB 6.8* 7.3*   HCT 22.4* 24.5*   .8* 101.2*   MCH 31.2* 30.2   MCHC 30.4* 29.8*   RDW 17.5* 18.1*   PLT 11* 11*   MPV 13.4*  --         Recent Labs   Lab 06/11/25  0945 06/12/25  1211   * 144   K 3.8 4.2    104   CO2 35* 30   BUN 16.7 15.5   CREATININE 0.87 0.86   * 112   CALCIUM 8.2* 8.4*   ALBUMIN 2.2* 2.4*   PROT 5.9 6.9   ALKPHOS 146 156*   ALT 6 6   AST 13 14   BILITOT 0.4 <0.5       Microbiology Results (last 7 days)       ** No results found for the last 168 hours. **             X-Ray Chest PA And Lateral  Narrative: EXAMINATION:  XR CHEST PA AND LATERAL    CLINICAL HISTORY:  Shortness of breath    TECHNIQUE:  Two-view    COMPARISON:  April 7, 2025.    FINDINGS:  Cardiopericardial silhouette enlarged appearance is similar.  Right lower lung lobe posterior segment atelectasis and/or infiltrates are less dense and are better visualized on lateral projection.  Suspect mild pulmonary edema.  Small fluid right dependent pleural space and within right interlobar fissure.  There is no apparent pneumothorax.  Left PICC line terminates within distal superior vena cava.  Impression: As above.    Electronically signed by: Iam Menon  Date:    05/22/2025  Time:    15:57        ASSESSMENT & PLAN:     Anemia anemia chronic disease symptomatic   thrombocytopenia  Generalized weakness   Lung CA on chemotherapy  Hypertension   DM  Deconditioning    Plan:   Admit   1 unit platelet transfuse   Two PRBC pending   Oncology consult   Symptomatic management   Review and continue home meds accordingly   Labs in a.m.  PT/OT  Code status: Full    VTE Prophylaxis: will be placed on SCD for DVT prophylaxis and will be advised to be as mobile as possible and sit in a chair as tolerated    Patient condition:  Stable    __________________________________________________________________________  INPATIENT LIST OF MEDICATIONS     Scheduled Meds:   0.9%  NaCl infusion (for blood administration)   Intravenous Once    0.9%  NaCl infusion (for blood administration)   Intravenous Once    0.9%  NaCl infusion (for blood administration)   Intravenous Once      Continuous Infusions:  PRN Meds:.  Current Facility-Administered Medications:     0.9%  NaCl infusion (for blood administration), , Intravenous, Q24H PRN    0.9%  NaCl infusion (for blood administration), , Intravenous, Q24H PRN      Antoni BUI NP have reviewed and discussed the case with KALPESH Davis   Please see the following addendum for further assessment and plan from there attending MD.        Discharge Planning and Disposition: No mobility needs. Ambulating well. Good social support system.   Anticipated discharge    All diagnosis and differential diagnosis have been reviewed; assessment and plan has been documented; I have personally reviewed the labs and test results that are presently available; I have reviewed the patients medication list; I have reviewed the consulting providers response and recommendations. I have reviewed or attempted to review medical records based upon their availability.    All of the patient and family questions have been addressed and answered. Patient's is agreeable to the above stated plan. I will continue to monitor closely and make adjustments to medical management as needed.      Antoni Peoples NP   06/12/2025 06/12/2025 Dr. Osborn-chart reviewed patient examined.  Patient with stage IV A non-small-cell lung cancer with recurrent right malignant effusion presently on chemotherapy, cycle 3 with carboplatin/Taxol developing profound anemia/thrombocytopenia requiring transfusion PRBC/platelets.  Patient is stable at the present moment, appears very anxious.  Agree with the assessment and plans done by nurse practitioner Mr. Peoples .  Some corrections were done to HPI/physical examination/assessment and plans at the time of my evaluation.  Case discussed with family member.  Patient has a right chest PleurX to self drain recurrent right pleural effusion.  PICC line left upper extremity.  A.m. labs.  Appreciate assistance by Oncology    NATIVIDAD ABARCA IM    6/12/25

## 2025-06-12 NOTE — ED PROVIDER NOTES
Encounter Date: 6/12/2025    SCRIBE #1 NOTE: I, Lindsay Mallory, am scribing for, and in the presence of,  Jasper Eubanks MD. I have scribed the entire note.       History     Chief Complaint   Patient presents with    Abnormal Lab     Hx of stage 4 lung CA. On chemo. Last treatment on 5/29. Sent to ED by PCP for low H&H and low platelets. GCS 15. Pt denies complaints.      84 year old male with a hx of lung cancer, DM, and HTN presents to the ED for abnormal labs. Pt recently had labs drawn revealing he has a low H&H. Pt has been feeling generally weak the last few days. Pt denies any chest pain. Pt chronically requires 2L supplemental O2. Pt is currently on chemotherapy.     The history is provided by the patient. No  was used.     Review of patient's allergies indicates:  No Known Allergies  Past Medical History:   Diagnosis Date    Anxiety     Diabetes mellitus     HLD (hyperlipidemia)     Hypertension     Major depressive disorder, single episode, unspecified     Pneumonia, unspecified organism      Past Surgical History:   Procedure Laterality Date    Cardiac stent      ENDOBRONCHIAL ULTRASOUND N/A 3/28/2025    Procedure: ENDOBRONCHIAL ULTRASOUND (EBUS);  Surgeon: Jerrod Harman Jr., MD, [facility];  Location: [facility] BRONCHOSCOPY LAB;  Service: Pulmonary;  Laterality: N/A;    HERNIA REPAIR N/A     INSERTION OF PLEURAL CATHETER N/A 4/8/2025    Procedure: INSERTION, CATHETER, PLEURAL;  Surgeon: Roslyn Hull MD;  Location: [facility] OR;  Service: Cardiovascular;  Laterality: N/A;  PLEUR-X CATHETER INSERTION / RIGHT     No family history on file.  Social History[1]  Review of Systems   Constitutional:         Generally weak    Respiratory:  Positive for shortness of breath. Negative for chest tightness.    Cardiovascular:  Negative for chest pain.       Physical Exam     Initial Vitals [06/12/25 1146]   BP Pulse Resp Temp SpO2   (!) 101/57 94 18 97.7 °F (36.5 °C) 96 %      MAP       --         Physical  Exam    Constitutional: He is not diaphoretic. No distress.   thin   HENT:   Head: Normocephalic and atraumatic.   Right Ear: External ear normal.   Left Ear: External ear normal.   Nose: Nose normal.   Eyes: EOM are normal. Pupils are equal, round, and reactive to light. Right eye exhibits no discharge. Left eye exhibits no discharge.   Cardiovascular:  Normal rate, regular rhythm and normal heart sounds.     Exam reveals no gallop and no friction rub.       No murmur heard.  Pulmonary/Chest: Breath sounds normal. He has no wheezes. He has no rhonchi. He has no rales. He exhibits no tenderness.   Dyspneic    Abdominal: Abdomen is soft. Bowel sounds are normal. He exhibits no distension and no mass. There is no abdominal tenderness. There is no rebound and no guarding.   Musculoskeletal:         General: No edema. Normal range of motion.     Neurological: He is alert and oriented to person, place, and time. No cranial nerve deficit or sensory deficit.   Skin: Skin is warm and dry. Capillary refill takes less than 2 seconds. There is pallor.         ED Course   Critical Care    Date/Time: 6/12/2025 7:24 PM    Performed by: Jasper Eubanks MD  Authorized by: Jasper Eubanks MD  Direct patient critical care time: 9 minutes  Additional history critical care time: 7 minutes  Ordering / reviewing critical care time: 8 minutes  Documentation critical care time: 5 minutes  Consulting other physicians critical care time: 6 minutes  Total critical care time (exclusive of procedural time) : 35 minutes  Critical care time was exclusive of separately billable procedures and treating other patients.  Critical care was time spent personally by me on the following activities: development of treatment plan with patient or surrogate, discussions with consultants, interpretation of cardiac output measurements, evaluation of patient's response to treatment, examination of patient, ordering and performing treatments and  interventions, ordering and review of laboratory studies, ordering and review of radiographic studies, pulse oximetry, re-evaluation of patient's condition and review of old charts.  Comments: Anemia requiring blood transfusion        Labs Reviewed   COMPREHENSIVE METABOLIC PANEL - Abnormal       Result Value    Sodium 144      Potassium 4.2      Chloride 104      CO2 30      Glucose 112      Blood Urea Nitrogen 15.5      Creatinine 0.86      Calcium 8.4 (*)     Protein Total 6.9      Albumin 2.4 (*)     Globulin 4.5 (*)     Albumin/Globulin Ratio 0.5 (*)     Bilirubin Total <0.5       (*)     ALT 6      AST 14      eGFR >60      Anion Gap 10.0      BUN/Creatinine Ratio 18     CBC WITH DIFFERENTIAL - Abnormal    WBC 8.75      RBC 2.42 (*)     Hgb 7.3 (*)     Hct 24.5 (*)     .2 (*)     MCH 30.2      MCHC 29.8 (*)     RDW 18.1 (*)     Platelet 11 (*)     MPV        IPF 4.6      Neut % 66.4      Lymph % 22.7      Mono % 8.8      Eos % 0.3      Basophil % 0.9      Imm Grans % 0.9      Neut # 5.80      Lymph # 1.99      Mono # 0.77      Eos # 0.03      Baso # 0.08      Imm Gran # 0.08 (*)     NRBC% 0.0     PROTIME-INR - Normal    PT 13.3      INR 1.0      Narrative:     Protimes are used to monitor anticoagulant agents such as warfarin. PT INR values are based on the current patient normal mean and the DILIP value for the specific instrument reagent used.  **Routine theraputic target values for the INR are 2.0-3.0**   CBC W/ AUTO DIFFERENTIAL    Narrative:     The following orders were created for panel order CBC auto differential.  Procedure                               Abnormality         Status                     ---------                               -----------         ------                     CBC with Differential[[id number]]       Abnormal            Final result                 Please view results for these tests on the individual orders.   PATH REVIEW OF BLOOD SMEAR    Peripheral Smear  Evaluation        Value: - Macrocytic anemia.  - Severe thrombocytopenia; no clumps or schistocytes.    Paolo Salvador M.D.    TYPE & SCREEN    Group & Rh A POS      Indirect Sonia GEL NEG      Specimen Outdate 06/15/2025 23:59     POCT GLUCOSE MONITORING CONTINUOUS   PREPARE RBC SOFT    UNIT NUMBER [id number]      UNIT ABO/RH A POS      DISPENSE STATUS Issued      Unit Expiration [id number]      Product Code V4766M57      Unit Blood Type Code 6200      CROSSMATCH INTERPRETATION Compatible      UNIT NUMBER [id number]      UNIT ABO/RH A POS      DISPENSE STATUS Selected      Unit Expiration [id number]      Product Code N4919E78      Unit Blood Type Code 6200      CROSSMATCH INTERPRETATION Compatible     PREPARE PLATELETS (DOSE) SOFT    UNIT NUMBER [id number]      UNIT ABO/RH A POS      DISPENSE STATUS Issued      Unit Expiration [id number]      Product Code P7905K23      Unit Blood Type Code 6200      CROSSMATCH INTERPRETATION Not required            Imaging Results    None          Medications   0.9%  NaCl infusion (for blood administration) (has no administration in time range)   0.9%  NaCl infusion (for blood administration) (has no administration in time range)   ALPRAZolam tablet 0.25 mg (has no administration in time range)   aspirin EC tablet 81 mg (has no administration in time range)   busPIRone tablet 10 mg (has no administration in time range)   famotidine tablet 20 mg (has no administration in time range)   gabapentin capsule 300 mg (has no administration in time range)   glucose chewable tablet 16 g (has no administration in time range)   glucose chewable tablet 24 g (has no administration in time range)   dextrose 50% injection 12.5 g (has no administration in time range)   dextrose 50% injection 25 g (has no administration in time range)   glucagon (human recombinant) injection 1 mg (has no administration in time range)   insulin aspart U-100 injection 0-5 Units (has no  administration in time range)   mirtazapine tablet 30 mg (has no administration in time range)   metoprolol succinate (TOPROL-XL) 24 hr split tablet 12.5 mg (has no administration in time range)   polyethylene glycol packet 17 g (has no administration in time range)   tamsulosin 24 hr capsule 0.4 mg (has no administration in time range)   acetaminophen tablet 650 mg (has no administration in time range)   benzonatate capsule 100 mg (has no administration in time range)   calcium carbonate 200 mg calcium (500 mg) chewable tablet 500 mg (has no administration in time range)   guaiFENesin 100 mg/5 ml syrup 200 mg (has no administration in time range)   hydrALAZINE injection 10 mg (has no administration in time range)   ondansetron injection 4 mg (has no administration in time range)   sodium chloride 0.9% flush 10 mL (has no administration in time range)   naloxone 0.4 mg/mL injection 0.02 mg (has no administration in time range)   mupirocin 2 % ointment (has no administration in time range)   cyproheptadine 4 mg tablet 4 mg (has no administration in time range)     Medical Decision Making  The differential diagnosis includes, but is not limited to, generalized weakness, electrolyte imbalance, renal dysfunction, side effect of chemotherapy, symptomatic anemia, thrombocytopenia    Patient is awake and alert.  He is in good spirits that has somewhat pale.  Hemodynamically stable.  Labs reveal anemia as well as thrombocytopenia.  He is symptomatic from the anemia.  We will transfuse both platelets and PRBCs.  Plan for admission.  That has speak to patient's oncologist.  Comfortable with the plan.  Will admit to hospitalist.  Discussed with the Mari.  Will admit.  Both patient, daughter in room, comfortable with the plan.    Amount and/or Complexity of Data Reviewed  External Data Reviewed: notes.     Details: Chart review reveals history of lung cancer undergoing palliative treatment  Labs: ordered. Decision-making  details documented in ED Course.    Risk  OTC drugs.  Prescription drug management.  Decision regarding hospitalization.            Scribe Attestation:   Scribe #1: I performed the above scribed service and the documentation accurately describes the services I performed. I attest to the accuracy of the note.    Attending Attestation:           Physician Attestation for Scribe:  Physician Attestation Statement for Scribe #1: I, Jasper Eubanks MD, reviewed documentation, as scribed by Lindsay Mallory in my presence, and it is both accurate and complete.             ED Course as of 06/12/25 1940   Thu Jun 12, 2025   1249 Spoke to Dr. Eugene, patient evidently has been weak for several days possibly orthostatic.  Recommends blood platelet transfusion admission. [MM]   1940 CBC auto differential(!!)  Anemia with thrombocytopenia [MM]   1940 Comprehensive metabolic panel(!)  No significant electrolyte abnormality or renal dysfunction [MM]      ED Course User Index  [MM] Jasper Eubanks MD                           Clinical Impression:  Final diagnoses:  [D64.9] Symptomatic anemia (Primary)  [R07.9] Chest pain  [D69.6] Thrombocytopenia  [C34.90] Malignant neoplasm of lung, unspecified laterality, unspecified part of lung          ED Disposition Condition    Admit                       [1]   Social History  Tobacco Use    Smoking status: Never    Smokeless tobacco: Never   Substance Use Topics    Alcohol use: Not Currently    Drug use: Never        Jasper Eubanks MD  06/12/25 1940

## 2025-06-13 LAB
ALBUMIN SERPL-MCNC: 2.3 G/DL (ref 3.4–4.8)
ALBUMIN/GLOB SERPL: 0.6 RATIO (ref 1.1–2)
ALP SERPL-CCNC: 146 UNIT/L (ref 40–150)
ALT SERPL-CCNC: 8 UNIT/L (ref 0–55)
ANION GAP SERPL CALC-SCNC: 7 MEQ/L
AST SERPL-CCNC: 15 UNIT/L (ref 11–45)
BASOPHILS # BLD AUTO: 0.04 X10(3)/MCL
BASOPHILS NFR BLD AUTO: 0.4 %
BILIRUB SERPL-MCNC: 0.5 MG/DL
BUN SERPL-MCNC: 16.3 MG/DL (ref 8.4–25.7)
CALCIUM SERPL-MCNC: 7.9 MG/DL (ref 8.8–10)
CHLORIDE SERPL-SCNC: 105 MMOL/L (ref 98–107)
CO2 SERPL-SCNC: 32 MMOL/L (ref 23–31)
CREAT SERPL-MCNC: 0.81 MG/DL (ref 0.72–1.25)
CREAT/UREA NIT SERPL: 20
EOSINOPHIL # BLD AUTO: 0.03 X10(3)/MCL (ref 0–0.9)
EOSINOPHIL NFR BLD AUTO: 0.3 %
ERYTHROCYTE [DISTWIDTH] IN BLOOD BY AUTOMATED COUNT: 17.5 % (ref 11.5–17)
FOLATE SERPL-MCNC: 6.3 NG/ML (ref 7–31.4)
GFR SERPLBLD CREATININE-BSD FMLA CKD-EPI: >60 ML/MIN/1.73/M2
GLOBULIN SER-MCNC: 3.6 GM/DL (ref 2.4–3.5)
GLUCOSE SERPL-MCNC: 100 MG/DL (ref 82–115)
HCT VFR BLD AUTO: 27.4 % (ref 42–52)
HGB BLD-MCNC: 8.5 G/DL (ref 14–18)
IMM GRANULOCYTES # BLD AUTO: 0.07 X10(3)/MCL (ref 0–0.04)
IMM GRANULOCYTES NFR BLD AUTO: 0.7 %
LYMPHOCYTES # BLD AUTO: 1.93 X10(3)/MCL (ref 0.6–4.6)
LYMPHOCYTES NFR BLD AUTO: 20.5 %
MCH RBC QN AUTO: 30.4 PG (ref 27–31)
MCHC RBC AUTO-ENTMCNC: 31 G/DL (ref 33–36)
MCV RBC AUTO: 97.9 FL (ref 80–94)
MONOCYTES # BLD AUTO: 0.74 X10(3)/MCL (ref 0.1–1.3)
MONOCYTES NFR BLD AUTO: 7.9 %
NEUTROPHILS # BLD AUTO: 6.61 X10(3)/MCL (ref 2.1–9.2)
NEUTROPHILS NFR BLD AUTO: 70.2 %
NRBC BLD AUTO-RTO: 0 %
PLATELET # BLD AUTO: 41 X10(3)/MCL (ref 130–400)
PMV BLD AUTO: 10.3 FL (ref 7.4–10.4)
POCT GLUCOSE: 100 MG/DL (ref 70–110)
POCT GLUCOSE: 101 MG/DL (ref 70–110)
POTASSIUM SERPL-SCNC: 3.7 MMOL/L (ref 3.5–5.1)
PROT SERPL-MCNC: 5.9 GM/DL (ref 5.8–7.6)
RBC # BLD AUTO: 2.8 X10(6)/MCL (ref 4.7–6.1)
SODIUM SERPL-SCNC: 144 MMOL/L (ref 136–145)
WBC # BLD AUTO: 9.42 X10(3)/MCL (ref 4.5–11.5)

## 2025-06-13 PROCEDURE — 80053 COMPREHEN METABOLIC PANEL: CPT | Performed by: CLINICAL NURSE SPECIALIST

## 2025-06-13 PROCEDURE — 11000001 HC ACUTE MED/SURG PRIVATE ROOM

## 2025-06-13 PROCEDURE — 99232 SBSQ HOSP IP/OBS MODERATE 35: CPT | Mod: ,,, | Performed by: INTERNAL MEDICINE

## 2025-06-13 PROCEDURE — 87040 BLOOD CULTURE FOR BACTERIA: CPT | Performed by: INTERNAL MEDICINE

## 2025-06-13 PROCEDURE — 82746 ASSAY OF FOLIC ACID SERUM: CPT | Performed by: INTERNAL MEDICINE

## 2025-06-13 PROCEDURE — 36415 COLL VENOUS BLD VENIPUNCTURE: CPT | Performed by: INTERNAL MEDICINE

## 2025-06-13 PROCEDURE — 25000003 PHARM REV CODE 250: Performed by: CLINICAL NURSE SPECIALIST

## 2025-06-13 PROCEDURE — 99222 1ST HOSP IP/OBS MODERATE 55: CPT | Mod: ,,, | Performed by: INTERNAL MEDICINE

## 2025-06-13 PROCEDURE — 63600175 PHARM REV CODE 636 W HCPCS: Performed by: CLINICAL NURSE SPECIALIST

## 2025-06-13 PROCEDURE — 25000003 PHARM REV CODE 250: Performed by: INTERNAL MEDICINE

## 2025-06-13 PROCEDURE — 51701 INSERT BLADDER CATHETER: CPT

## 2025-06-13 PROCEDURE — 85025 COMPLETE CBC W/AUTO DIFF WBC: CPT | Performed by: CLINICAL NURSE SPECIALIST

## 2025-06-13 PROCEDURE — 27000221 HC OXYGEN, UP TO 24 HOURS

## 2025-06-13 RX ORDER — ALPRAZOLAM 0.25 MG/1
0.25 TABLET ORAL ONCE
Status: COMPLETED | OUTPATIENT
Start: 2025-06-14 | End: 2025-06-14

## 2025-06-13 RX ORDER — FOLIC ACID 1 MG/1
1 TABLET ORAL DAILY
Status: DISCONTINUED | OUTPATIENT
Start: 2025-06-13 | End: 2025-06-18 | Stop reason: HOSPADM

## 2025-06-13 RX ORDER — TALC
9 POWDER (GRAM) TOPICAL NIGHTLY PRN
Status: DISCONTINUED | OUTPATIENT
Start: 2025-06-14 | End: 2025-06-18 | Stop reason: HOSPADM

## 2025-06-13 RX ORDER — ESCITALOPRAM OXALATE 5 MG/1
5 TABLET ORAL DAILY
Status: DISCONTINUED | OUTPATIENT
Start: 2025-06-14 | End: 2025-06-16

## 2025-06-13 RX ORDER — BUSPIRONE HYDROCHLORIDE 5 MG/1
10 TABLET ORAL 3 TIMES DAILY
Status: DISCONTINUED | OUTPATIENT
Start: 2025-06-13 | End: 2025-06-13

## 2025-06-13 RX ADMIN — BUSPIRONE HYDROCHLORIDE 10 MG: 5 TABLET ORAL at 08:06

## 2025-06-13 RX ADMIN — CYPROHEPTADINE HYDROCHLORIDE 4 MG: 4 TABLET ORAL at 03:06

## 2025-06-13 RX ADMIN — MIRTAZAPINE 30 MG: 15 TABLET, FILM COATED ORAL at 08:06

## 2025-06-13 RX ADMIN — FOLIC ACID 1 MG: 1 TABLET ORAL at 03:06

## 2025-06-13 RX ADMIN — ALPRAZOLAM 0.25 MG: 0.25 TABLET ORAL at 08:06

## 2025-06-13 RX ADMIN — CYPROHEPTADINE HYDROCHLORIDE 4 MG: 4 TABLET ORAL at 08:06

## 2025-06-13 RX ADMIN — ACETAMINOPHEN 650 MG: 325 TABLET ORAL at 08:06

## 2025-06-13 RX ADMIN — TAMSULOSIN HYDROCHLORIDE 0.4 MG: 0.4 CAPSULE ORAL at 08:06

## 2025-06-13 RX ADMIN — GABAPENTIN 300 MG: 300 CAPSULE ORAL at 08:06

## 2025-06-13 RX ADMIN — ONDANSETRON 4 MG: 2 INJECTION INTRAMUSCULAR; INTRAVENOUS at 08:06

## 2025-06-13 RX ADMIN — BUSPIRONE HYDROCHLORIDE 10 MG: 5 TABLET ORAL at 03:06

## 2025-06-13 RX ADMIN — ASPIRIN 81 MG: 81 TABLET, COATED ORAL at 08:06

## 2025-06-13 RX ADMIN — MUPIROCIN: 20 OINTMENT TOPICAL at 08:06

## 2025-06-13 RX ADMIN — FAMOTIDINE 20 MG: 20 TABLET, FILM COATED ORAL at 08:06

## 2025-06-13 RX ADMIN — POLYETHYLENE GLYCOL 3350 17 G: 17 POWDER, FOR SOLUTION ORAL at 08:06

## 2025-06-13 RX ADMIN — METOPROLOL SUCCINATE 12.5 MG: 25 TABLET, EXTENDED RELEASE ORAL at 08:06

## 2025-06-13 NOTE — PLAN OF CARE
06/13/25 1204   Discharge Assessment   Assessment Type Discharge Planning Assessment   Confirmed/corrected address, phone number and insurance Yes   Confirmed Demographics Correct on Facesheet   Source of Information family   People in Home spouse   Name(s) of People in Home marga Dailey   Do you expect to return to your current living situation? Yes   Do you have help at home or someone to help you manage your care at home? Yes   Who are your caregiver(s) and their phone number(s)? wife Ashlie   Prior to hospitilization cognitive status: Alert/Oriented   Current cognitive status: Alert/Oriented   Walking or Climbing Stairs Difficulty yes   Walking or Climbing Stairs ambulation difficulty, requires equipment   Mobility Management walker   Dressing/Bathing Difficulty yes   Dressing/Bathing bathing difficulty, requires equipment   Dressing/Bathing Management shower chair   Equipment Currently Used at Home glucometer;wheelchair;commode;walker, rolling;shower chair;oxygen  (has a transport wheelchair, has oxygen concentrator and portable oxygen)   Do you currently have service(s) that help you manage your care at home? Yes   Name and Contact number of agency [facility]   Is the pt/caregiver preference to resume services with current agency Yes   Do you take prescription medications? Yes   Do you have prescription coverage? Yes   Do you have any problems affording any of your prescribed medications? No   Is the patient taking medications as prescribed? yes   Who is going to help you get home at discharge? family   How do you get to doctors appointments? family or friend will provide   Are you on dialysis? No   Do you take coumadin? No   Discharge Plan A Home Health   Discharge Plan B New Nursing Home placement - [facility] care facility   DME Needed Upon Discharge  none   Discharge Plan discussed with: Adult children   Transition of Care Barriers None     Daughter at bedside. Patient confused.  He lives at home with his  wife Ashlie.  Called [facility] and the patient is current.  Clinical updates sent via epic.

## 2025-06-13 NOTE — PROGRESS NOTES
[facility]  Hospital Medicine Progress Note        Chief Complaint: Inpatient Follow-up for Abnormal Lab (Hx of stage 4 lung CA. On chemo. Last treatment on 5/29. Sent to ED by PCP for low H&H and low platelets. GCS 15. Pt denies complaints. )  -generalized weakness    HPI:    The patient is an 84-year-old male with a history of COPD/emphysema, anxiety/diabetes/hyperlipidemia/hypertension/depression as well as poorly differentiated adenocarcinoma of lung diagnosed around 3/25 by right pleural effusion showing malignant cells followed by endobronchial ultrasound with lymph node biopsy again consistent with poorly differentiated adenocarcinoma.  Patient gets seen by [facility].  Patient is a stage IV A initiated on palliative chemotherapy on 04/25 with Paclitaxel carboplatin Q 3 weeks with the planned end date on 06/19/25 by Dr. Niko curran.  The patient with a history of recurrent right pleural malignant effusion with PleurX catheter placement.    The patient was last seen at [facility] on 05/21/2026, at that time patient received 1 unit PRBC.  Chemotherapy was placed on hold and restarted on 05/29/2025 (3rd cycle of carboplatin/Taxol with growth factor support)    Post chemotherapy family was concerned about episodes of confusion/weakness.  Patient was redirected to emergency room but family declined.  This was followed by multiple phone calls with different progressive symptoms and again he was asked to come to emergency room for evaluation/fluids but again family declined.  The patient was seen by home health with findings of positive orthostatics, blood work was done showing severe anemia with a hemoglobin of 6.8 and a platelet count of 11,000. The patient was told to present to emergency room [facility].  The patient was evaluated by emergency room physician and decision for admission was done.  Patient is status post  2 units PRBC and 1 unit platelets.    ROS pertinent for anorexia /sob/anxiety/decreased appetite/confusion          Interval Hx:     06/13/2025 Dr. Osborn-chart reviewed patient examined.  Patient sleeping at the present moment post sedation.  Case discussed with family member, daughter at bedside, Nahomi.  Patient is status post 2 units PRBC and 1 unit platelet.  Portable chest x-ray pending for evaluation of right pleural effusion.  Vital signs are stable.  Hemoglobin 8.6 and platelet count of 46,000. No appetite/super anxious/not sleeping.    Notes by Oncology reviewed.  We will consult palliative/physical therapy/psych    Case was discussed with patient's nurse and  on the floor.    Objective/physical exam:  General: In no acute distress, afebrile. Resting   Chest: Clear to auscultation anteriroy. Right chest pleur x  Heart: RRR, +S1, S2, no appreciable murmur  Abdomen: Soft, nontender, BS +  MSK: Warm, no lower extremity edema, no clubbing or cyanosis  Neurologic: Alert and oriented x4, Cranial nerve II-XII intact, Strength 5/5 in all 4 extremities    VITAL SIGNS: 24 HRS MIN & MAX LAST   Temp  Min: 97.3 °F (36.3 °C)  Max: 98.2 °F (36.8 °C) 97.3 °F (36.3 °C)   BP  Min: 101/57  Max: 147/66 (!) 147/66   Pulse  Min: 74  Max: 140  76   Resp  Min: 14  Max: 24 18   SpO2  Min: 93 %  Max: 100 % 100 %     I have reviewed the following labs:  Recent Labs   Lab 06/11/25  0945 06/12/25  1211 06/13/25  0330   WBC 6.41 8.75 9.42   RBC 2.18* 2.42* 2.80*   HGB 6.8* 7.3* 8.5*   HCT 22.4* 24.5* 27.4*   .8* 101.2* 97.9*   MCH 31.2* 30.2 30.4   MCHC 30.4* 29.8* 31.0*   RDW 17.5* 18.1* 17.5*   PLT 11* 11* 41*   MPV 13.4*  --  10.3     Recent Labs   Lab 06/11/25  0945 06/12/25  1211 06/13/25  0330   * 144 144   K 3.8 4.2 3.7    104 105   CO2 35* 30 32*   BUN 16.7 15.5 16.3   CREATININE 0.87 0.86 0.81   * 112 100   CALCIUM 8.2* 8.4* 7.9*   ALBUMIN 2.2* 2.4* 2.3*   PROT 5.9 6.9 5.9   ALKPHOS 146 156*  146   ALT 6 6 8   AST 13 14 15   BILITOT 0.4 <0.5 0.5     Microbiology Results (last 7 days)       Procedure Component Value Units Date/Time    Blood Culture [[id number]] Collected: 06/13/25 0330    Order Status: Resulted Specimen: Blood from Arm, Right Updated: 06/13/25 0502    Blood Culture [[id number]] Collected: 06/13/25 0330    Order Status: Resulted Specimen: Blood from Arm, Right Updated: 06/13/25 0435    Blood Culture [[id number]]     Order Status: Canceled Specimen: Blood from Arm, Right              See below for Radiology    Assessment/Plan:  1.-non-small cell lung carcinoma stage IV A  -currently on chemo, 3rd cycle with carboplatin/Taxol(palliative chemo)  - admitted for profound anemia/thrombocytopenia related to chemotherapy    2.-recurrent right malignant pleural effusion  -patient with PleurX catheter  -usually self drains Q 2 days around 300 cc    3.-severe symptomatic anemia due to chemotherapy  -patient is status post 2 units PRBC on 06/12/2025    4-severe thrombocytopenia related to chemotherapy  -patient presented with a platelet count of 11,000, status post 1 unit plt  -platelet count at 41,000 on 06/13/2025    5.-folate deficiency  -continue folic acid 1 mg p.o. daily    6.-anxiety/depression-  -BuSpar 10 mg p.o. t.i.d.   -consult psych for evaluation    7.-insomnia  -continue Remeron for insomnia/appetite    8.-deconditioning/generalized weakness  -we will get Physical therapy to see    history of COPD/emphysema, anxiety/diabetes/hyperlipidemia/hypertension/depression       Plan-vital signs are stable.  Tachycardia resolved.  Hemoglobin as well as platelet count much improved after transfusion.  We will go ahead and consult palliative team for goals of care.  Consult psych for severe anxiety.  We will continue to attempt to improve nutrition.    Eventually there are plans for CT scan of chest/abdomen/pelvis for restaging/ response to tx   We will add appetite stimulant  We will get portable  chest x-ray for evaluation of pleural effusion if not done   Consult physical therapy  A.m. labs      VTE prophylaxis:  SCD    Patient condition:  Fair    Anticipated discharge and Disposition:   To be determined      All diagnosis and differential diagnosis have been reviewed; assessment and plan has been documented; I have personally reviewed the labs and test results that are presently available; I have reviewed the patients medication list; I have reviewed the consulting providers response and recommendations. I have reviewed or attempted to review medical records based upon their availability    All of the patient's questions have been  addressed and answered. Patient's is agreeable to the above stated plan. I will continue to monitor closely and make adjustments to medical management as needed.    Portions of this note dictated using EMR integrated voice recognition software, and may be subject to voice recognition errors not corrected at proofreading. Please contact writer for clarification if needed.   _____________________________________________________________________    Malnutrition Status:  Nutrition consulted. Most recent weight and BMI monitored-     Measurements:  Wt Readings from Last 1 Encounters:   06/12/25 80.7 kg (177 lb 14.4 oz)   Body mass index is 24.13 kg/m².    Patient has been screened and assessed by RD.    Malnutrition Type:  Context:    Level:      Malnutrition Characteristic Summary:       Interventions/Recommendations (treatment strategy):        Scheduled Med:   aspirin  81 mg Oral Daily    busPIRone  10 mg Oral BID    cyproheptadine  4 mg Oral TID    famotidine  20 mg Oral Daily    folic acid  1 mg Oral Daily    gabapentin  300 mg Oral BID    metoprolol succinate  12.5 mg Oral Daily    mirtazapine  30 mg Oral QHS    mupirocin   Nasal BID    polyethylene glycol  17 g Oral Daily    tamsulosin  0.4 mg Oral Daily      Continuous Infusions:     PRN Meds:    Current Facility-Administered  Medications:     0.9%  NaCl infusion (for blood administration), , Intravenous, Q24H PRN    0.9%  NaCl infusion (for blood administration), , Intravenous, Q24H PRN    acetaminophen, 650 mg, Oral, Q6H PRN    ALPRAZolam, 0.25 mg, Oral, TID PRN    benzonatate, 100 mg, Oral, TID PRN    calcium carbonate, 500 mg, Oral, TID PRN    dextrose 50%, 12.5 g, Intravenous, PRN    dextrose 50%, 25 g, Intravenous, PRN    glucagon (human recombinant), 1 mg, Intramuscular, PRN    glucose, 16 g, Oral, PRN    glucose, 24 g, Oral, PRN    guaiFENesin 100 mg/5 ml, 200 mg, Oral, Q4H PRN    hydrALAZINE, 10 mg, Intravenous, Q6H PRN    insulin aspart U-100, 0-5 Units, Subcutaneous, QID (AC + HS) PRN    naloxone, 0.02 mg, Intravenous, PRN    ondansetron, 4 mg, Intravenous, Q6H PRN    sodium chloride 0.9%, 10 mL, Intravenous, Q12H PRN     Radiology:  I have personally reviewed the following imaging and agree with the radiologist.     X-Ray Chest PA And Lateral  Narrative: EXAMINATION:  XR CHEST PA AND LATERAL    CLINICAL HISTORY:  Shortness of breath    TECHNIQUE:  Two-view    COMPARISON:  April 7, 2025.    FINDINGS:  Cardiopericardial silhouette enlarged appearance is similar.  Right lower lung lobe posterior segment atelectasis and/or infiltrates are less dense and are better visualized on lateral projection.  Suspect mild pulmonary edema.  Small fluid right dependent pleural space and within right interlobar fissure.  There is no apparent pneumothorax.  Left PICC line terminates within distal superior vena cava.  Impression: As above.    Electronically signed by: Iam Menon  Date:    05/22/2025  Time:    15:57      Harvey Osborn MD  Department of Hospital Medicine   [facility]   06/13/2025

## 2025-06-13 NOTE — CONSULTS
"6/13/2025  Juanjose Chawla   1940   [id number]            Psychiatry Initial Consult Note    Date of Admission: 6/12/2025 11:53 AM    Chief Complaint: Psychiatric consult for "severe anxiety. Currently on chemo"    SUBJECTIVE:   History of Present Illness:   Juanjose Chawla is a 84 y.o. male with a past medical history that includes anxiety, diabetes mellitus, HLD, HTN, PNA, and documented history of a single episode of MDD. Presented to [facility] ED on 06/12/25 with abnormal labs with low H&H. Has stage 4 lung cancer. Being seen by psychiatry today due to anxiety. Over past few months has developed panic attacks in the afternoon consisting of shortness of breath and palpitations. The evening time appears to be a trigger for this increased anxiety and family also reports using the bathroom in the afternoon as he will become short of breath further worsening anxiety. His daughter also report increased anxiety at night as he has concerns about having decreased help at night. He reports anxiety is associated with impaired sleep, feeling restless and tense, and impaired sleep. Denies depressed or sad mood but did make a comment about decreased hope. Has had fatigue and low motivation but also with recent anemia. PRN alprazolam has been helpful. Has been prescribed buspirone 5mg BID for several months and feels this has been unhelpful. Denies SI, HI, or auditory/visual hallucinations.        Past Psychiatric History:   Previous Psychiatric Hospitalizations: Denies   Previous Medication Trials: Alprazolam, remeron, buspirone  Previous Suicide Attempts: Denies   Outpatient psychiatrist: None    Current Medications:   Home Psychiatric Meds: Alprazolam 0.25mg PO TID PRN anxiety, buspirone 5mg PO BID, remeron 30mg PO QHS    Past Medical/Surgical History:   Past Medical History:   Diagnosis Date    Anxiety     Diabetes mellitus     HLD (hyperlipidemia)     Hypertension     Major depressive disorder, single episode, " unspecified     Pneumonia, unspecified organism      Past Surgical History:   Procedure Laterality Date    Cardiac stent      ENDOBRONCHIAL ULTRASOUND N/A 3/28/2025    Procedure: ENDOBRONCHIAL ULTRASOUND (EBUS);  Surgeon: Jerrod Harman Jr., MD, [facility];  Location: [facility] BRONCHOSCOPY LAB;  Service: Pulmonary;  Laterality: N/A;    HERNIA REPAIR N/A     INSERTION OF PLEURAL CATHETER N/A 4/8/2025    Procedure: INSERTION, CATHETER, PLEURAL;  Surgeon: Roslyn Hull MD;  Location: [facility] OR;  Service: Cardiovascular;  Laterality: N/A;  PLEUR-X CATHETER INSERTION / RIGHT         Family Psychiatric History:   Mother- Anxiety, depression     Allergies:   Review of patient's allergies indicates:  No Known Allergies    Substance Abuse History:   Tobacco: Reports history of tobacco use lasting six years >40 years ago  Alcohol: Denies  Illicit Substances: Denies  Treatment: Denies        Scheduled Meds:    aspirin  81 mg Oral Daily    busPIRone  10 mg Oral TID    cyproheptadine  4 mg Oral TID    famotidine  20 mg Oral Daily    folic acid  1 mg Oral Daily    gabapentin  300 mg Oral BID    metoprolol succinate  12.5 mg Oral Daily    mirtazapine  30 mg Oral QHS    mupirocin   Nasal BID    polyethylene glycol  17 g Oral Daily    tamsulosin  0.4 mg Oral Daily      PRN Meds:   Current Facility-Administered Medications:     0.9%  NaCl infusion (for blood administration), , Intravenous, Q24H PRN    0.9%  NaCl infusion (for blood administration), , Intravenous, Q24H PRN    acetaminophen, 650 mg, Oral, Q6H PRN    ALPRAZolam, 0.25 mg, Oral, TID PRN    benzonatate, 100 mg, Oral, TID PRN    calcium carbonate, 500 mg, Oral, TID PRN    dextrose 50%, 12.5 g, Intravenous, PRN    dextrose 50%, 25 g, Intravenous, PRN    glucagon (human recombinant), 1 mg, Intramuscular, PRN    glucose, 16 g, Oral, PRN    glucose, 24 g, Oral, PRN    guaiFENesin 100 mg/5 ml, 200 mg, Oral, Q4H PRN    hydrALAZINE, 10 mg, Intravenous, Q6H PRN    insulin aspart U-100,  0-5 Units, Subcutaneous, QID (AC + HS) PRN    naloxone, 0.02 mg, Intravenous, PRN    ondansetron, 4 mg, Intravenous, Q6H PRN    sodium chloride 0.9%, 10 mL, Intravenous, Q12H PRN   Psychotherapeutics (From admission, onward)      Start     Stop Route Frequency Ordered    06/13/25 1500  busPIRone tablet 10 mg         -- Oral 3 times daily 06/13/25 1056    06/12/25 2100  mirtazapine tablet 30 mg         -- Oral Nightly 06/12/25 1541    06/12/25 1639  ALPRAZolam tablet 0.25 mg         -- Oral 3 times daily PRN 06/12/25 1540              Social History:  Housing Status: Lives with wife  Relationship Status/Sexual Orientation:    Children: 2  Education: 3 years college   Employment Status/Info: Retired, , oil field work    history: [email or web address], 8 years        Legal History:   Past Charges/Incarcerations: Denies   Pending charges: Denies      OBJECTIVE:       Vitals   Vitals:    06/13/25 1157   BP: (!) 141/80   Pulse: 67   Resp: 18   Temp: 97.5 °F (36.4 °C)        Labs/Imaging/Studies:   Recent Results (from the past 48 hours)   Comprehensive metabolic panel    Collection Time: 06/12/25 12:11 PM   Result Value Ref Range    Sodium 144 136 - 145 mmol/L    Potassium 4.2 3.5 - 5.1 mmol/L    Chloride 104 98 - 107 mmol/L    CO2 30 23 - 31 mmol/L    Glucose 112 82 - 115 mg/dL    Blood Urea Nitrogen 15.5 8.4 - 25.7 mg/dL    Creatinine 0.86 0.72 - 1.25 mg/dL    Calcium 8.4 (L) 8.8 - 10.0 mg/dL    Protein Total 6.9 5.8 - 7.6 gm/dL    Albumin 2.4 (L) 3.4 - 4.8 g/dL    Globulin 4.5 (H) 2.4 - 3.5 gm/dL    Albumin/Globulin Ratio 0.5 (L) 1.1 - 2.0 ratio    Bilirubin Total <0.5 <=1.5 mg/dL     (H) 40 - 150 unit/L    ALT 6 0 - 55 unit/L    AST 14 11 - 45 unit/L    eGFR >60 mL/min/1.73/m2    Anion Gap 10.0 mEq/L    BUN/Creatinine Ratio 18    Protime-INR    Collection Time: 06/12/25 12:11 PM   Result Value Ref Range    PT 13.3 12.5 - 14.5 seconds    INR 1.0 <=1.3   Type & Screen    Collection Time: 06/12/25  12:11 PM   Result Value Ref Range    Group & Rh A POS     Indirect Sonia GEL NEG     Specimen Outdate 06/15/2025 23:59    CBC with Differential    Collection Time: 06/12/25 12:11 PM   Result Value Ref Range    WBC 8.75 4.50 - 11.50 x10(3)/mcL    RBC 2.42 (L) 4.70 - 6.10 x10(6)/mcL    Hgb 7.3 (L) 14.0 - 18.0 g/dL    Hct 24.5 (L) 42.0 - 52.0 %    .2 (H) 80.0 - 94.0 fL    MCH 30.2 27.0 - 31.0 pg    MCHC 29.8 (L) 33.0 - 36.0 g/dL    RDW 18.1 (H) 11.5 - 17.0 %    Platelet 11 (LL) 130 - 400 x10(3)/mcL    MPV      IPF 4.6 0.9 - 11.2 %    Neut % 66.4 %    Lymph % 22.7 %    Mono % 8.8 %    Eos % 0.3 %    Basophil % 0.9 %    Imm Grans % 0.9 %    Neut # 5.80 2.1 - 9.2 x10(3)/mcL    Lymph # 1.99 0.6 - 4.6 x10(3)/mcL    Mono # 0.77 0.1 - 1.3 x10(3)/mcL    Eos # 0.03 0 - 0.9 x10(3)/mcL    Baso # 0.08 <=0.2 x10(3)/mcL    Imm Gran # 0.08 (H) 0.00 - 0.04 x10(3)/mcL    NRBC% 0.0 %   Path Review, Peripheral Smear    Collection Time: 06/12/25 12:11 PM   Result Value Ref Range    Peripheral Smear Evaluation       - Macrocytic anemia.  - Severe thrombocytopenia; no clumps or schistocytes.    Paolo Salvador M.D.    Prepare RBC 2 Units; Symptomatic anemia    Collection Time: 06/12/25 12:11 PM   Result Value Ref Range    UNIT NUMBER [id number]     UNIT ABO/RH A POS     DISPENSE STATUS Transfused     Unit Expiration [id number]     Product Code I8787R43     Unit Blood Type Code 6200     CROSSMATCH INTERPRETATION Compatible     UNIT NUMBER [id number]     UNIT ABO/RH A POS     DISPENSE STATUS Transfused     Unit Expiration [id number]     Product Code Y1779F08     Unit Blood Type Code 6200     CROSSMATCH INTERPRETATION Compatible    Prepare Platelets 1 Dose    Collection Time: 06/12/25 12:11 PM   Result Value Ref Range    UNIT NUMBER [id number]     UNIT ABO/RH A POS     DISPENSE STATUS Transfused     Unit Expiration [id number]     Product Code R6292O79     Unit Blood Type Code 6200     CROSSMATCH INTERPRETATION Not  required    Vitamin B12    Collection Time: 06/12/25 12:11 PM   Result Value Ref Range    Vitamin B12 >2,000 (H) 213 - 816 pg/mL   Iron and TIBC    Collection Time: 06/12/25 12:11 PM   Result Value Ref Range    Iron Binding Capacity Unsaturated 69 60 - 240 ug/dL    Iron Level 70 65 - 175 ug/dL    Transferrin 109 mg/dL    Iron Binding Capacity Total 139 (L) 250 - 450 ug/dL    Iron Saturation 50 20 - 50 %   Ferritin    Collection Time: 06/12/25 12:11 PM   Result Value Ref Range    Ferritin Level 1,641.81 (H) 21.81 - 274.66 ng/mL   POCT glucose    Collection Time: 06/12/25  5:15 PM   Result Value Ref Range    POCT Glucose 106 70 - 110 mg/dL   Comprehensive Metabolic Panel    Collection Time: 06/13/25  3:30 AM   Result Value Ref Range    Sodium 144 136 - 145 mmol/L    Potassium 3.7 3.5 - 5.1 mmol/L    Chloride 105 98 - 107 mmol/L    CO2 32 (H) 23 - 31 mmol/L    Glucose 100 82 - 115 mg/dL    Blood Urea Nitrogen 16.3 8.4 - 25.7 mg/dL    Creatinine 0.81 0.72 - 1.25 mg/dL    Calcium 7.9 (L) 8.8 - 10.0 mg/dL    Protein Total 5.9 5.8 - 7.6 gm/dL    Albumin 2.3 (L) 3.4 - 4.8 g/dL    Globulin 3.6 (H) 2.4 - 3.5 gm/dL    Albumin/Globulin Ratio 0.6 (L) 1.1 - 2.0 ratio    Bilirubin Total 0.5 <=1.5 mg/dL     40 - 150 unit/L    ALT 8 0 - 55 unit/L    AST 15 11 - 45 unit/L    eGFR >60 mL/min/1.73/m2    Anion Gap 7.0 mEq/L    BUN/Creatinine Ratio 20    Folate    Collection Time: 06/13/25  3:30 AM   Result Value Ref Range    Folate Level 6.3 (L) 7.0 - 31.4 ng/mL   CBC with Differential    Collection Time: 06/13/25  3:30 AM   Result Value Ref Range    WBC 9.42 4.50 - 11.50 x10(3)/mcL    RBC 2.80 (L) 4.70 - 6.10 x10(6)/mcL    Hgb 8.5 (L) 14.0 - 18.0 g/dL    Hct 27.4 (L) 42.0 - 52.0 %    MCV 97.9 (H) 80.0 - 94.0 fL    MCH 30.4 27.0 - 31.0 pg    MCHC 31.0 (L) 33.0 - 36.0 g/dL    RDW 17.5 (H) 11.5 - 17.0 %    Platelet 41 (L) 130 - 400 x10(3)/mcL    MPV 10.3 7.4 - 10.4 fL    Neut % 70.2 %    Lymph % 20.5 %    Mono % 7.9 %    Eos % 0.3  "%    Basophil % 0.4 %    Imm Grans % 0.7 %    Neut # 6.61 2.1 - 9.2 x10(3)/mcL    Lymph # 1.93 0.6 - 4.6 x10(3)/mcL    Mono # 0.74 0.1 - 1.3 x10(3)/mcL    Eos # 0.03 0 - 0.9 x10(3)/mcL    Baso # 0.04 <=0.2 x10(3)/mcL    Imm Gran # 0.07 (H) 0.00 - 0.04 x10(3)/mcL    NRBC% 0.0 %   POCT glucose    Collection Time: 06/13/25 11:55 AM   Result Value Ref Range    POCT Glucose 101 70 - 110 mg/dL   POCT glucose    Collection Time: 06/13/25  3:18 PM   Result Value Ref Range    POCT Glucose 100 70 - 110 mg/dL      No results found for: "PHENYTOIN", "PHENOBARB", "VALPROATE", "CBMZ"        Psychiatric Mental Status Exam:  General Appearance: appears stated age, dressed in hospital garb, lying in bed, in no acute distress  Arousal: alert  Behavior: cooperative, pleasant, polite, appropriate eye-contact, under good behavioral control  Movements and Motor Activity: no abnormal involuntary movements noted; no tics, no tremors, no akathisia, no dystonia, no evidence of tardive dyskinesia; no psychomotor agitation or retardation  Orientation: intact; oriented fully to person, place, time and situation  Speech: intact; normal rate, rhythm, volume, tone and pitch; conversational, spontaneous, and coherent  Mood: Euthymic  Affect: euthymic, reactive, full-range  Thought Process: intact, linear, goal-directed, organized, logical  Associations: intact, no loosening of associations  Thought Content and Perceptions: no suicidal or homicidal ideation, no auditory or visual hallucinations, no paranoid ideation, no ideas of reference, no evidence of delusions or psychosis  Recent and Remote Memory: grossly intact; per interview/observation with patient  Attention and Concentration: grossly intact; per interview/observation with patient  Fund of Knowledge: grossly intact; based on history, vocabulary, fund of knowledge, syntax, grammar, and content  Insight: adequate; based on understanding of severity of illness and HPI  Judgment: adequate; " based on patient's behavior and HPI        ASSESSMENT/PLAN:   Diagnoses:  Unspecified anxiety disorder  R/O Unspecified depressive disorder    Past Medical History:   Diagnosis Date    Anxiety     Diabetes mellitus     HLD (hyperlipidemia)     Hypertension     Major depressive disorder, single episode, unspecified     Pneumonia, unspecified organism           Problem lists and Management Plans:  Medication management  Discontinue buspirone  Continue mirtazapine 30mg PO QHS  Continue Alprazolam 0.25mg PO TID PRN anxiety  Lexapro 5mg PO x3 days then increase to 10mg daily if tolerating  Legal  PEC not recommended. Low risk of imminent harm to self or others.  Psychiatry will sign-off        Brando Mcnamara

## 2025-06-13 NOTE — PROGRESS NOTES
[facility] - Oncology Acute  Hematology/Oncology  Consult Note    Patient Name: Juanjose Chawla  MRN: [id number]  Admission Date: 6/12/2025  Hospital Length of Stay: 1 days  Attending Provider: Harvey Osborn MD  Consulting Provider: Niko Johnson MD  Principal Problem:<principal problem not specified>    Consults  Subjective:     HPI: This is an 84-year-old patient he was last seen by us in clinic on 05/21/2026.  He was given 1 unit of packed red blood cells, therapy was held and then he was reinstituted on chemotherapy on 05/29/2025.  This is his 3rd cycle of carboplatin and Taxol with growth factor support.        His family called he was having episodes of confusion and weakness he was asked to come to the emergency room.    Family declined.    He had multiple phone calls with different progressive symptoms he was asked to come to the emergency room the family again declined they ask for IV fluids at home.  The impression of the nursing staff was that he needed more aggressive care and evaluation.  He was not brought to the emergency room.  Home health then went out the patient was noted to be orthostatic his blood counts were drawn.  He is noted to have severe symptomatic anemia and thrombocytopenia and he was asked to come to the emergency room.      His blood work on 06/11 showed a hemoglobin of 6.8 with a platelet count of 11,000.      He was seen evaluated in the ER.  Repeat blood evaluation shows the patient's hemoglobin is 7.3 he has a normal white count but his platelet count is still 11.  His sodium is normal his potassium is normal his BUN is 15 with a creatinine 0.8 in his calcium is 8.4 he has a mild elevation of his alk-phos at    he is scheduled for 2 units of blood and 1 unit of platelets    And we were consulted for his history.  I spoke to the ER physician discussing him the orthostatic, rule out sepsis as well    He currently in his anorexia    He denies any current  headache.  All Korin's had occasional.  No visual change.  He still feels short of breath.  They have been draining less fluid out of his lung 300 at times.  He has a lot of anxiety get him going to the bathroom because it is difficult for him.    He has difficulty with the eating no difficulty swallowing just nothing tastes right.  He has lost his taste for food.  Has a metallic taste.    No bleeding.  No rash.  No neuropathy.  Pain is well-controlled.  Still super anxious    He has.  This severe anxiety and confusion.  And then gets better.  His family was present with the bedside.    Today 06/13/2025:   Patient is sleeping comfortably.  Chest x-ray pending.    Started on Periactin   Started on folic acid.  Discussed case with .  Dr. Osborn  Discussed with the family   Patient not awakened from sleep.  Discussed with daughter he had a rough night.  They changed his BuSpar increase in also put him on some p.r.n. antianxiety medication.  He received his transfusions well.      Oncology Treatment Plan:   OP NSCLC PACLITAXEL CARBOPLATIN Q3W    Oncology History Overview Note   Images     February 27, 2025:  CT of the chest, COPD and emphysema, right lower lobe atelectasis, lymphadenopathy in the right hilum as well as the mediastinum and smaller nodes in the left hilum.  The right hilar lymph node measures 2.5 cm other small nodes in the right hilum.  And there is a precarinal lymph node 2.2 x 2.2 cm.  And adenopathy seen in the subcarinal space as well as the mediastinum.     03/13/2025: CT abdomen pelvis, right-sided pleural effusion interstitial fibrosis lungs bilaterally prostatic hypertrophy     03/20/2025: PET-CT: Hypermetabolic consolidation right lung base with mediastinal and bilateral adenopathy and innumerable scattered hypermetabolic bone lesions     pathology  The right pleural effusion showed malignant cells consistent with a poorly differential adenocarcinoma:  Negative for TTF 1, WT1, GATA3.  Suggest an  origin of the GI tract clinical pathology is recommended    2nd biopsy  1. EBUS LYMPH NODE 4R (TWO CONVENTIONAL SMEARS, ONE THIN PREP SMEAR, ONE CELL   BLOCK):    RARE MALIGNANT CELLS CONSISTENT WITH POORLY DIFFERENTIATED ADENOCARCINOMA.      2. EBUS LYMPH NODE 7R:    POORLY DIFFERENTIATED ADENOCARCINOMA.   The immunohistochemical profile of the tumor cells is not typical of lung   primary and may be seen with adenocarcinomas of upper   gastrointestinal/pancreaticobiliary primary.       Tempus Liquid biopsy  TP53  No actionable mutation  KEAP1 +     Metastasis to bone   3/24/2025 Initial Diagnosis    Metastasis to bone     Secondary malignancy of mediastinal lymph nodes   3/24/2025 Initial Diagnosis    Secondary malignancy of mediastinal lymph nodes     Adenocarcinoma of unknown primary   4/4/2025 Cancer Staged    Staging form: Lung, AJCC V9  - Clinical stage from 4/4/2025: Stage ANTONIA (cTX, cNX, cM1b)     4/5/2025 -  Chemotherapy    Treatment Summary   Plan Name: OP NSCLC PACLITAXEL CARBOPLATIN Q3W  Treatment Goal: Palliative  Status: Active  Start Date: 4/5/2025  End Date: 6/19/2025 (Planned)  Provider: Niko Johnson MD  Chemotherapy: CARBOplatin (PARAPLATIN) 635 mg in 0.9% NaCl 348.5 mL chemo infusion, 635 mg (88.4 % of original dose 715.8 mg), Intravenous, Clinic/HOD 1 time, 3 of 4 cycles  Dose modification:   (original dose 715.8 mg, Cycle 1)  Administration: 635 mg (4/5/2025), 600 mg (5/2/2025)  PACLitaxeL (TAXOL) 135 mg/m2 = 282 mg in 0.9% NaCl 500 mL chemo infusion, 135 mg/m2 = 282 mg (100 % of original dose 135 mg/m2), Intravenous, Clinic/HOD 1 time, 3 of 4 cycles  Dose modification: 135 mg/m2 (original dose 135 mg/m2, Cycle 1, Reason: MD Discretion)  Administration: 282 mg (4/5/2025), 282 mg (5/2/2025)          Medications:  Continuous Infusions:    Scheduled Meds:   aspirin  81 mg Oral Daily    busPIRone  10 mg Oral TID    cyproheptadine  4 mg Oral TID    famotidine  20 mg Oral Daily    folic acid  1 mg  Oral Daily    gabapentin  300 mg Oral BID    metoprolol succinate  12.5 mg Oral Daily    mirtazapine  30 mg Oral QHS    mupirocin   Nasal BID    polyethylene glycol  17 g Oral Daily    tamsulosin  0.4 mg Oral Daily     PRN Meds:  Current Facility-Administered Medications:     0.9%  NaCl infusion (for blood administration), , Intravenous, Q24H PRN    0.9%  NaCl infusion (for blood administration), , Intravenous, Q24H PRN    acetaminophen, 650 mg, Oral, Q6H PRN    ALPRAZolam, 0.25 mg, Oral, TID PRN    benzonatate, 100 mg, Oral, TID PRN    calcium carbonate, 500 mg, Oral, TID PRN    dextrose 50%, 12.5 g, Intravenous, PRN    dextrose 50%, 25 g, Intravenous, PRN    glucagon (human recombinant), 1 mg, Intramuscular, PRN    glucose, 16 g, Oral, PRN    glucose, 24 g, Oral, PRN    guaiFENesin 100 mg/5 ml, 200 mg, Oral, Q4H PRN    hydrALAZINE, 10 mg, Intravenous, Q6H PRN    insulin aspart U-100, 0-5 Units, Subcutaneous, QID (AC + HS) PRN    naloxone, 0.02 mg, Intravenous, PRN    ondansetron, 4 mg, Intravenous, Q6H PRN    sodium chloride 0.9%, 10 mL, Intravenous, Q12H PRN     Review of patient's allergies indicates:  No Known Allergies     Past Medical History:   Diagnosis Date    Anxiety     Diabetes mellitus     HLD (hyperlipidemia)     Hypertension     Major depressive disorder, single episode, unspecified     Pneumonia, unspecified organism      Past Surgical History:   Procedure Laterality Date    Cardiac stent      ENDOBRONCHIAL ULTRASOUND N/A 3/28/2025    Procedure: ENDOBRONCHIAL ULTRASOUND (EBUS);  Surgeon: Jerrod Harman Jr., MD, [facility];  Location: [facility] BRONCHOSCOPY LAB;  Service: Pulmonary;  Laterality: N/A;    HERNIA REPAIR N/A     INSERTION OF PLEURAL CATHETER N/A 4/8/2025    Procedure: INSERTION, CATHETER, PLEURAL;  Surgeon: Roslyn Hull MD;  Location: [facility] OR;  Service: Cardiovascular;  Laterality: N/A;  PLEUR-X CATHETER INSERTION / RIGHT     Family History    None       Tobacco Use    Smoking status: Never     Smokeless tobacco: Never   Substance and Sexual Activity    Alcohol use: Not Currently    Drug use: Never    Sexual activity: Not on file       Review of Systems see above in HPI  Objective:     Vital Signs (Most Recent):  Temp: 97.5 °F (36.4 °C) (06/13/25 1157)  Pulse: 67 (06/13/25 1157)  Resp: 18 (06/13/25 1157)  BP: (!) 141/80 (06/13/25 1157)  SpO2: 100 % (06/13/25 1157) Vital Signs (24h Range):  Temp:  [97.3 °F (36.3 °C)-98.2 °F (36.8 °C)] 97.5 °F (36.4 °C)  Pulse:  [] 67  Resp:  [15-24] 18  SpO2:  [93 %-100 %] 100 %  BP: (117-147)/(50-99) 141/80     Weight: 80.7 kg (177 lb 14.4 oz)  Body mass index is 24.13 kg/m².  Body surface area is 2.02 meters squared.      Intake/Output Summary (Last 24 hours) at 6/13/2025 1216  Last data filed at 6/12/2025 1756  Gross per 24 hour   Intake 196.35 ml   Output --   Net 196.35 ml       Physical Exam  Vitals reviewed.   Constitutional:       General: He is awake. He is not in acute distress.  HENT:      Head: Normocephalic and atraumatic.   Eyes:      Comments: palor   Cardiovascular:      Rate and Rhythm: Normal rate and regular rhythm.      Heart sounds: Normal heart sounds.   Pulmonary:      Effort: Tachypnea and prolonged expiration present. No accessory muscle usage.      Breath sounds: Decreased air movement present.   Chest:      Comments: Cath in place  Abdominal:      General: Abdomen is flat. Bowel sounds are normal. There is no distension.      Palpations: Abdomen is soft.   Musculoskeletal:      Cervical back: Normal range of motion and neck supple.      Right lower leg: No edema.      Left lower leg: No edema.   Lymphadenopathy:      Upper Body:      Right upper body: No supraclavicular adenopathy.      Left upper body: No supraclavicular adenopathy.   Skin:     General: Skin is warm and dry.   Neurological:      Mental Status: He is alert.      GCS: GCS eye subscore is 4. GCS verbal subscore is 5. GCS motor subscore is 6.      Cranial Nerves: Cranial  nerves 2-12 are intact.   Psychiatric:         Attention and Perception: Attention normal.         Mood and Affect: Mood is anxious.         Behavior: Behavior is hyperactive.         Significant Labs:   CBC:   Recent Labs   Lab 06/12/25  1211 06/13/25  0330   WBC 8.75 9.42   HGB 7.3* 8.5*   HCT 24.5* 27.4*   PLT 11* 41*    and CMP:   Recent Labs   Lab 06/12/25  1211 06/13/25  0330    144   K 4.2 3.7    105   CO2 30 32*    100   BUN 15.5 16.3   CREATININE 0.86 0.81   CALCIUM 8.4* 7.9*   PROT 6.9 5.9   ALBUMIN 2.4* 2.3*   BILITOT <0.5 0.5   ALKPHOS 156* 146   AST 14 15   ALT 6 8       Diagnostic Results:      Assessment/Plan:   Anemia and thrombocytopenia most likely secondary to chemotherapy   Acute symptomatic anemia   Non-small-cell lung carcinoma              Malignant pleural effusions               Malignant hilar and mediastinal adenopathy                 Malignant bone lesions                  History of hypercalcemia    History of depression anxiety ---was on BuSpar at home  History of insomnia: On Remeron in the past  Folic acid deficiency    Recommendations   Keep hemoglobin greater than 8 -                 -if still symptomatic at 8---- May try to get closer to 10 based on volume status  Keep platelets gout greater than 20 unless bleeding   Palliative care consultation  vs psych with anxiety-- continue BuSpar                       With Xanax p.r.n.  --we will try to wait till he is more stable from a blood standpoint before CT scan of the chest abdomen pelvis  PICC line care okay to use  Add ensure pudding or equivalent supplement  Consider draining pleural effusion if worse  Periactin for appetite stimulation      I explained to the patient's daughters they were unsure how much time he has left.    We discussed his disease options are palliative.      We discussed 1st correcting his anemia and thrombocytopenia.    Understanding he will have difficulty tolerating the more aggressive  chemotherapy.  Once he stable consider repeat CT scanning to see how his cancer is doing.  With repeat staging    He knows the goals of care or palliation  SCDs for now.  Avoid Lovenox with severe anemia and thrombocytopenia    Start folic acid    We will recheck in a.m.        Thank you for your consult.     Niko Johnson MD  Hematology/Oncology  [facility] - Oncology [facility]

## 2025-06-13 NOTE — CONSULTS
Patient Name: Juanjose Chawla   MRN: [id number]   Admission Date: 6/12/2025   Hospital Length of Stay: 1   Attending Provider: Harvey Osborn MD   Consulting Provider: Chu Snow M.D.  Reason for Consult: Goals of Care  Primary Care Physician: Sadiq Hartmann MD     Principal Problem: <principal problem not specified>     Patient information was obtained from patient, relative(s), and ER records.      Final diagnoses:  [D64.9] Symptomatic anemia (Primary)  [R07.9] Chest pain  [D69.6] Thrombocytopenia  [C34.90] Malignant neoplasm of lung, unspecified laterality, unspecified part of lung       We reviewed the patient's current clinical status with the nurse. We reviewed clinical documentation, labs and imaging.       Advance Care Planning     Date: 06/13/2025    [facility]  The patient's wife is his surrogate decision maker.  The patient does not have a healthcare power of .  He has 2 children, both daughters who were present during the interview.  I engaged the patient and family in a voluntary conversation about advance care planning and we specifically addressed what the goals of care would be moving forward, in light of the patient's change in clinical status, specifically uncontrolled anxiety with associated decreased food and drink intake and dehydration which resulted in hospitalization.    We were consulted to review goals of care with the patient and his family.  When I arrived the patient was sleeping but he was easily arousable.  He did not appears anxious as I expected although his family stated that he had taken Xanax 0.25 mg.  Complaints of anxiety which have been prevalent most of his life but are more severe since he was diagnosed with cancer and began treatment.  His family describes his perseveration with certain personal needs in a way that resembles an obsessive compulsive disorder, possibly undiagnosed nevertheless, the patient has a history of anxiety but has mask this very well  over a long period of time as he was independent.  I perceive from talking with the patient that he fears a lot of things and 1 of those is his inability to control what is happening.  As he has lost his independence, he has greatly fearful of not being able to control what happens next.  This has taken form in things like not being able to eat easily, having a poor appetite, a horrible taste in his mouth when he attempts to eat food and anxiety over going to relieve himself on the toilet.  A lot of this is because he needs assistance.  According to his family, he typically avoided situations which would require him to have to wait on others or he worried about appointments excessively.    I told him that I would be willing to assist in symptom management including anxiety management present I noted Psychiatry is consulted and therefore I recommended that we wait on Psychiatry is recommendations.  However, I did mention some recommendations to according treatment depending on Psychiatry is recommendations.  If like to follow in our palliative clinic, I can continue to manage symptoms.    Code Status:  I reviewed the risks and benefits of aggressive cardiopulmonary resuscitative measures taken in the event of a cardiopulmonary arrest event. I discussed the high risk of non-survival from such an event.  The explained that statistically, only about 1 in 15 patients survive a cardiopulmonary arrest to return home, despite aggressive CPR and life saving measures. I discussed the risk of increased morbidity and a potential decline in quality of life with survival from such an event. Finally I reviewed the risks to the patient who survives such an event, including chest wall injuries from CPR, the risk of persistent ventilator needs and the risk of anoxic brain injuries.  Although the patient stated that his goal is to die peacefully he is greatly afraid of dying and leaving his family.  Although he is very fearful of  suffering in any way, he has a greater fear of dying.  His daughters were present during the discussion and they did contribute to it.  They were encouraging patient to elect to allow a natural death in the event of a cardiopulmonary arrest.  Despite a comprehensive discussion of goals at end of life, The patient currently elects a full code status.        We did specifically address the patient's likely prognosis, which is poor.  We explored the patient's values and preferences for future care.  The patient endorses that what is most important right now is to focus on remaining as independent as possible, symptom/pain control, extending life as long as possible, even it it means sacrificing quality, curative/life-prolongation (regardless of treatment burdens), and comfort and QOL     Accordingly, we have decided that the best plan to meet the patient's goals includes continuing with treatment    A total of 20 min was spent on advance care planning, goals of care discussion, emotional support, formulating and communicating prognosis and exploring burden/benefit of various approaches of treatment. This discussion occurred on a fully voluntary basis with the verbal consent of the patient and/or family.     Hospice  I did explain the role for hospice care at this stage of the patient's illness, including its ability to help the patient live with the best quality of life possible.  We will notbe making a hospice referral as the patient wishes to continue with aggressive treatment.  He may be favor of palliative care outpatient services.  He would prefer to have a service that comes to his home.  This can be arranged if that is his wish.        Symptom review:    Anxiety is his primary symptom.  He denies pain.  He has had some intermittent nausea typically associated with anxiety.  He has had shortness of breath intermittently has a PleurX catheter which has been utilized daily.  He does wear oxygen continuously as  well.  He denies constipation or other symptoms.    Assessment and Plan:    Goals of care/counseling  Debilitating anxiety  Stage IV lung cancer  COPD  Pancytopenia    I will follow up with the patient on Monday to continue goals of care discussion and review recommendations by Psychiatry assist the patient in setting of palliative care outpatient services at the time of discharge.  If he wishes to continue to follow with our clinic, I will assist in managing symptoms as well.      History of Present Illness:     84-year-old male with a history of COPD, metastatic lung cancer, stage IV A status post chemotherapy.  The patient completed 3 rounds 05/29/2025.  Since that time he has had problems with poor appetite, decreased food and drink intake and severe anxiety hydration he is hospitalized at this time.  We were consulted to review goals of care with the patient and family.      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    Adenocarcinoma of unknown primary 03/26/2025    Anxiety about treatment 03/26/2025    Hypercalcemia 03/26/2025    Moderate malnutrition 03/27/2025     Resolved Ambulatory Problems     Diagnosis Date Noted    Pleural effusion 02/28/2025     Past Medical History:   Diagnosis Date    Anxiety     Diabetes mellitus     HLD (hyperlipidemia)     Hypertension     Major depressive disorder, single episode, unspecified     Pneumonia, unspecified organism         Past Surgical History:   Procedure Laterality Date    Cardiac stent      ENDOBRONCHIAL ULTRASOUND N/A 3/28/2025    Procedure: ENDOBRONCHIAL ULTRASOUND (EBUS);  Surgeon: Jerrod Harman Jr., MD, [facility];  Location: [facility];  Service: Pulmonary;  Laterality: N/A;    HERNIA REPAIR N/A     INSERTION OF PLEURAL CATHETER N/A 4/8/2025    Procedure: INSERTION, CATHETER, PLEURAL;  Surgeon: Della  Roslyn ASCENCIO MD;  Location: [facility];  Service: Cardiovascular;  Laterality: N/A;  PLEUR-X CATHETER INSERTION / RIGHT        Review of patient's allergies indicates:  No Known Allergies     Current Medications[1]       Current Facility-Administered Medications:     0.9%  NaCl infusion (for blood administration), , Intravenous, Q24H PRN    0.9%  NaCl infusion (for blood administration), , Intravenous, Q24H PRN    acetaminophen, 650 mg, Oral, Q6H PRN    ALPRAZolam, 0.25 mg, Oral, TID PRN    benzonatate, 100 mg, Oral, TID PRN    calcium carbonate, 500 mg, Oral, TID PRN    dextrose 50%, 12.5 g, Intravenous, PRN    dextrose 50%, 25 g, Intravenous, PRN    glucagon (human recombinant), 1 mg, Intramuscular, PRN    glucose, 16 g, Oral, PRN    glucose, 24 g, Oral, PRN    guaiFENesin 100 mg/5 ml, 200 mg, Oral, Q4H PRN    hydrALAZINE, 10 mg, Intravenous, Q6H PRN    insulin aspart U-100, 0-5 Units, Subcutaneous, QID (AC + HS) PRN    naloxone, 0.02 mg, Intravenous, PRN    ondansetron, 4 mg, Intravenous, Q6H PRN    sodium chloride 0.9%, 10 mL, Intravenous, Q12H PRN     No family history on file.     Review of Systems   All other systems reviewed and are negative.         12 point review of systems conducted, negative except as stated in the history of present illness. See HPI for details.      Objective:   BP (!) 141/80   Pulse 67   Temp 97.5 °F (36.4 °C) (Oral)   Resp 18   Ht 6' (1.829 m)   Wt 80.7 kg (177 lb 14.4 oz)   SpO2 100%   BMI 24.13 kg/m²      Physical Exam  Vitals reviewed.   Constitutional:       Appearance: He is not ill-appearing.   HENT:      Head: Normocephalic.      Nose: Nose normal.      Mouth/Throat:      Mouth: Mucous membranes are moist.      Pharynx: Oropharynx is clear.   Eyes:      Extraocular Movements: Extraocular movements intact.      Conjunctiva/sclera: Conjunctivae normal.      Pupils: Pupils are equal, round, and reactive to light.   Cardiovascular:      Rate and Rhythm: Normal rate.       Pulses: Normal pulses.   Pulmonary:      Effort: Pulmonary effort is normal.   Abdominal:      General: Abdomen is flat.      Palpations: Abdomen is soft.   Musculoskeletal:      Right lower leg: No edema.      Left lower leg: No edema.   Skin:     General: Skin is warm.   Neurological:      Mental Status: He is alert and oriented to person, place, and time.   Psychiatric:         Behavior: Behavior normal.            Review of Symptoms  Review of Symptoms      Symptom Assessment (ESAS 0-10 Scale)  Pain:  0  Dyspnea:  0  Anxiety:  0  Nausea:  0  Depression:  0  Anorexia:  0  Fatigue:  0  Insomnia:  0  Restlessness:  0  Agitation:  0         Performance Status:  40    Living Arrangements:  Lives with spouse and Lives in home    Psychosocial/Cultural:   See Palliative Psychosocial Note: Yes  **Primary  to Follow**  Palliative Care  Consult: No      Advance Care Planning   Advance Directives:   Living Will: No    Do Not Resuscitate Status: No    Medical Power of : No      Decision Making:  Patient answered questions and Family answered questions  Goals of Care: The patient and family endorses that what is most important right now is to focus on remaining as independent as possible, extending life as long as possible, even it it means sacrificing quality, and curative/life-prolongation (regardless of treatment burdens)    Accordingly, we have decided that the best plan to meet the patient's goals includes continuing with treatment            65 min of encounter was spent in chart review, face to face discussion of goals of care, symptom assessment, coordination of care and emotional support.     Chu Snow M.D.  Palliative Medicine  [facility]         [1]   Current Facility-Administered Medications:     0.9%  NaCl infusion (for blood administration), , Intravenous, Q24H PRN, Jasper Eubanks MD    0.9%  NaCl infusion (for blood  administration), , Intravenous, Q24H PRN, Jasper Euabnks MD    acetaminophen tablet 650 mg, 650 mg, Oral, Q6H PRN, Antoni Peoples B, NP, 650 mg at 06/13/25 0826    ALPRAZolam tablet 0.25 mg, 0.25 mg, Oral, TID PRN, Antoni Peoples B, NP, 0.25 mg at 06/13/25 0827    aspirin EC tablet 81 mg, 81 mg, Oral, Daily, Antoni Peoples B, NP, 81 mg at 06/13/25 0826    benzonatate capsule 100 mg, 100 mg, Oral, TID PRN, Antoni Peoples B, NP    busPIRone tablet 10 mg, 10 mg, Oral, TID, Harvey Osborn MD, 10 mg at 06/13/25 1519    calcium carbonate 200 mg calcium (500 mg) chewable tablet 500 mg, 500 mg, Oral, TID PRN, Antoni Peoples, NP    cyproheptadine 4 mg tablet 4 mg, 4 mg, Oral, TID, Niko Johnson MD, 4 mg at 06/13/25 1519    dextrose 50% injection 12.5 g, 12.5 g, Intravenous, PRN, Antoni Peoples, NP    dextrose 50% injection 25 g, 25 g, Intravenous, PRN, Antoni Peoples B, NP    famotidine tablet 20 mg, 20 mg, Oral, Daily, Antoni Peoples B, NP, 20 mg at 06/13/25 0827    folic acid tablet 1 mg, 1 mg, Oral, Daily, Harvey Osborn MD, 1 mg at 06/13/25 1520    gabapentin capsule 300 mg, 300 mg, Oral, BID, Antoni Peoples B, NP, 300 mg at 06/13/25 0827    glucagon (human recombinant) injection 1 mg, 1 mg, Intramuscular, PRN, Antoni Peoples B, NP    glucose chewable tablet 16 g, 16 g, Oral, PRN, Antoni Peoples B, NP    glucose chewable tablet 24 g, 24 g, Oral, PRN, Antoni Peoples B, NP    guaiFENesin 100 mg/5 ml syrup 200 mg, 200 mg, Oral, Q4H PRN, Antoni Peoples, NP    hydrALAZINE injection 10 mg, 10 mg, Intravenous, Q6H PRN, Antoni Peoples NP    insulin aspart U-100 injection 0-5 Units, 0-5 Units, Subcutaneous, QID (AC + HS) PRN, Antoni Peoples NP    metoprolol succinate (TOPROL-XL) 24 hr split tablet 12.5 mg, 12.5 mg, Oral, Daily, Antoni Peoples NP, 12.5 mg at 06/13/25 0826    mirtazapine tablet 30 mg, 30 mg, Oral, QHS, Antoni Peoples NP, 30 mg at 06/12/25 2032    mupirocin 2 % ointment, , Nasal, BID,  Harvey Osborn MD, Given at 06/13/25 0827    naloxone 0.4 mg/mL injection 0.02 mg, 0.02 mg, Intravenous, PRN, Antoni Peoples NP    ondansetron injection 4 mg, 4 mg, Intravenous, Q6H PRN, Antoni Peoples, DEEP, 4 mg at 06/13/25 0827    polyethylene glycol packet 17 g, 17 g, Oral, Daily, Antoni Peoples NP, 17 g at 06/13/25 0827    sodium chloride 0.9% flush 10 mL, 10 mL, Intravenous, Q12H PRN, Antoni Peoples NP    tamsulosin 24 hr capsule 0.4 mg, 0.4 mg, Oral, Daily, Antoni Peoples NP, 0.4 mg at 06/13/25 0827

## 2025-06-14 LAB
ABO + RH BLD: NORMAL
ANION GAP SERPL CALC-SCNC: 3 MEQ/L
BASOPHILS # BLD AUTO: 0.06 X10(3)/MCL
BASOPHILS NFR BLD AUTO: 0.7 %
BLD PROD TYP BPU: NORMAL
BLOOD UNIT EXPIRATION DATE: NORMAL
BLOOD UNIT TYPE CODE: 5100
BUN SERPL-MCNC: 15.6 MG/DL (ref 8.4–25.7)
CALCIUM SERPL-MCNC: 8 MG/DL (ref 8.8–10)
CHLORIDE SERPL-SCNC: 106 MMOL/L (ref 98–107)
CO2 SERPL-SCNC: 36 MMOL/L (ref 23–31)
CREAT SERPL-MCNC: 0.79 MG/DL (ref 0.72–1.25)
CREAT/UREA NIT SERPL: 20
CROSSMATCH INTERPRETATION: NORMAL
DISPENSE STATUS: NORMAL
EOSINOPHIL # BLD AUTO: 0.05 X10(3)/MCL (ref 0–0.9)
EOSINOPHIL NFR BLD AUTO: 0.6 %
ERYTHROCYTE [DISTWIDTH] IN BLOOD BY AUTOMATED COUNT: 18.2 % (ref 11.5–17)
GFR SERPLBLD CREATININE-BSD FMLA CKD-EPI: >60 ML/MIN/1.73/M2
GLUCOSE SERPL-MCNC: 112 MG/DL (ref 82–115)
HCT VFR BLD AUTO: 29 % (ref 42–52)
HGB BLD-MCNC: 8.8 G/DL (ref 14–18)
IMM GRANULOCYTES # BLD AUTO: 0.08 X10(3)/MCL (ref 0–0.04)
IMM GRANULOCYTES NFR BLD AUTO: 0.9 %
LYMPHOCYTES # BLD AUTO: 1.73 X10(3)/MCL (ref 0.6–4.6)
LYMPHOCYTES NFR BLD AUTO: 19.5 %
MAGNESIUM SERPL-MCNC: 1.7 MG/DL (ref 1.6–2.6)
MCH RBC QN AUTO: 30 PG (ref 27–31)
MCHC RBC AUTO-ENTMCNC: 30.3 G/DL (ref 33–36)
MCV RBC AUTO: 99 FL (ref 80–94)
MONOCYTES # BLD AUTO: 0.71 X10(3)/MCL (ref 0.1–1.3)
MONOCYTES NFR BLD AUTO: 8 %
NEUTROPHILS # BLD AUTO: 6.23 X10(3)/MCL (ref 2.1–9.2)
NEUTROPHILS NFR BLD AUTO: 70.3 %
NRBC BLD AUTO-RTO: 0 %
PLATELET # BLD AUTO: 20 X10(3)/MCL (ref 130–400)
PLATELETS.RETICULATED NFR BLD AUTO: 2 % (ref 0.9–11.2)
PMV BLD AUTO: 11 FL (ref 7.4–10.4)
POCT GLUCOSE: 106 MG/DL (ref 70–110)
POCT GLUCOSE: 111 MG/DL (ref 70–110)
POCT GLUCOSE: 126 MG/DL (ref 70–110)
POCT GLUCOSE: 81 MG/DL (ref 70–110)
POTASSIUM SERPL-SCNC: 4.1 MMOL/L (ref 3.5–5.1)
RBC # BLD AUTO: 2.93 X10(6)/MCL (ref 4.7–6.1)
SODIUM SERPL-SCNC: 145 MMOL/L (ref 136–145)
UNIT NUMBER: NORMAL
WBC # BLD AUTO: 8.86 X10(3)/MCL (ref 4.5–11.5)

## 2025-06-14 PROCEDURE — 11000001 HC ACUTE MED/SURG PRIVATE ROOM

## 2025-06-14 PROCEDURE — 25000003 PHARM REV CODE 250: Performed by: INTERNAL MEDICINE

## 2025-06-14 PROCEDURE — 27000221 HC OXYGEN, UP TO 24 HOURS

## 2025-06-14 PROCEDURE — 25000003 PHARM REV CODE 250: Performed by: CLINICAL NURSE SPECIALIST

## 2025-06-14 PROCEDURE — 99232 SBSQ HOSP IP/OBS MODERATE 35: CPT | Mod: ,,, | Performed by: INTERNAL MEDICINE

## 2025-06-14 PROCEDURE — 25000003 PHARM REV CODE 250: Performed by: NURSE PRACTITIONER

## 2025-06-14 PROCEDURE — 85025 COMPLETE CBC W/AUTO DIFF WBC: CPT | Performed by: INTERNAL MEDICINE

## 2025-06-14 PROCEDURE — P9035 PLATELET PHERES LEUKOREDUCED: HCPCS | Performed by: INTERNAL MEDICINE

## 2025-06-14 PROCEDURE — 83735 ASSAY OF MAGNESIUM: CPT | Performed by: INTERNAL MEDICINE

## 2025-06-14 PROCEDURE — 80048 BASIC METABOLIC PNL TOTAL CA: CPT | Performed by: INTERNAL MEDICINE

## 2025-06-14 PROCEDURE — 25500020 PHARM REV CODE 255: Performed by: INTERNAL MEDICINE

## 2025-06-14 PROCEDURE — 36415 COLL VENOUS BLD VENIPUNCTURE: CPT | Performed by: INTERNAL MEDICINE

## 2025-06-14 PROCEDURE — 87040 BLOOD CULTURE FOR BACTERIA: CPT | Performed by: INTERNAL MEDICINE

## 2025-06-14 RX ORDER — HYDROCODONE BITARTRATE AND ACETAMINOPHEN 500; 5 MG/1; MG/1
TABLET ORAL
Status: DISCONTINUED | OUTPATIENT
Start: 2025-06-14 | End: 2025-06-18

## 2025-06-14 RX ORDER — DIATRIZOATE MEGLUMINE AND DIATRIZOATE SODIUM 660; 100 MG/ML; MG/ML
60 SOLUTION ORAL; RECTAL
Status: COMPLETED | OUTPATIENT
Start: 2025-06-14 | End: 2025-06-14

## 2025-06-14 RX ADMIN — MIRTAZAPINE 30 MG: 15 TABLET, FILM COATED ORAL at 08:06

## 2025-06-14 RX ADMIN — Medication 9 MG: at 12:06

## 2025-06-14 RX ADMIN — IOHEXOL 100 ML: 350 INJECTION, SOLUTION INTRAVENOUS at 07:06

## 2025-06-14 RX ADMIN — ASPIRIN 81 MG: 81 TABLET, COATED ORAL at 12:06

## 2025-06-14 RX ADMIN — CYPROHEPTADINE HYDROCHLORIDE 4 MG: 4 TABLET ORAL at 08:06

## 2025-06-14 RX ADMIN — MUPIROCIN: 20 OINTMENT TOPICAL at 08:06

## 2025-06-14 RX ADMIN — GABAPENTIN 300 MG: 300 CAPSULE ORAL at 08:06

## 2025-06-14 RX ADMIN — ALPRAZOLAM 0.25 MG: 0.25 TABLET ORAL at 05:06

## 2025-06-14 RX ADMIN — ALPRAZOLAM 0.25 MG: 0.25 TABLET ORAL at 09:06

## 2025-06-14 RX ADMIN — Medication 6 MG: at 09:06

## 2025-06-14 RX ADMIN — DIATRIZOATE MEGLUMINE AND DIATRIZOATE SODIUM 60 ML: 660; 100 LIQUID ORAL; RECTAL at 07:06

## 2025-06-14 RX ADMIN — METOPROLOL SUCCINATE 12.5 MG: 25 TABLET, EXTENDED RELEASE ORAL at 12:06

## 2025-06-14 RX ADMIN — ACETAMINOPHEN 650 MG: 325 TABLET ORAL at 12:06

## 2025-06-14 RX ADMIN — ALPRAZOLAM 0.25 MG: 0.25 TABLET ORAL at 12:06

## 2025-06-14 NOTE — PROGRESS NOTES
[facility] - Oncology Acute  Hematology/Oncology  Consult Note    Patient Name: Juanjose Chawla  MRN: [id number]  Admission Date: 6/12/2025  Hospital Length of Stay: 2 days  Attending Provider: Harvey Osborn MD  Consulting Provider: Niko Johnson MD  Principal Problem:<principal problem not specified>    Consults  Subjective:     HPI: This is an 84-year-old patient he was last seen by us in clinic on 05/21/2026.  He was given 1 unit of packed red blood cells, therapy was held and then he was reinstituted on chemotherapy on 05/29/2025.  This is his 3rd cycle of carboplatin and Taxol with growth factor support.        His family called he was having episodes of confusion and weakness he was asked to come to the emergency room.    Family declined.    He had multiple phone calls with different progressive symptoms he was asked to come to the emergency room the family again declined they ask for IV fluids at home.  The impression of the nursing staff was that he needed more aggressive care and evaluation.  He was not brought to the emergency room.  Home health then went out the patient was noted to be orthostatic his blood counts were drawn.  He is noted to have severe symptomatic anemia and thrombocytopenia and he was asked to come to the emergency room.      His blood work on 06/11 showed a hemoglobin of 6.8 with a platelet count of 11,000.      He was seen evaluated in the ER.  Repeat blood evaluation shows the patient's hemoglobin is 7.3 he has a normal white count but his platelet count is still 11.  His sodium is normal his potassium is normal his BUN is 15 with a creatinine 0.8 in his calcium is 8.4 he has a mild elevation of his alk-phos at    he is scheduled for 2 units of blood and 1 unit of platelets    And we were consulted for his history.  I spoke to the ER physician discussing him the orthostatic, rule out sepsis as well    He currently in his anorexia    He denies any current  headache.  All Korin's had occasional.  No visual change.  He still feels short of breath.  They have been draining less fluid out of his lung 300 at times.  He has a lot of anxiety get him going to the bathroom because it is difficult for him.    He has difficulty with the eating no difficulty swallowing just nothing tastes right.  He has lost his taste for food.  Has a metallic taste.    No bleeding.  No rash.  No neuropathy.  Pain is well-controlled.  Still super anxious    He has.  This severe anxiety and confusion.  And then gets better.  His family was present with the bedside.     06/13/2025:   Patient is sleeping comfortably.  Chest x-ray pending.    Started on Periactin   Started on folic acid.  Discussed case with .  Dr. Osborn  Discussed with the family   Patient not awakened from sleep.  Discussed with daughter he had a rough night.  They changed his BuSpar increase in also put him on some p.r.n. antianxiety medication.  He received his transfusions well.    06/14/2025   Patient was seen by palliative care and psych yesterday.  Notes reviewed.  Recommendations appreciated  He had a difficult night with trying to get back to sleep.  He was given melatonin in addition to his other medications.    He is little more awake today.  But he still somnolent and groggy from his medications.  He is lying in bed comfortable.  No bowel movement.  Good urine output.  They drain 350 cc of fluid yesterday.  No bleeding.  He had a little bit of a nosebleed this morning which improved after humidification of his oxygen with a little more dark blood now.    We discussed platelet transfusion as well.      Oncology Treatment Plan:   OP NSCLC PACLITAXEL CARBOPLATIN Q3W    Oncology History Overview Note   Images     February 27, 2025:  CT of the chest, COPD and emphysema, right lower lobe atelectasis, lymphadenopathy in the right hilum as well as the mediastinum and smaller nodes in the left hilum.  The right hilar lymph node  measures 2.5 cm other small nodes in the right hilum.  And there is a precarinal lymph node 2.2 x 2.2 cm.  And adenopathy seen in the subcarinal space as well as the mediastinum.     03/13/2025: CT abdomen pelvis, right-sided pleural effusion interstitial fibrosis lungs bilaterally prostatic hypertrophy     03/20/2025: PET-CT: Hypermetabolic consolidation right lung base with mediastinal and bilateral adenopathy and innumerable scattered hypermetabolic bone lesions     pathology  The right pleural effusion showed malignant cells consistent with a poorly differential adenocarcinoma:  Negative for TTF 1, WT1, GATA3.  Suggest an origin of the GI tract clinical pathology is recommended    2nd biopsy  1. EBUS LYMPH NODE 4R (TWO CONVENTIONAL SMEARS, ONE THIN PREP SMEAR, ONE CELL   BLOCK):    RARE MALIGNANT CELLS CONSISTENT WITH POORLY DIFFERENTIATED ADENOCARCINOMA.      2. EBUS LYMPH NODE 7R:    POORLY DIFFERENTIATED ADENOCARCINOMA.   The immunohistochemical profile of the tumor cells is not typical of lung   primary and may be seen with adenocarcinomas of upper   gastrointestinal/pancreaticobiliary primary.       Tempus Liquid biopsy  TP53  No actionable mutation  KEAP1 +     Metastasis to bone   3/24/2025 Initial Diagnosis    Metastasis to bone     Secondary malignancy of mediastinal lymph nodes   3/24/2025 Initial Diagnosis    Secondary malignancy of mediastinal lymph nodes     Adenocarcinoma of unknown primary   4/4/2025 Cancer Staged    Staging form: Lung, AJCC V9  - Clinical stage from 4/4/2025: Stage ANTONIA (cTX, cNX, cM1b)     4/5/2025 -  Chemotherapy    Treatment Summary   Plan Name: OP NSCLC PACLITAXEL CARBOPLATIN Q3W  Treatment Goal: Palliative  Status: Active  Start Date: 4/5/2025  End Date: 6/19/2025 (Planned)  Provider: Niko Johnson MD  Chemotherapy: CARBOplatin (PARAPLATIN) 635 mg in 0.9% NaCl 348.5 mL chemo infusion, 635 mg (88.4 % of original dose 715.8 mg), Intravenous, Clinic/HOD 1 time, 3 of 4  cycles  Dose modification:   (original dose 715.8 mg, Cycle 1)  Administration: 635 mg (4/5/2025), 600 mg (5/2/2025)  PACLitaxeL (TAXOL) 135 mg/m2 = 282 mg in 0.9% NaCl 500 mL chemo infusion, 135 mg/m2 = 282 mg (100 % of original dose 135 mg/m2), Intravenous, Clinic/HOD 1 time, 3 of 4 cycles  Dose modification: 135 mg/m2 (original dose 135 mg/m2, Cycle 1, Reason: MD Discretion)  Administration: 282 mg (4/5/2025), 282 mg (5/2/2025)          Medications:  Continuous Infusions:    Scheduled Meds:   aspirin  81 mg Oral Daily    cyproheptadine  4 mg Oral TID    EScitalopram oxalate  5 mg Oral Daily    famotidine  20 mg Oral Daily    folic acid  1 mg Oral Daily    gabapentin  300 mg Oral BID    metoprolol succinate  12.5 mg Oral Daily    mirtazapine  30 mg Oral QHS    mupirocin   Nasal BID    polyethylene glycol  17 g Oral Daily    tamsulosin  0.4 mg Oral Daily     PRN Meds:  Current Facility-Administered Medications:     0.9%  NaCl infusion (for blood administration), , Intravenous, Q24H PRN    0.9%  NaCl infusion (for blood administration), , Intravenous, Q24H PRN    acetaminophen, 650 mg, Oral, Q6H PRN    ALPRAZolam, 0.25 mg, Oral, TID PRN    benzonatate, 100 mg, Oral, TID PRN    calcium carbonate, 500 mg, Oral, TID PRN    dextrose 50%, 12.5 g, Intravenous, PRN    dextrose 50%, 25 g, Intravenous, PRN    glucagon (human recombinant), 1 mg, Intramuscular, PRN    glucose, 16 g, Oral, PRN    glucose, 24 g, Oral, PRN    guaiFENesin 100 mg/5 ml, 200 mg, Oral, Q4H PRN    hydrALAZINE, 10 mg, Intravenous, Q6H PRN    insulin aspart U-100, 0-5 Units, Subcutaneous, QID (AC + HS) PRN    melatonin, 9 mg, Oral, Nightly PRN    naloxone, 0.02 mg, Intravenous, PRN    ondansetron, 4 mg, Intravenous, Q6H PRN    sodium chloride 0.9%, 10 mL, Intravenous, Q12H PRN     Review of patient's allergies indicates:  No Known Allergies     Past Medical History:   Diagnosis Date    Anxiety     Diabetes mellitus     HLD (hyperlipidemia)      Hypertension     Major depressive disorder, single episode, unspecified     Pneumonia, unspecified organism      Past Surgical History:   Procedure Laterality Date    Cardiac stent      ENDOBRONCHIAL ULTRASOUND N/A 3/28/2025    Procedure: ENDOBRONCHIAL ULTRASOUND (EBUS);  Surgeon: Jerrod Harman Jr., MD, [facility];  Location: [facility];  Service: Pulmonary;  Laterality: N/A;    HERNIA REPAIR N/A     INSERTION OF PLEURAL CATHETER N/A 4/8/2025    Procedure: INSERTION, CATHETER, PLEURAL;  Surgeon: Roslyn Hull MD;  Location: [facility] OR;  Service: Cardiovascular;  Laterality: N/A;  PLEUR-X CATHETER INSERTION / RIGHT     Family History    None       Tobacco Use    Smoking status: Never    Smokeless tobacco: Never   Substance and Sexual Activity    Alcohol use: Not Currently    Drug use: Never    Sexual activity: Not on file       Review of Systems see above in HPI  Objective:     Vital Signs (Most Recent):  Temp: 97.6 °F (36.4 °C) (06/14/25 0715)  Pulse: 79 (06/14/25 0732)  Resp: 20 (06/13/25 2034)  BP: (!) 147/86 (06/14/25 0715)  SpO2: 100 % (06/14/25 0715) Vital Signs (24h Range):  Temp:  [97.5 °F (36.4 °C)-97.6 °F (36.4 °C)] 97.6 °F (36.4 °C)  Pulse:  [67-86] 79  Resp:  [18-20] 20  SpO2:  [95 %-100 %] 100 %  BP: (132-147)/(70-86) 147/86     Weight: 80.7 kg (177 lb 14.4 oz)  Body mass index is 24.13 kg/m².  Body surface area is 2.02 meters squared.      Intake/Output Summary (Last 24 hours) at 6/14/2025 1023  Last data filed at 6/13/2025 2150  Gross per 24 hour   Intake 100 ml   Output 450 ml   Net -350 ml       Physical Exam  Vitals reviewed.   Constitutional:       General: He is awake. He is not in acute distress.  HENT:      Head: Normocephalic and atraumatic.   Eyes:      Comments: palor   Cardiovascular:      Rate and Rhythm: Normal rate and regular rhythm.      Heart sounds: Normal heart sounds.   Pulmonary:      Effort: Tachypnea and prolonged expiration present. No accessory muscle usage.      Breath  sounds: Decreased air movement present.   Chest:      Comments: Cath in place  Abdominal:      General: Abdomen is flat. Bowel sounds are normal. There is no distension.      Palpations: Abdomen is soft.   Musculoskeletal:      Cervical back: Normal range of motion and neck supple.      Right lower leg: No edema.      Left lower leg: No edema.   Lymphadenopathy:      Upper Body:      Right upper body: No supraclavicular adenopathy.      Left upper body: No supraclavicular adenopathy.   Skin:     General: Skin is warm and dry.   Neurological:      Mental Status: He is alert.      GCS: GCS eye subscore is 4. GCS verbal subscore is 5. GCS motor subscore is 6.      Cranial Nerves: Cranial nerves 2-12 are intact.   Psychiatric:         Attention and Perception: Attention normal.         Mood and Affect: Mood is anxious.         Behavior: Behavior is hyperactive.         Significant Labs:   CBC:   Recent Labs   Lab 06/12/25  1211 06/13/25  0330 06/14/25  0519   WBC 8.75 9.42 8.86   HGB 7.3* 8.5* 8.8*   HCT 24.5* 27.4* 29.0*   PLT 11* 41* 20*    and CMP:   Recent Labs   Lab 06/12/25  1211 06/13/25  0330 06/14/25  0519    144 145   K 4.2 3.7 4.1    105 106   CO2 30 32* 36*    100 112   BUN 15.5 16.3 15.6   CREATININE 0.86 0.81 0.79   CALCIUM 8.4* 7.9* 8.0*   PROT 6.9 5.9  --    ALBUMIN 2.4* 2.3*  --    BILITOT <0.5 0.5  --    ALKPHOS 156* 146  --    AST 14 15  --    ALT 6 8  --        Diagnostic Results:      Assessment/Plan:   Anemia and thrombocytopenia most likely secondary to chemotherapy   Acute symptomatic anemia   Non-small-cell lung carcinoma              Malignant pleural effusions               Malignant hilar and mediastinal adenopathy                 Malignant bone lesions                  History of hypercalcemia    History of depression anxiety ---was on BuSpar at home  History of insomnia: On Remeron in the past  Folic acid deficiency    Recommendations   Keep hemoglobin greater than 8 -     "             -if still symptomatic at 8---- May try to get closer to 10 based on volume status  Keep platelets greater than 20 unless bleeding --we will transfuse today 1 unit of platelets  Palliative care consultation  vs psych with anxiety-- continue with recs from them                       With Xanax p.r.n.  -e CT scan of the chest abdomen pelvis  PICC line care okay to use  Continue ensure pudding or equivalent supplement  Continue to drain his pleural effusion as it home  Periactin for appetite stimulation           Understanding he will have difficulty tolerating the more aggressive chemotherapy.   He knows the goals of care or palliation  "what about hospice"----patient asked  We discussed we would definitely recommend continued follow up with the palliative care.  If he has progressive disease on his CT we would discuss  hospice.  And transition to home care  SCDs for now.  Avoid Lovenox with severe anemia and thrombocytopenia     Continue folic acid    We will recheck in a.m.        Thank you for your consult.     Niko Johnson MD  Hematology/Oncology  [facility]   "

## 2025-06-14 NOTE — PLAN OF CARE
Problem: Adult Inpatient Plan of Care  Goal: Plan of Care Review  Outcome: Progressing  Goal: Patient-Specific Goal (Individualized)  Outcome: Progressing  Goal: Absence of Hospital-Acquired Illness or Injury  Outcome: Progressing  Goal: Optimal Comfort and Wellbeing  Outcome: Progressing  Goal: Readiness for Transition of Care  Outcome: Progressing     Problem: Infection  Goal: Absence of Infection Signs and Symptoms  Outcome: Progressing     Problem: Wound  Goal: Optimal Coping  Outcome: Progressing  Goal: Optimal Functional Ability  Outcome: Progressing  Goal: Absence of Infection Signs and Symptoms  Outcome: Progressing  Goal: Improved Oral Intake  Outcome: Progressing  Goal: Optimal Pain Control and Function  Outcome: Progressing  Goal: Skin Health and Integrity  Outcome: Progressing  Goal: Optimal Wound Healing  Outcome: Progressing     Problem: Fall Injury Risk  Goal: Absence of Fall and Fall-Related Injury  Outcome: Progressing     Problem: Coping Ineffective  Goal: Effective Coping  Outcome: Progressing

## 2025-06-15 LAB
ALBUMIN SERPL-MCNC: 2.2 G/DL (ref 3.4–4.8)
ALBUMIN/GLOB SERPL: 0.5 RATIO (ref 1.1–2)
ALP SERPL-CCNC: 147 UNIT/L (ref 40–150)
ALT SERPL-CCNC: 6 UNIT/L (ref 0–55)
ANION GAP SERPL CALC-SCNC: 6 MEQ/L
AST SERPL-CCNC: 12 UNIT/L (ref 11–45)
BASOPHILS # BLD AUTO: 0.04 X10(3)/MCL
BASOPHILS NFR BLD AUTO: 0.5 %
BILIRUB SERPL-MCNC: 0.5 MG/DL
BUN SERPL-MCNC: 14.5 MG/DL (ref 8.4–25.7)
CALCIUM SERPL-MCNC: 8.4 MG/DL (ref 8.8–10)
CHLORIDE SERPL-SCNC: 103 MMOL/L (ref 98–107)
CO2 SERPL-SCNC: 36 MMOL/L (ref 23–31)
CREAT SERPL-MCNC: 0.85 MG/DL (ref 0.72–1.25)
CREAT/UREA NIT SERPL: 17
EOSINOPHIL # BLD AUTO: 0.03 X10(3)/MCL (ref 0–0.9)
EOSINOPHIL NFR BLD AUTO: 0.4 %
ERYTHROCYTE [DISTWIDTH] IN BLOOD BY AUTOMATED COUNT: 17.6 % (ref 11.5–17)
GFR SERPLBLD CREATININE-BSD FMLA CKD-EPI: >60 ML/MIN/1.73/M2
GLOBULIN SER-MCNC: 4.1 GM/DL (ref 2.4–3.5)
GLUCOSE SERPL-MCNC: 91 MG/DL (ref 82–115)
HCT VFR BLD AUTO: 28.6 % (ref 42–52)
HGB BLD-MCNC: 8.7 G/DL (ref 14–18)
IMM GRANULOCYTES # BLD AUTO: 0.05 X10(3)/MCL (ref 0–0.04)
IMM GRANULOCYTES NFR BLD AUTO: 0.6 %
LYMPHOCYTES # BLD AUTO: 1.26 X10(3)/MCL (ref 0.6–4.6)
LYMPHOCYTES NFR BLD AUTO: 14.7 %
MAGNESIUM SERPL-MCNC: 1.6 MG/DL (ref 1.6–2.6)
MCH RBC QN AUTO: 30.6 PG (ref 27–31)
MCHC RBC AUTO-ENTMCNC: 30.4 G/DL (ref 33–36)
MCV RBC AUTO: 100.7 FL (ref 80–94)
MONOCYTES # BLD AUTO: 0.63 X10(3)/MCL (ref 0.1–1.3)
MONOCYTES NFR BLD AUTO: 7.4 %
NEUTROPHILS # BLD AUTO: 6.56 X10(3)/MCL (ref 2.1–9.2)
NEUTROPHILS NFR BLD AUTO: 76.4 %
NRBC BLD AUTO-RTO: 0 %
PLATELET # BLD AUTO: 41 X10(3)/MCL (ref 130–400)
PMV BLD AUTO: 11.1 FL (ref 7.4–10.4)
POCT GLUCOSE: 115 MG/DL (ref 70–110)
POCT GLUCOSE: 133 MG/DL (ref 70–110)
POCT GLUCOSE: 96 MG/DL (ref 70–110)
POTASSIUM SERPL-SCNC: 4 MMOL/L (ref 3.5–5.1)
PROT SERPL-MCNC: 6.3 GM/DL (ref 5.8–7.6)
RBC # BLD AUTO: 2.84 X10(6)/MCL (ref 4.7–6.1)
SODIUM SERPL-SCNC: 145 MMOL/L (ref 136–145)
WBC # BLD AUTO: 8.57 X10(3)/MCL (ref 4.5–11.5)

## 2025-06-15 PROCEDURE — 11000001 HC ACUTE MED/SURG PRIVATE ROOM

## 2025-06-15 PROCEDURE — 25000003 PHARM REV CODE 250: Performed by: INTERNAL MEDICINE

## 2025-06-15 PROCEDURE — 85025 COMPLETE CBC W/AUTO DIFF WBC: CPT | Performed by: INTERNAL MEDICINE

## 2025-06-15 PROCEDURE — 99233 SBSQ HOSP IP/OBS HIGH 50: CPT | Mod: ,,, | Performed by: INTERNAL MEDICINE

## 2025-06-15 PROCEDURE — 25000003 PHARM REV CODE 250

## 2025-06-15 PROCEDURE — 25000003 PHARM REV CODE 250: Performed by: CLINICAL NURSE SPECIALIST

## 2025-06-15 PROCEDURE — 83735 ASSAY OF MAGNESIUM: CPT | Performed by: INTERNAL MEDICINE

## 2025-06-15 PROCEDURE — 36415 COLL VENOUS BLD VENIPUNCTURE: CPT | Performed by: INTERNAL MEDICINE

## 2025-06-15 PROCEDURE — 80053 COMPREHEN METABOLIC PANEL: CPT | Performed by: INTERNAL MEDICINE

## 2025-06-15 RX ORDER — POLYETHYLENE GLYCOL 3350 17 G/17G
17 POWDER, FOR SOLUTION ORAL 2 TIMES DAILY
Status: DISCONTINUED | OUTPATIENT
Start: 2025-06-15 | End: 2025-06-18 | Stop reason: HOSPADM

## 2025-06-15 RX ADMIN — MUPIROCIN: 20 OINTMENT TOPICAL at 08:06

## 2025-06-15 RX ADMIN — CYPROHEPTADINE HYDROCHLORIDE 4 MG: 4 TABLET ORAL at 10:06

## 2025-06-15 RX ADMIN — ACETAMINOPHEN 650 MG: 325 TABLET ORAL at 03:06

## 2025-06-15 RX ADMIN — CYPROHEPTADINE HYDROCHLORIDE 4 MG: 4 TABLET ORAL at 08:06

## 2025-06-15 RX ADMIN — METOPROLOL SUCCINATE 12.5 MG: 25 TABLET, EXTENDED RELEASE ORAL at 10:06

## 2025-06-15 RX ADMIN — GABAPENTIN 300 MG: 300 CAPSULE ORAL at 10:06

## 2025-06-15 RX ADMIN — GABAPENTIN 300 MG: 300 CAPSULE ORAL at 08:06

## 2025-06-15 RX ADMIN — ALPRAZOLAM 0.25 MG: 0.25 TABLET ORAL at 09:06

## 2025-06-15 RX ADMIN — CYPROHEPTADINE HYDROCHLORIDE 4 MG: 4 TABLET ORAL at 03:06

## 2025-06-15 RX ADMIN — FOLIC ACID 1 MG: 1 TABLET ORAL at 10:06

## 2025-06-15 RX ADMIN — MUPIROCIN: 20 OINTMENT TOPICAL at 11:06

## 2025-06-15 RX ADMIN — ESCITALOPRAM OXALATE 5 MG: 5 TABLET, FILM COATED ORAL at 10:06

## 2025-06-15 RX ADMIN — TAMSULOSIN HYDROCHLORIDE 0.4 MG: 0.4 CAPSULE ORAL at 10:06

## 2025-06-15 RX ADMIN — POLYETHYLENE GLYCOL 3350 17 G: 17 POWDER, FOR SOLUTION ORAL at 10:06

## 2025-06-15 RX ADMIN — MIRTAZAPINE 30 MG: 15 TABLET, FILM COATED ORAL at 08:06

## 2025-06-15 RX ADMIN — FAMOTIDINE 20 MG: 20 TABLET, FILM COATED ORAL at 10:06

## 2025-06-15 RX ADMIN — ASPIRIN 81 MG: 81 TABLET, COATED ORAL at 10:06

## 2025-06-15 NOTE — PROGRESS NOTES
[facility] - Oncology Acute  Hematology/Oncology  Consult Note    Patient Name: Juanjose Chawla  MRN: [id number]  Admission Date: 6/12/2025  Hospital Length of Stay: 3 days  Attending Provider: Harvey Osborn MD  Consulting Provider: Niko Johnson MD  Principal Problem:<principal problem not specified>    Consults  Subjective:     HPI: This is an 84-year-old patient he was last seen by us in clinic on 05/21/2026.  He was given 1 unit of packed red blood cells, therapy was held and then he was reinstituted on chemotherapy on 05/29/2025.  This is his 3rd cycle of carboplatin and Taxol with growth factor support.        His family called he was having episodes of confusion and weakness he was asked to come to the emergency room.    Family declined.    He had multiple phone calls with different progressive symptoms he was asked to come to the emergency room the family again declined they ask for IV fluids at home.  The impression of the nursing staff was that he needed more aggressive care and evaluation.  He was not brought to the emergency room.  Home health then went out the patient was noted to be orthostatic his blood counts were drawn.  He is noted to have severe symptomatic anemia and thrombocytopenia and he was asked to come to the emergency room.      His blood work on 06/11 showed a hemoglobin of 6.8 with a platelet count of 11,000.      He was seen evaluated in the ER.  Repeat blood evaluation shows the patient's hemoglobin is 7.3 he has a normal white count but his platelet count is still 11.  His sodium is normal his potassium is normal his BUN is 15 with a creatinine 0.8 in his calcium is 8.4 he has a mild elevation of his alk-phos at    he is scheduled for 2 units of blood and 1 unit of platelets    And we were consulted for his history.  I spoke to the ER physician discussing him the orthostatic, rule out sepsis as well    He currently in his anorexia    He denies any current  headache.  All Korin's had occasional.  No visual change.  He still feels short of breath.  They have been draining less fluid out of his lung 300 at times.  He has a lot of anxiety get him going to the bathroom because it is difficult for him.    He has difficulty with the eating no difficulty swallowing just nothing tastes right.  He has lost his taste for food.  Has a metallic taste.    No bleeding.  No rash.  No neuropathy.  Pain is well-controlled.  Still super anxious    He has.  This severe anxiety and confusion.  And then gets better.  His family was present with the bedside.     06/13/2025:   Patient is sleeping comfortably.  Chest x-ray pending.    Started on Periactin   Started on folic acid.  Discussed case with .  Dr. Osborn  Discussed with the family   Patient not awakened from sleep.  Discussed with daughter he had a rough night.  They changed his BuSpar increase in also put him on some p.r.n. antianxiety medication.  He received his transfusions well.    06/14/2025   Patient was seen by palliative care and psych yesterday.  Notes reviewed.  Recommendations appreciated  He had a difficult night with trying to get back to sleep.  He was given melatonin in addition to his other medications.    He is little more awake today.  But he still somnolent and groggy from his medications.  He is lying in bed comfortable.  No bowel movement.  Good urine output.  They drain 350 cc of fluid yesterday.  No bleeding.  He had a little bit of a nosebleed this morning which improved after humidification of his oxygen with a little more dark blood now.    We discussed platelet transfusion as well.      06/15/2025: Patient yesterday underwent CT scan chest abdomen pelvis  He has  stability in the lung and lymph nodes; however, he has progressive soft tissue areas involving the left iliac wing.  And small increase in the lytic lesions vertebral bodies.      He is lying in bed comfortably.    He does not want to get out of  bed to use the bedside commode because of his weakness.    He wants to use the urinal instead.    He still has no taste.    I had a long discussion with him in his daughter.    I explained the progressive disease in his bones.    His performance status is 3 or 4.  He is not a candidate for further chemotherapy.    In addition his cytopenias also preclude him from getting more chemotherapy.    There is no oral agent.  He has a KEAP1 1 mutation of undetermined significance.  I do not think this patient would tolerate doublet immunotherapy with his performance status.  In the goals of therapy we would be palliation.  Not to mention the after recovery cytopenias.  In addition this patient has difficulty even getting to treatment.        We discussed the futility of CPR mechanical ventilation.  We discussed the continued use of palliative oxygen and drainage of his pleural effusion.  I think we transition to palliative care and hospice        Oncology Treatment Plan:   OP NSCLC PACLITAXEL CARBOPLATIN Q3W    Oncology History Overview Note   Images     February 27, 2025:  CT of the chest, COPD and emphysema, right lower lobe atelectasis, lymphadenopathy in the right hilum as well as the mediastinum and smaller nodes in the left hilum.  The right hilar lymph node measures 2.5 cm other small nodes in the right hilum.  And there is a precarinal lymph node 2.2 x 2.2 cm.  And adenopathy seen in the subcarinal space as well as the mediastinum.     03/13/2025: CT abdomen pelvis, right-sided pleural effusion interstitial fibrosis lungs bilaterally prostatic hypertrophy     03/20/2025: PET-CT: Hypermetabolic consolidation right lung base with mediastinal and bilateral adenopathy and innumerable scattered hypermetabolic bone lesions     pathology  The right pleural effusion showed malignant cells consistent with a poorly differential adenocarcinoma:  Negative for TTF 1, WT1, GATA3.  Suggest an origin of the GI tract clinical  pathology is recommended    2nd biopsy  1. EBUS LYMPH NODE 4R (TWO CONVENTIONAL SMEARS, ONE THIN PREP SMEAR, ONE CELL   BLOCK):    RARE MALIGNANT CELLS CONSISTENT WITH POORLY DIFFERENTIATED ADENOCARCINOMA.      2. EBUS LYMPH NODE 7R:    POORLY DIFFERENTIATED ADENOCARCINOMA.   The immunohistochemical profile of the tumor cells is not typical of lung   primary and may be seen with adenocarcinomas of upper   gastrointestinal/pancreaticobiliary primary.       Tempus Liquid biopsy  TP53  No actionable mutation  KEAP1 +     Metastasis to bone   3/24/2025 Initial Diagnosis    Metastasis to bone     Secondary malignancy of mediastinal lymph nodes   3/24/2025 Initial Diagnosis    Secondary malignancy of mediastinal lymph nodes     Adenocarcinoma of unknown primary   4/4/2025 Cancer Staged    Staging form: Lung, AJCC V9  - Clinical stage from 4/4/2025: Stage ANTONIA (cTX, cNX, cM1b)     4/5/2025 -  Chemotherapy    Treatment Summary   Plan Name: OP NSCLC PACLITAXEL CARBOPLATIN Q3W  Treatment Goal: Palliative  Status: Active  Start Date: 4/5/2025  End Date: 6/19/2025 (Planned)  Provider: Niko Johnson MD  Chemotherapy: CARBOplatin (PARAPLATIN) 635 mg in 0.9% NaCl 348.5 mL chemo infusion, 635 mg (88.4 % of original dose 715.8 mg), Intravenous, Clinic/HOD 1 time, 3 of 4 cycles  Dose modification:   (original dose 715.8 mg, Cycle 1)  Administration: 635 mg (4/5/2025), 600 mg (5/2/2025)  PACLitaxeL (TAXOL) 135 mg/m2 = 282 mg in 0.9% NaCl 500 mL chemo infusion, 135 mg/m2 = 282 mg (100 % of original dose 135 mg/m2), Intravenous, Clinic/HOD 1 time, 3 of 4 cycles  Dose modification: 135 mg/m2 (original dose 135 mg/m2, Cycle 1, Reason: MD Discretion)  Administration: 282 mg (4/5/2025), 282 mg (5/2/2025)          Medications:  Continuous Infusions:    Scheduled Meds:   aspirin  81 mg Oral Daily    cyproheptadine  4 mg Oral TID    EScitalopram oxalate  5 mg Oral Daily    famotidine  20 mg Oral Daily    folic acid  1 mg Oral Daily     gabapentin  300 mg Oral BID    metoprolol succinate  12.5 mg Oral Daily    mirtazapine  30 mg Oral QHS    mupirocin   Nasal BID    polyethylene glycol  17 g Oral Daily    tamsulosin  0.4 mg Oral Daily     PRN Meds:  Current Facility-Administered Medications:     0.9%  NaCl infusion (for blood administration), , Intravenous, Q24H PRN    acetaminophen, 650 mg, Oral, Q6H PRN    ALPRAZolam, 0.25 mg, Oral, TID PRN    benzonatate, 100 mg, Oral, TID PRN    calcium carbonate, 500 mg, Oral, TID PRN    dextrose 50%, 12.5 g, Intravenous, PRN    dextrose 50%, 25 g, Intravenous, PRN    glucagon (human recombinant), 1 mg, Intramuscular, PRN    glucose, 16 g, Oral, PRN    glucose, 24 g, Oral, PRN    guaiFENesin 100 mg/5 ml, 200 mg, Oral, Q4H PRN    hydrALAZINE, 10 mg, Intravenous, Q6H PRN    insulin aspart U-100, 0-5 Units, Subcutaneous, QID (AC + HS) PRN    melatonin, 9 mg, Oral, Nightly PRN    naloxone, 0.02 mg, Intravenous, PRN    ondansetron, 4 mg, Intravenous, Q6H PRN    sodium chloride 0.9%, 10 mL, Intravenous, Q12H PRN     Review of patient's allergies indicates:  No Known Allergies     Past Medical History:   Diagnosis Date    Anxiety     Diabetes mellitus     HLD (hyperlipidemia)     Hypertension     Major depressive disorder, single episode, unspecified     Pneumonia, unspecified organism      Past Surgical History:   Procedure Laterality Date    Cardiac stent      ENDOBRONCHIAL ULTRASOUND N/A 3/28/2025    Procedure: ENDOBRONCHIAL ULTRASOUND (EBUS);  Surgeon: Jerrod Harman Jr., MD, [facility];  Location: [facility] BRONCHOSCOPY LAB;  Service: Pulmonary;  Laterality: N/A;    HERNIA REPAIR N/A     INSERTION OF PLEURAL CATHETER N/A 4/8/2025    Procedure: INSERTION, CATHETER, PLEURAL;  Surgeon: Roslyn Hull MD;  Location: [facility] OR;  Service: Cardiovascular;  Laterality: N/A;  PLEUR-X CATHETER INSERTION / RIGHT     Family History    None       Tobacco Use    Smoking status: Never    Smokeless tobacco: Never   Substance and Sexual  Activity    Alcohol use: Not Currently    Drug use: Never    Sexual activity: Not on file       Review of Systems see above in HPI  Objective:     Vital Signs (Most Recent):  Temp: 97.8 °F (36.6 °C) (06/15/25 0819)  Pulse: 80 (06/15/25 0819)  Resp: 18 (06/15/25 0819)  BP: 116/64 (06/15/25 0819)  SpO2: 97 % (06/15/25 0819) Vital Signs (24h Range):  Temp:  [96.9 °F (36.1 °C)-98.1 °F (36.7 °C)] 97.8 °F (36.6 °C)  Pulse:  [65-91] 80  Resp:  [18] 18  SpO2:  [90 %-100 %] 97 %  BP: ()/(55-80) 116/64     Weight: 80.7 kg (177 lb 14.4 oz)  Body mass index is 24.13 kg/m².  Body surface area is 2.02 meters squared.      Intake/Output Summary (Last 24 hours) at 6/15/2025 1012  Last data filed at 6/14/2025 2246  Gross per 24 hour   Intake --   Output 210 ml   Net -210 ml       Physical Exam  Vitals reviewed.   Constitutional:       General: He is awake. He is not in acute distress.  HENT:      Head: Normocephalic and atraumatic.   Eyes:      Comments: palor   Cardiovascular:      Rate and Rhythm: Normal rate and regular rhythm.      Heart sounds: Normal heart sounds.   Pulmonary:      Effort: Tachypnea and prolonged expiration present. No accessory muscle usage.      Breath sounds: Decreased air movement present.   Chest:      Comments: Cath in place  Abdominal:      General: Abdomen is flat. Bowel sounds are normal. There is no distension.      Palpations: Abdomen is soft.   Musculoskeletal:      Cervical back: Normal range of motion and neck supple.      Right lower leg: No edema.      Left lower leg: No edema.   Lymphadenopathy:      Upper Body:      Right upper body: No supraclavicular adenopathy.      Left upper body: No supraclavicular adenopathy.   Skin:     General: Skin is warm and dry.   Neurological:      Mental Status: He is alert.      GCS: GCS eye subscore is 4. GCS verbal subscore is 5. GCS motor subscore is 6.      Cranial Nerves: Cranial nerves 2-12 are intact.   Psychiatric:         Attention and  "Perception: Attention normal.         Significant Labs:   CBC:   Recent Labs   Lab 06/14/25  0519 06/15/25  0344   WBC 8.86 8.57   HGB 8.8* 8.7*   HCT 29.0* 28.6*   PLT 20* 41*    and CMP:   Recent Labs   Lab 06/14/25  0519 06/15/25  0344    145   K 4.1 4.0    103   CO2 36* 36*    91   BUN 15.6 14.5   CREATININE 0.79 0.85   CALCIUM 8.0* 8.4*   PROT  --  6.3   ALBUMIN  --  2.2*   BILITOT  --  0.5   ALKPHOS  --  147   AST  --  12   ALT  --  6       Diagnostic Results:      Assessment/Plan:   Anemia and thrombocytopenia most likely secondary to chemotherapy   Acute symptomatic anemia   Non-small-cell lung carcinoma              Malignant pleural effusions               Malignant hilar and mediastinal adenopathy                 Malignant bone lesions                  History of hypercalcemia    History of depression anxiety ---was on BuSpar at home  History of insomnia: On Remeron in the past  Folic acid deficiency    Recommendations   Keep hemoglobin greater than 8 -                 -if still symptomatic at 8---- May try to get closer to 10 based on volume status  Keep platelets greater than 20 unless bleeding --  Palliative care consultation  vs psych with anxiety-- continue with recs from them                       With Xanax p.r.n.    PICC line care okay to use-----can remove on discharge as we will no longer use this  Continue ensure pudding or equivalent supplement  Continue to drain his pleural effusion as it home  Periactin for appetite stimulation           Understanding he will have difficulty tolerating the more aggressive chemotherapy.   And while my biggest concern is his difficulty common back and forth chemotherapy.  And even or radiation.    He has had difficulty with progressive cytopenias.    He has underlying goals are palliation.  There is no  oral targetable  therapy in his nGS panel  He knows the goals of care or palliation  "what about hospice"----patient asked  has progressive " disease on his CT hospice.  And transition to home care  SCDs for now.  Avoid Lovenox with severe anemia and thrombocytopenia     Continue folic acid    DC PICC line on discharge        Thank you for your consult.     Niko Johnson MD  Hematology/Oncology  [facility]  Oncology [facility]

## 2025-06-15 NOTE — PT/OT/SLP PROGRESS
Physical Therapy      Patient Name:  Juanjose Chawla   MRN:  [id number]    PT orders received and chart reviewed. Pt resting in bed upon therapist entry. Pt's daughter present in room. Pt pleasantly declining to mobilize out of bed despite maximal encouragement and motivation.     History obtained: Pt uses a walker at baseline to ambulate short distances and for transfers. Pt lives with his wife. Daughters stay with pt at night (pt is never alone). DME used: BSC, shower chair, transport chair, and hospital bed. Pt has been requiring increased assistance with mobility over the past few weeks. Pt has been transferring with family or staff to BSC during this stay. Gait belt provided to pt to ensure safety with transfers.      Will follow up tomorrow or as schedule allows.

## 2025-06-15 NOTE — PROGRESS NOTES
[facility]  Hospital Medicine Progress Note        Chief Complaint: Inpatient Follow-up for Abnormal Lab (Hx of stage 4 lung CA. On chemo. Last treatment on 5/29. Sent to ED by PCP for low H&H and low platelets. GCS 15. Pt denies complaints. )  -generalized weakness    HPI:    The patient is an 84-year-old male with a history of COPD/emphysema, anxiety/diabetes/hyperlipidemia/hypertension/depression as well as poorly differentiated adenocarcinoma of lung diagnosed around 3/25 by right pleural effusion showing malignant cells followed by endobronchial ultrasound with lymph node biopsy again consistent with poorly differentiated adenocarcinoma.  Patient gets seen by [facility].  Patient is a stage IV A initiated on palliative chemotherapy on 04/25 with Paclitaxel carboplatin Q 3 weeks with the planned end date on 06/19/25 by Dr. Niko curran.  The patient with a history of recurrent right pleural malignant effusion with PleurX catheter placement.    The patient was last seen at [facility] on 05/21/2026, at that time patient received 1 unit PRBC.  Chemotherapy was placed on hold and restarted on 05/29/2025 (3rd cycle of carboplatin/Taxol with growth factor support)    Post chemotherapy family was concerned about episodes of confusion/weakness.  Patient was redirected to emergency room but family declined.  This was followed by multiple phone calls with different progressive symptoms and again he was asked to come to emergency room for evaluation/fluids but again family declined.  The patient was seen by home health with findings of positive orthostatics, blood work was done showing severe anemia with a hemoglobin of 6.8 and a platelet count of 11,000. The patient was told to present to emergency room [facility].  The patient was evaluated by emergency room physician and decision for admission was done.  Patient is status post  2 units PRBC and 1 unit platelets.    ROS pertinent for anorexia /sob/anxiety/decreased appetite/confusion          Interval Hx:     06/13/2025 Dr. Osborn-chart reviewed patient examined.  Patient sleeping at the present moment post sedation.  Case discussed with family member, daughter at bedside, Nahomi.  Patient is status post 2 units PRBC and 1 unit platelet.  Portable chest x-ray pending for evaluation of right pleural effusion.  Vital signs are stable.  Hemoglobin 8.6 and platelet count of 46,000. No appetite/super anxious/not sleeping.    Notes by Oncology reviewed.  We will consult palliative/physical therapy/psych    6/14/25 dr osborn - post 1 unit platelets . Alert and active . Seen by psych and buspar dc. Placed on remeron / lexapro  w  prn xanax.     Case was discussed with patient's nurse and  on the floor.    Objective/physical exam:  General: In no acute distress, afebrile. Resting   Chest: Clear to auscultation anteriroy. Right chest pleur x  Heart: RRR, +S1, S2, no appreciable murmur  Abdomen: Soft, nontender, BS +  MSK: Warm, no lower extremity edema, no clubbing or cyanosis  Neurologic: Alert and oriented x4, Cranial nerve II-XII intact, Strength 5/5 in all 4 extremities    VITAL SIGNS: 24 HRS MIN & MAX LAST   Temp  Min: 96.9 °F (36.1 °C)  Max: 98.1 °F (36.7 °C) 97.1 °F (36.2 °C)   BP  Min: 98/55  Max: 147/86 130/64   Pulse  Min: 66  Max: 79  75   Resp  Min: 18  Max: 18 18   SpO2  Min: 95 %  Max: 100 % 95 %     I have reviewed the following labs:  Recent Labs   Lab 06/11/25  0945 06/12/25  1211 06/13/25  0330 06/14/25  0519   WBC 6.41 8.75 9.42 8.86   RBC 2.18* 2.42* 2.80* 2.93*   HGB 6.8* 7.3* 8.5* 8.8*   HCT 22.4* 24.5* 27.4* 29.0*   .8* 101.2* 97.9* 99.0*   MCH 31.2* 30.2 30.4 30.0   MCHC 30.4* 29.8* 31.0* 30.3*   RDW 17.5* 18.1* 17.5* 18.2*   PLT 11* 11* 41* 20*   MPV 13.4*  --  10.3 11.0*     Recent Labs   Lab 06/11/25  0945 06/12/25  1211 06/13/25  0330 06/14/25  0519   NA  146* 144 144 145   K 3.8 4.2 3.7 4.1    104 105 106   CO2 35* 30 32* 36*   BUN 16.7 15.5 16.3 15.6   CREATININE 0.87 0.86 0.81 0.79   * 112 100 112   CALCIUM 8.2* 8.4* 7.9* 8.0*   MG  --   --   --  1.70   ALBUMIN 2.2* 2.4* 2.3*  --    PROT 5.9 6.9 5.9  --    ALKPHOS 146 156* 146  --    ALT 6 6 8  --    AST 13 14 15  --    BILITOT 0.4 <0.5 0.5  --      Microbiology Results (last 7 days)       Procedure Component Value Units Date/Time    Blood Culture [[id number]] Collected: 06/14/25 1006    Order Status: Resulted Specimen: Blood from Arm, Right Updated: 06/14/25 1042    Blood Culture [[id number]] Collected: 06/14/25 1006    Order Status: Resulted Specimen: Blood from Arm, Left Updated: 06/14/25 1042    Blood Culture [[id number]]  (Normal) Collected: 06/13/25 0330    Order Status: Completed Specimen: Blood from Arm, Right Updated: 06/14/25 0901     Blood Culture No Growth At 24 Hours    Blood Culture [[id number]]  (Normal) Collected: 06/13/25 0330    Order Status: Completed Specimen: Blood from Arm, Right Updated: 06/14/25 0500     Blood Culture No Growth At 24 Hours    Blood Culture [[id number]]     Order Status: Canceled Specimen: Blood from Arm, Right              See below for Radiology    Assessment/Plan:  1.-non-small cell lung carcinoma stage IV A  -currently on chemo, 3rd cycle with carboplatin/Taxol(palliative chemo)  - admitted for profound anemia/thrombocytopenia related to chemotherapy    2.-recurrent right malignant pleural effusion  -patient with PleurX catheter  -usually self drains Q 2 days around 300 cc    3.-severe symptomatic anemia due to chemotherapy  -patient is status post 2 units PRBC on 06/12/2025  -6.8.>2 units>8.8      4-severe thrombocytopenia related to chemotherapy  -patient presented with a platelet count of 11,000, status post 1 unit plt  -platelet count at 41,000 on 06/13/2025>> 20,000 on 6/14/25>>> 1 unit    5.-folate deficiency  -continue folic acid 1 mg p.o.  daily    6.-anxiety/depression-  -BuSpar 10 mg p.o. t.i.d. dc  -consult psych for evaluation> remeron /lexapro/xanax prn     7.-insomnia  -continue Remeron for insomnia/appetite    8.-deconditioning/generalized weakness  -we will get Physical therapy to see    history of COPD/emphysema, anxiety/diabetes/hyperlipidemia/hypertension/depression       Plan-vital signs are stable.  Tachycardia resolved.  Hemoglobin as well as platelet count much improved after transfusion.  We will go ahead and consult palliative team for goals of care.  Consult psych for severe anxiety.  We will continue to attempt to improve nutrition.    CT scan of chest/abdomen/pelvis for restaging/ response to tx  done  on 6/14/25  Consult physical therapy  A.m. labs      VTE prophylaxis:  SCD    Patient condition:  Fair    Anticipated discharge and Disposition:   To be determined      All diagnosis and differential diagnosis have been reviewed; assessment and plan has been documented; I have personally reviewed the labs and test results that are presently available; I have reviewed the patients medication list; I have reviewed the consulting providers response and recommendations. I have reviewed or attempted to review medical records based upon their availability    All of the patient's questions have been  addressed and answered. Patient's is agreeable to the above stated plan. I will continue to monitor closely and make adjustments to medical management as needed.    Portions of this note dictated using EMR integrated voice recognition software, and may be subject to voice recognition errors not corrected at proofreading. Please contact writer for clarification if needed.   _____________________________________________________________________    Malnutrition Status:  Nutrition consulted. Most recent weight and BMI monitored-     Measurements:  Wt Readings from Last 1 Encounters:   06/12/25 80.7 kg (177 lb 14.4 oz)   Body mass index is 24.13  kg/m².    Patient has been screened and assessed by RD.    Malnutrition Type:  Context:    Level:      Malnutrition Characteristic Summary:       Interventions/Recommendations (treatment strategy):        Scheduled Med:   aspirin  81 mg Oral Daily    cyproheptadine  4 mg Oral TID    EScitalopram oxalate  5 mg Oral Daily    famotidine  20 mg Oral Daily    folic acid  1 mg Oral Daily    gabapentin  300 mg Oral BID    metoprolol succinate  12.5 mg Oral Daily    mirtazapine  30 mg Oral QHS    mupirocin   Nasal BID    polyethylene glycol  17 g Oral Daily    tamsulosin  0.4 mg Oral Daily      Continuous Infusions:     PRN Meds:    Current Facility-Administered Medications:     0.9%  NaCl infusion (for blood administration), , Intravenous, Q24H PRN    acetaminophen, 650 mg, Oral, Q6H PRN    ALPRAZolam, 0.25 mg, Oral, TID PRN    benzonatate, 100 mg, Oral, TID PRN    calcium carbonate, 500 mg, Oral, TID PRN    dextrose 50%, 12.5 g, Intravenous, PRN    dextrose 50%, 25 g, Intravenous, PRN    glucagon (human recombinant), 1 mg, Intramuscular, PRN    glucose, 16 g, Oral, PRN    glucose, 24 g, Oral, PRN    guaiFENesin 100 mg/5 ml, 200 mg, Oral, Q4H PRN    hydrALAZINE, 10 mg, Intravenous, Q6H PRN    insulin aspart U-100, 0-5 Units, Subcutaneous, QID (AC + HS) PRN    melatonin, 9 mg, Oral, Nightly PRN    naloxone, 0.02 mg, Intravenous, PRN    ondansetron, 4 mg, Intravenous, Q6H PRN    sodium chloride 0.9%, 10 mL, Intravenous, Q12H PRN     Radiology:  I have personally reviewed the following imaging and agree with the radiologist.     X-Ray Chest AP Portable  Narrative: EXAMINATION:  XR CHEST AP PORTABLE    CLINICAL HISTORY:  pain;    TECHNIQUE:  Single frontal view of the chest was performed.    COMPARISON:  May 22, 2025    FINDINGS:  Examination reveals mediastinal silhouette to be within normal limits cardiac silhouette is at the upper limits of normal to mildly enlarged.    There is blunting of the right costophrenic angle  indicating the presence of a right-sided pleural effusion.    There is increase in interstitial and pulmonary vascular markings which might be related to some changes of pulmonary vascular congestion and cardiac decompensation.    Slightly more confluent opacities identified at the left base superimposed infiltrate cannot be completely excluded.    No other focal consolidative changes nor other significant change as compared with the previous exam  Impression: Changes of right-sided pleural effusion.    Increase interstitial markings may indicate a degree of pulmonary vascular congestion and cardiac decompensation.    Confluent opacities specially at the left base superimposed infiltrate cannot be excluded.    No other change    Electronically signed by: Hai Wan  Date:    06/13/2025  Time:    12:46      Harvey Osborn MD  Department of Hospital Medicine   [facility]   06/14/2025

## 2025-06-15 NOTE — PROGRESS NOTES
[facility]  Hospital Medicine Progress Note        Chief Complaint: Inpatient Follow-up for Abnormal Lab (Hx of stage 4 lung CA. On chemo. Last treatment on 5/29. Sent to ED by PCP for low H&H and low platelets. GCS 15. Pt denies complaints. )  -generalized weakness    HPI:    The patient is an 84-year-old male with a history of COPD/emphysema, anxiety/diabetes/hyperlipidemia/hypertension/depression as well as poorly differentiated adenocarcinoma of lung diagnosed around 3/25 by right pleural effusion showing malignant cells followed by endobronchial ultrasound with lymph node biopsy again consistent with poorly differentiated adenocarcinoma.  Patient gets seen by [facility].  Patient is a stage IV A initiated on palliative chemotherapy on 04/25 with Paclitaxel carboplatin Q 3 weeks with the planned end date on 06/19/25 by Dr. Niko curran.  The patient with a history of recurrent right pleural malignant effusion with PleurX catheter placement.    The patient was last seen at [facility] on 05/21/2026, at that time patient received 1 unit PRBC.  Chemotherapy was placed on hold and restarted on 05/29/2025 (3rd cycle of carboplatin/Taxol with growth factor support)    Post chemotherapy family was concerned about episodes of confusion/weakness.  Patient was redirected to emergency room but family declined.  This was followed by multiple phone calls with different progressive symptoms and again he was asked to come to emergency room for evaluation/fluids but again family declined.  The patient was seen by home health with findings of positive orthostatics, blood work was done showing severe anemia with a hemoglobin of 6.8 and a platelet count of 11,000. The patient was told to present to emergency room [facility].  The patient was evaluated by emergency room physician and decision for admission was done.  Patient is status post  2 units PRBC and 1 unit platelets.    ROS pertinent for anorexia /sob/anxiety/decreased appetite/confusion          Interval Hx:     06/13/2025 Dr. Osborn-chart reviewed patient examined.  Patient sleeping at the present moment post sedation.  Case discussed with family member, daughter at bedside, Nahomi.  Patient is status post 2 units PRBC and 1 unit platelet.  Portable chest x-ray pending for evaluation of right pleural effusion.  Vital signs are stable.  Hemoglobin 8.6 and platelet count of 46,000. No appetite/super anxious/not sleeping.    Notes by Oncology reviewed.  We will consult palliative/physical therapy/psych    6/14/25 dr osborn - post 1 unit platelets . Alert and active . Seen by psych and buspar dc. Placed on remeron / lexapro  w  prn xanax.     06/15/2025 Dr. Osborn-chart reviewed patient examined.-the patient was given 1 unit platelets yesterday.  Also patient was seen by psych and medications for anxiety updated.  CT chest abdomen and pelvis was done to eval response to chemotherapy.  Two start with a his platelet count went up to 40,000.  H&H with a hemoglobin of 8.8.  CT chest abdomen and pelvis with progression of osteolytic lesions.  And Dr. Culp with Hematology Oncology was able to talk to patient about not being a further candidate for palliative chemotherapy.  The present moment patient appears stable.  Still having episodes of anxiety.  Remains on Remeron/low-dose Lexapro and Xanax 0.25 p.o. t.i.d. PRN.  Still not sleeping much.  Patient may have to be discharged home with home health with plans for palliative treatment with eventual hospice.  Remains alert/active and oriented.  No wanting to get out of bed.    Case was discussed with patient's nurse and  on the floor.    Objective/physical exam:  General: In no acute distress, afebrile. Resting   Chest: Clear to auscultation anteriroy. Right chest pleur x  Heart: RRR, +S1, S2, no appreciable murmur  Abdomen: Soft, nontender,  BS +  MSK: Warm, no lower extremity edema, no clubbing or cyanosis  Neurologic: Alert and oriented x4, Cranial nerve II-XII intact, Strength 5/5 in all 4 extremities    VITAL SIGNS: 24 HRS MIN & MAX LAST   Temp  Min: 96.9 °F (36.1 °C)  Max: 98.1 °F (36.7 °C) 97.7 °F (36.5 °C)   BP  Min: 98/55  Max: 134/63 131/71   Pulse  Min: 65  Max: 91  67   Resp  Min: 18  Max: 18 18   SpO2  Min: 90 %  Max: 100 % (!) 93 %     I have reviewed the following labs:  Recent Labs   Lab 06/13/25  0330 06/14/25  0519 06/15/25  0344   WBC 9.42 8.86 8.57   RBC 2.80* 2.93* 2.84*   HGB 8.5* 8.8* 8.7*   HCT 27.4* 29.0* 28.6*   MCV 97.9* 99.0* 100.7*   MCH 30.4 30.0 30.6   MCHC 31.0* 30.3* 30.4*   RDW 17.5* 18.2* 17.6*   PLT 41* 20* 41*   MPV 10.3 11.0* 11.1*     Recent Labs   Lab 06/12/25  1211 06/13/25  0330 06/14/25  0519 06/15/25  0344    144 145 145   K 4.2 3.7 4.1 4.0    105 106 103   CO2 30 32* 36* 36*   BUN 15.5 16.3 15.6 14.5   CREATININE 0.86 0.81 0.79 0.85    100 112 91   CALCIUM 8.4* 7.9* 8.0* 8.4*   MG  --   --  1.70 1.60   ALBUMIN 2.4* 2.3*  --  2.2*   PROT 6.9 5.9  --  6.3   ALKPHOS 156* 146  --  147   ALT 6 8  --  6   AST 14 15  --  12   BILITOT <0.5 0.5  --  0.5     Microbiology Results (last 7 days)       Procedure Component Value Units Date/Time    Blood Culture [[id number]]  (Normal) Collected: 06/14/25 1006    Order Status: Completed Specimen: Blood from Arm, Right Updated: 06/15/25 1201     Blood Culture No Growth At 24 Hours    Blood Culture [[id number]]  (Normal) Collected: 06/14/25 1006    Order Status: Completed Specimen: Blood from Arm, Left Updated: 06/15/25 1201     Blood Culture No Growth At 24 Hours    Blood Culture [[id number]]  (Normal) Collected: 06/13/25 0330    Order Status: Completed Specimen: Blood from Arm, Right Updated: 06/15/25 0901     Blood Culture No Growth At 48 Hours    Blood Culture [[id number]]  (Normal) Collected: 06/13/25 0330    Order Status: Completed Specimen: Blood from  Arm, Right Updated: 06/15/25 0500     Blood Culture No Growth At 48 Hours    Blood Culture [[id number]]     Order Status: Canceled Specimen: Blood from Arm, Right              See below for Radiology    Assessment/Plan:  1.-non-small cell lung carcinoma stage IV A  -currently on chemo, 3rd cycle with carboplatin/Taxol(palliative chemo)  - admitted for profound anemia/thrombocytopenia related to chemotherapy  -patient has received PRBCs/platelets  -CT chest abdomen and pelvis with progression of bone metastasis.    2.-recurrent right malignant pleural effusion  -patient with PleurX catheter  -usually self drains Q 2 days around 300 cc    3.-severe symptomatic anemia due to chemotherapy  -patient is status post 2 units PRBC on 06/12/2025  -6.8.>2 units>8.8>> 8.8      4-severe thrombocytopenia related to chemotherapy  -patient presented with a platelet count of 11,000, status post 1 unit plt>>>41,000 on 06/13/2025>> 20,000 on 6/14/25>>> 1 unit>>> 41,000    5.-folate deficiency  -continue folic acid 1 mg p.o. daily    6.-anxiety/depression-  -continue Lexapro 5 mg p.o. daily/Remeron/Xanax 0.25 mg p.o. t.i.d. PRN  -consult psych for evaluation> remeron /lexapro/xanax prn     7.-insomnia  -continue Remeron for insomnia/appetite  -continue Remeron for insomnia as well as appetite    8.-deconditioning/generalized weakness  -Physical therapy to see    history of COPD/emphysema, anxiety/diabetes/hyperlipidemia/hypertension/depression       Plan-vital signs are stable.  Tachycardia resolved.  Hemoglobin as well as platelet count much improved after transfusion.  We will go ahead and consult palliative team for goals of care.  Consult psych for severe anxiety.  We will continue to attempt to improve nutrition.     physical therapy  A.m. labs  Continue monitoring anxiety/deconditioning/cytopenias      VTE prophylaxis:  SCD    Patient condition:  Fair    Anticipated discharge and Disposition:   To be determined      All diagnosis  and differential diagnosis have been reviewed; assessment and plan has been documented; I have personally reviewed the labs and test results that are presently available; I have reviewed the patients medication list; I have reviewed the consulting providers response and recommendations. I have reviewed or attempted to review medical records based upon their availability    All of the patient's questions have been  addressed and answered. Patient's is agreeable to the above stated plan. I will continue to monitor closely and make adjustments to medical management as needed.    Portions of this note dictated using EMR integrated voice recognition software, and may be subject to voice recognition errors not corrected at proofreading. Please contact writer for clarification if needed.   _____________________________________________________________________    Malnutrition Status:  Nutrition consulted. Most recent weight and BMI monitored-     Measurements:  Wt Readings from Last 1 Encounters:   06/12/25 80.7 kg (177 lb 14.4 oz)   Body mass index is 24.13 kg/m².    Patient has been screened and assessed by RD.    Malnutrition Type:  Context:    Level:      Malnutrition Characteristic Summary:       Interventions/Recommendations (treatment strategy):        Scheduled Med:   aspirin  81 mg Oral Daily    cyproheptadine  4 mg Oral TID    EScitalopram oxalate  5 mg Oral Daily    famotidine  20 mg Oral Daily    folic acid  1 mg Oral Daily    gabapentin  300 mg Oral BID    metoprolol succinate  12.5 mg Oral Daily    mirtazapine  30 mg Oral QHS    mupirocin   Nasal BID    polyethylene glycol  17 g Oral Daily    tamsulosin  0.4 mg Oral Daily      Continuous Infusions:     PRN Meds:    Current Facility-Administered Medications:     0.9%  NaCl infusion (for blood administration), , Intravenous, Q24H PRN    acetaminophen, 650 mg, Oral, Q6H PRN    ALPRAZolam, 0.25 mg, Oral, TID PRN    benzonatate, 100 mg, Oral, TID PRN    calcium  carbonate, 500 mg, Oral, TID PRN    dextrose 50%, 12.5 g, Intravenous, PRN    dextrose 50%, 25 g, Intravenous, PRN    glucagon (human recombinant), 1 mg, Intramuscular, PRN    glucose, 16 g, Oral, PRN    glucose, 24 g, Oral, PRN    guaiFENesin 100 mg/5 ml, 200 mg, Oral, Q4H PRN    hydrALAZINE, 10 mg, Intravenous, Q6H PRN    insulin aspart U-100, 0-5 Units, Subcutaneous, QID (AC + HS) PRN    melatonin, 9 mg, Oral, Nightly PRN    naloxone, 0.02 mg, Intravenous, PRN    ondansetron, 4 mg, Intravenous, Q6H PRN    sodium chloride 0.9%, 10 mL, Intravenous, Q12H PRN     Radiology:  I have personally reviewed the following imaging and agree with the radiologist.     CT Chest Abdomen Pelvis With IV Contrast (XPD) Routine Oral Contrast  Narrative: EXAMINATION:  CT CHEST ABDOMEN PELVIS WITH IV CONTRAST (XPD)    CLINICAL HISTORY:  NSCLC   assess response;    TECHNIQUE:  CT imaging from the chest through the pelvis after IV contrast.  Axial, coronal and sagittal reformatted images reviewed. Dose length product 910 mGycm. Automatic exposure control, adjustment of mA/kV or iterative reconstruction technique used to limit radiation dose.    COMPARISON:  Chest CT 04/03/2025    CT abdomen/pelvis 03/13/2025    FINDINGS:  Lung and large airways: Underlying parenchymal fibrosis.  Consolidation in the right lower lobe has similar appearance since April when allowing for less compressive atelectasis on today's exam.    Pleura: Right pleural drain with small volume right pleural fluid.  Fluid volume has decreased since previous chest CT.  No significant pleural fluid on the left.    Mediastinum and yamil: Left PICC tip in the SVC.  Heart mildly enlarged.  15 mm right lower paratracheal lymph node previously measured 19 mm.  13 mm right hilar lymph node previously measured 17 mm.  Few other mediastinal and hilar lymph nodes mildly smaller as well.    Chest wall/axilla and lower neck: No significant findings.    Liver/biliary: Normal liver. No  biliary dilatation.    Pancreas: Normal.    Spleen: Normal.    Adrenals: Normal.    Genitourinary: Symmetric renal enhancement. No hydronephrosis. Bladder within normal limits. Enlarged prostate.    Stomach/Bowel: Normal caliber small bowel and colon. No discernible sites of bowel inflammation.    Abdominal/pelvic lymph nodes and peritoneum: No pathologically enlarged lymph node identified. No ascites.    Abdominal/pelvic vasculature: Aortoiliac atherosclerosis.    Abdominal wall: Stable appearance.    Musculoskeletal: Multiple lytic lesions are new, larger or better defined since previous exams.  Lesions involve the spine, ribs, pelvis and scapulas.  Larger lesions include a 33 mm lesion in the left iliac wing which has an exophytic soft tissue component measuring 24 mm, a 31 mm lesion in the L5 vertebral body/pedicle and a 19 mm lesion in the T6 vertebral body.  Impression: Increased size, number and definition of osseous lesions.    Several mediastinal and hilar lymph nodes are mildly smaller.    Little overall change in right lower lobe consolidation.    Electronically signed by: Brian Valentin  Date:    06/15/2025  Time:    09:43      Harvey Osborn MD  Department of Hospital Medicine   [facility]   06/15/2025

## 2025-06-15 NOTE — PLAN OF CARE
Problem: Adult Inpatient Plan of Care  Goal: Plan of Care Review  Outcome: Progressing  Flowsheets (Taken 6/15/2025 0602)  Plan of Care Reviewed With: patient  Goal: Patient-Specific Goal (Individualized)  Outcome: Progressing  Goal: Absence of Hospital-Acquired Illness or Injury  Outcome: Progressing  Intervention: Identify and Manage Fall Risk  Flowsheets (Taken 6/15/2025 0602)  Safety Promotion/Fall Prevention:   assistive device/personal item within reach   muscle strengthening facilitated   side rails raised x 3  Intervention: Prevent Skin Injury  Flowsheets (Taken 6/15/2025 0602)  Body Position: weight shifting  Skin Protection: incontinence pads utilized  Intervention: Prevent and Manage VTE (Venous Thromboembolism) Risk  Flowsheets (Taken 6/15/2025 0602)  VTE Prevention/Management: remove, assess skin, and reapply sequential compression device  Intervention: Prevent Infection  Flowsheets (Taken 6/15/2025 0602)  Infection Prevention:   single patient room provided   equipment surfaces disinfected   hand hygiene promoted   visitors restricted/screened   rest/sleep promoted   personal protective equipment utilized  Goal: Optimal Comfort and Wellbeing  Outcome: Progressing  Intervention: Monitor Pain and Promote Comfort  Flowsheets (Taken 6/15/2025 0602)  Pain Management Interventions:   care clustered   medication offered  Intervention: Provide Person-Centered Care  Flowsheets (Taken 6/15/2025 0602)  Trust Relationship/Rapport:   choices provided   care explained   questions encouraged   reassurance provided   emotional support provided   thoughts/feelings acknowledged   empathic listening provided   questions answered  Goal: Readiness for Transition of Care  Outcome: Progressing     Problem: Infection  Goal: Absence of Infection Signs and Symptoms  Outcome: Progressing

## 2025-06-16 ENCOUNTER — DOCUMENT SCAN (OUTPATIENT)
Dept: HOME HEALTH SERVICES | Facility: HOSPITAL | Age: 85
End: 2025-06-16
Payer: MEDICARE

## 2025-06-16 LAB
ABO + RH BLD: NORMAL
ANION GAP SERPL CALC-SCNC: 6 MEQ/L
BASOPHILS # BLD AUTO: 0.02 X10(3)/MCL
BASOPHILS NFR BLD AUTO: 0.3 %
BLD PROD TYP BPU: NORMAL
BLOOD UNIT EXPIRATION DATE: NORMAL
BLOOD UNIT TYPE CODE: 6200
BUN SERPL-MCNC: 16.2 MG/DL (ref 8.4–25.7)
CALCIUM SERPL-MCNC: 8.1 MG/DL (ref 8.8–10)
CHLORIDE SERPL-SCNC: 103 MMOL/L (ref 98–107)
CO2 SERPL-SCNC: 35 MMOL/L (ref 23–31)
CREAT SERPL-MCNC: 0.82 MG/DL (ref 0.72–1.25)
CREAT/UREA NIT SERPL: 20
CROSSMATCH INTERPRETATION: NORMAL
DISPENSE STATUS: NORMAL
EOSINOPHIL # BLD AUTO: 0.04 X10(3)/MCL (ref 0–0.9)
EOSINOPHIL NFR BLD AUTO: 0.5 %
ERYTHROCYTE [DISTWIDTH] IN BLOOD BY AUTOMATED COUNT: 17.4 % (ref 11.5–17)
GFR SERPLBLD CREATININE-BSD FMLA CKD-EPI: >60 ML/MIN/1.73/M2
GLUCOSE SERPL-MCNC: 95 MG/DL (ref 82–115)
HCT VFR BLD AUTO: 27.9 % (ref 42–52)
HGB BLD-MCNC: 8.2 G/DL (ref 14–18)
IMM GRANULOCYTES # BLD AUTO: 0.06 X10(3)/MCL (ref 0–0.04)
IMM GRANULOCYTES NFR BLD AUTO: 0.8 %
LYMPHOCYTES # BLD AUTO: 1.59 X10(3)/MCL (ref 0.6–4.6)
LYMPHOCYTES NFR BLD AUTO: 21 %
MAGNESIUM SERPL-MCNC: 1.7 MG/DL (ref 1.6–2.6)
MCH RBC QN AUTO: 29.8 PG (ref 27–31)
MCHC RBC AUTO-ENTMCNC: 29.4 G/DL (ref 33–36)
MCV RBC AUTO: 101.5 FL (ref 80–94)
MONOCYTES # BLD AUTO: 0.78 X10(3)/MCL (ref 0.1–1.3)
MONOCYTES NFR BLD AUTO: 10.3 %
NEUTROPHILS # BLD AUTO: 5.09 X10(3)/MCL (ref 2.1–9.2)
NEUTROPHILS NFR BLD AUTO: 67.1 %
NRBC BLD AUTO-RTO: 0 %
PLATELET # BLD AUTO: 25 X10(3)/MCL (ref 130–400)
PLATELETS.RETICULATED NFR BLD AUTO: 3.2 % (ref 0.9–11.2)
PMV BLD AUTO: 10.8 FL (ref 7.4–10.4)
POCT GLUCOSE: 120 MG/DL (ref 70–110)
POCT GLUCOSE: 98 MG/DL (ref 70–110)
POTASSIUM SERPL-SCNC: 3.9 MMOL/L (ref 3.5–5.1)
RBC # BLD AUTO: 2.75 X10(6)/MCL (ref 4.7–6.1)
SODIUM SERPL-SCNC: 144 MMOL/L (ref 136–145)
UNIT NUMBER: NORMAL
WBC # BLD AUTO: 7.58 X10(3)/MCL (ref 4.5–11.5)

## 2025-06-16 PROCEDURE — 99232 SBSQ HOSP IP/OBS MODERATE 35: CPT | Mod: ,,, | Performed by: INTERNAL MEDICINE

## 2025-06-16 PROCEDURE — 25000003 PHARM REV CODE 250: Performed by: INTERNAL MEDICINE

## 2025-06-16 PROCEDURE — 97162 PT EVAL MOD COMPLEX 30 MIN: CPT

## 2025-06-16 PROCEDURE — 36415 COLL VENOUS BLD VENIPUNCTURE: CPT | Performed by: INTERNAL MEDICINE

## 2025-06-16 PROCEDURE — 80048 BASIC METABOLIC PNL TOTAL CA: CPT | Performed by: INTERNAL MEDICINE

## 2025-06-16 PROCEDURE — 25000003 PHARM REV CODE 250

## 2025-06-16 PROCEDURE — 83735 ASSAY OF MAGNESIUM: CPT | Performed by: INTERNAL MEDICINE

## 2025-06-16 PROCEDURE — P9035 PLATELET PHERES LEUKOREDUCED: HCPCS | Performed by: INTERNAL MEDICINE

## 2025-06-16 PROCEDURE — 99233 SBSQ HOSP IP/OBS HIGH 50: CPT | Mod: ,,, | Performed by: INTERNAL MEDICINE

## 2025-06-16 PROCEDURE — 85025 COMPLETE CBC W/AUTO DIFF WBC: CPT | Performed by: INTERNAL MEDICINE

## 2025-06-16 PROCEDURE — 11000001 HC ACUTE MED/SURG PRIVATE ROOM

## 2025-06-16 PROCEDURE — 25000003 PHARM REV CODE 250: Performed by: CLINICAL NURSE SPECIALIST

## 2025-06-16 RX ORDER — ESCITALOPRAM OXALATE 10 MG/1
10 TABLET ORAL DAILY
Status: DISCONTINUED | OUTPATIENT
Start: 2025-06-17 | End: 2025-06-18 | Stop reason: HOSPADM

## 2025-06-16 RX ORDER — GABAPENTIN 300 MG/1
600 CAPSULE ORAL NIGHTLY
Status: DISCONTINUED | OUTPATIENT
Start: 2025-06-16 | End: 2025-06-17

## 2025-06-16 RX ORDER — ESCITALOPRAM OXALATE 5 MG/1
5 TABLET ORAL ONCE
Status: COMPLETED | OUTPATIENT
Start: 2025-06-16 | End: 2025-06-16

## 2025-06-16 RX ADMIN — ASPIRIN 81 MG: 81 TABLET, COATED ORAL at 09:06

## 2025-06-16 RX ADMIN — METOPROLOL SUCCINATE 12.5 MG: 25 TABLET, EXTENDED RELEASE ORAL at 09:06

## 2025-06-16 RX ADMIN — ACETAMINOPHEN 650 MG: 325 TABLET ORAL at 11:06

## 2025-06-16 RX ADMIN — MUPIROCIN: 20 OINTMENT TOPICAL at 08:06

## 2025-06-16 RX ADMIN — POLYETHYLENE GLYCOL 3350 17 G: 17 POWDER, FOR SOLUTION ORAL at 08:06

## 2025-06-16 RX ADMIN — CYPROHEPTADINE HYDROCHLORIDE 4 MG: 4 TABLET ORAL at 09:06

## 2025-06-16 RX ADMIN — CYPROHEPTADINE HYDROCHLORIDE 4 MG: 4 TABLET ORAL at 08:06

## 2025-06-16 RX ADMIN — FAMOTIDINE 20 MG: 20 TABLET, FILM COATED ORAL at 09:06

## 2025-06-16 RX ADMIN — ALPRAZOLAM 0.25 MG: 0.25 TABLET ORAL at 11:06

## 2025-06-16 RX ADMIN — MIRTAZAPINE 30 MG: 15 TABLET, FILM COATED ORAL at 08:06

## 2025-06-16 RX ADMIN — TAMSULOSIN HYDROCHLORIDE 0.4 MG: 0.4 CAPSULE ORAL at 09:06

## 2025-06-16 RX ADMIN — CYPROHEPTADINE HYDROCHLORIDE 4 MG: 4 TABLET ORAL at 04:06

## 2025-06-16 RX ADMIN — FOLIC ACID 1 MG: 1 TABLET ORAL at 09:06

## 2025-06-16 RX ADMIN — MUPIROCIN: 20 OINTMENT TOPICAL at 09:06

## 2025-06-16 RX ADMIN — GABAPENTIN 600 MG: 300 CAPSULE ORAL at 10:06

## 2025-06-16 RX ADMIN — ESCITALOPRAM OXALATE 5 MG: 5 TABLET, FILM COATED ORAL at 09:06

## 2025-06-16 RX ADMIN — ESCITALOPRAM OXALATE 5 MG: 5 TABLET, FILM COATED ORAL at 12:06

## 2025-06-16 RX ADMIN — GABAPENTIN 300 MG: 300 CAPSULE ORAL at 09:06

## 2025-06-16 NOTE — PT/OT/SLP EVAL
"Physical Therapy Evaluation    Patient Name:  Juanjose Chawla   MRN:  [id number]    Recommendations:     Discharge therapy intensity: Low Intensity Therapy   Discharge Equipment Recommendations: none   Barriers to discharge: Ongoing medical needs    Assessment:     Juanjose Chawal is a 84 y.o. male admitted with a medical diagnosis of stage Iv lung cancer, recurrent R malignant pleural effusion, anemia, thrombocytopenia, deconditioning.  He presents with the following impairments/functional limitations: weakness, impaired endurance, impaired self care skills, impaired functional mobility, gait instability, impaired balance.    Pt tolerated PT eval fairly well, receiving platelets at time of eval but cleared by RN to mobilize. Ptrequiring reassurance and encouragement to mobilize 2/2 anxiousness and fear of falling. At baseline, pt is able to ambulate short distances with a RW but has been needing more assist lately 2/2 weakness. Today he was able to mobilize to EOB with Sotero and perform STS and stand step t/f with CGA and a RW. He has good support at home, with 24/7 assist between his wife and daughters who stay with him at night. Family is comfortable with the help they have been providing and ultimate goal of pt and family is to go home at d/c. Will continue to see pt while in-house to improve strength, endurance, and functional mobility. Recommending low intensity therapy.    Rehab Prognosis: Good; patient would benefit from acute skilled PT services to address these deficits and reach maximum level of function.    Recent Surgery: * No surgery found *      Plan:     During this hospitalization, patient would benefit from acute PT services 5 x/week to address the identified rehab impairments via gait training, therapeutic activities, therapeutic exercises and progress toward the following goals:    Plan of Care Expires:  07/16/25    Subjective     Chief Complaint: "I might fall"  Patient/Family Comments/goals: " to go home  Pain/Comfort:  Pain Rating 1: 0/10    Patients cultural, spiritual, Mu-ism conflicts given the current situation: no    Living Environment:  Pt lives with his wife in a [facility], daughters stay at night so pt is never alone.  Prior to admission, patients level of function was using RW for short distances, lately needing spv-Sotero.  Equipment used at home: wheelchair, bedside commode, shower chair, oxygen, walker, rolling, hospital bed.  DME owned (not currently used): none.  Upon discharge, patient will have assistance from wife, daughters, and nephew nearby.    Objective:     Communicated with RN prior to session.  Patient found HOB elevated with oxygen, peripheral IV, PICC line (pleurx drain)  upon PT entry to room.    General Precautions: Standard, fall  Orthopedic Precautions:N/A   Braces: N/A  Respiratory Status: Nasal cannula, flow 2 L/min, humidified  Blood Pressure: NT      Exams:  Cognitive Exam:  Patient is oriented to Person, Place, and Time  RLE ROM: WFL  RLE Strength: grossly 4/5  LLE ROM: WFL  LLE Strength: grossly 4/5  Skin integrity: Visible skin intact      Functional Mobility:  Bed Mobility:     Scooting: contact guard assistance  Supine to Sit: minimum assistance and increased time required  Transfers:     Sit to Stand:  contact guard assistance with rolling walker  Bed to Chair: contact guard assistance with  rolling walker  using  Stand-Step t/f  Pt declined additional mobility/gait trials 2/2 fatigue and anxiousness, agreeable to stay t/f to chair  Balance: SBA-CGA overall      AM-PAC 6 CLICK MOBILITY  Total Score:17       Treatment & Education:  Patient and daughter/s were provided with verbal education education regarding PT role/goals/POC, fall prevention, safety awareness, and discharge/DME recommendations.  Understanding was verbalized.     Patient left up in chair with all lines intact, call button in reach, RN notified, and daughter present.    GOALS:   Multidisciplinary  Problems       Physical Therapy Goals          Problem: Physical Therapy    Goal Priority Disciplines Outcome Interventions   Physical Therapy Goal     PT, PT/OT Progressing    Description: Goals to be met by: 25     Patient will increase functional independence with mobility by performin. Supine to sit with Stand-by Assistance  2. Sit to stand transfer with Stand-by Assistance  3. Bed to chair transfer with Stand-by Assistance using Rolling Walker  4. Gait  x 100 feet with Stand-by Assistance using Rolling Walker.   5. Improve BLE strength to 5/5 to improve functional mobility                         History:     Past Medical History:   Diagnosis Date    Anxiety     Diabetes mellitus     HLD (hyperlipidemia)     Hypertension     Major depressive disorder, single episode, unspecified     Pneumonia, unspecified organism        Past Surgical History:   Procedure Laterality Date    Cardiac stent      ENDOBRONCHIAL ULTRASOUND N/A 3/28/2025    Procedure: ENDOBRONCHIAL ULTRASOUND (EBUS);  Surgeon: Jerrod Harman Jr., MD, [facility];  Location: [facility] BRONCHOSCOPY LAB;  Service: Pulmonary;  Laterality: N/A;    HERNIA REPAIR N/A     INSERTION OF PLEURAL CATHETER N/A 2025    Procedure: INSERTION, CATHETER, PLEURAL;  Surgeon: Roslyn Hull MD;  Location: [facility] OR;  Service: Cardiovascular;  Laterality: N/A;  PLEUR-X CATHETER INSERTION / RIGHT       Time Tracking:     PT Received On: 25  PT Start Time: 1415     PT Stop Time: 1433  PT Total Time (min): 18 min     Billable Minutes: Evaluation 18      2025

## 2025-06-16 NOTE — PLAN OF CARE
Problem: Adult Inpatient Plan of Care  Goal: Plan of Care Review  Outcome: Progressing  Flowsheets (Taken 6/15/2025 1918)  Plan of Care Reviewed With:   family   patient  Goal: Patient-Specific Goal (Individualized)  Outcome: Progressing  Flowsheets (Taken 6/15/2025 1918)  Individualized Care Needs: assist with adls, manage pain  Goal: Absence of Hospital-Acquired Illness or Injury  Outcome: Progressing  Intervention: Identify and Manage Fall Risk  Flowsheets (Taken 6/15/2025 1918)  Safety Promotion/Fall Prevention:   assistive device/personal item within reach   Fall Risk reviewed with patient/family   side rails raised x 3   Fall Risk signage in place   nonskid shoes/socks when out of bed  Intervention: Prevent Skin Injury  Flowsheets (Taken 6/15/2025 1918)  Body Position:   weight shifting   position changed independently  Intervention: Prevent and Manage VTE (Venous Thromboembolism) Risk  Flowsheets (Taken 6/15/2025 1918)  VTE Prevention/Management:   ambulation promoted   fluids promoted  Intervention: Prevent Infection  Flowsheets (Taken 6/15/2025 1918)  Infection Prevention:   hand hygiene promoted   rest/sleep promoted   single patient room provided  Goal: Optimal Comfort and Wellbeing  Outcome: Progressing  Intervention: Monitor Pain and Promote Comfort  Flowsheets (Taken 6/15/2025 1918)  Pain Management Interventions:   care clustered   quiet environment facilitated   position adjusted   medication offered  Intervention: Provide Person-Centered Care  Flowsheets (Taken 6/15/2025 1918)  Trust Relationship/Rapport:   care explained   choices provided   questions answered   questions encouraged   thoughts/feelings acknowledged  Goal: Readiness for Transition of Care  Outcome: Progressing     Problem: Infection  Goal: Absence of Infection Signs and Symptoms  Outcome: Progressing      For the next 24 hours patient needs to be with a responsible adult.    For 24 hours DO NOT drive, operate machinery, appliances, drink alcohol, make important decisions or sign legal documents.    Start with a light or bland diet if you are feeling sick to your stomach otherwise advance to regular diet as tolerated.    Follow recommendations on procedure report if provided by your doctor.    Call Dr Michele  for problems [phone]    Problems may include but not limited to: large amounts of bleeding, trouble breathing, repeated vomiting, severe unrelieved pain, fever or chills.

## 2025-06-16 NOTE — PLAN OF CARE
Problem: Adult Inpatient Plan of Care  Goal: Plan of Care Review  Outcome: Progressing  Flowsheets (Taken 6/15/2025 1918)  Plan of Care Reviewed With:   family   patient  Goal: Patient-Specific Goal (Individualized)  Outcome: Progressing  Flowsheets (Taken 6/15/2025 1918)  Individualized Care Needs: assist with adls, manage pain  Goal: Absence of Hospital-Acquired Illness or Injury  Outcome: Progressing  Intervention: Identify and Manage Fall Risk  Flowsheets (Taken 6/15/2025 1918)  Safety Promotion/Fall Prevention:   assistive device/personal item within reach   Fall Risk reviewed with patient/family   side rails raised x 3   Fall Risk signage in place   nonskid shoes/socks when out of bed  Intervention: Prevent Skin Injury  Flowsheets (Taken 6/15/2025 1918)  Body Position:   weight shifting   position changed independently  Intervention: Prevent and Manage VTE (Venous Thromboembolism) Risk  Flowsheets (Taken 6/15/2025 1918)  VTE Prevention/Management:   ambulation promoted   fluids promoted  Intervention: Prevent Infection  Flowsheets (Taken 6/15/2025 1918)  Infection Prevention:   hand hygiene promoted   rest/sleep promoted   single patient room provided  Goal: Optimal Comfort and Wellbeing  Outcome: Progressing  Intervention: Monitor Pain and Promote Comfort  Flowsheets (Taken 6/15/2025 1918)  Pain Management Interventions:   care clustered   quiet environment facilitated   position adjusted   medication offered  Intervention: Provide Person-Centered Care  Flowsheets (Taken 6/15/2025 1918)  Trust Relationship/Rapport:   care explained   choices provided   questions answered   questions encouraged   thoughts/feelings acknowledged  Goal: Readiness for Transition of Care  Outcome: Progressing     Problem: Infection  Goal: Absence of Infection Signs and Symptoms  Outcome: Progressing     Problem: Adult Inpatient Plan of Care  Goal: Plan of Care Review  Outcome: Progressing  Flowsheets (Taken 6/15/2025 1918)  Plan  of Care Reviewed With:   family   patient  Goal: Patient-Specific Goal (Individualized)  Outcome: Progressing  Flowsheets (Taken 6/15/2025 1918)  Individualized Care Needs: assist with adls, manage pain  Goal: Absence of Hospital-Acquired Illness or Injury  Outcome: Progressing  Intervention: Identify and Manage Fall Risk  Flowsheets (Taken 6/15/2025 1918)  Safety Promotion/Fall Prevention:   assistive device/personal item within reach   Fall Risk reviewed with patient/family   side rails raised x 3   Fall Risk signage in place   nonskid shoes/socks when out of bed  Intervention: Prevent Skin Injury  Flowsheets (Taken 6/15/2025 1918)  Body Position:   weight shifting   position changed independently  Intervention: Prevent and Manage VTE (Venous Thromboembolism) Risk  Flowsheets (Taken 6/15/2025 1918)  VTE Prevention/Management:   ambulation promoted   fluids promoted  Intervention: Prevent Infection  Flowsheets (Taken 6/15/2025 1918)  Infection Prevention:   hand hygiene promoted   rest/sleep promoted   single patient room provided  Goal: Optimal Comfort and Wellbeing  Outcome: Progressing  Intervention: Monitor Pain and Promote Comfort  Flowsheets (Taken 6/15/2025 1918)  Pain Management Interventions:   care clustered   quiet environment facilitated   position adjusted   medication offered  Intervention: Provide Person-Centered Care  Flowsheets (Taken 6/15/2025 1918)  Trust Relationship/Rapport:   care explained   choices provided   questions answered   questions encouraged   thoughts/feelings acknowledged  Goal: Readiness for Transition of Care  Outcome: Progressing     Problem: Infection  Goal: Absence of Infection Signs and Symptoms  Outcome: Progressing

## 2025-06-16 NOTE — PROGRESS NOTES
[facility] - Oncology Acute  Hematology/Oncology  Consult Note    Patient Name: Juanjose Chawla  MRN: [id number]  Admission Date: 6/12/2025  Hospital Length of Stay: 4 days  Attending Provider: Harvey Osborn MD  Consulting Provider: Niko Johnson MD  Principal Problem:<principal problem not specified>    Consults  Subjective:     HPI: This is an 84-year-old patient he was last seen by us in clinic on 05/21/2026.  He was given 1 unit of packed red blood cells, therapy was held and then he was reinstituted on chemotherapy on 05/29/2025.  This is his 3rd cycle of carboplatin and Taxol with growth factor support.        His family called he was having episodes of confusion and weakness he was asked to come to the emergency room.    Family declined.    He had multiple phone calls with different progressive symptoms he was asked to come to the emergency room the family again declined they ask for IV fluids at home.  The impression of the nursing staff was that he needed more aggressive care and evaluation.  He was not brought to the emergency room.  Home health then went out the patient was noted to be orthostatic his blood counts were drawn.  He is noted to have severe symptomatic anemia and thrombocytopenia and he was asked to come to the emergency room.      His blood work on 06/11 showed a hemoglobin of 6.8 with a platelet count of 11,000.      He was seen evaluated in the ER.  Repeat blood evaluation shows the patient's hemoglobin is 7.3 he has a normal white count but his platelet count is still 11.  His sodium is normal his potassium is normal his BUN is 15 with a creatinine 0.8 in his calcium is 8.4 he has a mild elevation of his alk-phos at    he is scheduled for 2 units of blood and 1 unit of platelets    And we were consulted for his history.  I spoke to the ER physician discussing him the orthostatic, rule out sepsis as well    He currently in his anorexia    He denies any current  headache.  All Korin's had occasional.  No visual change.  He still feels short of breath.  They have been draining less fluid out of his lung 300 at times.  He has a lot of anxiety get him going to the bathroom because it is difficult for him.    He has difficulty with the eating no difficulty swallowing just nothing tastes right.  He has lost his taste for food.  Has a metallic taste.    No bleeding.  No rash.  No neuropathy.  Pain is well-controlled.  Still super anxious    He has.  This severe anxiety and confusion.  And then gets better.  His family was present with the bedside.     06/13/2025:   Patient is sleeping comfortably.  Chest x-ray pending.    Started on Periactin   Started on folic acid.  Discussed case with .  Dr. Osborn  Discussed with the family   Patient not awakened from sleep.  Discussed with daughter he had a rough night.  They changed his BuSpar increase in also put him on some p.r.n. antianxiety medication.  He received his transfusions well.    06/14/2025   Patient was seen by palliative care and psych yesterday.  Notes reviewed.  Recommendations appreciated  He had a difficult night with trying to get back to sleep.  He was given melatonin in addition to his other medications.    He is little more awake today.  But he still somnolent and groggy from his medications.  He is lying in bed comfortable.  No bowel movement.  Good urine output.  They drain 350 cc of fluid yesterday.  No bleeding.  He had a little bit of a nosebleed this morning which improved after humidification of his oxygen with a little more dark blood now.    We discussed platelet transfusion as well.      06/15/2025: Patient yesterday underwent CT scan chest abdomen pelvis  He has  stability in the lung and lymph nodes; however, he has progressive soft tissue areas involving the left iliac wing.  And small increase in the lytic lesions vertebral bodies.      He is lying in bed comfortably.    He does not want to get out of  bed to use the bedside commode because of his weakness.    He wants to use the urinal instead.    He still has no taste.    I had a long discussion with him in his daughter.    I explained the progressive disease in his bones.    His performance status is 3 or 4.  He is not a candidate for further chemotherapy.    In addition his cytopenias also preclude him from getting more chemotherapy.    There is no oral agent.  He has a KEAP1 1 mutation of undetermined significance.  I do not think this patient would tolerate doublet immunotherapy with his performance status.  In the goals of therapy we would be palliation.  Not to mention the after recovery cytopenias.  In addition this patient has difficulty even getting to treatment.        We discussed the futility of CPR mechanical ventilation.  We discussed the continued use of palliative oxygen and drainage of his pleural effusion.  I think we transition to palliative care and hospice        Today 06/16/2025:   Patient lying in bed.  He has some mild blood oozing from his nose.    No significant bleeding.  Positive bowel movement.  Daughter stated she will try to keep him up yesterday.    He still gets worse with his anxiety at night.  Explained his progressive cytopenias can be from delayed chemotherapy effect or he could have underlying more progressive bone marrow involvement.    Making it further clear that he is not a candidate for more chemotherapy.  And I would not put him through a bone marrow biopsy.    Agree with transition to home care and hospice      Oncology Treatment Plan:   [No matching plan found]    Oncology History Overview Note   Images     February 27, 2025:  CT of the chest, COPD and emphysema, right lower lobe atelectasis, lymphadenopathy in the right hilum as well as the mediastinum and smaller nodes in the left hilum.  The right hilar lymph node measures 2.5 cm other small nodes in the right hilum.  And there is a precarinal lymph node 2.2 x 2.2  cm.  And adenopathy seen in the subcarinal space as well as the mediastinum.     03/13/2025: CT abdomen pelvis, right-sided pleural effusion interstitial fibrosis lungs bilaterally prostatic hypertrophy     03/20/2025: PET-CT: Hypermetabolic consolidation right lung base with mediastinal and bilateral adenopathy and innumerable scattered hypermetabolic bone lesions     pathology  The right pleural effusion showed malignant cells consistent with a poorly differential adenocarcinoma:  Negative for TTF 1, WT1, GATA3.  Suggest an origin of the GI tract clinical pathology is recommended    2nd biopsy  1. EBUS LYMPH NODE 4R (TWO CONVENTIONAL SMEARS, ONE THIN PREP SMEAR, ONE CELL   BLOCK):    RARE MALIGNANT CELLS CONSISTENT WITH POORLY DIFFERENTIATED ADENOCARCINOMA.      2. EBUS LYMPH NODE 7R:    POORLY DIFFERENTIATED ADENOCARCINOMA.   The immunohistochemical profile of the tumor cells is not typical of lung   primary and may be seen with adenocarcinomas of upper   gastrointestinal/pancreaticobiliary primary.       Tempus Liquid biopsy  TP53  No actionable mutation  KEAP1 +     Metastasis to bone   3/24/2025 Initial Diagnosis    Metastasis to bone     Secondary malignancy of mediastinal lymph nodes   3/24/2025 Initial Diagnosis    Secondary malignancy of mediastinal lymph nodes     Adenocarcinoma of unknown primary   4/4/2025 Cancer Staged    Staging form: Lung, AJCC V9  - Clinical stage from 4/4/2025: Stage ANTONIA (cTX, cNX, cM1b)     4/5/2025 - 5/29/2025 Chemotherapy    Treatment Summary   Plan Name: OP NSCLC PACLITAXEL CARBOPLATIN Q3W  Treatment Goal: Palliative  Status: Inactive  Start Date: 4/5/2025  End Date: 5/29/2025  Provider: Niko Johnson MD  Chemotherapy: CARBOplatin (PARAPLATIN) 635 mg in 0.9% NaCl 348.5 mL chemo infusion, 635 mg (88.4 % of original dose 715.8 mg), Intravenous, Clinic/HOD 1 time, 3 of 4 cycles  Dose modification:   (original dose 715.8 mg, Cycle 1)  Administration: 635 mg (4/5/2025), 600 mg  (5/2/2025), 505 mg (5/29/2025)  PACLitaxeL (TAXOL) 135 mg/m2 = 282 mg in 0.9% NaCl 500 mL chemo infusion, 135 mg/m2 = 282 mg (100 % of original dose 135 mg/m2), Intravenous, Clinic/HOD 1 time, 3 of 4 cycles  Dose modification: 135 mg/m2 (original dose 135 mg/m2, Cycle 1, Reason: MD Discretion)  Administration: 282 mg (4/5/2025), 282 mg (5/2/2025), 282 mg (5/29/2025)          Medications:  Continuous Infusions:    Scheduled Meds:   aspirin  81 mg Oral Daily    cyproheptadine  4 mg Oral TID    EScitalopram oxalate  5 mg Oral Daily    famotidine  20 mg Oral Daily    folic acid  1 mg Oral Daily    gabapentin  300 mg Oral BID    metoprolol succinate  12.5 mg Oral Daily    mirtazapine  30 mg Oral QHS    mupirocin   Nasal BID    polyethylene glycol  17 g Oral BID    tamsulosin  0.4 mg Oral Daily     PRN Meds:  Current Facility-Administered Medications:     0.9%  NaCl infusion (for blood administration), , Intravenous, Q24H PRN    acetaminophen, 650 mg, Oral, Q6H PRN    ALPRAZolam, 0.25 mg, Oral, TID PRN    benzonatate, 100 mg, Oral, TID PRN    calcium carbonate, 500 mg, Oral, TID PRN    dextrose 50%, 12.5 g, Intravenous, PRN    dextrose 50%, 25 g, Intravenous, PRN    glucagon (human recombinant), 1 mg, Intramuscular, PRN    glucose, 16 g, Oral, PRN    glucose, 24 g, Oral, PRN    guaiFENesin 100 mg/5 ml, 200 mg, Oral, Q4H PRN    hydrALAZINE, 10 mg, Intravenous, Q6H PRN    insulin aspart U-100, 0-5 Units, Subcutaneous, QID (AC + HS) PRN    melatonin, 9 mg, Oral, Nightly PRN    naloxone, 0.02 mg, Intravenous, PRN    ondansetron, 4 mg, Intravenous, Q6H PRN    sodium chloride 0.9%, 10 mL, Intravenous, Q12H PRN     Review of patient's allergies indicates:  No Known Allergies     Past Medical History:   Diagnosis Date    Anxiety     Diabetes mellitus     HLD (hyperlipidemia)     Hypertension     Major depressive disorder, single episode, unspecified     Pneumonia, unspecified organism      Past Surgical History:   Procedure  Laterality Date    Cardiac stent      ENDOBRONCHIAL ULTRASOUND N/A 3/28/2025    Procedure: ENDOBRONCHIAL ULTRASOUND (EBUS);  Surgeon: Jerrod Harman Jr., MD, [facility];  Location: [facility] BRONCHOSCOPY LAB;  Service: Pulmonary;  Laterality: N/A;    HERNIA REPAIR N/A     INSERTION OF PLEURAL CATHETER N/A 4/8/2025    Procedure: INSERTION, CATHETER, PLEURAL;  Surgeon: Roslyn Hull MD;  Location: [facility] OR;  Service: Cardiovascular;  Laterality: N/A;  PLEUR-X CATHETER INSERTION / RIGHT     Family History    None       Tobacco Use    Smoking status: Never    Smokeless tobacco: Never   Substance and Sexual Activity    Alcohol use: Not Currently    Drug use: Never    Sexual activity: Not on file       Review of Systems see above in HPI  Objective:     Vital Signs (Most Recent):  Temp: 97.7 °F (36.5 °C) (06/16/25 0743)  Pulse: 91 (06/16/25 0743)  Resp: 15 (06/16/25 0743)  BP: 123/73 (06/16/25 0743)  SpO2: (!) 93 % (06/16/25 0743) Vital Signs (24h Range):  Temp:  [97.6 °F (36.4 °C)-97.9 °F (36.6 °C)] 97.7 °F (36.5 °C)  Pulse:  [67-91] 91  Resp:  [15-20] 15  SpO2:  [93 %-100 %] 93 %  BP: (110-131)/(64-73) 123/73     Weight: 80.7 kg (177 lb 14.4 oz)  Body mass index is 24.13 kg/m².  Body surface area is 2.02 meters squared.      Intake/Output Summary (Last 24 hours) at 6/16/2025 0746  Last data filed at 6/16/2025 0625  Gross per 24 hour   Intake 240 ml   Output 300 ml   Net -60 ml       Physical Exam  Vitals reviewed.   Constitutional:       General: He is awake. He is not in acute distress.  HENT:      Head: Normocephalic and atraumatic.   Eyes:      Comments: palor   Cardiovascular:      Rate and Rhythm: Normal rate and regular rhythm.      Heart sounds: Normal heart sounds.   Pulmonary:      Effort: Tachypnea and prolonged expiration present. No accessory muscle usage.      Breath sounds: Decreased air movement present.   Chest:      Comments: Cath in place  Abdominal:      General: Abdomen is flat. Bowel sounds are normal.  There is no distension.      Palpations: Abdomen is soft.   Musculoskeletal:      Cervical back: Normal range of motion and neck supple.      Right lower leg: No edema.      Left lower leg: No edema.   Lymphadenopathy:      Upper Body:      Right upper body: No supraclavicular adenopathy.      Left upper body: No supraclavicular adenopathy.   Skin:     General: Skin is warm and dry.   Neurological:      Mental Status: He is alert.      GCS: GCS eye subscore is 4. GCS verbal subscore is 5. GCS motor subscore is 6.      Cranial Nerves: Cranial nerves 2-12 are intact.   Psychiatric:         Attention and Perception: Attention normal.         Significant Labs:   CBC:   Recent Labs   Lab 06/15/25  0344 06/16/25  0316   WBC 8.57 7.58   HGB 8.7* 8.2*   HCT 28.6* 27.9*   PLT 41* 25*    and CMP:   Recent Labs   Lab 06/15/25  0344 06/16/25  0316    144   K 4.0 3.9    103   CO2 36* 35*   GLU 91 95   BUN 14.5 16.2   CREATININE 0.85 0.82   CALCIUM 8.4* 8.1*   PROT 6.3  --    ALBUMIN 2.2*  --    BILITOT 0.5  --    ALKPHOS 147  --    AST 12  --    ALT 6  --        Diagnostic Results:      Assessment/Plan:   Anemia and thrombocytopenia most likely secondary to chemotherapy   Acute symptomatic anemia   Non-small-cell lung carcinoma              Malignant pleural effusions               Malignant hilar and mediastinal adenopathy                 Malignant bone lesions                  History of hypercalcemia    History of depression anxiety ---was on BuSpar at home  History of insomnia: On Remeron in the past  Folic acid deficiency    Recommendations   Keep hemoglobin greater than 8 -                 -if still symptomatic at 8---- May try to get closer to 10 based on volume status  Keep platelets greater than 20 unless bleeding --we will transfuse today with nosebleed                                      If worse DC aspirin  Palliative care consultation  / psych with anxiety-- continue with recs from them                   "     With Xanax p.r.n.----consider increasing dose early    PICC line care okay to use-----can remove on discharge as we will no longer use this  Continue ensure pudding or equivalent supplement  Continue to drain his pleural effusion as it home  Periactin for appetite stimulation           Understanding he will have difficulty tolerating the more aggressive chemotherapy.   And while my biggest concern is his difficulty common back and forth chemotherapy.  And even or radiation.    And progressive cytopenias (indicating either bone marrow involvement or progressive difficulty secondary to chemotherapy).    He has underlying goals are palliation.  There is no  oral targetable  therapy in his nGS panel  He knows the goals of care or palliation  "what about hospice"----patient asked the other day  has progressive disease on his CT ---agree with--- transition to home care and hospice  SCDs for now.  Avoid Lovenox with severe anemia and thrombocytopenia     Continue folic acid    DC PICC line on discharge        Thank you for your consult.     Niko Johnson MD  Hematology/Oncology  [facility] - Oncology [facility]   " 01-Jan-2000

## 2025-06-16 NOTE — PLAN OF CARE
Problem: Physical Therapy  Goal: Physical Therapy Goal  Description: Goals to be met by: 25     Patient will increase functional independence with mobility by performin. Supine to sit with Stand-by Assistance  2. Sit to stand transfer with Stand-by Assistance  3. Bed to chair transfer with Stand-by Assistance using Rolling Walker  4. Gait  x 100 feet with Stand-by Assistance using Rolling Walker.   5. Improve BLE strength to 5/5 to improve functional mobility    Outcome: Progressing

## 2025-06-16 NOTE — PROGRESS NOTES
Patient Name: Juanjose Chawla   MRN: [id number]   Admission Date: 6/12/2025   Hospital Length of Stay: 4   Attending Provider: Harvey Osborn MD   Consulting Provider: Chu Snow MD    Primary Care Physician:  Sadiq Hartmann MD     Principal Problem: <principal problem not specified>       Final diagnoses:  [D64.9] Symptomatic anemia (Primary)  [R07.9] Chest pain  [D69.6] Thrombocytopenia  [C34.90] Malignant neoplasm of lung, unspecified laterality, unspecified part of lung      Goals of care review:    We met with the patient with his daughter at bedside.  We reviewed his discussion with Oncology.  He stated that Oncology gave him a bleak outlook.  His daughter stated that the patient was speaking as though he was giving up but she was encouraged him to not give up just yet.  We reviewed the options of home palliative care versus home hospice care.  I explained to him that at present, I would recommend home hospice care over palliative care.  I explained to him that palliative care is helpful if the patient could make clinic visits or is able to await a follow up visit for 2-4 weeks.  However, at present he is more likely to benefit from several visits per week and an on-call service to manage symptoms.  I explained that he is not a candidate for chemotherapy now and is unlikely to be so in the future.  Due to cytopenias, Oncology is not recommending future chemo or bone marrow biopsy.  I described some of the positive services he would gained from home hospice care.    Hospice review:     I reviewed the benefits of home hospice care, including aggressive symptom-based management with the intent to avoid future hospitalizations which may increase increase the risk of having a negative effect on her quality of life. I reviewed the benefit of regular visits by an assigned RN and 24/7 on call RN to address uncontrolled symptoms which may precipitate into a symptom crisis. This may prevent unwanted ER  visits or hospitalizations ordinarily necessary to manage symptoms of an advanced illness. I also reviewed benefit of regular CNA visits and the option of  and  visitations. I explained that all medications related to her diagnosis and symptom related medications would be covered by hospice, including oxygen, a hospital bed and other durable medical equipment.     The patient could also benefit from assistance with bathing in the home.  His wife is not able to physically manage his care alone.  We also talked about the option of having a sitter service be a part of his care as well.  The patient stated that he does not have the money for this.  His daughter responded by saying that he indeed does have the money and they would continue to talk about this.    The patient was not willing to make any decisions at this time regarding the options of palliative care versus hospice care.  The patient also wishes to continue to elect a full code status.  He would like to continue to rely on th option of ER visits and recurrent hospitalizations for the purpose of transfusions if needed upon discharge.  Even though I pointed out that this may not be a realistic plan, due to the patient's severe weakness, he was persistent that he is not ready to make any decisions at this time about anything.   I think that he is simply having a hard time making any decisions at all due to his underlying anxiety and fear.  He and his daughter stated they would continue to consider options.  I did let them know that that the attending physician is likely to plan at discharge soon and I am worried that if there is no decision made by then, he may not have adequate medical assistance in the home.       Symptom review:    We reviewed symptom management.  Patient complains of restless legs syndrome unmanaged.  He has a history of taking gabapentin 300 mg 3 times a day.  At present he is on this b.i.d..  He states that his  symptoms are only present at nighttime.  He also complains of anxiety as does his daughter on his behalf.  The use of Xanax is beneficial but causes difficulty in his ability to stay awake and communicate clearly.  He is currently on Lexapro 5 mg with intent to increase the dose tomorrow to 10 mg.      Assessment/Plan:     Goals of care/counseling  Anxiety   Restless legs syndrome  Thrombocytopenia    I have adjusted his gabapentin to be 600 mg at night and discontinued morning dose.  I have increase Lexapro to 10 mg daily, 1st dose was today.  Xanax will be continued for PRN anxiety.  Recommendations were made for home hospice care.  The patient was not willing to make any decisions regarding hospice or palliative care at this time.  We will continue to be available provide support and education regarding future goals of care.    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    Adenocarcinoma of unknown primary 03/26/2025    Anxiety about treatment 03/26/2025    Hypercalcemia 03/26/2025    Moderate malnutrition 03/27/2025     Resolved Ambulatory Problems     Diagnosis Date Noted    Pleural effusion 02/28/2025     Past Medical History:   Diagnosis Date    Anxiety     Diabetes mellitus     HLD (hyperlipidemia)     Hypertension     Major depressive disorder, single episode, unspecified     Pneumonia, unspecified organism         Past Surgical History:   Procedure Laterality Date    Cardiac stent      ENDOBRONCHIAL ULTRASOUND N/A 3/28/2025    Procedure: ENDOBRONCHIAL ULTRASOUND (EBUS);  Surgeon: Jerrod Harman Jr., MD, [facility];  Location: [facility];  Service: Pulmonary;  Laterality: N/A;    HERNIA REPAIR N/A     INSERTION OF PLEURAL CATHETER N/A 4/8/2025    Procedure: INSERTION, CATHETER, PLEURAL;  Surgeon: Roslyn Hull  MD SILVA;  Location: [facility];  Service: Cardiovascular;  Laterality: N/A;  PLEUR-X CATHETER INSERTION / RIGHT        Review of patient's allergies indicates:  No Known Allergies     Current Medications[1]       Current Facility-Administered Medications:     0.9%  NaCl infusion (for blood administration), , Intravenous, Q24H PRN    acetaminophen, 650 mg, Oral, Q6H PRN    ALPRAZolam, 0.25 mg, Oral, TID PRN    benzonatate, 100 mg, Oral, TID PRN    calcium carbonate, 500 mg, Oral, TID PRN    dextrose 50%, 12.5 g, Intravenous, PRN    dextrose 50%, 25 g, Intravenous, PRN    glucagon (human recombinant), 1 mg, Intramuscular, PRN    glucose, 16 g, Oral, PRN    glucose, 24 g, Oral, PRN    guaiFENesin 100 mg/5 ml, 200 mg, Oral, Q4H PRN    hydrALAZINE, 10 mg, Intravenous, Q6H PRN    insulin aspart U-100, 0-5 Units, Subcutaneous, QID (AC + HS) PRN    melatonin, 9 mg, Oral, Nightly PRN    naloxone, 0.02 mg, Intravenous, PRN    ondansetron, 4 mg, Intravenous, Q6H PRN    sodium chloride 0.9%, 10 mL, Intravenous, Q12H PRN     No family history on file.       Review of Systems   All other systems reviewed and are negative.           Objective:   /78   Pulse 70   Temp 97.4 °F (36.3 °C) (Oral)   Resp 18   Ht 6' (1.829 m)   Wt 80.7 kg (177 lb 14.4 oz)   SpO2 99%   BMI 24.13 kg/m²      Physical Exam   Constitutional: He is oriented to person, place, and time.  Non-toxic appearance. He appears ill.   HENT:   Head: Normocephalic.   Mouth/Throat: Mucous membranes are moist. Oropharynx is clear.   Eyes: Pupils are equal, round, and reactive to light. Conjunctivae are normal.   Cardiovascular: Normal rate, regular rhythm, normal heart sounds and normal pulses. Pulmonary:      Effort: Pulmonary effort is normal.      Breath sounds: Normal breath sounds.     Abdominal: Soft. Bowel sounds are normal. He exhibits no distension. There is no abdominal tenderness.   Musculoskeletal:      Right lower leg: No edema.      Left lower leg: No  edema.   Neurological: He is alert and oriented to person, place, and time.   Skin: Skin is warm.   Vitals reviewed.         Review of Symptoms  Review of Symptoms      Symptom Assessment (ESAS 0-10 Scale)  Pain:  0  Dyspnea:  0  Anxiety:  0  Nausea:  0  Depression:  0  Anorexia:  0  Fatigue:  0  Insomnia:  0  Restlessness:  0  Agitation:  0         Living Arrangements:  Lives with spouse and Lives in home    Psychosocial/Cultural:   See Palliative Psychosocial Note: Yes  **Primary  to Follow**  Palliative Care  Consult: No      Advance Care Planning   Advance Directives:   Living Will: No    Do Not Resuscitate Status: No    Medical Power of : No      Decision Making:  Patient answered questions and Family answered questions  Goals of Care: What is most important right now is to focus on remaining as independent as possible, symptom/pain control, extending life as long as possible, even it it means sacrificing quality, curative/life-prolongation (regardless of treatment burdens), comfort and QOL . Accordingly, we have decided that the best plan to meet the patient's goals includes continuing with treatment.                36 min of encounter was spent in chart review, face to face discussion of goals of care, symptom assessment, coordination of care and emotional support.       Chu Snow MD  Palliative Medicine  [facility]        [1]   Current Facility-Administered Medications:     0.9%  NaCl infusion (for blood administration), , Intravenous, Q24H PRN, Niko Johnson MD    acetaminophen tablet 650 mg, 650 mg, Oral, Q6H PRN, Antoni Peoples, NP, 650 mg at 06/16/25 1100    ALPRAZolam tablet 0.25 mg, 0.25 mg, Oral, TID PRN, Antoin Peoples NP, 0.25 mg at 06/15/25 2106    aspirin EC tablet 81 mg, 81 mg, Oral, Daily, Antoni Peoples NP, 81 mg at 06/16/25 0950    benzonatate capsule 100 mg, 100 mg, Oral, TID PRN, Antoni Peoples NP     calcium carbonate 200 mg calcium (500 mg) chewable tablet 500 mg, 500 mg, Oral, TID PRN, Antoni Peoples, DEEP    cyproheptadine 4 mg tablet 4 mg, 4 mg, Oral, TID, Niko Johnson MD, 4 mg at 06/16/25 0950    dextrose 50% injection 12.5 g, 12.5 g, Intravenous, PRN, Antoni Peoples, NP    dextrose 50% injection 25 g, 25 g, Intravenous, PRN, Antoni Peoples NP    [START ON 6/17/2025] EScitalopram oxalate tablet 10 mg, 10 mg, Oral, Daily, Chu Snow MD    famotidine tablet 20 mg, 20 mg, Oral, Daily, Antoni Peoples NP, 20 mg at 06/16/25 0950    folic acid tablet 1 mg, 1 mg, Oral, Daily, Harvey Osborn MD, 1 mg at 06/16/25 0950    [START ON 6/17/2025] gabapentin capsule 600 mg, 600 mg, Oral, QHS, Chu Snow MD    glucagon (human recombinant) injection 1 mg, 1 mg, Intramuscular, PRN, Antoni Peoples, NP    glucose chewable tablet 16 g, 16 g, Oral, PRN, Antoni Peoples, NP    glucose chewable tablet 24 g, 24 g, Oral, PRN, Antoni Peoples, DEEP    guaiFENesin 100 mg/5 ml syrup 200 mg, 200 mg, Oral, Q4H PRN, Antoni Peoples, DEEP    hydrALAZINE injection 10 mg, 10 mg, Intravenous, Q6H PRN, Antoni Peoples, NP    insulin aspart U-100 injection 0-5 Units, 0-5 Units, Subcutaneous, QID (AC + HS) PRN, Antoni Peoples NP    melatonin tablet 9 mg, 9 mg, Oral, Nightly PRN, Vira Mccurdy FNP, 6 mg at 06/14/25 2120    metoprolol succinate (TOPROL-XL) 24 hr split tablet 12.5 mg, 12.5 mg, Oral, Daily, Antoni Peoples NP, 12.5 mg at 06/16/25 0950    mirtazapine tablet 30 mg, 30 mg, Oral, QHS, Antoni Peoples NP, 30 mg at 06/15/25 2023    mupirocin 2 % ointment, , Nasal, BID, Harvey Osborn MD, Given at 06/16/25 0956    naloxone 0.4 mg/mL injection 0.02 mg, 0.02 mg, Intravenous, PRN, Antoni Peoples NP    ondansetron injection 4 mg, 4 mg, Intravenous, Q6H PRN, Antoni Peoples NP, 4 mg at 06/13/25 2046    polyethylene glycol packet 17 g, 17 g, Oral, BID, Harvey Osborn MD    sodium chloride  0.9% flush 10 mL, 10 mL, Intravenous, Q12H PRN, Antoni Peoples, NP    tamsulosin 24 hr capsule 0.4 mg, 0.4 mg, Oral, Daily, Antoni Peoples, NP, 0.4 mg at 06/16/25 0946

## 2025-06-17 LAB
ANION GAP SERPL CALC-SCNC: 5 MEQ/L
BASOPHILS # BLD AUTO: 0.03 X10(3)/MCL
BASOPHILS NFR BLD AUTO: 0.4 %
BUN SERPL-MCNC: 16.4 MG/DL (ref 8.4–25.7)
CALCIUM SERPL-MCNC: 8.1 MG/DL (ref 8.8–10)
CHLORIDE SERPL-SCNC: 103 MMOL/L (ref 98–107)
CO2 SERPL-SCNC: 35 MMOL/L (ref 23–31)
CREAT SERPL-MCNC: 0.77 MG/DL (ref 0.72–1.25)
CREAT/UREA NIT SERPL: 21
EOSINOPHIL # BLD AUTO: 0.02 X10(3)/MCL (ref 0–0.9)
EOSINOPHIL NFR BLD AUTO: 0.3 %
ERYTHROCYTE [DISTWIDTH] IN BLOOD BY AUTOMATED COUNT: 17.3 % (ref 11.5–17)
GFR SERPLBLD CREATININE-BSD FMLA CKD-EPI: >60 ML/MIN/1.73/M2
GLUCOSE SERPL-MCNC: 105 MG/DL (ref 82–115)
HCT VFR BLD AUTO: 25.2 % (ref 42–52)
HGB BLD-MCNC: 7.7 G/DL (ref 14–18)
IMM GRANULOCYTES # BLD AUTO: 0.04 X10(3)/MCL (ref 0–0.04)
IMM GRANULOCYTES NFR BLD AUTO: 0.5 %
LYMPHOCYTES # BLD AUTO: 1.36 X10(3)/MCL (ref 0.6–4.6)
LYMPHOCYTES NFR BLD AUTO: 18.5 %
MAGNESIUM SERPL-MCNC: 1.7 MG/DL (ref 1.6–2.6)
MCH RBC QN AUTO: 30.7 PG (ref 27–31)
MCHC RBC AUTO-ENTMCNC: 30.6 G/DL (ref 33–36)
MCV RBC AUTO: 100.4 FL (ref 80–94)
MONOCYTES # BLD AUTO: 0.65 X10(3)/MCL (ref 0.1–1.3)
MONOCYTES NFR BLD AUTO: 8.9 %
NEUTROPHILS # BLD AUTO: 5.24 X10(3)/MCL (ref 2.1–9.2)
NEUTROPHILS NFR BLD AUTO: 71.4 %
NRBC BLD AUTO-RTO: 0 %
PLATELET # BLD AUTO: 44 X10(3)/MCL (ref 130–400)
PMV BLD AUTO: 10.5 FL (ref 7.4–10.4)
POCT GLUCOSE: 125 MG/DL (ref 70–110)
POTASSIUM SERPL-SCNC: 4 MMOL/L (ref 3.5–5.1)
RBC # BLD AUTO: 2.51 X10(6)/MCL (ref 4.7–6.1)
SODIUM SERPL-SCNC: 143 MMOL/L (ref 136–145)
WBC # BLD AUTO: 7.34 X10(3)/MCL (ref 4.5–11.5)

## 2025-06-17 PROCEDURE — 25000003 PHARM REV CODE 250: Performed by: INTERNAL MEDICINE

## 2025-06-17 PROCEDURE — 25000003 PHARM REV CODE 250: Performed by: CLINICAL NURSE SPECIALIST

## 2025-06-17 PROCEDURE — 36415 COLL VENOUS BLD VENIPUNCTURE: CPT | Performed by: INTERNAL MEDICINE

## 2025-06-17 PROCEDURE — 99232 SBSQ HOSP IP/OBS MODERATE 35: CPT | Mod: ,,, | Performed by: INTERNAL MEDICINE

## 2025-06-17 PROCEDURE — 11000001 HC ACUTE MED/SURG PRIVATE ROOM

## 2025-06-17 PROCEDURE — 83735 ASSAY OF MAGNESIUM: CPT | Performed by: INTERNAL MEDICINE

## 2025-06-17 PROCEDURE — 80048 BASIC METABOLIC PNL TOTAL CA: CPT | Performed by: INTERNAL MEDICINE

## 2025-06-17 PROCEDURE — 85025 COMPLETE CBC W/AUTO DIFF WBC: CPT | Performed by: INTERNAL MEDICINE

## 2025-06-17 RX ORDER — GABAPENTIN 300 MG/1
600 CAPSULE ORAL NIGHTLY
Status: DISCONTINUED | OUTPATIENT
Start: 2025-06-17 | End: 2025-06-17

## 2025-06-17 RX ORDER — GABAPENTIN 300 MG/1
600 CAPSULE ORAL NIGHTLY
Status: DISCONTINUED | OUTPATIENT
Start: 2025-06-18 | End: 2025-06-18 | Stop reason: HOSPADM

## 2025-06-17 RX ADMIN — METOPROLOL SUCCINATE 12.5 MG: 25 TABLET, EXTENDED RELEASE ORAL at 10:06

## 2025-06-17 RX ADMIN — ALPRAZOLAM 0.25 MG: 0.25 TABLET ORAL at 09:06

## 2025-06-17 RX ADMIN — ASPIRIN 81 MG: 81 TABLET, COATED ORAL at 10:06

## 2025-06-17 RX ADMIN — FOLIC ACID 1 MG: 1 TABLET ORAL at 10:06

## 2025-06-17 RX ADMIN — CYPROHEPTADINE HYDROCHLORIDE 4 MG: 4 TABLET ORAL at 10:06

## 2025-06-17 RX ADMIN — ESCITALOPRAM OXALATE 10 MG: 10 TABLET ORAL at 10:06

## 2025-06-17 RX ADMIN — GABAPENTIN 600 MG: 300 CAPSULE ORAL at 07:06

## 2025-06-17 RX ADMIN — MIRTAZAPINE 30 MG: 15 TABLET, FILM COATED ORAL at 09:06

## 2025-06-17 RX ADMIN — CYPROHEPTADINE HYDROCHLORIDE 4 MG: 4 TABLET ORAL at 09:06

## 2025-06-17 RX ADMIN — CYPROHEPTADINE HYDROCHLORIDE 4 MG: 4 TABLET ORAL at 03:06

## 2025-06-17 RX ADMIN — MUPIROCIN: 20 OINTMENT TOPICAL at 10:06

## 2025-06-17 RX ADMIN — FAMOTIDINE 20 MG: 20 TABLET, FILM COATED ORAL at 10:06

## 2025-06-17 RX ADMIN — TAMSULOSIN HYDROCHLORIDE 0.4 MG: 0.4 CAPSULE ORAL at 10:06

## 2025-06-17 NOTE — PLAN OF CARE
Problem: Adult Inpatient Plan of Care  Goal: Plan of Care Review  Outcome: Progressing  Flowsheets (Taken 6/16/2025 2245)  Plan of Care Reviewed With: patient  Goal: Patient-Specific Goal (Individualized)  Outcome: Progressing  Flowsheets (Taken 6/16/2025 2245)  Individualized Care Needs: assist with adls, keep comfortable  Goal: Absence of Hospital-Acquired Illness or Injury  Outcome: Progressing  Intervention: Identify and Manage Fall Risk  Flowsheets (Taken 6/16/2025 2245)  Safety Promotion/Fall Prevention:   assistive device/personal item within reach   Fall Risk reviewed with patient/family   nonskid shoes/socks when out of bed   side rails raised x 3  Intervention: Prevent Skin Injury  Flowsheets (Taken 6/16/2025 2245)  Body Position:   position changed independently   weight shifting  Intervention: Prevent and Manage VTE (Venous Thromboembolism) Risk  Flowsheets (Taken 6/16/2025 2245)  VTE Prevention/Management: ambulation promoted  Intervention: Prevent Infection  Flowsheets (Taken 6/16/2025 2245)  Infection Prevention:   rest/sleep promoted   hand hygiene promoted   single patient room provided  Goal: Optimal Comfort and Wellbeing  Outcome: Progressing  Intervention: Monitor Pain and Promote Comfort  Flowsheets (Taken 6/16/2025 2245)  Pain Management Interventions:   care clustered   quiet environment facilitated  Intervention: Provide Person-Centered Care  Flowsheets (Taken 6/16/2025 2245)  Trust Relationship/Rapport:   care explained   choices provided   questions encouraged   questions answered   thoughts/feelings acknowledged  Goal: Readiness for Transition of Care  Outcome: Progressing

## 2025-06-17 NOTE — PLAN OF CARE
Spoke with the patient's daughter.  The patient currently has [facility].  They would like the patient to continue with [facility] after discharge.  They will wait until the patient is home and then choose a home hospice provider.

## 2025-06-17 NOTE — PROGRESS NOTES
[facility] - Oncology Acute  Hematology/Oncology  Consult Note    Patient Name: Juanjose Chawla  MRN: [id number]  Admission Date: 6/12/2025  Hospital Length of Stay: 5 days  Attending Provider: Harvey Osborn MD  Consulting Provider: Niko Johnson MD  Principal Problem:<principal problem not specified>    Consults  Subjective:     HPI: This is an 84-year-old patient he was last seen by us in clinic on 05/21/2026.  He was given 1 unit of packed red blood cells, therapy was held and then he was reinstituted on chemotherapy on 05/29/2025.  This is his 3rd cycle of carboplatin and Taxol with growth factor support.        His family called he was having episodes of confusion and weakness he was asked to come to the emergency room.    Family declined.    He had multiple phone calls with different progressive symptoms he was asked to come to the emergency room the family again declined they ask for IV fluids at home.  The impression of the nursing staff was that he needed more aggressive care and evaluation.  He was not brought to the emergency room.  Home health then went out the patient was noted to be orthostatic his blood counts were drawn.  He is noted to have severe symptomatic anemia and thrombocytopenia and he was asked to come to the emergency room.      His blood work on 06/11 showed a hemoglobin of 6.8 with a platelet count of 11,000.      He was seen evaluated in the ER.  Repeat blood evaluation shows the patient's hemoglobin is 7.3 he has a normal white count but his platelet count is still 11.  His sodium is normal his potassium is normal his BUN is 15 with a creatinine 0.8 in his calcium is 8.4 he has a mild elevation of his alk-phos at    he is scheduled for 2 units of blood and 1 unit of platelets    And we were consulted for his history.  I spoke to the ER physician discussing him the orthostatic, rule out sepsis as well    He currently in his anorexia    He denies any current  headache.  All Korin's had occasional.  No visual change.  He still feels short of breath.  They have been draining less fluid out of his lung 300 at times.  He has a lot of anxiety get him going to the bathroom because it is difficult for him.    He has difficulty with the eating no difficulty swallowing just nothing tastes right.  He has lost his taste for food.  Has a metallic taste.    No bleeding.  No rash.  No neuropathy.  Pain is well-controlled.  Still super anxious    He has.  This severe anxiety and confusion.  And then gets better.  His family was present with the bedside.     06/13/2025:   Patient is sleeping comfortably.  Chest x-ray pending.    Started on Periactin   Started on folic acid.  Discussed case with .  Dr. Osborn  Discussed with the family   Patient not awakened from sleep.  Discussed with daughter he had a rough night.  They changed his BuSpar increase in also put him on some p.r.n. antianxiety medication.  He received his transfusions well.    06/14/2025   Patient was seen by palliative care and psych yesterday.  Notes reviewed.  Recommendations appreciated  He had a difficult night with trying to get back to sleep.  He was given melatonin in addition to his other medications.    He is little more awake today.  But he still somnolent and groggy from his medications.  He is lying in bed comfortable.  No bowel movement.  Good urine output.  They drain 350 cc of fluid yesterday.  No bleeding.  He had a little bit of a nosebleed this morning which improved after humidification of his oxygen with a little more dark blood now.    We discussed platelet transfusion as well.      06/15/2025: Patient yesterday underwent CT scan chest abdomen pelvis  He has  stability in the lung and lymph nodes; however, he has progressive soft tissue areas involving the left iliac wing.  And small increase in the lytic lesions vertebral bodies.      He is lying in bed comfortably.    He does not want to get out of  bed to use the bedside commode because of his weakness.    He wants to use the urinal instead.    He still has no taste.    I had a long discussion with him in his daughter.    I explained the progressive disease in his bones.    His performance status is 3 or 4.  He is not a candidate for further chemotherapy.    In addition his cytopenias also preclude him from getting more chemotherapy.    There is no oral agent.  He has a KEAP1 1 mutation of undetermined significance.  I do not think this patient would tolerate doublet immunotherapy with his performance status.  In the goals of therapy we would be palliation.  Not to mention the after recovery cytopenias.  In addition this patient has difficulty even getting to treatment.        We discussed the futility of CPR mechanical ventilation.  We discussed the continued use of palliative oxygen and drainage of his pleural effusion.  I think we transition to palliative care and hospice         06/16/2025:   Patient lying in bed.  He has some mild blood oozing from his nose.    No significant bleeding.  Positive bowel movement.  Daughter stated she will try to keep him up yesterday.    He still gets worse with his anxiety at night.  Explained his progressive cytopenias can be from delayed chemotherapy effect or he could have underlying more progressive bone marrow involvement.    Making it further clear that he is not a candidate for more chemotherapy.  And I would not put him through a bone marrow biopsy.    Agree with transition to home care and hospice      Today 6/17  Family asking about other meds for anxiety  Will defer to pscy and primary    No pain  Sitting up  Feels better today        Oncology Treatment Plan:   [No matching plan found]    Oncology History Overview Note   Images     February 27, 2025:  CT of the chest, COPD and emphysema, right lower lobe atelectasis, lymphadenopathy in the right hilum as well as the mediastinum and smaller nodes in the left  hilum.  The right hilar lymph node measures 2.5 cm other small nodes in the right hilum.  And there is a precarinal lymph node 2.2 x 2.2 cm.  And adenopathy seen in the subcarinal space as well as the mediastinum.     03/13/2025: CT abdomen pelvis, right-sided pleural effusion interstitial fibrosis lungs bilaterally prostatic hypertrophy     03/20/2025: PET-CT: Hypermetabolic consolidation right lung base with mediastinal and bilateral adenopathy and innumerable scattered hypermetabolic bone lesions     pathology  The right pleural effusion showed malignant cells consistent with a poorly differential adenocarcinoma:  Negative for TTF 1, WT1, GATA3.  Suggest an origin of the GI tract clinical pathology is recommended    2nd biopsy  1. EBUS LYMPH NODE 4R (TWO CONVENTIONAL SMEARS, ONE THIN PREP SMEAR, ONE CELL   BLOCK):    RARE MALIGNANT CELLS CONSISTENT WITH POORLY DIFFERENTIATED ADENOCARCINOMA.      2. EBUS LYMPH NODE 7R:    POORLY DIFFERENTIATED ADENOCARCINOMA.   The immunohistochemical profile of the tumor cells is not typical of lung   primary and may be seen with adenocarcinomas of upper   gastrointestinal/pancreaticobiliary primary.       Tempus Liquid biopsy  TP53  No actionable mutation  KEAP1 +     Metastasis to bone   3/24/2025 Initial Diagnosis    Metastasis to bone     Secondary malignancy of mediastinal lymph nodes   3/24/2025 Initial Diagnosis    Secondary malignancy of mediastinal lymph nodes     Adenocarcinoma of unknown primary   4/4/2025 Cancer Staged    Staging form: Lung, AJCC V9  - Clinical stage from 4/4/2025: Stage ANTONIA (cTX, cNX, cM1b)     4/5/2025 - 5/29/2025 Chemotherapy    Treatment Summary   Plan Name: OP NSCLC PACLITAXEL CARBOPLATIN Q3W  Treatment Goal: Palliative  Status: Inactive  Start Date: 4/5/2025  End Date: 5/29/2025  Provider: Niko Jonhson MD  Chemotherapy: CARBOplatin (PARAPLATIN) 635 mg in 0.9% NaCl 348.5 mL chemo infusion, 635 mg (88.4 % of original dose 715.8 mg),  Intravenous, Clinic/HOD 1 time, 3 of 4 cycles  Dose modification:   (original dose 715.8 mg, Cycle 1)  Administration: 635 mg (4/5/2025), 600 mg (5/2/2025), 505 mg (5/29/2025)  PACLitaxeL (TAXOL) 135 mg/m2 = 282 mg in 0.9% NaCl 500 mL chemo infusion, 135 mg/m2 = 282 mg (100 % of original dose 135 mg/m2), Intravenous, Clinic/HOD 1 time, 3 of 4 cycles  Dose modification: 135 mg/m2 (original dose 135 mg/m2, Cycle 1, Reason: MD Discretion)  Administration: 282 mg (4/5/2025), 282 mg (5/2/2025), 282 mg (5/29/2025)          Medications:  Continuous Infusions:    Scheduled Meds:   aspirin  81 mg Oral Daily    cyproheptadine  4 mg Oral TID    EScitalopram oxalate  10 mg Oral Daily    famotidine  20 mg Oral Daily    folic acid  1 mg Oral Daily    gabapentin  600 mg Oral QHS    metoprolol succinate  12.5 mg Oral Daily    mirtazapine  30 mg Oral QHS    mupirocin   Nasal BID    polyethylene glycol  17 g Oral BID    tamsulosin  0.4 mg Oral Daily     PRN Meds:  Current Facility-Administered Medications:     0.9%  NaCl infusion (for blood administration), , Intravenous, Q24H PRN    acetaminophen, 650 mg, Oral, Q6H PRN    ALPRAZolam, 0.25 mg, Oral, TID PRN    benzonatate, 100 mg, Oral, TID PRN    calcium carbonate, 500 mg, Oral, TID PRN    dextrose 50%, 12.5 g, Intravenous, PRN    dextrose 50%, 25 g, Intravenous, PRN    glucagon (human recombinant), 1 mg, Intramuscular, PRN    glucose, 16 g, Oral, PRN    glucose, 24 g, Oral, PRN    guaiFENesin 100 mg/5 ml, 200 mg, Oral, Q4H PRN    hydrALAZINE, 10 mg, Intravenous, Q6H PRN    insulin aspart U-100, 0-5 Units, Subcutaneous, QID (AC + HS) PRN    melatonin, 9 mg, Oral, Nightly PRN    naloxone, 0.02 mg, Intravenous, PRN    ondansetron, 4 mg, Intravenous, Q6H PRN    sodium chloride 0.9%, 10 mL, Intravenous, Q12H PRN     Review of patient's allergies indicates:  No Known Allergies     Past Medical History:   Diagnosis Date    Anxiety     Diabetes mellitus     HLD (hyperlipidemia)      Hypertension     Major depressive disorder, single episode, unspecified     Pneumonia, unspecified organism      Past Surgical History:   Procedure Laterality Date    Cardiac stent      ENDOBRONCHIAL ULTRASOUND N/A 3/28/2025    Procedure: ENDOBRONCHIAL ULTRASOUND (EBUS);  Surgeon: Jerrod Harman Jr., MD, [facility];  Location: [facility] BRONCHOSCOPY LAB;  Service: Pulmonary;  Laterality: N/A;    HERNIA REPAIR N/A     INSERTION OF PLEURAL CATHETER N/A 4/8/2025    Procedure: INSERTION, CATHETER, PLEURAL;  Surgeon: Roslyn Hull MD;  Location: [facility] OR;  Service: Cardiovascular;  Laterality: N/A;  PLEUR-X CATHETER INSERTION / RIGHT     Family History    None       Tobacco Use    Smoking status: Never    Smokeless tobacco: Never   Substance and Sexual Activity    Alcohol use: Not Currently    Drug use: Never    Sexual activity: Not on file       Review of Systems see above in HPI  Objective:     Vital Signs (Most Recent):  Temp: 97.3 °F (36.3 °C) (06/17/25 0512)  Pulse: 70 (06/17/25 0741)  Resp: 20 (06/17/25 0512)  BP: (!) 145/97 (06/17/25 0513)  SpO2: 99 % (06/17/25 0513) Vital Signs (24h Range):  Temp:  [97.3 °F (36.3 °C)-98.2 °F (36.8 °C)] 97.3 °F (36.3 °C)  Pulse:  [60-91] 70  Resp:  [18-20] 20  SpO2:  [96 %-99 %] 99 %  BP: (106-145)/(52-97) 145/97     Weight: 80.7 kg (177 lb 14.4 oz)  Body mass index is 24.13 kg/m².  Body surface area is 2.02 meters squared.      Intake/Output Summary (Last 24 hours) at 6/17/2025 0804  Last data filed at 6/17/2025 0535  Gross per 24 hour   Intake 420 ml   Output 100 ml   Net 320 ml       Physical Exam  Vitals reviewed.   Constitutional:       General: He is awake. He is not in acute distress.  HENT:      Head: Normocephalic and atraumatic.   Eyes:      Comments: palor   Cardiovascular:      Rate and Rhythm: Normal rate and regular rhythm.      Heart sounds: Normal heart sounds.   Pulmonary:      Effort: Tachypnea and prolonged expiration present. No accessory muscle usage.      Breath  sounds: Decreased air movement present.   Chest:      Comments: Cath in place  Abdominal:      General: Abdomen is flat. Bowel sounds are normal. There is no distension.      Palpations: Abdomen is soft.   Musculoskeletal:      Cervical back: Normal range of motion and neck supple.      Right lower leg: No edema.      Left lower leg: No edema.   Lymphadenopathy:      Upper Body:      Right upper body: No supraclavicular adenopathy.      Left upper body: No supraclavicular adenopathy.   Skin:     General: Skin is warm and dry.   Neurological:      Mental Status: He is alert.      GCS: GCS eye subscore is 4. GCS verbal subscore is 5. GCS motor subscore is 6.      Cranial Nerves: Cranial nerves 2-12 are intact.   Psychiatric:         Attention and Perception: Attention normal.         Significant Labs:   CBC:   Recent Labs   Lab 06/16/25 0316 06/17/25 0318   WBC 7.58 7.34   HGB 8.2* 7.7*   HCT 27.9* 25.2*   PLT 25* 44*    and CMP:   Recent Labs   Lab 06/16/25 0316 06/17/25 0318    143   K 3.9 4.0    103   CO2 35* 35*   GLU 95 105   BUN 16.2 16.4   CREATININE 0.82 0.77   CALCIUM 8.1* 8.1*       Diagnostic Results:      Assessment/Plan:   Anemia and thrombocytopenia most likely secondary to chemotherapy   Acute symptomatic anemia   Non-small-cell lung carcinoma              Malignant pleural effusions               Malignant hilar and mediastinal adenopathy                 Malignant bone lesions                  History of hypercalcemia    History of depression anxiety ---was on BuSpar at home  History of insomnia: On Remeron in the past  Folic acid deficiency    Recommendations   Keep hemoglobin greater than 7 -                 -if still symptomatic consider increasing to 8  Keep platelets greater than 20 unless bleeding -                                      If worse DC aspirin  Palliative care consultation  / psych with anxiety-- continue with recs from them                       With Xanax  p.r.n.----consider increasing dose early    PICC line care okay to use-----can remove on discharge as we will no longer use this  Continue ensure pudding or equivalent supplement  Continue to drain his pleural effusion as it home--give pain meds before drain by 30min ( try to drain as much as possible)  Periactin for appetite stimulation      DC PICC line on discharge        Thank you for your consult.     Niko Johnson MD  Hematology/Oncology  [facility] - Oncology [facility]

## 2025-06-17 NOTE — PROGRESS NOTES
[facility]  Hospital Medicine Progress Note        Chief Complaint: Inpatient Follow-up for Abnormal Lab (Hx of stage 4 lung CA. On chemo. Last treatment on 5/29. Sent to ED by PCP for low H&H and low platelets. GCS 15. Pt denies complaints. )  -generalized weakness    HPI:    The patient is an 84-year-old male with a history of COPD/emphysema, anxiety/diabetes/hyperlipidemia/hypertension/depression as well as poorly differentiated adenocarcinoma of lung diagnosed around 3/25 by right pleural effusion showing malignant cells followed by endobronchial ultrasound with lymph node biopsy again consistent with poorly differentiated adenocarcinoma.  Patient gets seen by [facility].  Patient is a stage IV A initiated on palliative chemotherapy on 04/25 with Paclitaxel carboplatin Q 3 weeks with the planned end date on 06/19/25 by Dr. Niko curran.  The patient with a history of recurrent right pleural malignant effusion with PleurX catheter placement.    The patient was last seen at [facility] on 05/21/2026, at that time patient received 1 unit PRBC.  Chemotherapy was placed on hold and restarted on 05/29/2025 (3rd cycle of carboplatin/Taxol with growth factor support)    Post chemotherapy family was concerned about episodes of confusion/weakness.  Patient was redirected to emergency room but family declined.  This was followed by multiple phone calls with different progressive symptoms and again he was asked to come to emergency room for evaluation/fluids but again family declined.  The patient was seen by home health with findings of positive orthostatics, blood work was done showing severe anemia with a hemoglobin of 6.8 and a platelet count of 11,000. The patient was told to present to emergency room [facility].  The patient was evaluated by emergency room physician and decision for admission was done.  Patient is status post  2 units PRBC and 1 unit platelets.    ROS pertinent for anorexia /sob/anxiety/decreased appetite/confusion          Interval Hx:     06/13/2025 Dr. Osborn-chart reviewed patient examined.  Patient sleeping at the present moment post sedation.  Case discussed with family member, daughter at bedside, Nahomi.  Patient is status post 2 units PRBC and 1 unit platelet.  Portable chest x-ray pending for evaluation of right pleural effusion.  Vital signs are stable.  Hemoglobin 8.6 and platelet count of 46,000. No appetite/super anxious/not sleeping.    Notes by Oncology reviewed.  We will consult palliative/physical therapy/psych    6/14/25 dr osborn - post 1 unit platelets . Alert and active . Seen by psych and buspar dc. Placed on remeron / lexapro  w  prn xanax.     06/15/2025 Dr. Osborn-chart reviewed patient examined.-the patient was given 1 unit platelets yesterday.  Also patient was seen by psych and medications for anxiety updated.  CT chest abdomen and pelvis was done to eval response to chemotherapy.  Two start with a his platelet count went up to 40,000.  H&H with a hemoglobin of 8.8.  CT chest abdomen and pelvis with progression of osteolytic lesions.  And Dr. Culp with Hematology Oncology was able to talk to patient about not being a further candidate for palliative chemotherapy.  The present moment patient appears stable.  Still having episodes of anxiety.  Remains on Remeron/low-dose Lexapro and Xanax 0.25 p.o. t.i.d. PRN.  Still not sleeping much.  Patient may have to be discharged home with home health with plans for palliative treatment with eventual hospice.  Remains alert/active and oriented.  No wanting to get out of bed.      6/16/25 dr osborn - still up. Offered xamax but denied. Seen by palliative  - given 1 unit plt's   Case was discussed with patient's nurse and  on the floor.    Objective/physical exam:  General: In no acute distress, afebrile. Resting   Chest: Clear to auscultation  anteriroy. Right chest pleur x  Heart: RRR, +S1, S2, no appreciable murmur  Abdomen: Soft, nontender, BS +  MSK: Warm, no lower extremity edema, no clubbing or cyanosis  Neurologic: Alert and oriented x4, Cranial nerve II-XII intact, Strength 5/5 in all 4 extremities    VITAL SIGNS: 24 HRS MIN & MAX LAST   Temp  Min: 97.3 °F (36.3 °C)  Max: 98.2 °F (36.8 °C) 98.2 °F (36.8 °C)   BP  Min: 106/63  Max: 129/63 106/63   Pulse  Min: 60  Max: 91  60   Resp  Min: 15  Max: 20 18   SpO2  Min: 93 %  Max: 99 % 99 %     I have reviewed the following labs:  Recent Labs   Lab 06/14/25  0519 06/15/25  0344 06/16/25  0316   WBC 8.86 8.57 7.58   RBC 2.93* 2.84* 2.75*   HGB 8.8* 8.7* 8.2*   HCT 29.0* 28.6* 27.9*   MCV 99.0* 100.7* 101.5*   MCH 30.0 30.6 29.8   MCHC 30.3* 30.4* 29.4*   RDW 18.2* 17.6* 17.4*   PLT 20* 41* 25*   MPV 11.0* 11.1* 10.8*     Recent Labs   Lab 06/12/25  1211 06/13/25  0330 06/14/25  0519 06/15/25  0344 06/16/25  0316    144 145 145 144   K 4.2 3.7 4.1 4.0 3.9    105 106 103 103   CO2 30 32* 36* 36* 35*   BUN 15.5 16.3 15.6 14.5 16.2   CREATININE 0.86 0.81 0.79 0.85 0.82    100 112 91 95   CALCIUM 8.4* 7.9* 8.0* 8.4* 8.1*   MG  --   --  1.70 1.60 1.70   ALBUMIN 2.4* 2.3*  --  2.2*  --    PROT 6.9 5.9  --  6.3  --    ALKPHOS 156* 146  --  147  --    ALT 6 8  --  6  --    AST 14 15  --  12  --    BILITOT <0.5 0.5  --  0.5  --      Microbiology Results (last 7 days)       Procedure Component Value Units Date/Time    Blood Culture [[id number]]  (Normal) Collected: 06/14/25 1006    Order Status: Completed Specimen: Blood from Arm, Right Updated: 06/16/25 1202     Blood Culture No Growth At 48 Hours    Blood Culture [[id number]]  (Normal) Collected: 06/14/25 1006    Order Status: Completed Specimen: Blood from Arm, Left Updated: 06/16/25 1201     Blood Culture No Growth At 48 Hours    Blood Culture [[id number]]  (Normal) Collected: 06/13/25 0330    Order Status: Completed Specimen: Blood from Arm,  Right Updated: 06/16/25 0901     Blood Culture No Growth At 72 Hours    Blood Culture [[id number]]  (Normal) Collected: 06/13/25 0330    Order Status: Completed Specimen: Blood from Arm, Right Updated: 06/16/25 0501     Blood Culture No Growth At 72 Hours    Blood Culture [[id number]]     Order Status: Canceled Specimen: Blood from Arm, Right              See below for Radiology    Assessment/Plan:  1.-non-small cell lung carcinoma stage IV A  -currently on chemo, 3rd cycle with carboplatin/Taxol(palliative chemo)  - admitted for profound anemia/thrombocytopenia related to chemotherapy  -patient has received PRBCs/platelets  -CT chest abdomen and pelvis with progression of bone metastasis.    2.-recurrent right malignant pleural effusion  -patient with PleurX catheter  -usually self drains Q 2 days around 300 cc    3.-severe symptomatic anemia due to chemotherapy  -patient is status post 2 units PRBC on 06/12/2025  -6.8.>2 units>8.8>> 8.8      4-severe thrombocytopenia related to chemotherapy  -patient presented with a platelet count of 11,000, status post 1 unit plt>>>41,000 on 06/13/2025>> 20,000 on 6/14/25>>> 1 unit>>> 41,000>>>>>[id number]>>>1 unit plt    5.-folate deficiency  -continue folic acid 1 mg p.o. daily    6.-anxiety/depression-  -continue Lexapro 5 mg p.o. daily/Remeron/Xanax 0.25 mg p.o. t.i.d. PRN  -consult psych for evaluation> remeron /lexapro/xanax prn     7.-insomnia  -continue Remeron for insomnia/appetite  -continue Remeron for insomnia as well as appetite    8.-deconditioning/generalized weakness  -Physical therapy to see    history of COPD/emphysema, anxiety/diabetes/hyperlipidemia/hypertension/depression       Plan-  Seen by palliative   physical therapy  A.m. labs  Continue monitoring anxiety/deconditioning/cytopenias      VTE prophylaxis:  SCD    Patient condition:  Fair    Anticipated discharge and Disposition:   To be determined      All diagnosis and differential diagnosis have been  reviewed; assessment and plan has been documented; I have personally reviewed the labs and test results that are presently available; I have reviewed the patients medication list; I have reviewed the consulting providers response and recommendations. I have reviewed or attempted to review medical records based upon their availability    All of the patient's questions have been  addressed and answered. Patient's is agreeable to the above stated plan. I will continue to monitor closely and make adjustments to medical management as needed.    Portions of this note dictated using EMR integrated voice recognition software, and may be subject to voice recognition errors not corrected at proofreading. Please contact writer for clarification if needed.   _____________________________________________________________________    Malnutrition Status:  Nutrition consulted. Most recent weight and BMI monitored-     Measurements:  Wt Readings from Last 1 Encounters:   06/12/25 80.7 kg (177 lb 14.4 oz)   Body mass index is 24.13 kg/m².    Patient has been screened and assessed by RD.    Malnutrition Type:  Context:    Level:      Malnutrition Characteristic Summary:       Interventions/Recommendations (treatment strategy):        Scheduled Med:   aspirin  81 mg Oral Daily    cyproheptadine  4 mg Oral TID    [START ON 6/17/2025] EScitalopram oxalate  10 mg Oral Daily    famotidine  20 mg Oral Daily    folic acid  1 mg Oral Daily    gabapentin  600 mg Oral QHS    metoprolol succinate  12.5 mg Oral Daily    mirtazapine  30 mg Oral QHS    mupirocin   Nasal BID    polyethylene glycol  17 g Oral BID    tamsulosin  0.4 mg Oral Daily      Continuous Infusions:     PRN Meds:    Current Facility-Administered Medications:     0.9%  NaCl infusion (for blood administration), , Intravenous, Q24H PRN    acetaminophen, 650 mg, Oral, Q6H PRN    ALPRAZolam, 0.25 mg, Oral, TID PRN    benzonatate, 100 mg, Oral, TID PRN    calcium carbonate, 500 mg,  Oral, TID PRN    dextrose 50%, 12.5 g, Intravenous, PRN    dextrose 50%, 25 g, Intravenous, PRN    glucagon (human recombinant), 1 mg, Intramuscular, PRN    glucose, 16 g, Oral, PRN    glucose, 24 g, Oral, PRN    guaiFENesin 100 mg/5 ml, 200 mg, Oral, Q4H PRN    hydrALAZINE, 10 mg, Intravenous, Q6H PRN    insulin aspart U-100, 0-5 Units, Subcutaneous, QID (AC + HS) PRN    melatonin, 9 mg, Oral, Nightly PRN    naloxone, 0.02 mg, Intravenous, PRN    ondansetron, 4 mg, Intravenous, Q6H PRN    sodium chloride 0.9%, 10 mL, Intravenous, Q12H PRN     Radiology:  I have personally reviewed the following imaging and agree with the radiologist.     CT Chest Abdomen Pelvis With IV Contrast (XPD) Routine Oral Contrast  Narrative: EXAMINATION:  CT CHEST ABDOMEN PELVIS WITH IV CONTRAST (XPD)    CLINICAL HISTORY:  NSCLC   assess response;    TECHNIQUE:  CT imaging from the chest through the pelvis after IV contrast.  Axial, coronal and sagittal reformatted images reviewed. Dose length product 910 mGycm. Automatic exposure control, adjustment of mA/kV or iterative reconstruction technique used to limit radiation dose.    COMPARISON:  Chest CT 04/03/2025    CT abdomen/pelvis 03/13/2025    FINDINGS:  Lung and large airways: Underlying parenchymal fibrosis.  Consolidation in the right lower lobe has similar appearance since April when allowing for less compressive atelectasis on today's exam.    Pleura: Right pleural drain with small volume right pleural fluid.  Fluid volume has decreased since previous chest CT.  No significant pleural fluid on the left.    Mediastinum and yamil: Left PICC tip in the SVC.  Heart mildly enlarged.  15 mm right lower paratracheal lymph node previously measured 19 mm.  13 mm right hilar lymph node previously measured 17 mm.  Few other mediastinal and hilar lymph nodes mildly smaller as well.    Chest wall/axilla and lower neck: No significant findings.    Liver/biliary: Normal liver. No biliary  dilatation.    Pancreas: Normal.    Spleen: Normal.    Adrenals: Normal.    Genitourinary: Symmetric renal enhancement. No hydronephrosis. Bladder within normal limits. Enlarged prostate.    Stomach/Bowel: Normal caliber small bowel and colon. No discernible sites of bowel inflammation.    Abdominal/pelvic lymph nodes and peritoneum: No pathologically enlarged lymph node identified. No ascites.    Abdominal/pelvic vasculature: Aortoiliac atherosclerosis.    Abdominal wall: Stable appearance.    Musculoskeletal: Multiple lytic lesions are new, larger or better defined since previous exams.  Lesions involve the spine, ribs, pelvis and scapulas.  Larger lesions include a 33 mm lesion in the left iliac wing which has an exophytic soft tissue component measuring 24 mm, a 31 mm lesion in the L5 vertebral body/pedicle and a 19 mm lesion in the T6 vertebral body.  Impression: Increased size, number and definition of osseous lesions.    Several mediastinal and hilar lymph nodes are mildly smaller.    Little overall change in right lower lobe consolidation.    Electronically signed by: Brian Valentin  Date:    06/15/2025  Time:    09:43      Harvey Osborn MD  Department of Hospital Medicine   [facility]   06/16/2025

## 2025-06-18 VITALS
DIASTOLIC BLOOD PRESSURE: 73 MMHG | HEART RATE: 94 BPM | TEMPERATURE: 97 F | RESPIRATION RATE: 20 BRPM | WEIGHT: 177.88 LBS | HEIGHT: 72 IN | BODY MASS INDEX: 24.09 KG/M2 | SYSTOLIC BLOOD PRESSURE: 122 MMHG | OXYGEN SATURATION: 98 %

## 2025-06-18 PROBLEM — T50.905A THROMBOCYTOPENIA DUE TO DRUGS: Status: ACTIVE | Noted: 2025-06-18

## 2025-06-18 PROBLEM — D69.59 THROMBOCYTOPENIA DUE TO DRUGS: Status: ACTIVE | Noted: 2025-06-18

## 2025-06-18 PROBLEM — E53.8 FOLATE DEFICIENCY: Status: ACTIVE | Noted: 2025-06-18

## 2025-06-18 PROBLEM — D64.9 SYMPTOMATIC ANEMIA: Status: ACTIVE | Noted: 2025-06-18

## 2025-06-18 PROBLEM — F41.0 GENERALIZED ANXIETY DISORDER WITH PANIC ATTACKS: Status: ACTIVE | Noted: 2025-06-18

## 2025-06-18 PROBLEM — R07.9 CHEST PAIN: Status: ACTIVE | Noted: 2025-06-18

## 2025-06-18 PROBLEM — F41.1 GENERALIZED ANXIETY DISORDER WITH PANIC ATTACKS: Status: ACTIVE | Noted: 2025-06-18

## 2025-06-18 LAB
ANION GAP SERPL CALC-SCNC: 5 MEQ/L
BACTERIA BLD CULT: NORMAL
BACTERIA BLD CULT: NORMAL
BASOPHILS # BLD AUTO: 0.03 X10(3)/MCL
BASOPHILS NFR BLD AUTO: 0.5 %
BUN SERPL-MCNC: 14 MG/DL (ref 8.4–25.7)
CALCIUM SERPL-MCNC: 8.5 MG/DL (ref 8.8–10)
CHLORIDE SERPL-SCNC: 102 MMOL/L (ref 98–107)
CO2 SERPL-SCNC: 35 MMOL/L (ref 23–31)
CREAT SERPL-MCNC: 0.76 MG/DL (ref 0.72–1.25)
CREAT/UREA NIT SERPL: 18
EOSINOPHIL # BLD AUTO: 0.02 X10(3)/MCL (ref 0–0.9)
EOSINOPHIL NFR BLD AUTO: 0.3 %
ERYTHROCYTE [DISTWIDTH] IN BLOOD BY AUTOMATED COUNT: 17.6 % (ref 11.5–17)
GFR SERPLBLD CREATININE-BSD FMLA CKD-EPI: >60 ML/MIN/1.73/M2
GLUCOSE SERPL-MCNC: 107 MG/DL (ref 82–115)
HCT VFR BLD AUTO: 27.3 % (ref 42–52)
HGB BLD-MCNC: 8.1 G/DL (ref 14–18)
IMM GRANULOCYTES # BLD AUTO: 0.03 X10(3)/MCL (ref 0–0.04)
IMM GRANULOCYTES NFR BLD AUTO: 0.5 %
LYMPHOCYTES # BLD AUTO: 1.32 X10(3)/MCL (ref 0.6–4.6)
LYMPHOCYTES NFR BLD AUTO: 21.7 %
MCH RBC QN AUTO: 30.2 PG (ref 27–31)
MCHC RBC AUTO-ENTMCNC: 29.7 G/DL (ref 33–36)
MCV RBC AUTO: 101.9 FL (ref 80–94)
MONOCYTES # BLD AUTO: 0.72 X10(3)/MCL (ref 0.1–1.3)
MONOCYTES NFR BLD AUTO: 11.8 %
NEUTROPHILS # BLD AUTO: 3.97 X10(3)/MCL (ref 2.1–9.2)
NEUTROPHILS NFR BLD AUTO: 65.2 %
NRBC BLD AUTO-RTO: 0 %
PLATELET # BLD AUTO: 48 X10(3)/MCL (ref 130–400)
PMV BLD AUTO: 10.7 FL (ref 7.4–10.4)
POCT GLUCOSE: 136 MG/DL (ref 70–110)
POCT GLUCOSE: 183 MG/DL (ref 70–110)
POTASSIUM SERPL-SCNC: 3.8 MMOL/L (ref 3.5–5.1)
RBC # BLD AUTO: 2.68 X10(6)/MCL (ref 4.7–6.1)
SODIUM SERPL-SCNC: 142 MMOL/L (ref 136–145)
WBC # BLD AUTO: 6.09 X10(3)/MCL (ref 4.5–11.5)

## 2025-06-18 PROCEDURE — 25000003 PHARM REV CODE 250: Performed by: CLINICAL NURSE SPECIALIST

## 2025-06-18 PROCEDURE — 97530 THERAPEUTIC ACTIVITIES: CPT

## 2025-06-18 PROCEDURE — 36415 COLL VENOUS BLD VENIPUNCTURE: CPT | Performed by: INTERNAL MEDICINE

## 2025-06-18 PROCEDURE — 51798 US URINE CAPACITY MEASURE: CPT

## 2025-06-18 PROCEDURE — 80048 BASIC METABOLIC PNL TOTAL CA: CPT | Performed by: INTERNAL MEDICINE

## 2025-06-18 PROCEDURE — 25000003 PHARM REV CODE 250: Performed by: INTERNAL MEDICINE

## 2025-06-18 PROCEDURE — 85025 COMPLETE CBC W/AUTO DIFF WBC: CPT | Performed by: INTERNAL MEDICINE

## 2025-06-18 RX ORDER — GABAPENTIN 300 MG/1
600 CAPSULE ORAL NIGHTLY
Qty: 60 CAPSULE | Refills: 0 | Status: SHIPPED | OUTPATIENT
Start: 2025-06-18 | End: 2025-07-18

## 2025-06-18 RX ORDER — FOLIC ACID 1 MG/1
1 TABLET ORAL DAILY
Qty: 30 TABLET | Refills: 2 | Status: SHIPPED | OUTPATIENT
Start: 2025-06-19 | End: 2025-09-17

## 2025-06-18 RX ORDER — CYPROHEPTADINE HYDROCHLORIDE 4 MG/1
4 TABLET ORAL 2 TIMES DAILY PRN
Qty: 28 TABLET | Refills: 0 | Status: SHIPPED | OUTPATIENT
Start: 2025-06-18

## 2025-06-18 RX ORDER — ESCITALOPRAM OXALATE 10 MG/1
10 TABLET ORAL DAILY
Qty: 90 TABLET | Refills: 3 | Status: SHIPPED | OUTPATIENT
Start: 2025-06-19 | End: 2026-06-19

## 2025-06-18 RX ORDER — ALPRAZOLAM 0.5 MG/1
0.5 TABLET ORAL NIGHTLY PRN
Qty: 30 TABLET | Refills: 0 | Status: SHIPPED | OUTPATIENT
Start: 2025-06-18

## 2025-06-18 RX ADMIN — TAMSULOSIN HYDROCHLORIDE 0.4 MG: 0.4 CAPSULE ORAL at 09:06

## 2025-06-18 RX ADMIN — ESCITALOPRAM OXALATE 10 MG: 10 TABLET ORAL at 09:06

## 2025-06-18 RX ADMIN — METOPROLOL SUCCINATE 12.5 MG: 25 TABLET, EXTENDED RELEASE ORAL at 09:06

## 2025-06-18 RX ADMIN — CYPROHEPTADINE HYDROCHLORIDE 4 MG: 4 TABLET ORAL at 09:06

## 2025-06-18 RX ADMIN — FAMOTIDINE 20 MG: 20 TABLET, FILM COATED ORAL at 09:06

## 2025-06-18 RX ADMIN — ASPIRIN 81 MG: 81 TABLET, COATED ORAL at 09:06

## 2025-06-18 RX ADMIN — FOLIC ACID 1 MG: 1 TABLET ORAL at 09:06

## 2025-06-18 NOTE — PROGRESS NOTES
EMERGENCY DEPARTMENT HISTORY AND PHYSICAL EXAM    Date: 7/20/2020  Patient Name: Jing Atkinson    History of Presenting Illness     Chief Complaint   Patient presents with    Leg Swelling         History Provided By: Patient    HPI: Jing Atkinson is a 30 y.o. female with a PMH of hypertension, DVT, PE, MS, ovarian CA who presents with left lower extremity paresthesias and pain behind the left knee x1.5 weeks. Patient denies any injury or trauma but states that she has similar symptoms when initially diagnosed with a DVT last year. Patient states she is no longer on anticoagulants as she was only on them for 6 months. Patient denies any recent long trips or travel. Patient is not on any OCPs but does smoke occasionally. Patient denies any chest pain or shortness of breath. PCP: Kb Loya MD    Current Outpatient Medications   Medication Sig Dispense Refill    methylPREDNISolone (Medrol, Leonel,) 4 mg tablet Take as instructed 1 Dose Pack 0    loratadine (Claritin) 10 mg tablet Take 1 Tab by mouth daily. 20 Tab 0    fluticasone propionate (FLONASE) 50 mcg/actuation nasal spray 2 Sprays by Both Nostrils route daily. 1 Bottle 0    lidocaine (LIDODERM) 5 % Apply patch to the affected area for 12 hours a day and remove for 12 hours a day. 10 Each 0    famotidine (PEPCID) 40 mg tablet Take 1 Tab by mouth nightly. 30 Tab 3       Past History     Past Medical History:  Past Medical History:   Diagnosis Date    Cancer ([facility] [address]     ovarian     Hypertension     with pregnancy    Ill-defined condition     MS    Ill-defined condition     TIMOTHY 1 with last pap       Past Surgical History:  History reviewed. No pertinent surgical history.     Family History:  Family History   Problem Relation Age of Onset    Kidney Disease Mother     Cancer Father        Social History:  Social History     Tobacco Use    Smoking status: Current Some Day Smoker     Packs/day: 0.25     Types: Cigars    Smokeless tobacco: [facility]  Hospital Medicine Progress Note        Chief Complaint: Inpatient Follow-up for Abnormal Lab (Hx of stage 4 lung CA. On chemo. Last treatment on 5/29. Sent to ED by PCP for low H&H and low platelets. GCS 15. Pt denies complaints. )  -generalized weakness    HPI:    The patient is an 84-year-old male with a history of COPD/emphysema, anxiety/diabetes/hyperlipidemia/hypertension/depression as well as poorly differentiated adenocarcinoma of lung diagnosed around 3/25 by right pleural effusion showing malignant cells followed by endobronchial ultrasound with lymph node biopsy again consistent with poorly differentiated adenocarcinoma.  Patient gets seen by [facility].  Patient is a stage IV A initiated on palliative chemotherapy on 04/25 with Paclitaxel carboplatin Q 3 weeks with the planned end date on 06/19/25 by Dr. Niko curran.  The patient with a history of recurrent right pleural malignant effusion with PleurX catheter placement.    The patient was last seen at [facility] on 05/21/2026, at that time patient received 1 unit PRBC.  Chemotherapy was placed on hold and restarted on 05/29/2025 (3rd cycle of carboplatin/Taxol with growth factor support)    Post chemotherapy family was concerned about episodes of confusion/weakness.  Patient was redirected to emergency room but family declined.  This was followed by multiple phone calls with different progressive symptoms and again he was asked to come to emergency room for evaluation/fluids but again family declined.  The patient was seen by home health with findings of positive orthostatics, blood work was done showing severe anemia with a hemoglobin of 6.8 and a platelet count of 11,000. The patient was told to present to emergency room [facility].  The patient was evaluated by emergency room physician and decision for admission was done.  Patient is status post  2 units PRBC and 1 unit platelets.    ROS pertinent for anorexia /sob/anxiety/decreased appetite/confusion          Interval Hx:     06/13/2025 Dr. Osborn-chart reviewed patient examined.  Patient sleeping at the present moment post sedation.  Case discussed with family member, daughter at bedside, Nahomi.  Patient is status post 2 units PRBC and 1 unit platelet.  Portable chest x-ray pending for evaluation of right pleural effusion.  Vital signs are stable.  Hemoglobin 8.6 and platelet count of 46,000. No appetite/super anxious/not sleeping.    Notes by Oncology reviewed.  We will consult palliative/physical therapy/psych    6/14/25 dr osborn - post 1 unit platelets . Alert and active . Seen by psych and buspar dc. Placed on remeron / lexapro  w  prn xanax.     06/15/2025 Dr. Osborn-chart reviewed patient examined.-the patient was given 1 unit platelets yesterday.  Also patient was seen by psych and medications for anxiety updated.  CT chest abdomen and pelvis was done to eval response to chemotherapy.  Two start with a his platelet count went up to 40,000.  H&H with a hemoglobin of 8.8.  CT chest abdomen and pelvis with progression of osteolytic lesions.  And Dr. Culp with Hematology Oncology was able to talk to patient about not being a further candidate for palliative chemotherapy.  The present moment patient appears stable.  Still having episodes of anxiety.  Remains on Remeron/low-dose Lexapro and Xanax 0.25 p.o. t.i.d. PRN.  Still not sleeping much.  Patient may have to be discharged home with home health with plans for palliative treatment with eventual hospice.  Remains alert/active and oriented.  No wanting to get out of bed.      6/16/25 dr osborn - still up. Offered xamax but denied. Seen by palliative  - given 1 unit plt's   Case was discussed with patient's nurse and  on the floor.    06/17/2025 Dr. Osborn-platelet count at 44,000.  When it comes to anxiety it is difficult to help him out  Former User    Tobacco comment: black and milds occ   Substance Use Topics    Alcohol use: Yes     Alcohol/week: 3.0 standard drinks     Types: 3 Shots of liquor per week     Frequency: 2-3 times a week     Drinks per session: 1 or 2     Comment: occ    Drug use: Yes     Types: Marijuana     Comment: occ       Allergies: Allergies   Allergen Reactions    Shellfish Derived Swelling         Review of Systems   Review of Systems   Constitutional: Negative for chills and fever. Respiratory: Negative for shortness of breath. Cardiovascular: Negative for chest pain. Gastrointestinal: Negative for nausea and vomiting. Musculoskeletal: Positive for back pain (intermittent) and myalgias. Negative for gait problem. Allergic/Immunologic: Negative for immunocompromised state. Neurological: Positive for numbness. Negative for dizziness, speech difficulty and weakness. Psychiatric/Behavioral: Negative for self-injury. All other systems reviewed and are negative. Physical Exam     Vitals:    07/20/20 2130 07/20/20 2149 07/20/20 2230 07/20/20 2241   BP: (!) 153/109  (!) 153/100    Pulse:  (!) 103  94   Resp:       Temp:       SpO2: 100%  99%    Weight:       Height:         Physical Exam  Vitals signs and nursing note reviewed. Constitutional:       General: She is not in acute distress. Appearance: She is well-developed. HENT:      Head: Normocephalic and atraumatic. Eyes:      Conjunctiva/sclera: Conjunctivae normal.   Cardiovascular:      Rate and Rhythm: Normal rate and regular rhythm. Pulses:           Dorsalis pedis pulses are 2+ on the right side and 2+ on the left side. Heart sounds: Normal heart sounds. Pulmonary:      Effort: Pulmonary effort is normal. No respiratory distress. Breath sounds: Normal breath sounds. No wheezing or rales. Musculoskeletal:      Left knee: Tenderness ( Minimal tenderness to the posterior knee) found. Right lower leg: No edema. Left lower leg: She exhibits no tenderness, no bony tenderness, no swelling and no deformity. No edema. Skin:     General: Skin is warm and dry. Neurological:      Mental Status: She is alert and oriented to person, place, and time. Psychiatric:         Behavior: Behavior normal.         Thought Content: Thought content normal.         Judgment: Judgment normal.           Diagnostic Study Results     Labs -   No results found for this or any previous visit (from the past 12 hour(s)). Radiologic Studies -   No orders to display     CT Results  (Last 48 hours)    None        CXR Results  (Last 48 hours)    None            Medical Decision Making   I am the first provider for this patient. I reviewed the vital signs, available nursing notes, past medical history, past surgical history, family history and social history. Vital Signs-Reviewed the patient's vital signs. Disposition:  Discharged    DISCHARGE NOTE:   10:45 PM      Care plan outlined and precautions discussed. Patient has no new complaints, changes, or physical findings. Results of duplex ultrasound were reviewed with the patient. All medications were reviewed with the patient; will d/c home with steroids. All of pt's questions and concerns were addressed. Patient was instructed and agrees to follow up with PCP for possible referral to vascular should symptoms persist, as well as to return to the ED upon further deterioration. Patient is ready to go home.     Follow-up Information     Follow up With Specialties Details Why Contact Info    Faviola Rao MD Internal Medicine Schedule an appointment as soon as possible for a visit in 2 days As needed [id number] [facility]  [phone]            Discharge Medication List as of 7/20/2020 10:45 PM      START taking these medications    Details   methylPREDNISolone (Medrol, Leonel,) 4 mg tablet Take as instructed, Normal, Disp-1 Dose Pack,R-0         CONTINUE these since his very finicky wheeze medications.  I will defer to Psychiatry.  Plenty of medications are ordered and active, I will let him choose    Objective/physical exam:  General: In no acute distress, afebrile. Resting   Chest: Clear to auscultation anteriroy. Right chest pleur x  Heart: RRR, +S1, S2, no appreciable murmur  Abdomen: Soft, nontender, BS +  MSK: Warm, no lower extremity edema, no clubbing or cyanosis  Neurologic: Alert and oriented x4, Cranial nerve II-XII intact, Strength 5/5 in all 4 extremities    VITAL SIGNS: 24 HRS MIN & MAX LAST   Temp  Min: 97.3 °F (36.3 °C)  Max: 97.7 °F (36.5 °C) 97.7 °F (36.5 °C)   BP  Min: 98/62  Max: 145/97 122/72   Pulse  Min: 70  Max: 78  70   Resp  Min: 18  Max: 20 18   SpO2  Min: 90 %  Max: 99 % 96 %     I have reviewed the following labs:  Recent Labs   Lab 06/15/25  0344 06/16/25 0316 06/17/25 0318   WBC 8.57 7.58 7.34   RBC 2.84* 2.75* 2.51*   HGB 8.7* 8.2* 7.7*   HCT 28.6* 27.9* 25.2*   .7* 101.5* 100.4*   MCH 30.6 29.8 30.7   MCHC 30.4* 29.4* 30.6*   RDW 17.6* 17.4* 17.3*   PLT 41* 25* 44*   MPV 11.1* 10.8* 10.5*     Recent Labs   Lab 06/12/25  1211 06/13/25  0330 06/14/25  0519 06/15/25  0344 06/16/25 0316 06/17/25 0318    144   < > 145 144 143   K 4.2 3.7   < > 4.0 3.9 4.0    105   < > 103 103 103   CO2 30 32*   < > 36* 35* 35*   BUN 15.5 16.3   < > 14.5 16.2 16.4   CREATININE 0.86 0.81   < > 0.85 0.82 0.77    100   < > 91 95 105   CALCIUM 8.4* 7.9*   < > 8.4* 8.1* 8.1*   MG  --   --    < > 1.60 1.70 1.70   ALBUMIN 2.4* 2.3*  --  2.2*  --   --    PROT 6.9 5.9  --  6.3  --   --    ALKPHOS 156* 146  --  147  --   --    ALT 6 8  --  6  --   --    AST 14 15  --  12  --   --    BILITOT <0.5 0.5  --  0.5  --   --     < > = values in this interval not displayed.     Microbiology Results (last 7 days)       Procedure Component Value Units Date/Time    Blood Culture [[id number]]  (Normal) Collected: 06/14/25 1006    Order Status: Completed  Specimen: Blood from Arm, Right Updated: 06/17/25 1202     Blood Culture No Growth At 72 Hours    Blood Culture [[id number]]  (Normal) Collected: 06/14/25 1006    Order Status: Completed Specimen: Blood from Arm, Left Updated: 06/17/25 1202     Blood Culture No Growth At 72 Hours    Blood Culture [[id number]]  (Normal) Collected: 06/13/25 0330    Order Status: Completed Specimen: Blood from Arm, Right Updated: 06/17/25 0901     Blood Culture No Growth At 96 Hours    Blood Culture [[id number]]  (Normal) Collected: 06/13/25 0330    Order Status: Completed Specimen: Blood from Arm, Right Updated: 06/17/25 0501     Blood Culture No Growth At 96 Hours    Blood Culture [[id number]]     Order Status: Canceled Specimen: Blood from Arm, Right              See below for Radiology    Assessment/Plan:  1.-non-small cell lung carcinoma stage IV A  -currently on chemo, 3rd cycle with carboplatin/Taxol(palliative chemo)  - admitted for profound anemia/thrombocytopenia related to chemotherapy  -patient has received PRBCs/platelets  -CT chest abdomen and pelvis with progression of bone metastasis.    2.-recurrent right malignant pleural effusion  -patient with PleurX catheter  -usually self drains Q 2 days around 300 cc    3.-severe symptomatic anemia due to chemotherapy  -patient is status post 2 units PRBC on 06/12/2025  -6.8.>2 units>8.8>> 8.8      4-severe thrombocytopenia related to chemotherapy  -patient presented with a platelet count of 11,000, status post 1 unit plt>>>41,000 on 06/13/2025>> 20,000 on 6/14/25>>> 1 unit>>> 41,000>>>>>[id number]>>>1 unit plt    5.-folate deficiency  -continue folic acid 1 mg p.o. daily    6.-anxiety/depression-  -continue Lexapro 5 mg p.o. daily/Remeron/Xanax 0.25 mg p.o. t.i.d. PRN  -consult psych for evaluation> remeron /lexapro/xanax prn     7.-insomnia  -continue Remeron for insomnia/appetite  -continue Remeron for insomnia as well as appetite    8.-deconditioning/generalized  medications which have NOT CHANGED    Details   loratadine (Claritin) 10 mg tablet Take 1 Tab by mouth daily. , Normal, Disp-20 Tab, R-0      fluticasone propionate (FLONASE) 50 mcg/actuation nasal spray 2 Sprays by Both Nostrils route daily. , Normal, Disp-1 Bottle, R-0      lidocaine (LIDODERM) 5 % Apply patch to the affected area for 12 hours a day and remove for 12 hours a day., Normal, Disp-10 Each, R-0      famotidine (PEPCID) 40 mg tablet Take 1 Tab by mouth nightly., Normal, Disp-30 Tab, R-3         STOP taking these medications       rivaroxaban (XARELTO) 20 mg tab tablet Comments:   Reason for Stopping:               Provider Notes (Medical Decision Making):   Patient presents with left lower extremity paresthesias and posterior knee pain. DDX: Recurrent DVT, Baker's cyst, Muscle spasm, sciatica. Will get duplex ultrasound of lower extremity. procedures:  Procedures    Please note that this dictation was completed with Dragon, computer voice recognition software. Quite often unanticipated grammatical, syntax, homophones, and other interpretive errors are inadvertently transcribed by the computer software. Please disregard these errors. Additionally, please excuse any errors that have escaped final proofreading. Diagnosis     Clinical Impression:   1. Left leg paresthesias    2.  Lower extremity pain, posterior, left weakness  -Physical therapy to see    history of COPD/emphysema, anxiety/diabetes/hyperlipidemia/hypertension/depression       Plan-  A.m. labs      VTE prophylaxis:  SCD    Patient condition:  Fair    Anticipated discharge and Disposition:   To be determined      All diagnosis and differential diagnosis have been reviewed; assessment and plan has been documented; I have personally reviewed the labs and test results that are presently available; I have reviewed the patients medication list; I have reviewed the consulting providers response and recommendations. I have reviewed or attempted to review medical records based upon their availability    All of the patient's questions have been  addressed and answered. Patient's is agreeable to the above stated plan. I will continue to monitor closely and make adjustments to medical management as needed.    Portions of this note dictated using EMR integrated voice recognition software, and may be subject to voice recognition errors not corrected at proofreading. Please contact writer for clarification if needed.   _____________________________________________________________________    Malnutrition Status:  Nutrition consulted. Most recent weight and BMI monitored-     Measurements:  Wt Readings from Last 1 Encounters:   06/12/25 80.7 kg (177 lb 14.4 oz)   Body mass index is 24.13 kg/m².    Patient has been screened and assessed by RD.    Malnutrition Type:  Context:    Level:      Malnutrition Characteristic Summary:       Interventions/Recommendations (treatment strategy):        Scheduled Med:   aspirin  81 mg Oral Daily    cyproheptadine  4 mg Oral TID    EScitalopram oxalate  10 mg Oral Daily    famotidine  20 mg Oral Daily    folic acid  1 mg Oral Daily    [START ON 6/18/2025] gabapentin  600 mg Oral QHS    metoprolol succinate  12.5 mg Oral Daily    mirtazapine  30 mg Oral QHS    mupirocin   Nasal BID    polyethylene glycol  17 g Oral BID    tamsulosin  0.4 mg Oral  Daily      Continuous Infusions:     PRN Meds:    Current Facility-Administered Medications:     0.9%  NaCl infusion (for blood administration), , Intravenous, Q24H PRN    acetaminophen, 650 mg, Oral, Q6H PRN    ALPRAZolam, 0.25 mg, Oral, TID PRN    benzonatate, 100 mg, Oral, TID PRN    calcium carbonate, 500 mg, Oral, TID PRN    dextrose 50%, 12.5 g, Intravenous, PRN    dextrose 50%, 25 g, Intravenous, PRN    glucagon (human recombinant), 1 mg, Intramuscular, PRN    glucose, 16 g, Oral, PRN    glucose, 24 g, Oral, PRN    guaiFENesin 100 mg/5 ml, 200 mg, Oral, Q4H PRN    hydrALAZINE, 10 mg, Intravenous, Q6H PRN    insulin aspart U-100, 0-5 Units, Subcutaneous, QID (AC + HS) PRN    melatonin, 9 mg, Oral, Nightly PRN    naloxone, 0.02 mg, Intravenous, PRN    ondansetron, 4 mg, Intravenous, Q6H PRN    sodium chloride 0.9%, 10 mL, Intravenous, Q12H PRN     Radiology:  I have personally reviewed the following imaging and agree with the radiologist.     CT Chest Abdomen Pelvis With IV Contrast (XPD) Routine Oral Contrast  Narrative: EXAMINATION:  CT CHEST ABDOMEN PELVIS WITH IV CONTRAST (XPD)    CLINICAL HISTORY:  NSCLC   assess response;    TECHNIQUE:  CT imaging from the chest through the pelvis after IV contrast.  Axial, coronal and sagittal reformatted images reviewed. Dose length product 910 mGycm. Automatic exposure control, adjustment of mA/kV or iterative reconstruction technique used to limit radiation dose.    COMPARISON:  Chest CT 04/03/2025    CT abdomen/pelvis 03/13/2025    FINDINGS:  Lung and large airways: Underlying parenchymal fibrosis.  Consolidation in the right lower lobe has similar appearance since April when allowing for less compressive atelectasis on today's exam.    Pleura: Right pleural drain with small volume right pleural fluid.  Fluid volume has decreased since previous chest CT.  No significant pleural fluid on the left.    Mediastinum and yamil: Left PICC tip in the SVC.  Heart mildly  enlarged.  15 mm right lower paratracheal lymph node previously measured 19 mm.  13 mm right hilar lymph node previously measured 17 mm.  Few other mediastinal and hilar lymph nodes mildly smaller as well.    Chest wall/axilla and lower neck: No significant findings.    Liver/biliary: Normal liver. No biliary dilatation.    Pancreas: Normal.    Spleen: Normal.    Adrenals: Normal.    Genitourinary: Symmetric renal enhancement. No hydronephrosis. Bladder within normal limits. Enlarged prostate.    Stomach/Bowel: Normal caliber small bowel and colon. No discernible sites of bowel inflammation.    Abdominal/pelvic lymph nodes and peritoneum: No pathologically enlarged lymph node identified. No ascites.    Abdominal/pelvic vasculature: Aortoiliac atherosclerosis.    Abdominal wall: Stable appearance.    Musculoskeletal: Multiple lytic lesions are new, larger or better defined since previous exams.  Lesions involve the spine, ribs, pelvis and scapulas.  Larger lesions include a 33 mm lesion in the left iliac wing which has an exophytic soft tissue component measuring 24 mm, a 31 mm lesion in the L5 vertebral body/pedicle and a 19 mm lesion in the T6 vertebral body.  Impression: Increased size, number and definition of osseous lesions.    Several mediastinal and hilar lymph nodes are mildly smaller.    Little overall change in right lower lobe consolidation.    Electronically signed by: Brian Valentin  Date:    06/15/2025  Time:    09:43      Harvey Osborn MD  Department of Hospital Medicine   [facility]   06/17/2025

## 2025-06-18 NOTE — DISCHARGE SUMMARY
[facility] Hospital Medicine Discharge Summary    Admit Date: 6/12/2025  Discharge Date and Time: 6/18/20253:07 PM  Admitting Physician:  Team  Discharging Physician: Harvey Osborn MD.  Primary Care Physician: Sadiq Hartmann MD  Consults: Hematology/Oncology, Hospital Medicine,palliative team     Discharge Diagnoses:  1.-non-small cell lung carcinoma stage IV A  -currently on chemo, 3rd cycle with carboplatin/Taxol(palliative chemo)  - admitted for profound anemia/thrombocytopenia related to chemotherapy  -patient has received PRBCs/platelets  -CT chest abdomen and pelvis with progression of bone metastasis.     2.-recurrent right malignant pleural effusion  -patient with PleurX catheter  -usually self drains Q 2 days around 300 cc     3.-severe symptomatic anemia due to chemotherapy  -patient is status post 2 units PRBC on 06/12/2025          4-severe thrombocytopenia related to chemotherapy  -patient presented with a platelet count of 11,000, status post 1 unit plt>>>41,000 on 06/13/2025>> 20,000 on 6/14/25>>> 1 unit>>> 41,000>>>>>22672>>>1 unit plt>>>4400>>>35291     5.-folate deficiency  -continue folic acid 1 mg p.o. daily     6.-anxiety/depression-  -continue Lexapro 5 mg p.o. daily/Remeron/Xanax 0.25 mg p.o. t.i.d. PRN  -consult psych for evaluation> remeron /lexapro/xanax prn      7.-insomnia  -continue Remeron for insomnia/appetite prn   -gabapentin 600 mgs po q hs  - xanax prn      8.-deconditioning/generalized weakness      9- urinary retention        history of COPD/emphysema, anxiety/diabetes/hyperlipidemia/hypertension/depression     Hospital Course:   The patient is an 84-year-old male with a history of COPD/emphysema, anxiety/diabetes/hyperlipidemia/hypertension/depression as well as poorly differentiated adenocarcinoma of lung diagnosed around 3/25 by right pleural effusion showing malignant cells followed by endobronchial ultrasound with lymph node biopsy  again consistent with poorly differentiated adenocarcinoma.  Patient gets seen by [facility].  Patient is a stage IV A initiated on palliative chemotherapy on 04/25 with Paclitaxel carboplatin Q 3 weeks with the planned end date on 06/19/25 by Dr. Niko curran.  The patient with a history of recurrent right pleural malignant effusion with PleurX catheter placement.     The patient was last seen at [facility] on 05/21/2026, at that time patient received 1 unit PRBC.  Chemotherapy was placed on hold and restarted on 05/29/2025 (3rd cycle of carboplatin/Taxol with growth factor support)     Post chemotherapy family was concerned about episodes of confusion/weakness.  Patient was redirected to emergency room but family declined.  This was followed by multiple phone calls with different progressive symptoms and again he was asked to come to emergency room for evaluation/fluids but again family declined.  The patient was seen by home health with findings of positive orthostatics, blood work was done showing severe anemia with a hemoglobin of 6.8 and a platelet count of 11,000. The patient was told to present to emergency room [facility].  The patient was evaluated by emergency room physician and decision for admission was done.  Patient is status post 2 units PRBC and 1 unit platelets.     ROS pertinent for anorexia /sob/anxiety/decreased appetite/confusion     Patient sleeping at the present moment post sedation.  Case discussed with family member, daughter at bedside, Nahomi.  Patient is status post 2 units PRBC and 1 unit platelet.  Portable chest x-ray pending for evaluation of right pleural effusion.  Vital signs are stable.  Hemoglobin 8.6 and platelet count of 46,000. No appetite/super anxious/not sleeping.    Notes by Oncology reviewed.  We will consult palliative/physical therapy/psych     6/14/25 dr gordon - post 1 unit platelets . Alert and active .  Seen by psych and buspar dc. Placed on remeron / lexapro  w  prn xanax.      patient was seen by psych and medications for anxiety updated.  CT chest abdomen and pelvis was done to eval response to chemotherapy.  T0 start with a his platelet count went up to 40,000.  H&H with a hemoglobin of 8.8.  CT chest abdomen and pelvis with progression of osteolytic lesions.  And Dr. Eugene  with Hematology Oncology was able to talk to patient about not being a further candidate for palliative chemotherapy.   patient appears stable.  Still having episodes of anxiety.  Remains on Remeron/low-dose Lexapro and Xanax 0.25 p.o. t.i.d. PRN.  Still not sleeping much.  Patient may have to be discharged home with home health with plans for palliative treatment with eventual hospice.  Remains alert/active and oriented.  No wanting to get out of bed.  Again drop in plt's and given 1 unit plt's     platelet count at 44,000.  When it comes to anxiety it is difficult to help him out since his very finicky wheeze medications.  I will defer to Psychiatry.  Plenty of medications are ordered and active, I will let him choose  Palliative team consulted.  Appreciate their assistance.  They were able to prescribed some medications to help with sleep that includes gabapentin 600 mg q.h.s..  Psychiatry place patient on Lexapro 10 mg p.o. daily.  Patient is platelets finally trending up, now 48,000. Extensive talk by palliative team about hospice services.  Still, patient unable to make a decision.  He will like to go home and discuss with wife and family members before making a decision about hospice.  In reality, patient continues to decline.  No longer getting out of bed.  Now retaining urine with a volume of 553 with a history of BPH.  We will attempt to place Donovan catheter if he will let us.  Patient to follow with his primary care physician and he will make decision about hospice services at notify his oncologist Dr. Niko eugene.      Pt  was seen and examined on the day of discharge  Vitals:  VITAL SIGNS: 24 HRS MIN & MAX LAST   Temp  Min: 96.8 °F (36 °C)  Max: 97.9 °F (36.6 °C) 97.2 °F (36.2 °C)   BP  Min: 118/79  Max: 143/70 118/79   Pulse  Min: 70  Max: 84  84   Resp  Min: 14  Max: 20 20   SpO2  Min: 95 %  Max: 100 % 100 %       Physical Exam:  General: In no acute distress, afebrile. Resting   Chest: Clear to auscultation anteriroy. Right chest pleur x  Heart: RRR, +S1, S2, no appreciable murmur  Abdomen: Soft, nontender, BS +  MSK: Warm, no lower extremity edema, no clubbing or cyanosis  Neurologic: Alert and oriented x4, Cranial nerve II-XII intact, global deconditioning, not able to get out of bed by himself.    Procedures Performed: No admission procedures for hospital encounter.     Significant Diagnostic Studies: See Full reports for all details    Recent Labs   Lab 06/16/25 0316 06/17/25 0318 06/18/25  0331   WBC 7.58 7.34 6.09   RBC 2.75* 2.51* 2.68*   HGB 8.2* 7.7* 8.1*   HCT 27.9* 25.2* 27.3*   .5* 100.4* 101.9*   MCH 29.8 30.7 30.2   MCHC 29.4* 30.6* 29.7*   RDW 17.4* 17.3* 17.6*   PLT 25* 44* 48*   MPV 10.8* 10.5* 10.7*       Recent Labs   Lab 06/12/25  1211 06/13/25  0330 06/14/25  0519 06/15/25  0344 06/16/25  0316 06/17/25  0318 06/18/25  0331    144   < > 145 144 143 142   K 4.2 3.7   < > 4.0 3.9 4.0 3.8    105   < > 103 103 103 102   CO2 30 32*   < > 36* 35* 35* 35*   BUN 15.5 16.3   < > 14.5 16.2 16.4 14.0   CREATININE 0.86 0.81   < > 0.85 0.82 0.77 0.76    100   < > 91 95 105 107   CALCIUM 8.4* 7.9*   < > 8.4* 8.1* 8.1* 8.5*   MG  --   --    < > 1.60 1.70 1.70  --    ALBUMIN 2.4* 2.3*  --  2.2*  --   --   --    PROT 6.9 5.9  --  6.3  --   --   --    ALKPHOS 156* 146  --  147  --   --   --    ALT 6 8  --  6  --   --   --    AST 14 15  --  12  --   --   --    BILITOT <0.5 0.5  --  0.5  --   --   --     < > = values in this interval not displayed.        Microbiology Results (last 7 days)        Procedure Component Value Units Date/Time    Blood Culture [[id number]]  (Normal) Collected: 06/14/25 1006    Order Status: Completed Specimen: Blood from Arm, Right Updated: 06/18/25 1202     Blood Culture No Growth At 96 Hours    Blood Culture [[id number]]  (Normal) Collected: 06/14/25 1006    Order Status: Completed Specimen: Blood from Arm, Left Updated: 06/18/25 1202     Blood Culture No Growth At 96 Hours    Blood Culture [[id number]]  (Normal) Collected: 06/13/25 0330    Order Status: Completed Specimen: Blood from Arm, Right Updated: 06/18/25 0902     Blood Culture No Growth at 5 days    Blood Culture [[id number]]  (Normal) Collected: 06/13/25 0330    Order Status: Completed Specimen: Blood from Arm, Right Updated: 06/18/25 0501     Blood Culture No Growth at 5 days    Blood Culture [[id number]]     Order Status: Canceled Specimen: Blood from Arm, Right              CT Chest Abdomen Pelvis With IV Contrast (XPD) Routine Oral Contrast  Narrative: EXAMINATION:  CT CHEST ABDOMEN PELVIS WITH IV CONTRAST (XPD)    CLINICAL HISTORY:  NSCLC   assess response;    TECHNIQUE:  CT imaging from the chest through the pelvis after IV contrast.  Axial, coronal and sagittal reformatted images reviewed. Dose length product 910 mGycm. Automatic exposure control, adjustment of mA/kV or iterative reconstruction technique used to limit radiation dose.    COMPARISON:  Chest CT 04/03/2025    CT abdomen/pelvis 03/13/2025    FINDINGS:  Lung and large airways: Underlying parenchymal fibrosis.  Consolidation in the right lower lobe has similar appearance since April when allowing for less compressive atelectasis on today's exam.    Pleura: Right pleural drain with small volume right pleural fluid.  Fluid volume has decreased since previous chest CT.  No significant pleural fluid on the left.    Mediastinum and yamil: Left PICC tip in the SVC.  Heart mildly enlarged.  15 mm right lower paratracheal lymph node previously measured 19 mm.   13 mm right hilar lymph node previously measured 17 mm.  Few other mediastinal and hilar lymph nodes mildly smaller as well.    Chest wall/axilla and lower neck: No significant findings.    Liver/biliary: Normal liver. No biliary dilatation.    Pancreas: Normal.    Spleen: Normal.    Adrenals: Normal.    Genitourinary: Symmetric renal enhancement. No hydronephrosis. Bladder within normal limits. Enlarged prostate.    Stomach/Bowel: Normal caliber small bowel and colon. No discernible sites of bowel inflammation.    Abdominal/pelvic lymph nodes and peritoneum: No pathologically enlarged lymph node identified. No ascites.    Abdominal/pelvic vasculature: Aortoiliac atherosclerosis.    Abdominal wall: Stable appearance.    Musculoskeletal: Multiple lytic lesions are new, larger or better defined since previous exams.  Lesions involve the spine, ribs, pelvis and scapulas.  Larger lesions include a 33 mm lesion in the left iliac wing which has an exophytic soft tissue component measuring 24 mm, a 31 mm lesion in the L5 vertebral body/pedicle and a 19 mm lesion in the T6 vertebral body.  Impression: Increased size, number and definition of osseous lesions.    Several mediastinal and hilar lymph nodes are mildly smaller.    Little overall change in right lower lobe consolidation.    Electronically signed by: Brian Valentin  Date:    06/15/2025  Time:    09:43         Medication List        PAUSE taking these medications      glimepiride 2 MG tablet  Wait to take this until your doctor or other care provider tells you to start again.  Commonly known as: AMARYL  Take 1 tablet (2 mg total) by mouth 2 (two) times a day.     metFORMIN 1000 MG tablet  Wait to take this until your doctor or other care provider tells you to start again.  Commonly known as: GLUCOPHAGE            START taking these medications      cyproheptadine 4 mg tablet  Commonly known as: PERIACTIN  Take 1 tablet (4 mg total) by mouth 2 (two) times daily as  needed (Increased appetite).     EScitalopram oxalate 10 MG tablet  Commonly known as: LEXAPRO  Take 1 tablet (10 mg total) by mouth once daily.  Start taking on: June 19, 2025     folic acid 1 MG tablet  Commonly known as: FOLVITE  Take 1 tablet (1 mg total) by mouth once daily.  Start taking on: June 19, 2025            CHANGE how you take these medications      gabapentin 300 MG capsule  Commonly known as: NEURONTIN  Take 2 capsules (600 mg total) by mouth every evening.  What changed:   how much to take  when to take this     metoprolol succinate 25 MG 24 hr tablet  Commonly known as: TOPROL-XL  Take 0.5 tablets (12.5 mg total) by mouth once daily.  What changed: additional instructions            CONTINUE taking these medications      albuterol 0.63 mg/3 mL Nebu  Commonly known as: ACCUNEB     ALPRAZolam 0.25 MG tablet  Commonly known as: XANAX     aspirin 81 MG EC tablet  Commonly known as: ECOTRIN     famotidine 20 MG tablet  Commonly known as: PEPCID     guaiFENesin-codeine 100-10 mg/5 ml  mg/5 mL syrup  Commonly known as: TUSSI-ORGANIDIN NR  Take 5 mLs by mouth every 4 (four) hours as needed for Cough or Congestion.     mirtazapine 30 MG tablet  Commonly known as: REMERON  Take 1 tablet (30 mg total) by mouth every evening.     polyethylene glycol 17 gram Pwpk  Commonly known as: GLYCOLAX     tamsulosin 0.4 mg Cap  Commonly known as: FLOMAX     umeclidinium 62.5 mcg/actuation inhalation capsule  Commonly known as: INCRUSE ELLIPTA  Inhale 62.5 mcg into the lungs once daily. Controller            STOP taking these medications      busPIRone 5 MG Tab  Commonly known as: BUSPAR               Where to Get Your Medications        These medications were sent to [address] [address]      Phone: [phone]   cyproheptadine 4 mg tablet  EScitalopram oxalate 10 MG tablet  folic acid 1 MG tablet  gabapentin 300 MG capsule          Explained in  detail to the patient about the discharge plan, medications, and follow-up visits. Pt understands and agrees with the treatment plan  Discharge Disposition: Home-Health Care [facility]   Discharged Condition: stable  Diet-   Dietary Orders (From admission, onward)       Start     Ordered    06/17/25 0757  Dietary nutrition supplements All Meals; Boost Glucose Control - Vanilla  Continuous        Question Answer Comment   Frequency: All Meals    Select PO Supplement: Boost Glucose Control - Vanilla        06/17/25 0756    06/12/25 1929  Dietary nutrition supplements All Meals; Other (see comments)  Continuous        Comments: Ensure pudding or equivalent   Question Answer Comment   Frequency: All Meals    Select PO Supplement: Other (see comments)        06/12/25 1930    06/12/25 1544  Diet Adult Regular  Diet effective now         06/12/25 1543                   Medications Per DC med rec  Activities as tolerated   Follow-up Information       [facility] Follow up.    Why: This is your current home health care provider.  They will contact you after discharge.  You may call them for any questions regarding home health.  Contact information:  [address] [place] [address]  [phone]               Sadiq Hartmann MD Follow up.    Specialty: Family Medicine  Contact information:  [address]  [place] [address]  [phone]               Niko Johnson MD Follow up.    Specialties: Oncology, Hematology and Oncology  Contact information:  [address]  [address]  [facility] [address]  [phone]                           For further questions contact hospitalist office    Discharge time 33 minutes    For worsening symptoms, chest pain, shortness of breath, increased abdominal pain, high grade fever, stroke or stroke like symptoms, immediately go to the nearest Emergency Room or call 911 as soon as possible.      Harvey Chaudhry M.D, on 6/18/2025. at 3:07 PM.

## 2025-06-18 NOTE — PLAN OF CARE
06/18/25 1510   Final Note   Assessment Type Final Discharge Note   Anticipated Discharge Disposition Home-Health   Hospital Resources/Appts/Education Provided Post-Acute resouces added to AVS   Post-Acute Status   Post-Acute Authorization Home Health   Home Health Status Set-up Complete/Auth obtained   Discharge Delays None known at this time     Discharge home today with home health.  AVS sent to [facility]  via epic.

## 2025-06-18 NOTE — PLAN OF CARE
Problem: Adult Inpatient Plan of Care  Goal: Plan of Care Review  Outcome: Progressing  Goal: Patient-Specific Goal (Individualized)  Outcome: Progressing  Goal: Absence of Hospital-Acquired Illness or Injury  Outcome: Progressing  Intervention: Identify and Manage Fall Risk  Flowsheets (Taken 6/17/2025 2357)  Safety Promotion/Fall Prevention:   assistive device/personal item within reach   family to remain at bedside   side rails raised x 2  Intervention: Prevent Skin Injury  Flowsheets (Taken 6/17/2025 2357)  Body Position: weight shifting  Skin Protection: incontinence pads utilized  Device Skin Pressure Protection: absorbent pad utilized/changed  Intervention: Prevent and Manage VTE (Venous Thromboembolism) Risk  Flowsheets (Taken 6/17/2025 2357)  VTE Prevention/Management: ambulation promoted  Intervention: Prevent Infection  Flowsheets (Taken 6/17/2025 2357)  Infection Prevention:   rest/sleep promoted   single patient room provided  Goal: Optimal Comfort and Wellbeing  Outcome: Progressing  Intervention: Monitor Pain and Promote Comfort  Flowsheets (Taken 6/17/2025 2357)  Pain Management Interventions:   care clustered   quiet environment facilitated  Intervention: Provide Person-Centered Care  Flowsheets (Taken 6/17/2025 2357)  Trust Relationship/Rapport:   care explained   thoughts/feelings acknowledged   questions encouraged

## 2025-06-18 NOTE — PROGRESS NOTES
Advance Care Planning     Date: 06/18/2025    Met with patient and patient's daughter at bedside and reviewed goals of care.  Daughter at bedside reports that it has been arranged with family that along with the wife, another family member would be at the home to assist patient with help/needs. I reviewed the benefits of hospice services including CNA visits to help with activities of daily living and regular visits by an RN for on going symptom management and support and 24/7 on call RN to address uncontrolled symptoms.    Patient's daughter reports that patient and family have had multiple discussions and are agreeable that upon discharge hospice services would be most beneficial to patient, however, states that patient would like to discuss hospice services and goals of care in person with his wife (wife is unable to come to the hospital related to limited mobility and advanced age). Patient/family also expressed they would like to speak to multiple hospice agencies prior to deciding. Verbalized understanding of the importance of including his wife in the decision and informed that case management could provide a list of hospice agencies that are near his home for them to review and consider, patient and daughter appreciative of option of list to review, case management notified of request. I did have case management provide a list of hospice agencies for them to review and informed the patient/family that once they have elected a company that they could notify oncology or his primary of their election and request an order to be sent when they are ready. I did offer palliative care services in the mean time, however they stated they would be electing an agency soon and quicker than when a palliative program could potential see him.     Patient and patient's daughter at bedside, states improvement in patient's restlessness with medication adjustment completed by Dr. Snow, Gabapentin 600 mg at night and Lexapro  10 mg Daily.        [facility]  I engaged the patient and family in a voluntary conversation about advance care planning and we specifically addressed what the goals of care would be moving forward, in light of the patient's change in clinical status, specifically goals of care as discussed above. We explored the patient's values and preferences for future care.  The patient and family endorses that what is most important right now is to focus on remaining as independent as possible, symptom/pain control, and curative/life-prolongation (regardless of treatment burdens)    Accordingly, we have decided that the best plan to meet the patient's goals includes continuing with treatment; patient and family continue to discuss/consider the option of hospice in the near future.     Palliative Care RN visit was spent on advance care planning, goals of care discussion, emotional support, formulating and communicating prognosis and exploring burden/benefit of various approaches of treatment. This discussion occurred on a fully voluntary basis with the verbal consent of the patient and/or family.

## 2025-06-18 NOTE — PT/OT/SLP PROGRESS
Physical Therapy Treatment    Patient Name:  Juanjose Chawla   MRN:  [id number]    Recommendations:     Discharge therapy intensity: Low Intensity Therapy   Discharge Equipment Recommendations: none  Barriers to discharge: Ongoing medical needs    Assessment:     Juanjose Chawla is a 84 y.o. male admitted with a medical diagnosis of stage Iv lung cancer, recurrent R malignant pleural effusion, anemia, thrombocytopenia, deconditioning.  He presents with the following impairments/functional limitations: weakness, impaired endurance, impaired self care skills, impaired functional mobility, gait instability, impaired balance.    Pt VERY anxious to mobilize and needing max encouragement to participate today.    Rehab Prognosis: Good; patient would benefit from acute skilled PT services to address these deficits and reach maximum level of function.    Recent Surgery: * No surgery found *      Plan:     During this hospitalization, patient would benefit from acute PT services 5 x/week to address the identified rehab impairments via gait training, therapeutic activities, therapeutic exercises and progress toward the following goals:    Plan of Care Expires:  07/16/25    Subjective     Chief Complaint: self limiting comments throughout  Patient/Family Comments/goals:   Pain/Comfort:  Pain Rating 1: 0/10      Objective:     Communicated with RN prior to session.  Patient found HOB elevated with oxygen, peripheral IV, PICC line (pleurx drain) upon PT entry to room.     General Precautions: Standard, fall  Orthopedic Precautions: N/A  Braces: N/A  Respiratory Status: Nasal cannula, flow 2 L/min, humidified  Blood Pressure:   Skin Integrity: Visible skin intact      Functional Mobility:  Bed Mobility:     Supine to Sit: moderate assistance  Sit to Supine: minimum assistance  Transfers:     Sit to Stand:  contact guard assistance with rolling walker  Pre-gait: small sidesteps at bedside towards HOB w/ RW, CGA  Pt initially  wanting to attempt to ambulate to toilet in bathroom for voiding, however changed his mind stating he was too weak and couldn't make it. Refusing further mobility today    Therapeutic Activities/Exercises:      Co-Treatment: No    Education Provided:  Role and goals of PT, transfer training, bed mobility, gait training, balance training, safety awareness, assistive device, strengthening exercises, and importance of participating in PT to return to PLOF.        Patient left HOB elevated with all lines intact, call button in reach, and daughter present    GOALS:   Multidisciplinary Problems       Physical Therapy Goals          Problem: Physical Therapy    Goal Priority Disciplines Outcome Interventions   Physical Therapy Goal     PT, PT/OT Progressing    Description: Goals to be met by: 25     Patient will increase functional independence with mobility by performin. Supine to sit with Stand-by Assistance  2. Sit to stand transfer with Stand-by Assistance  3. Bed to chair transfer with Stand-by Assistance using Rolling Walker  4. Gait  x 100 feet with Stand-by Assistance using Rolling Walker.   5. Improve BLE strength to 5/5 to improve functional mobility                         Time Tracking:     PT Received On: 25  PT Start Time: 1013     PT Stop Time: 1030  PT Total Time (min): 17 min     Billable Minutes: Therapeutic Activity 17    Treatment Type: Treatment  PT/PTA: PT     Number of PTA visits since last PT visit: 2025

## 2025-06-18 NOTE — DISCHARGE INSTRUCTIONS
1.-please follow with primary care physician in 1-2 weeks   2.-please follow-up with your oncologist as scheduled  3.-please discontinue medications for diabetes until further assessment by your primary care physician

## 2025-06-19 ENCOUNTER — PATIENT OUTREACH (OUTPATIENT)
Dept: ADMINISTRATIVE | Facility: CLINIC | Age: 85
End: 2025-06-19
Payer: MEDICARE

## 2025-06-19 ENCOUNTER — NURSE TRIAGE (OUTPATIENT)
Dept: ADMINISTRATIVE | Facility: CLINIC | Age: 85
End: 2025-06-19
Payer: MEDICARE

## 2025-06-19 LAB
BACTERIA BLD CULT: NORMAL
BACTERIA BLD CULT: NORMAL

## 2025-06-19 NOTE — TELEPHONE ENCOUNTER
Discharged from hospital yesterday    Has question about catheter.   Says that home health RN said that she needs to change catheter 1x/ month.     Daughter says she needs to speak with someone who can give her instructions on when the catheter needs to be changed out. Says that she wants to make sure that the home health RN is giving her the correct information.     Denies pain from catheter.   Still producing urine in bag.     Says he is drinking a small amount.   Reports that he ate a small amount last night.     Needs assistance to stand; reports this is new since the last week.    Dispo is call 911 now  Advised to call 911   Daughter refusing dispo  Reiterated dispo.         Reason for Disposition   Shock suspected (e.g., cold/pale/clammy skin, too weak to stand, low BP, rapid pulse)    Protocols used: Urinary Catheter (e.g., Donovan) Symptoms and Madxiqalb-D-NO

## 2025-06-20 ENCOUNTER — DOCUMENT SCAN (OUTPATIENT)
Dept: HOME HEALTH SERVICES | Facility: HOSPITAL | Age: 85
End: 2025-06-20
Payer: MEDICARE

## 2025-07-02 ENCOUNTER — PATIENT MESSAGE (OUTPATIENT)
Dept: HEMATOLOGY/ONCOLOGY | Facility: CLINIC | Age: 85
End: 2025-07-02
Payer: MEDICARE

## 2025-07-15 ENCOUNTER — DOCUMENT SCAN (OUTPATIENT)
Dept: HOME HEALTH SERVICES | Facility: HOSPITAL | Age: 85
End: 2025-07-15
Payer: MEDICARE

## 2025-07-18 ENCOUNTER — DOCUMENT SCAN (OUTPATIENT)
Dept: HOME HEALTH SERVICES | Facility: HOSPITAL | Age: 85
End: 2025-07-18
Payer: MEDICARE

## 2025-08-20 ENCOUNTER — EXTERNAL HOME HEALTH (OUTPATIENT)
Dept: HOME HEALTH SERVICES | Facility: HOSPITAL | Age: 85
End: 2025-08-20
Payer: MEDICARE

## 2025-08-20 ENCOUNTER — DOCUMENT SCAN (OUTPATIENT)
Dept: HOME HEALTH SERVICES | Facility: HOSPITAL | Age: 85
End: 2025-08-20
Payer: MEDICARE

## (undated) DEVICE — GLOVE PROTEXIS NEOPRN SZ8

## (undated) DEVICE — NDL HYPO REG 25G X 1 1/2

## (undated) DEVICE — DRAPE ORTH SPLIT 77X108IN

## (undated) DEVICE — NDL BIOPSY ASPIRATION 21G

## (undated) DEVICE — TOWEL OR DISP STRL BLUE 4/PK

## (undated) DEVICE — KIT SURGICAL TURNOVER

## (undated) DEVICE — CATH BRONCHOSCOPE F/BF

## (undated) DEVICE — COVER TABLE HVY DTY 60X90IN

## (undated) DEVICE — KIT ROCKET IPC CATH INSERT 16F

## (undated) DEVICE — POSITIONER HEAD ADULT

## (undated) DEVICE — SUPPORT ULNA NERVE PROTECTOR

## (undated) DEVICE — GLOVE SURG BIOGEL LATEX SZ 7.5

## (undated) DEVICE — ELECTRODE PATIENT RETURN DISP

## (undated) DEVICE — DRAPE TOP 53X102IN

## (undated) DEVICE — KIT CATH/START PLEURX PLEURAL

## (undated) DEVICE — CATH THORACIC 24FR ST

## (undated) DEVICE — DRAIN CHEST DRY SUCTION

## (undated) DEVICE — KIT PLEURX DRAINAGE 1000CC

## (undated) DEVICE — PENCIL ELECSURG ROCKER 15FT

## (undated) DEVICE — SUT ETHBND XTRA 1 OS-8 30IN

## (undated) DEVICE — TUBING SUC UNIV W/CONN 12FT